# Patient Record
Sex: MALE | Race: WHITE | NOT HISPANIC OR LATINO | URBAN - METROPOLITAN AREA
[De-identification: names, ages, dates, MRNs, and addresses within clinical notes are randomized per-mention and may not be internally consistent; named-entity substitution may affect disease eponyms.]

---

## 2021-04-05 ENCOUNTER — INPATIENT (INPATIENT)
Facility: HOSPITAL | Age: 56
LOS: 24 days | Discharge: NURSING, REHAB OR LONG-TERM CARE | DRG: 4 | End: 2021-04-30
Attending: STUDENT IN AN ORGANIZED HEALTH CARE EDUCATION/TRAINING PROGRAM | Admitting: INTERNAL MEDICINE
Payer: COMMERCIAL

## 2021-04-05 VITALS — HEART RATE: 115 BPM | RESPIRATION RATE: 44 BRPM

## 2021-04-05 LAB
A1C WITH ESTIMATED AVERAGE GLUCOSE RESULT: 6.2 % — HIGH (ref 4–5.6)
ALBUMIN SERPL ELPH-MCNC: 2.8 G/DL — LOW (ref 3.3–5)
ALP SERPL-CCNC: 80 U/L — SIGNIFICANT CHANGE UP (ref 40–120)
ALT FLD-CCNC: 19 U/L — SIGNIFICANT CHANGE UP (ref 10–45)
ANION GAP SERPL CALC-SCNC: 10 MMOL/L — SIGNIFICANT CHANGE UP (ref 5–17)
ANION GAP SERPL CALC-SCNC: 11 MMOL/L — SIGNIFICANT CHANGE UP (ref 5–17)
APTT BLD: 27.1 SEC — LOW (ref 27.5–35.5)
APTT BLD: 27.2 SEC — LOW (ref 27.5–35.5)
AST SERPL-CCNC: 45 U/L — HIGH (ref 10–40)
BASE EXCESS BLDA CALC-SCNC: -0.2 MMOL/L — SIGNIFICANT CHANGE UP (ref -2–3)
BASE EXCESS BLDA CALC-SCNC: -0.8 MMOL/L — SIGNIFICANT CHANGE UP (ref -2–3)
BASE EXCESS BLDA CALC-SCNC: -1.4 MMOL/L — SIGNIFICANT CHANGE UP (ref -2–3)
BASE EXCESS BLDA CALC-SCNC: -1.6 MMOL/L — SIGNIFICANT CHANGE UP (ref -2–3)
BASE EXCESS BLDA CALC-SCNC: 0.1 MMOL/L — SIGNIFICANT CHANGE UP (ref -2–3)
BASE EXCESS BLDV CALC-SCNC: -0.7 MMOL/L — SIGNIFICANT CHANGE UP
BASE EXCESS BLDV CALC-SCNC: -12.2 MMOL/L — SIGNIFICANT CHANGE UP
BASE EXCESS BLDV CALC-SCNC: 0.5 MMOL/L — SIGNIFICANT CHANGE UP
BASOPHILS # BLD AUTO: 0.02 K/UL — SIGNIFICANT CHANGE UP (ref 0–0.2)
BASOPHILS # BLD AUTO: 0.04 K/UL — SIGNIFICANT CHANGE UP (ref 0–0.2)
BASOPHILS NFR BLD AUTO: 0.2 % — SIGNIFICANT CHANGE UP (ref 0–2)
BASOPHILS NFR BLD AUTO: 0.3 % — SIGNIFICANT CHANGE UP (ref 0–2)
BILIRUB SERPL-MCNC: 0.5 MG/DL — SIGNIFICANT CHANGE UP (ref 0.2–1.2)
BUN SERPL-MCNC: 12 MG/DL — SIGNIFICANT CHANGE UP (ref 7–23)
BUN SERPL-MCNC: 14 MG/DL — SIGNIFICANT CHANGE UP (ref 7–23)
BUN SERPL-MCNC: 19 MG/DL — SIGNIFICANT CHANGE UP (ref 7–23)
CA-I SERPL-SCNC: 1.02 MMOL/L — LOW (ref 1.12–1.3)
CA-I SERPL-SCNC: 1.02 MMOL/L — LOW (ref 1.12–1.3)
CALCIUM SERPL-MCNC: 7.6 MG/DL — LOW (ref 8.4–10.5)
CALCIUM SERPL-MCNC: 7.7 MG/DL — LOW (ref 8.4–10.5)
CHLORIDE SERPL-SCNC: 94 MMOL/L — LOW (ref 96–108)
CHLORIDE SERPL-SCNC: 97 MMOL/L — SIGNIFICANT CHANGE UP (ref 96–108)
CK MB CFR SERPL CALC: 8.7 NG/ML — HIGH (ref 0–6.7)
CK MB CFR SERPL CALC: 8.8 NG/ML — HIGH (ref 0–6.7)
CK SERPL-CCNC: 145 U/L — SIGNIFICANT CHANGE UP (ref 30–200)
CK SERPL-CCNC: 203 U/L — HIGH (ref 30–200)
CO2 SERPL-SCNC: 26 MMOL/L — SIGNIFICANT CHANGE UP (ref 22–31)
CO2 SERPL-SCNC: 28 MMOL/L — SIGNIFICANT CHANGE UP (ref 22–31)
CREAT ?TM UR-MCNC: 128 MG/DL — SIGNIFICANT CHANGE UP
CREAT SERPL-MCNC: 1.69 MG/DL — HIGH (ref 0.5–1.3)
CREAT SERPL-MCNC: 2.1 MG/DL — HIGH (ref 0.5–1.3)
D DIMER BLD IA.RAPID-MCNC: 1002 NG/ML DDU — HIGH
EOSINOPHIL # BLD AUTO: 0 K/UL — SIGNIFICANT CHANGE UP (ref 0–0.5)
EOSINOPHIL # BLD AUTO: 0.01 K/UL — SIGNIFICANT CHANGE UP (ref 0–0.5)
EOSINOPHIL NFR BLD AUTO: 0 % — SIGNIFICANT CHANGE UP (ref 0–6)
EOSINOPHIL NFR BLD AUTO: 0.1 % — SIGNIFICANT CHANGE UP (ref 0–6)
ESTIMATED AVERAGE GLUCOSE: 131 MG/DL — HIGH (ref 68–114)
FIBRINOGEN PPP-MCNC: 450 MG/DL — HIGH (ref 258–438)
GAS PNL BLDA: SIGNIFICANT CHANGE UP
GAS PNL BLDV: 130 MMOL/L — LOW (ref 138–146)
GAS PNL BLDV: 130 MMOL/L — LOW (ref 138–146)
GAS PNL BLDV: SIGNIFICANT CHANGE UP
GLUCOSE BLDC GLUCOMTR-MCNC: 239 MG/DL — HIGH (ref 70–99)
GLUCOSE BLDC GLUCOMTR-MCNC: 244 MG/DL — HIGH (ref 70–99)
GLUCOSE BLDC GLUCOMTR-MCNC: 259 MG/DL — HIGH (ref 70–99)
GLUCOSE BLDC GLUCOMTR-MCNC: 266 MG/DL — HIGH (ref 70–99)
GLUCOSE BLDC GLUCOMTR-MCNC: 276 MG/DL — HIGH (ref 70–99)
GLUCOSE BLDC GLUCOMTR-MCNC: 280 MG/DL — HIGH (ref 70–99)
GLUCOSE BLDC GLUCOMTR-MCNC: 289 MG/DL — HIGH (ref 70–99)
GLUCOSE BLDC GLUCOMTR-MCNC: 329 MG/DL — HIGH (ref 70–99)
GLUCOSE SERPL-MCNC: 256 MG/DL — HIGH (ref 70–99)
GLUCOSE SERPL-MCNC: 350 MG/DL — HIGH (ref 70–99)
GRAM STN FLD: SIGNIFICANT CHANGE UP
HCO3 BLDA-SCNC: 25 MMOL/L — SIGNIFICANT CHANGE UP (ref 21–28)
HCO3 BLDA-SCNC: 25 MMOL/L — SIGNIFICANT CHANGE UP (ref 21–28)
HCO3 BLDA-SCNC: 26 MMOL/L — SIGNIFICANT CHANGE UP (ref 21–28)
HCO3 BLDA-SCNC: 26 MMOL/L — SIGNIFICANT CHANGE UP (ref 21–28)
HCO3 BLDA-SCNC: 27 MMOL/L — SIGNIFICANT CHANGE UP (ref 21–28)
HCO3 BLDV-SCNC: 16 MMOL/L — LOW (ref 20–27)
HCO3 BLDV-SCNC: 28 MMOL/L — HIGH (ref 20–27)
HCO3 BLDV-SCNC: 28 MMOL/L — HIGH (ref 20–27)
HCT VFR BLD CALC: 33.3 % — LOW (ref 39–50)
HCT VFR BLD CALC: 38.2 % — LOW (ref 39–50)
HGB BLD-MCNC: 11 G/DL — LOW (ref 13–17)
HGB BLD-MCNC: 9.7 G/DL — LOW (ref 13–17)
IMM GRANULOCYTES NFR BLD AUTO: 1 % — SIGNIFICANT CHANGE UP (ref 0–1.5)
IMM GRANULOCYTES NFR BLD AUTO: 1.1 % — SIGNIFICANT CHANGE UP (ref 0–1.5)
INR BLD: 1.19 — HIGH (ref 0.88–1.16)
INR BLD: 1.25 — HIGH (ref 0.88–1.16)
LACTATE SERPL-SCNC: 1.6 MMOL/L — SIGNIFICANT CHANGE UP (ref 0.5–2)
LACTATE SERPL-SCNC: 12.2 MMOL/L — CRITICAL HIGH (ref 0.5–2)
LACTATE SERPL-SCNC: 2 MMOL/L — SIGNIFICANT CHANGE UP (ref 0.5–2)
LEGIONELLA AG UR QL: NEGATIVE — SIGNIFICANT CHANGE UP
LYMPHOCYTES # BLD AUTO: 0.55 K/UL — LOW (ref 1–3.3)
LYMPHOCYTES # BLD AUTO: 13.1 % — SIGNIFICANT CHANGE UP (ref 13–44)
LYMPHOCYTES # BLD AUTO: 2.07 K/UL — SIGNIFICANT CHANGE UP (ref 1–3.3)
LYMPHOCYTES # BLD AUTO: 4.9 % — LOW (ref 13–44)
MAGNESIUM SERPL-MCNC: 2 MG/DL — SIGNIFICANT CHANGE UP (ref 1.6–2.6)
MCHC RBC-ENTMCNC: 22.4 PG — LOW (ref 27–34)
MCHC RBC-ENTMCNC: 22.8 PG — LOW (ref 27–34)
MCHC RBC-ENTMCNC: 28.8 GM/DL — LOW (ref 32–36)
MCHC RBC-ENTMCNC: 29.1 GM/DL — LOW (ref 32–36)
MCV RBC AUTO: 76.7 FL — LOW (ref 80–100)
MCV RBC AUTO: 79.3 FL — LOW (ref 80–100)
MONOCYTES # BLD AUTO: 0.42 K/UL — SIGNIFICANT CHANGE UP (ref 0–0.9)
MONOCYTES # BLD AUTO: 0.68 K/UL — SIGNIFICANT CHANGE UP (ref 0–0.9)
MONOCYTES NFR BLD AUTO: 3.7 % — SIGNIFICANT CHANGE UP (ref 2–14)
MONOCYTES NFR BLD AUTO: 4.3 % — SIGNIFICANT CHANGE UP (ref 2–14)
MRSA PCR RESULT.: NEGATIVE — SIGNIFICANT CHANGE UP
NEUTROPHILS # BLD AUTO: 10.23 K/UL — HIGH (ref 1.8–7.4)
NEUTROPHILS # BLD AUTO: 12.89 K/UL — HIGH (ref 1.8–7.4)
NEUTROPHILS NFR BLD AUTO: 81.2 % — HIGH (ref 43–77)
NEUTROPHILS NFR BLD AUTO: 90.1 % — HIGH (ref 43–77)
NRBC # BLD: 0 /100 WBCS — SIGNIFICANT CHANGE UP (ref 0–0)
NRBC # BLD: 1 /100 WBCS — HIGH (ref 0–0)
PCO2 BLDA: 51 MMHG — HIGH (ref 35–48)
PCO2 BLDA: 53 MMHG — HIGH (ref 35–48)
PCO2 BLDA: 54 MMHG — HIGH (ref 35–48)
PCO2 BLDA: 57 MMHG — HIGH (ref 35–48)
PCO2 BLDA: 59 MMHG — HIGH (ref 35–48)
PCO2 BLDV: 46 MMHG — SIGNIFICANT CHANGE UP (ref 42–55)
PCO2 BLDV: 58 MMHG — HIGH (ref 42–55)
PCO2 BLDV: 65 MMHG — HIGH (ref 42–55)
PH BLDA: 7.28 — LOW (ref 7.35–7.45)
PH BLDA: 7.28 — LOW (ref 7.35–7.45)
PH BLDA: 7.29 — LOW (ref 7.35–7.45)
PH BLDA: 7.3 — LOW (ref 7.35–7.45)
PH BLDA: 7.33 — LOW (ref 7.35–7.45)
PH BLDV: 7.16 — CRITICAL LOW (ref 7.32–7.43)
PH BLDV: 7.24 — LOW (ref 7.32–7.43)
PH BLDV: 7.3 — LOW (ref 7.32–7.43)
PHOSPHATE SERPL-MCNC: 3.4 MG/DL — SIGNIFICANT CHANGE UP (ref 2.5–4.5)
PLATELET # BLD AUTO: 227 K/UL — SIGNIFICANT CHANGE UP (ref 150–400)
PLATELET # BLD AUTO: 344 K/UL — SIGNIFICANT CHANGE UP (ref 150–400)
PO2 BLDA: 48 MMHG — CRITICAL LOW (ref 83–108)
PO2 BLDA: 54 MMHG — CRITICAL LOW (ref 83–108)
PO2 BLDA: 68 MMHG — LOW (ref 83–108)
PO2 BLDA: 91 MMHG — SIGNIFICANT CHANGE UP (ref 83–108)
PO2 BLDA: 99 MMHG — SIGNIFICANT CHANGE UP (ref 83–108)
PO2 BLDV: 44 MMHG — SIGNIFICANT CHANGE UP
PO2 BLDV: 46 MMHG — SIGNIFICANT CHANGE UP
PO2 BLDV: 54 MMHG — SIGNIFICANT CHANGE UP
POTASSIUM BLDV-SCNC: 3.9 MMOL/L — SIGNIFICANT CHANGE UP (ref 3.5–4.9)
POTASSIUM BLDV-SCNC: 4.1 MMOL/L — SIGNIFICANT CHANGE UP (ref 3.5–4.9)
POTASSIUM SERPL-MCNC: 4.1 MMOL/L — SIGNIFICANT CHANGE UP (ref 3.5–5.3)
POTASSIUM SERPL-MCNC: 4.6 MMOL/L — SIGNIFICANT CHANGE UP (ref 3.5–5.3)
POTASSIUM SERPL-SCNC: 4.1 MMOL/L — SIGNIFICANT CHANGE UP (ref 3.5–5.3)
POTASSIUM SERPL-SCNC: 4.6 MMOL/L — SIGNIFICANT CHANGE UP (ref 3.5–5.3)
PROT SERPL-MCNC: 6.4 G/DL — SIGNIFICANT CHANGE UP (ref 6–8.3)
PROTHROM AB SERPL-ACNC: 14.2 SEC — HIGH (ref 10.6–13.6)
PROTHROM AB SERPL-ACNC: 14.8 SEC — HIGH (ref 10.6–13.6)
RAPID RVP RESULT: DETECTED
RBC # BLD: 4.34 M/UL — SIGNIFICANT CHANGE UP (ref 4.2–5.8)
RBC # BLD: 4.82 M/UL — SIGNIFICANT CHANGE UP (ref 4.2–5.8)
RBC # FLD: 17.1 % — HIGH (ref 10.3–14.5)
RBC # FLD: 17.6 % — HIGH (ref 10.3–14.5)
S AUREUS DNA NOSE QL NAA+PROBE: POSITIVE
S PNEUM AG UR QL: NEGATIVE — SIGNIFICANT CHANGE UP
SAO2 % BLDA: 75 % — LOW (ref 95–100)
SAO2 % BLDA: 81 % — LOW (ref 95–100)
SAO2 % BLDA: 91 % — LOW (ref 95–100)
SAO2 % BLDA: 96 % — SIGNIFICANT CHANGE UP (ref 95–100)
SAO2 % BLDA: 97 % — SIGNIFICANT CHANGE UP (ref 95–100)
SAO2 % BLDV: 70 % — SIGNIFICANT CHANGE UP
SAO2 % BLDV: 73 % — SIGNIFICANT CHANGE UP
SAO2 % BLDV: 74 % — SIGNIFICANT CHANGE UP
SARS-COV-2 RNA SPEC QL NAA+PROBE: DETECTED
SARS-COV-2 RNA SPEC QL NAA+PROBE: DETECTED
SODIUM SERPL-SCNC: 132 MMOL/L — LOW (ref 135–145)
SODIUM SERPL-SCNC: 134 MMOL/L — LOW (ref 135–145)
SODIUM UR-SCNC: 24 MMOL/L — SIGNIFICANT CHANGE UP
SPECIMEN SOURCE: SIGNIFICANT CHANGE UP
TRIGL SERPL-MCNC: 86 MG/DL — SIGNIFICANT CHANGE UP
TROPONIN T SERPL-MCNC: 0.01 NG/ML — SIGNIFICANT CHANGE UP (ref 0–0.01)
TROPONIN T SERPL-MCNC: 0.12 NG/ML — CRITICAL HIGH (ref 0–0.01)
TROPONIN T SERPL-MCNC: 0.19 NG/ML — CRITICAL HIGH (ref 0–0.01)
WBC # BLD: 11.34 K/UL — HIGH (ref 3.8–10.5)
WBC # BLD: 15.85 K/UL — HIGH (ref 3.8–10.5)
WBC # FLD AUTO: 11.34 K/UL — HIGH (ref 3.8–10.5)
WBC # FLD AUTO: 15.85 K/UL — HIGH (ref 3.8–10.5)

## 2021-04-05 PROCEDURE — 99291 CRITICAL CARE FIRST HOUR: CPT

## 2021-04-05 PROCEDURE — 93308 TTE F-UP OR LMTD: CPT | Mod: 26

## 2021-04-05 PROCEDURE — 99291 CRITICAL CARE FIRST HOUR: CPT | Mod: 25

## 2021-04-05 PROCEDURE — 31500 INSERT EMERGENCY AIRWAY: CPT

## 2021-04-05 PROCEDURE — 99254 IP/OBS CNSLTJ NEW/EST MOD 60: CPT

## 2021-04-05 PROCEDURE — 71045 X-RAY EXAM CHEST 1 VIEW: CPT | Mod: 26

## 2021-04-05 RX ORDER — PIPERACILLIN AND TAZOBACTAM 4; .5 G/20ML; G/20ML
3.38 INJECTION, POWDER, LYOPHILIZED, FOR SOLUTION INTRAVENOUS ONCE
Refills: 0 | Status: COMPLETED | OUTPATIENT
Start: 2021-04-05 | End: 2021-04-05

## 2021-04-05 RX ORDER — REMDESIVIR 5 MG/ML
INJECTION INTRAVENOUS
Refills: 0 | Status: DISCONTINUED | OUTPATIENT
Start: 2021-04-05 | End: 2021-04-06

## 2021-04-05 RX ORDER — CISATRACURIUM BESYLATE 2 MG/ML
10 INJECTION INTRAVENOUS ONCE
Refills: 0 | Status: COMPLETED | OUTPATIENT
Start: 2021-04-05 | End: 2021-04-05

## 2021-04-05 RX ORDER — GLUCAGON INJECTION, SOLUTION 0.5 MG/.1ML
1 INJECTION, SOLUTION SUBCUTANEOUS ONCE
Refills: 0 | Status: DISCONTINUED | OUTPATIENT
Start: 2021-04-05 | End: 2021-04-30

## 2021-04-05 RX ORDER — FENTANYL CITRATE 50 UG/ML
50 INJECTION INTRAVENOUS ONCE
Refills: 0 | Status: DISCONTINUED | OUTPATIENT
Start: 2021-04-05 | End: 2021-04-05

## 2021-04-05 RX ORDER — DEXTROSE 50 % IN WATER 50 %
25 SYRINGE (ML) INTRAVENOUS ONCE
Refills: 0 | Status: DISCONTINUED | OUTPATIENT
Start: 2021-04-05 | End: 2021-04-30

## 2021-04-05 RX ORDER — VANCOMYCIN HCL 1 G
1500 VIAL (EA) INTRAVENOUS ONCE
Refills: 0 | Status: COMPLETED | OUTPATIENT
Start: 2021-04-05 | End: 2021-04-05

## 2021-04-05 RX ORDER — CIPROFLOXACIN HCL 0.3 %
1 DROPS OPHTHALMIC (EYE)
Refills: 0 | Status: DISCONTINUED | OUTPATIENT
Start: 2021-04-05 | End: 2021-04-09

## 2021-04-05 RX ORDER — PREDNISOLONE SODIUM PHOSPHATE 1 %
1 DROPS OPHTHALMIC (EYE)
Refills: 0 | Status: DISCONTINUED | OUTPATIENT
Start: 2021-04-05 | End: 2021-04-09

## 2021-04-05 RX ORDER — VANCOMYCIN HCL 1 G
1000 VIAL (EA) INTRAVENOUS ONCE
Refills: 0 | Status: DISCONTINUED | OUTPATIENT
Start: 2021-04-05 | End: 2021-04-05

## 2021-04-05 RX ORDER — PANTOPRAZOLE SODIUM 20 MG/1
40 TABLET, DELAYED RELEASE ORAL DAILY
Refills: 0 | Status: DISCONTINUED | OUTPATIENT
Start: 2021-04-05 | End: 2021-04-20

## 2021-04-05 RX ORDER — PROPOFOL 10 MG/ML
6.94 INJECTION, EMULSION INTRAVENOUS
Qty: 500 | Refills: 0 | Status: DISCONTINUED | OUTPATIENT
Start: 2021-04-05 | End: 2021-04-05

## 2021-04-05 RX ORDER — ETOMIDATE 2 MG/ML
20 INJECTION INTRAVENOUS ONCE
Refills: 0 | Status: COMPLETED | OUTPATIENT
Start: 2021-04-05 | End: 2021-04-05

## 2021-04-05 RX ORDER — CISATRACURIUM BESYLATE 2 MG/ML
3 INJECTION INTRAVENOUS
Qty: 200 | Refills: 0 | Status: DISCONTINUED | OUTPATIENT
Start: 2021-04-05 | End: 2021-04-06

## 2021-04-05 RX ORDER — CHLORHEXIDINE GLUCONATE 213 G/1000ML
1 SOLUTION TOPICAL
Refills: 0 | Status: DISCONTINUED | OUTPATIENT
Start: 2021-04-05 | End: 2021-04-09

## 2021-04-05 RX ORDER — VANCOMYCIN HCL 1 G
1500 VIAL (EA) INTRAVENOUS EVERY 12 HOURS
Refills: 0 | Status: DISCONTINUED | OUTPATIENT
Start: 2021-04-05 | End: 2021-04-05

## 2021-04-05 RX ORDER — KETOROLAC TROMETHAMINE 0.5 %
1 DROPS OPHTHALMIC (EYE)
Refills: 0 | Status: DISCONTINUED | OUTPATIENT
Start: 2021-04-05 | End: 2021-04-09

## 2021-04-05 RX ORDER — AZITHROMYCIN 500 MG/1
500 TABLET, FILM COATED ORAL ONCE
Refills: 0 | Status: DISCONTINUED | OUTPATIENT
Start: 2021-04-05 | End: 2021-04-05

## 2021-04-05 RX ORDER — INSULIN LISPRO 100/ML
VIAL (ML) SUBCUTANEOUS EVERY 6 HOURS
Refills: 0 | Status: DISCONTINUED | OUTPATIENT
Start: 2021-04-05 | End: 2021-04-05

## 2021-04-05 RX ORDER — NOREPINEPHRINE BITARTRATE/D5W 8 MG/250ML
0.02 PLASTIC BAG, INJECTION (ML) INTRAVENOUS
Qty: 8 | Refills: 0 | Status: DISCONTINUED | OUTPATIENT
Start: 2021-04-05 | End: 2021-04-09

## 2021-04-05 RX ORDER — INSULIN HUMAN 100 [IU]/ML
3 INJECTION, SOLUTION SUBCUTANEOUS
Qty: 50 | Refills: 0 | Status: DISCONTINUED | OUTPATIENT
Start: 2021-04-05 | End: 2021-04-06

## 2021-04-05 RX ORDER — SODIUM CHLORIDE 9 MG/ML
1000 INJECTION, SOLUTION INTRAVENOUS
Refills: 0 | Status: DISCONTINUED | OUTPATIENT
Start: 2021-04-05 | End: 2021-04-30

## 2021-04-05 RX ORDER — INSULIN HUMAN 100 [IU]/ML
6 INJECTION, SOLUTION SUBCUTANEOUS ONCE
Refills: 0 | Status: COMPLETED | OUTPATIENT
Start: 2021-04-05 | End: 2021-04-05

## 2021-04-05 RX ORDER — PROPOFOL 10 MG/ML
10 INJECTION, EMULSION INTRAVENOUS
Qty: 1000 | Refills: 0 | Status: DISCONTINUED | OUTPATIENT
Start: 2021-04-05 | End: 2021-04-07

## 2021-04-05 RX ORDER — DEXAMETHASONE 0.5 MG/5ML
6 ELIXIR ORAL ONCE
Refills: 0 | Status: COMPLETED | OUTPATIENT
Start: 2021-04-05 | End: 2021-04-05

## 2021-04-05 RX ORDER — PROPOFOL 10 MG/ML
10 INJECTION, EMULSION INTRAVENOUS
Qty: 1000 | Refills: 0 | Status: DISCONTINUED | OUTPATIENT
Start: 2021-04-05 | End: 2021-04-05

## 2021-04-05 RX ORDER — FENTANYL CITRATE 50 UG/ML
25 INJECTION INTRAVENOUS ONCE
Refills: 0 | Status: DISCONTINUED | OUTPATIENT
Start: 2021-04-05 | End: 2021-04-05

## 2021-04-05 RX ORDER — FENTANYL CITRATE 50 UG/ML
0.5 INJECTION INTRAVENOUS
Qty: 2500 | Refills: 0 | Status: DISCONTINUED | OUTPATIENT
Start: 2021-04-05 | End: 2021-04-07

## 2021-04-05 RX ORDER — DEXTROSE 50 % IN WATER 50 %
15 SYRINGE (ML) INTRAVENOUS ONCE
Refills: 0 | Status: DISCONTINUED | OUTPATIENT
Start: 2021-04-05 | End: 2021-04-30

## 2021-04-05 RX ORDER — AZITHROMYCIN 500 MG/1
500 TABLET, FILM COATED ORAL EVERY 24 HOURS
Refills: 0 | Status: DISCONTINUED | OUTPATIENT
Start: 2021-04-05 | End: 2021-04-05

## 2021-04-05 RX ORDER — PANTOPRAZOLE SODIUM 20 MG/1
40 TABLET, DELAYED RELEASE ORAL DAILY
Refills: 0 | Status: DISCONTINUED | OUTPATIENT
Start: 2021-04-05 | End: 2021-04-05

## 2021-04-05 RX ORDER — CHLORHEXIDINE GLUCONATE 213 G/1000ML
1 SOLUTION TOPICAL
Refills: 0 | Status: DISCONTINUED | OUTPATIENT
Start: 2021-04-05 | End: 2021-04-05

## 2021-04-05 RX ORDER — CEFTRIAXONE 500 MG/1
1000 INJECTION, POWDER, FOR SOLUTION INTRAMUSCULAR; INTRAVENOUS EVERY 24 HOURS
Refills: 0 | Status: DISCONTINUED | OUTPATIENT
Start: 2021-04-05 | End: 2021-04-07

## 2021-04-05 RX ORDER — ENOXAPARIN SODIUM 100 MG/ML
40 INJECTION SUBCUTANEOUS EVERY 12 HOURS
Refills: 0 | Status: DISCONTINUED | OUTPATIENT
Start: 2021-04-05 | End: 2021-04-06

## 2021-04-05 RX ORDER — PIPERACILLIN AND TAZOBACTAM 4; .5 G/20ML; G/20ML
3.38 INJECTION, POWDER, LYOPHILIZED, FOR SOLUTION INTRAVENOUS EVERY 6 HOURS
Refills: 0 | Status: DISCONTINUED | OUTPATIENT
Start: 2021-04-05 | End: 2021-04-05

## 2021-04-05 RX ORDER — SODIUM BICARBONATE 1 MEQ/ML
50 SYRINGE (ML) INTRAVENOUS ONCE
Refills: 0 | Status: COMPLETED | OUTPATIENT
Start: 2021-04-05 | End: 2021-04-05

## 2021-04-05 RX ORDER — DEXAMETHASONE 0.5 MG/5ML
6 ELIXIR ORAL EVERY 24 HOURS
Refills: 0 | Status: DISCONTINUED | OUTPATIENT
Start: 2021-04-06 | End: 2021-04-13

## 2021-04-05 RX ORDER — DEXTROSE 50 % IN WATER 50 %
12.5 SYRINGE (ML) INTRAVENOUS ONCE
Refills: 0 | Status: DISCONTINUED | OUTPATIENT
Start: 2021-04-05 | End: 2021-04-30

## 2021-04-05 RX ORDER — REMDESIVIR 5 MG/ML
200 INJECTION INTRAVENOUS EVERY 24 HOURS
Refills: 0 | Status: COMPLETED | OUTPATIENT
Start: 2021-04-05 | End: 2021-04-05

## 2021-04-05 RX ORDER — REMDESIVIR 5 MG/ML
100 INJECTION INTRAVENOUS EVERY 24 HOURS
Refills: 0 | Status: DISCONTINUED | OUTPATIENT
Start: 2021-04-06 | End: 2021-04-06

## 2021-04-05 RX ORDER — SODIUM CHLORIDE 9 MG/ML
10 INJECTION INTRAMUSCULAR; INTRAVENOUS; SUBCUTANEOUS
Refills: 0 | Status: DISCONTINUED | OUTPATIENT
Start: 2021-04-05 | End: 2021-04-24

## 2021-04-05 RX ORDER — CHLORHEXIDINE GLUCONATE 213 G/1000ML
15 SOLUTION TOPICAL EVERY 12 HOURS
Refills: 0 | Status: DISCONTINUED | OUTPATIENT
Start: 2021-04-05 | End: 2021-04-15

## 2021-04-05 RX ORDER — AZITHROMYCIN 500 MG/1
TABLET, FILM COATED ORAL
Refills: 0 | Status: DISCONTINUED | OUTPATIENT
Start: 2021-04-05 | End: 2021-04-05

## 2021-04-05 RX ORDER — FUROSEMIDE 40 MG
40 TABLET ORAL ONCE
Refills: 0 | Status: COMPLETED | OUTPATIENT
Start: 2021-04-05 | End: 2021-04-05

## 2021-04-05 RX ORDER — INSULIN LISPRO 100/ML
VIAL (ML) SUBCUTANEOUS
Refills: 0 | Status: DISCONTINUED | OUTPATIENT
Start: 2021-04-05 | End: 2021-04-05

## 2021-04-05 RX ORDER — SUCCINYLCHOLINE CHLORIDE 100 MG/5ML
160 SYRINGE (ML) INTRAVENOUS ONCE
Refills: 0 | Status: COMPLETED | OUTPATIENT
Start: 2021-04-05 | End: 2021-04-05

## 2021-04-05 RX ORDER — PANTOPRAZOLE SODIUM 20 MG/1
40 TABLET, DELAYED RELEASE ORAL
Refills: 0 | Status: DISCONTINUED | OUTPATIENT
Start: 2021-04-05 | End: 2021-04-05

## 2021-04-05 RX ORDER — CISATRACURIUM BESYLATE 2 MG/ML
20 INJECTION INTRAVENOUS ONCE
Refills: 0 | Status: COMPLETED | OUTPATIENT
Start: 2021-04-05 | End: 2021-04-05

## 2021-04-05 RX ADMIN — Medication 1 DROP(S): at 23:31

## 2021-04-05 RX ADMIN — CEFTRIAXONE 100 MILLIGRAM(S): 500 INJECTION, POWDER, FOR SOLUTION INTRAMUSCULAR; INTRAVENOUS at 18:12

## 2021-04-05 RX ADMIN — FENTANYL CITRATE 50 MICROGRAM(S): 50 INJECTION INTRAVENOUS at 06:52

## 2021-04-05 RX ADMIN — Medication 6 MILLIGRAM(S): at 06:30

## 2021-04-05 RX ADMIN — FENTANYL CITRATE 25 MICROGRAM(S): 50 INJECTION INTRAVENOUS at 11:32

## 2021-04-05 RX ADMIN — PIPERACILLIN AND TAZOBACTAM 200 GRAM(S): 4; .5 INJECTION, POWDER, LYOPHILIZED, FOR SOLUTION INTRAVENOUS at 07:10

## 2021-04-05 RX ADMIN — PROPOFOL 7.2 MICROGRAM(S)/KG/MIN: 10 INJECTION, EMULSION INTRAVENOUS at 18:45

## 2021-04-05 RX ADMIN — CISATRACURIUM BESYLATE 21.6 MICROGRAM(S)/KG/MIN: 2 INJECTION INTRAVENOUS at 08:45

## 2021-04-05 RX ADMIN — Medication 1 APPLICATION(S): at 18:12

## 2021-04-05 RX ADMIN — Medication 1 DROP(S): at 23:36

## 2021-04-05 RX ADMIN — CISATRACURIUM BESYLATE 21.6 MICROGRAM(S)/KG/MIN: 2 INJECTION INTRAVENOUS at 18:13

## 2021-04-05 RX ADMIN — PROPOFOL 7.2 MICROGRAM(S)/KG/MIN: 10 INJECTION, EMULSION INTRAVENOUS at 11:57

## 2021-04-05 RX ADMIN — Medication 1 DROP(S): at 23:30

## 2021-04-05 RX ADMIN — Medication 300 MILLIGRAM(S): at 07:56

## 2021-04-05 RX ADMIN — Medication 160 MILLIGRAM(S): at 06:38

## 2021-04-05 RX ADMIN — PROPOFOL 7.2 MICROGRAM(S)/KG/MIN: 10 INJECTION, EMULSION INTRAVENOUS at 07:33

## 2021-04-05 RX ADMIN — PIPERACILLIN AND TAZOBACTAM 200 GRAM(S): 4; .5 INJECTION, POWDER, LYOPHILIZED, FOR SOLUTION INTRAVENOUS at 12:43

## 2021-04-05 RX ADMIN — INSULIN HUMAN 6 UNIT(S): 100 INJECTION, SOLUTION SUBCUTANEOUS at 16:47

## 2021-04-05 RX ADMIN — INSULIN HUMAN 3 UNIT(S)/HR: 100 INJECTION, SOLUTION SUBCUTANEOUS at 23:34

## 2021-04-05 RX ADMIN — Medication 40 MILLIGRAM(S): at 06:52

## 2021-04-05 RX ADMIN — PROPOFOL 7.2 MICROGRAM(S)/KG/MIN: 10 INJECTION, EMULSION INTRAVENOUS at 15:08

## 2021-04-05 RX ADMIN — Medication 1 APPLICATION(S): at 12:39

## 2021-04-05 RX ADMIN — CISATRACURIUM BESYLATE 20 MILLIGRAM(S): 2 INJECTION INTRAVENOUS at 07:52

## 2021-04-05 RX ADMIN — PROPOFOL 7.2 MICROGRAM(S)/KG/MIN: 10 INJECTION, EMULSION INTRAVENOUS at 22:20

## 2021-04-05 RX ADMIN — REMDESIVIR 200 MILLIGRAM(S): 5 INJECTION INTRAVENOUS at 11:17

## 2021-04-05 RX ADMIN — FENTANYL CITRATE 50 MICROGRAM(S): 50 INJECTION INTRAVENOUS at 11:32

## 2021-04-05 RX ADMIN — ENOXAPARIN SODIUM 40 MILLIGRAM(S): 100 INJECTION SUBCUTANEOUS at 11:56

## 2021-04-05 RX ADMIN — CISATRACURIUM BESYLATE 10 MILLIGRAM(S): 2 INJECTION INTRAVENOUS at 14:25

## 2021-04-05 RX ADMIN — PANTOPRAZOLE SODIUM 40 MILLIGRAM(S): 20 TABLET, DELAYED RELEASE ORAL at 12:43

## 2021-04-05 RX ADMIN — CHLORHEXIDINE GLUCONATE 15 MILLILITER(S): 213 SOLUTION TOPICAL at 18:12

## 2021-04-05 RX ADMIN — AZITHROMYCIN 255 MILLIGRAM(S): 500 TABLET, FILM COATED ORAL at 16:23

## 2021-04-05 RX ADMIN — FENTANYL CITRATE 6 MICROGRAM(S)/KG/HR: 50 INJECTION INTRAVENOUS at 09:00

## 2021-04-05 RX ADMIN — Medication 1 APPLICATION(S): at 23:36

## 2021-04-05 RX ADMIN — CISATRACURIUM BESYLATE 21.6 MICROGRAM(S)/KG/MIN: 2 INJECTION INTRAVENOUS at 16:06

## 2021-04-05 RX ADMIN — INSULIN HUMAN 3 UNIT(S)/HR: 100 INJECTION, SOLUTION SUBCUTANEOUS at 17:13

## 2021-04-05 RX ADMIN — ETOMIDATE 20 MILLIGRAM(S): 2 INJECTION INTRAVENOUS at 06:37

## 2021-04-05 RX ADMIN — FENTANYL CITRATE 25 MICROGRAM(S): 50 INJECTION INTRAVENOUS at 07:06

## 2021-04-05 RX ADMIN — SODIUM CHLORIDE 10 MILLILITER(S): 9 INJECTION INTRAMUSCULAR; INTRAVENOUS; SUBCUTANEOUS at 12:45

## 2021-04-05 RX ADMIN — ENOXAPARIN SODIUM 40 MILLIGRAM(S): 100 INJECTION SUBCUTANEOUS at 21:22

## 2021-04-05 RX ADMIN — Medication 4.5 MICROGRAM(S)/KG/MIN: at 11:36

## 2021-04-05 RX ADMIN — Medication 50 MILLIEQUIVALENT(S): at 07:09

## 2021-04-05 RX ADMIN — FENTANYL CITRATE 50 MICROGRAM(S): 50 INJECTION INTRAVENOUS at 06:42

## 2021-04-05 RX ADMIN — Medication 8: at 12:01

## 2021-04-05 NOTE — ED ADULT TRIAGE NOTE - CHIEF COMPLAINT QUOTE
Pt ambulated in with wife, co SOB for a couple of days, worsening since yesterday. Pt presents tachypneic and cyanotic. Assisted to roneyus, Dr. Rodriguez aware and bedside.

## 2021-04-05 NOTE — CONSULT NOTE ADULT - ASSESSMENT
Mr. Paul Bosch is a 56M with a PMHx of gout and MU who presented to ED for SOB, intubated in ED for acute hypoxic respiratory failure likely 2/2 covid.    Neuro  Currently intubated and sedated  - c/w sedation with propofol and fentanyl given pt intubated and paralyzed    Cardiovascular  #Shock state  Likely distributive 2/2 sedation  - c/w levophed as needed to maintain MAP >65 Mr. Paul Bosch is a 56M with a PMHx of gout and MU who presented to ED for SOB, intubated in ED for acute hypoxic respiratory failure likely 2/2 covid.    Neuro  Currently intubated and sedated  - c/w sedation with propofol and fentanyl given pt intubated and paralyzed    Cardiovascular  #Shock state  Likely distributive 2/2 sedation  - c/w levophed as needed to maintain MAP >65    #Lactic acidosis  Lactate 12.2 on arrival. Likely multifactorial in setting of increased work of breathing and possible sepsis.  - trend to clearance    Pulmonary  #Acute hypoxic respiratory failure  Pt with progressive dyspnea over the course of two days. Noted to be hypoxic and tachypneic in ED. Initially put on NRB, then subsequently intubated. Hypoxia likely 2/2 ARDS 2/2 covid. Possible superimposed bacterial PNA vs PE contributing to hypoxia.  - c/w mechanical ventilation for oxygen support  - consider CT chest when pt clinically stable  - trend P/F ratio, prone for P/F <150    #MU  W/ home CPAP  - CPAP at night when extubated    GI  - no active issue/intervention    Renal/electrolytes  #Elevated creatinine  Unknown baseline. Possible pre-renal etiology vs ATN  - obtain urine lytes  - trend BMP  - avoid nephrotoxic medications    #AGMA  Likely 2/2 elevated lactate  - trend BMP    ID  #Covid  Pt covid positive. CXR with diffuse bilateral infiltrates. Inflammatory markers elevated.  - c/w dexamethasone and remdesivir  - trend inflammatory markers  - c/w mechanical ventilation for oxygen support    #Severe sepsis  At this time, cannot rule out superimposed bacterial pneumonia given severity of onset. Lactate 12.2 on arrival. Procalcitonin 0.17.  - c/w vanc/zosyn  - f/u blood culture  - f/u urine culture  - tylenol as needed for fever    Heme/onc  #Anemia  Unknown baseline. No evidence of bleed.  - trend CBC  - maintain active T+S  - transfuse for Hb <7    Disposition: COVID ICU    Case discussed with critical care fellow and attending Dr. Ramos

## 2021-04-05 NOTE — CONSULT NOTE ADULT - ATTENDING COMMENTS
Initial attending contact date 4/5/21     . See fellow note written above for details. I reviewed the fellow documentation. I have personally seen and examined this patient. I reviewed vitals, labs, medications, cardiac studies, and additional imaging. I agree with the above fellow's findings and plans as written above with the following additions/statements.    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 which has now been exacerbated in setting of COVID PNA  -Will need repeat ECHO post extubation to re-eval EF, if still depressed with wall motion abn will pursue ischemic work up  -Agree with diuresis for now and continuing with current management with Levo as mixed venous and clinical picture does not correlate with cardiogenic shock at this time

## 2021-04-05 NOTE — H&P ADULT - NSHPREVIEWOFSYSTEMS_GEN_ALL_CORE
As per HPI, unable to obtain at time of this documentation 2/2 ETT and sedation    General: +fatigue, no fever or chills  HEENT:  unable to assess   Respiratory: +SOB, DAVIS  CV: no palpitations, no chest pain  GI: no N/V/D, no abdominal pain  : no urinary frequency, urgency, dysuria, hematuria  Skin: no rashes, itching, dryness  MSK: +myalgia  Endocrine: unable to assess   Neurologic: unable to assess

## 2021-04-05 NOTE — ED PROVIDER NOTE - EKG ADDITIONAL INFORMATION FREE TEXT
45 min crit care time--critically ill pt with hypoxia, resp failure, requiring intubation, airway monitoring sedation

## 2021-04-05 NOTE — CONSULT NOTE ADULT - SUBJECTIVE AND OBJECTIVE BOX
CRITICAL CARE CONSULT NOTE    HPI:  Mr. Paul Bosch is a 56M with a PMHx of gout and MU who presented to ED for SOB. Pt's wife assists with history. Pt had been complaining of fatigue and malaise for two days with progressive shortness of breath. The night before admission, pt attempted to use his CPAP to relieve his symptoms, but his dyspnea progressed. On the morning of admission, he ambulated to the ED. Per the wife, pt did not recently have fever, chills, chest pain, nausea, vomiting, diarrhea, sick contacts, or recent travel. Pt received covid vaccine 3/25/21. Per ED provider, on arrival to ED pt was tachypneic, hypoxic, was placed on nonrebreather and then subsequently intubated.    VS in ED: , /105 -> 211/127, RR 44, SpO2 51% on NRB  Labs significant for: WBC 15.85, Hb 11.0. MCV 38.2, D-dimer 1002, fibrinogen 450, Na 131, Cl 91, CO2 14, anion gap 26, Cr 1.58, calcium 8.3, AST 57, .4, lactate 12.2,   VBG pH 7.16, pCO2 46, pO2 1.02, HCO3 16, covid-positive.   CXR diffuse bilateral infiltrates    PAST MEDICAL & SURGICAL HISTORY:  Gout  MU  Cataract surgery  Hip replacements      FAMILY HISTORY:  Unknown      SOCIAL HISTORY:  Denies tobacco or illicit drug use.  Social alcohol use.  Self-employed      HOME MEDICATIONS:  Colchicine      ALLERGIES:  No Known Allergies      Vital Signs Last 24 Hrs  T(C): 36.8 (04 Mar 2021 22:20), Max: 36.8 (04 Mar 2021 22:20)  T(F): 98.3 (04 Mar 2021 22:20), Max: 98.3 (04 Mar 2021 22:20)  HR: 127 (05 Mar 2021 00:53) (118 - 143)  BP: 133/79 (05 Mar 2021 00:53) (131/79 - 141/85)  BP(mean): --  RR: 16 (05 Mar 2021 00:53) (16 - 20)  SpO2: 98% (05 Mar 2021 00:53) (98% - 100%)      PHYSICAL EXAM:  Constitutional: Adult Male, overweight, intubated/sedated  EENT: PERRL, anicteric sclera; oropharynx clear, ETT in place  Neck: supple, no appreciable JVD  Respiratory: Diffuse crackles in all lung fields  Cardiovascular: +S1/S2, RRR  Gastrointestinal: soft, NT/ND; +BSx4  : alas in place draining clear yellow urine  Extremities: WWP; no edema, clubbing or cyanosis  Vascular: 2+ radial, DP/PT pulses B/L  Neurological: AAOx0      LABS:  CBC Full  -  ( 04 Mar 2021 22:43 )  WBC Count : 12.80 K/uL  RBC Count : 3.91 M/uL  Hemoglobin : 10.6 g/dL  Hematocrit : 33.6 %  Platelet Count - Automated : 467 K/uL  Mean Cell Volume : 85.9 fl  Mean Cell Hemoglobin : 27.1 pg  Mean Cell Hemoglobin Concentration : 31.5 gm/dL  Auto Neutrophil # : 9.57 K/uL  Auto Lymphocyte # : 2.06 K/uL  Auto Monocyte # : 0.92 K/uL  Auto Eosinophil # : 0.14 K/uL  Auto Basophil # : 0.06 K/uL  Auto Neutrophil % : 74.7 %  Auto Lymphocyte % : 16.1 %  Auto Monocyte % : 7.2 %  Auto Eosinophil % : 1.1 %  Auto Basophil % : 0.5 %    03-04    134<L>  |  97  |  10  ----------------------------<  138<H>  3.0<L>   |  25  |  0.51    Ca    9.0      04 Mar 2021 22:43    TPro  7.4  /  Alb  3.6  /  TBili  0.2  /  DBili  x   /  AST  14  /  ALT  10  /  AlkPhos  120  03-04      RADIOLOGY & ADDITIONAL STUDIES:

## 2021-04-05 NOTE — ED PROVIDER NOTE - PROGRESS NOTE DETAILS
pt with initial sats 12%  after he walked into ED- on NRB  sats 50%  pt intubated due to resp failure/ hypoxia FS  190  no hx DM

## 2021-04-05 NOTE — CONSULT NOTE ADULT - ATTENDING COMMENTS
Pt seen upon arrival to ICu with acuña. Profound hypoxemia seems acute and pt did receive 40mg Lasix initially with possible pulm edema contributing to hypoxemia and infiltrats. ECHO requested and EF15%. Cardiac enzymes neg and initial EKG reported sinus tach. repeat EKG and cardiac enzymes now. Cardiology called and will give additional diuretics. SBP is in 90's on low dose levo which started after sedation. HR 70. Warm on exam and lactate cleared.  Continue remdesivir and decadron and pt proned. Continue CAP coverage for now. Pt seen upon arrival to ICu with acuña. Profound hypoxemia seems acute and pt did receive 40mg Lasix initially with possible pulm edema contributing to hypoxemia and infiltrats. ECHO requested and EF15%. Cardiac enzymes neg and initial EKG reported sinus tach. repeat EKG and cardiac enzymes now. Cardiology called and will give additional diuretics. SBP is in 90's on low dose levo which started after sedation. HR 70. Warm on exam and lactate cleared.  Continue remdesivir and decadron and pt proned. Continue CAP coverage for now. Pt recruited on PEEP 14 and  and RR 28 with PH 7.3.

## 2021-04-05 NOTE — CHART NOTE - NSCHARTNOTEFT_GEN_A_CORE
@ approximately 1730, I spoke to Karissa (pt.'s spouse) to try and gain some insight into the patient's ADLs and current level of functioning. She states that prior to the onset of current symptoms, patient was independent and active despite being unemployed. 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 She has not noticed a change in activity level or DAVIS. She states he even recently walked "many" blocks to his appointments with no complaints.    He does use 2-3 pillows at night and this has not been a new finding for him as he does have a hx of MU.    He has not had any unilateral or bilateral pedal edema or swelling/pain in the calves. She does report hearing him c/o feet pain but he does have a hx of gout (and hasn't had any flare ups recently/he takes colchicine and allopurinol).    When asked if he has been keeping up with his PCP appointments, she stated that the last time he went to the doctor was for clearance for his B/L cataract surgery. The lab work was OK and she thinks the patient had an EKG done with no acute findings (apparently he has had issues in the past with EKGs because of his "hairy chest") @ approximately 1730, I spoke to Karissa (pt.'s spouse) to try and gain some insight into the patient's ADLs and current level of functioning. She states that prior to the onset of current symptoms, patient was independent and active despite being unemployed. 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 She has not noticed a change in activity level or DAVIS. She states he even recently walked "many" blocks to his appointments with no complaints.    He does use 2-3 pillows at night and this has not been a new finding for him as he does have a hx of MU.    He has not had any unilateral or bilateral pedal edema or swelling/pain in the calves. She does report hearing him c/o feet pain but he does have a hx of gout (and hasn't had any flare ups recently/he takes colchicine and allopurinol).    When asked if he has been keeping up with his PCP appointments, she stated that the last time he went to the doctor was for clearance for his B/L cataract surgery in March. The lab work was OK and she thinks the patient had an EKG done with no acute findings (apparently he has had issues in the past with EKGs because of his "hairy chest")

## 2021-04-05 NOTE — ED ADULT NURSE REASSESSMENT NOTE - NS ED NURSE REASSESS COMMENT FT1
patient walked to the ED in resp distress IV access achieved, labs drawn, patient sedated, intubated, 5/8  tube, 23lip line.  ICU consulted

## 2021-04-05 NOTE — ED PROCEDURE NOTE - CPROC ED TRACHE INTUB DETAIL1
Patient was pre-oxygenated. An endotracheal tube (ETT) was placed through the vocal cords into the trachea.  ETT position was confirmed by auscultation of bilateral breath sounds to all lung fields. ETCO2 level was appropriate./Difficult/crash intubation (see additional details section)./Patient connected to ventilator with settings as ordered./During intubation, applied gentle pressure to the cricoid cartilage.

## 2021-04-05 NOTE — H&P ADULT - NSHPSOCIALHISTORY_GEN_ALL_CORE
patient is , lives with wife, is self-employed; as per wife, never smoker and drinks wine occasionally

## 2021-04-05 NOTE — ED ADULT NURSE NOTE - OBJECTIVE STATEMENT
Pt ambulated in with wife, co SOB for a couple of days, worsening since yesterday. Pt presents tachypneic and cyanotic. Assisted to resus, Dr. Rodriguez aware and bedside.  Moderna vaccine 1st dose received 3/25/21

## 2021-04-05 NOTE — AIRWAY PLACEMENT NOTE ADULT - POST AIRWAY PLACEMENT ASSESSMENT:
breath sounds bilateral/positive end tidal CO2 noted/CXR pending/chest excursion noted/skin color improved

## 2021-04-05 NOTE — ED PROVIDER NOTE - CLINICAL SUMMARY MEDICAL DECISION MAKING FREE TEXT BOX
resp failure  hypoxia ? Covid   req intubation 57 yo male with hx MU  on arrival RR 50s  sats 12%  RSI intubation 8 ETT  CXR  + interstitial edema white out ARDS picture cw Covid-19 - pt had 1st moderna vaccine 11 days ago--noyed Peak airway pressure in the 40-50 range - dw ICU - will hold IVF for now  in light of CXR  noted- acidosis elev BG  ? DKA  in differential 55 yo male with hx MU  on arrival RR 50s  sats 12%  RSI intubation 8 ETT  CXR  + interstitial edema white out ARDS picture cw Covid-19 - pt had 1st moderna vaccine 11 days ago--noted Peak airway pressure in the 40-50 range - dw ICU - will hold IVF for now  in light of CXR  showing ARDS ? CHF / unclear inf infiltrates - acidosis elev BG  ? DKA  in differential

## 2021-04-05 NOTE — H&P ADULT - ASSESSMENT
Assessment and Plan   Patient is a 57 yo M with PMHx of MU and gout who presented to ED with c/o progressively worsening SOB now admitted to ICU for management of AHRF in the setting of COViD    NEURO  Sedation:   --[ X ] propofol and fentanyl for rass -4     Paralytic:   --[ X ] nimbex, paralyzed for vent synchrony  Neuro checks per ICU protocol, pain/sedation meds assessed and addressed, will titrate down as able to maintain vent synchrony    RESPIRATORY  [ X ] Acute respiratory failure with: [ X ] hypoxemia   /   [ X ] hypercapnia  [ X  ] ARDS with  bilateral ground glass opacities on CXR  -COVID19 confirmed  Will prone patient    Daily vent settings / pressures:  Mechanical Ventilation:  Mode: AC/ CMV (Assist Control/ Continuous Mandatory Ventilation)  RR (machine): 28  TV (machine): 450  FiO2: 100  PEEP: 14    (04-05-21 @ 11:10): 7.33 / 68 (PaO2) / 51 / 26  (04-05-21 @ 09:32): 7.28 / 54 (PaO2) / 59 / 27  (04-05-21 @ 08:23): 7.30 / 48 (PaO2) / 53 / 25      CARDIOVASCULAR  Septic/Vasodilatory shock  --[ X ] norepinephrine infusion  -- Wean vasopressors as tolerated for MAP goal >65  --TTE ordered to evaluate heart function  -POCUS exam with dilated RV and evidence of pulmHTN      RENAL  --[ X ] GRACE current BUN 14 /Cr 1.69 (12/1.58 on ED labs)      ID  --Remdesivir started today 4/5  --s/p Dexamethasone 6 mg in ED, will continue for 10 day course  --Zosyn and vanco for broad spectrum coverage  -Blood cultures x 2 sets drawn in ED  -Urine cx obtained in ED  -Urine leionella and strep ordered  -Sputum cx sent     Procalcitonin:  0.17 (04-05)    GI  --[place] sump NGT placed for decompression  --[ X ] pantoprazole for GI prophylaxis  --AST 57    ENDOCRINE  -q6hr correctional scale ordered to monitor for stress/steroid induced hyperglycemia  -Decadron started 4/5 AM    HEME  --DVT ppx with Lovenox BID    LINES: L IJ TLC (inserted in ED by fellow), L radial [place] (inserted on 3Wo by fellow)    DISPO/GOALS OF CARE:  Patient requires continuous monitoring with bedside rhythm monitoring, arterial line, pulse oximetry, ventilator monitoring and intermittent blood gas analysis. Care plan discussed with ICU care team. Patient remains critical at this time.   Assessment and Plan   Patient is a 55 yo M with PMHx of MU and gout who presented to ED with c/o progressively worsening SOB now admitted to ICU for management of AHRF in the setting of COViD    NEURO  #Sedation:   --[ X ] propofol and fentanyl for rass -4   -Will maintain sedated and paralyzed for vent synchrony    #Paralytic:   --[ X ] nimbex, paralyzed for vent synchrony  Neuro checks per ICU protocol, pain/sedation meds assessed and addressed, will titrate down as able to maintain vent synchrony    RESPIRATORY  [ X ] Acute respiratory failure with: [ X ] hypoxemia  /   [ X ] hypercapnia  [ X  ] ARDS with  bilateral ground glass opacities on CXR  -COVID19 confirmed  Will prone patient    Daily vent settings / pressures:  Mechanical Ventilation:  Mode: AC/ CMV (Assist Control/ Continuous Mandatory Ventilation)  RR (machine): 28  TV (machine): 450  FiO2: 100  PEEP: 14    ABG trend  (04-05-21 @ 11:10): 7.33 / 68 (PaO2) / 51 / 26  (04-05-21 @ 09:32): 7.28 / 54 (PaO2) / 59 / 27  (04-05-21 @ 08:23): 7.30 / 48 (PaO2) / 53 / 25    CARDIOVASCULAR  Septic/Vasodilatory shock  --[ X ] norepinephrine infusion  -- Wean vasopressors as tolerated for MAP goal >65  --TTE ordered to evaluate heart function  -POCUS exam with dilated RV and evidence of pulmHTN    RENAL  --[ X ] GRACE current BUN 14 /Cr 1.69 (12/1.58 on ED labs)    ID  --Remdesivir started today 4/5  --s/p Dexamethasone 6 mg in ED, will continue for 10 day course  --Zosyn and vanco for broad spectrum coverage  -Blood cultures x 2 sets drawn in ED  -Urine cx obtained in ED  -Urine leionella and strep ordered  -Sputum cx sent     Procalcitonin:  0.17 (04-05)    GI  --[place] sump NGT placed for decompression  --[ X ] pantoprazole for GI prophylaxis  --AST 57    ENDOCRINE  -q6hr correctional scale ordered to monitor for stress/steroid induced hyperglycemia  -Decadron started 4/5 AM    HEME  --DVT ppx with Lovenox BID    LINES: L IJ TLC (inserted in ED by fellow), L radial [place] (inserted on 3Wo by fellow)    DISPO/GOALS OF CARE:  Patient requires continuous monitoring with bedside rhythm monitoring, arterial line, pulse oximetry, ventilator monitoring and intermittent blood gas analysis. Care plan discussed with ICU care team. Patient remains critical at this time.   Assessment and Plan   Patient is a 55 yo M with PMHx of MU and gout who presented to ED with c/o progressively worsening SOB now admitted to ICU for management of AHRF in the setting of COViD    NEURO  #Sedated   -propofol and fentanyl for rass -4   -Will maintain sedated and paralyzed for vent synchrony    #Paralytic:   -nimbex, paralyzed for vent synchrony  Neuro checks per ICU protocol, pain/sedation meds assessed and addressed, will titrate down as able to maintain vent synchrony    RESPIRATORY  #Acute hypoxic hypercapneic respiratory failure in the setting of COViD PNA  -Patient initially presented with O2 saturation of 14% and cyanotic requiring immediate intubation  -CXr with diffuse bilateral opacities with + COVID19 PCR  -Will prone patient  -On AC/VC mechanical Ventilation:  Mode: AC/ CMV; RR: 28; TV: 450; FiO2: 100; PEEP: 14  -Initial ABG (04-05-21 @ 08:23): 7.30 / 48 (PaO2) / 53 / 25  -Will Wean FiO2 as tolerated    #MU  -As per patient's wife, patient has MU on CPAP  -Will obtain collateral on current management and compliance with CPAP    CARDIOVASCULAR  #Severe Sepsis  -Patient presents with tachycardia, tachypnea with a pulmonary process, lactate 12; meeting criteria for severe sepsis; currently on exam, patient well perfused with adequate pulses, adequate UO, unable to assess mentation at this time  -Source likely COVID19 PNA with possible superimposed bacterial PNA  -Broad spectrum abx started, vanco and zosyn ordered  -Decadron and remdesivir for COVID19  -F/U blood, sputum and urine cultures  -F/U MRSA/MSSA PCR  -F/U repeat lactate  -Will hold off on fluid resuscitation due to high-risk for pulm edema  -Maintain MAP > 65 mmHg  -Monitor urine output    #RV dilation  -Bedside POCUS demonstrating dilated RV and mildly enlarged pulm artery possibly 2/2 MU vs PE (unlikely)  -Formal TTE ordered to evaluate heart function    #RVdilation  -Bedside POCUS demonstrated enlarged   -Bedside ECHO ordered    RENAL  --[ X ] GRACE current BUN 14 /Cr 1.69 (12/1.58 on ED labs)    ID  --Remdesivir started today 4/5  --s/p Dexamethasone 6 mg in ED, will continue for 10 day course  --Zosyn and vanco for broad spectrum coverage  -Blood cultures x 2 sets drawn in ED  -Urine cx obtained in ED  -Urine leionella and strep ordered  -Sputum cx sent     Procalcitonin:  0.17 (04-05)    GI  --[place] sump NGT placed for decompression  --[ X ] pantoprazole for GI prophylaxis  --AST 57    ENDOCRINE  -q6hr correctional scale ordered to monitor for stress/steroid induced hyperglycemia  -Decadron started 4/5 AM    HEME  --DVT ppx with Lovenox BID    LINES: L IJ TLC (inserted in ED by fellow), L radial [place] (inserted on 3Wo by fellow)    DISPO/GOALS OF CARE:  Patient requires continuous monitoring with bedside rhythm monitoring, arterial line, pulse oximetry, ventilator monitoring and intermittent blood gas analysis. Care plan discussed with ICU care team. Patient remains critical at this time.   Assessment and Plan   Patient is a 57 yo M with PMHx of MU and gout who presented to ED with c/o progressively worsening SOB now admitted to ICU for management of AHRF in the setting of COViD    NEURO  #Sedated   -propofol and fentanyl for rass -4   -Will maintain sedated and paralyzed for vent synchrony    #Paralytic:   -nimbex, paralyzed for vent synchrony  Neuro checks per ICU protocol, pain/sedation meds assessed and addressed, will titrate down as able to maintain vent synchrony    RESPIRATORY  #Acute hypoxic hypercapneic respiratory failure in the setting of COViD PNA  -Patient initially presented with O2 saturation of 14% and cyanotic requiring immediate intubation  -CXr with diffuse bilateral opacities with + COVID19 PCR  -Will prone patient  -On AC/VC mechanical Ventilation:  Mode: AC/ CMV; RR: 28; TV: 450; FiO2: 100; PEEP: 14  -Initial ABG (04-05-21 @ 08:23): 7.30 / 48 (PaO2) / 53 / 25  -Will Wean FiO2 as tolerated    #MU  -As per patient's wife, patient has MU on CPAP  -Will obtain collateral on current management and compliance with CPAP    CARDIOVASCULAR  #Severe Sepsis  -Patient presents with tachycardia, tachypnea with a pulmonary process, lactate 12; meeting criteria for severe sepsis; currently on exam, patient well perfused with adequate pulses, adequate UO, unable to assess mentation at this time  -Source likely COVID19 PNA with possible superimposed bacterial PNA  -Broad spectrum abx started, vanco and zosyn ordered  -Decadron and remdesivir for COVID19  -F/U blood, sputum and urine cultures  -F/U MRSA/MSSA PCR  -F/U repeat lactate  -Will hold off on fluid resuscitation due to high-risk for pulm edema  -Maintain MAP > 65 mmHg  -Monitor urine output    #RV dilation  -Bedside POCUS demonstrating dilated RV and mildly enlarged pulm artery possibly 2/2 MU vs PE (unlikely)  -Formal TTE ordered to evaluate heart function    ---------  RENAL  --[ X ] GRACE current BUN 14 /Cr 1.69 (12/1.58 on ED labs)    ID  --Remdesivir started today 4/5  --s/p Dexamethasone 6 mg in ED, will continue for 10 day course  --Zosyn and vanco for broad spectrum coverage  -Blood cultures x 2 sets drawn in ED  -Urine cx obtained in ED  -Urine leionella and strep ordered  -Sputum cx sent     Procalcitonin:  0.17 (04-05)    GI  --[place] sump NGT placed for decompression  --[ X ] pantoprazole for GI prophylaxis  --AST 57    ENDOCRINE  -q6hr correctional scale ordered to monitor for stress/steroid induced hyperglycemia  -Decadron started 4/5 AM    HEME  --DVT ppx with Lovenox BID    LINES: L IJ TLC (inserted in ED by fellow), L radial [place]anna (inserted on 3Wo by fellow)    DISPO/GOALS OF CARE:  Patient requires continuous monitoring with bedside rhythm monitoring, arterial line, pulse oximetry, ventilator monitoring and intermittent blood gas analysis. Care plan discussed with ICU care team. Patient remains critical at this time.   Assessment and Plan   Patient is a 55 yo M with PMHx of MU and gout who presented to ED with c/o progressively worsening SOB now admitted to ICU for management of AHRF in the setting of COViD    NEURO  #Sedated   -propofol and fentanyl for rass -4   -Will maintain sedated and paralyzed for vent synchrony    #Paralytic:   -nimbex, paralyzed for vent synchrony  Neuro checks per ICU protocol, pain/sedation meds assessed and addressed, will titrate down as able to maintain vent synchrony    RESPIRATORY  #Acute hypoxic hypercapneic respiratory failure in the setting of COViD PNA  -Patient initially presented with O2 saturation of 14% and cyanotic requiring immediate intubation  -CXr with diffuse bilateral opacities with + COVID19 PCR  -Will prone patient  -On AC/VC mechanical Ventilation:  Mode: AC/ CMV; RR: 28; TV: 450; FiO2: 100; PEEP: 14  -Initial ABG (04-05-21 @ 08:23): 7.30 / 48 (PaO2) / 53 / 25  -Will Wean FiO2 as tolerated    #MU  -As per patient's wife, patient has MU on CPAP  -Will obtain collateral on current management and compliance with CPAP    CARDIOVASCULAR  #Severe Sepsis  -Patient presents with tachycardia, tachypnea with a pulmonary process, lactate 12; meeting criteria for severe sepsis; currently on exam, patient well perfused with adequate pulses, adequate UO, unable to assess mentation at this time  -Source likely COVID19 PNA with possible superimposed bacterial PNA  -Broad spectrum abx started, vanco and zosyn ordered  -Decadron and remdesivir started today for COVID19  -F/U blood, sputum and urine cultures  -F/U MRSA/MSSA PCR  -F/U repeat lactate  -Will hold off on fluid resuscitation due to high-risk for pulm edema  -Maintain MAP > 65 mmHg  -Monitor urine output    #RV dilation  -Bedside POCUS demonstrating dilated RV and mildly enlarged pulm artery possibly 2/2 MU vs PE (unlikely)  -Formal TTE ordered to evaluate heart function    GI  -No active issues  -Will maintain NGT for gastric decompression  -Protonix IVP for GI ppx    RENAL//F&E  #Acute Kidney Injury  -Likely 2/2 sepsis  -Current BUN 14 /Cr 1.69 (12/1.58 on ED labs)  -Strict I&O monitoring    ID  #COVID PNA   -Plan as above    #Severe sepsis  -Plan as above    Procalcitonin: 0.17 (04-05)    ENDOCRINE  #Hyperglycemia  -q6hr correctional scale ordered to monitor for stress/steroid induced hyperglycemia  -Decadron started 4/5 AM    HEME  -No active issues  -DVT ppx with Lovenox BID    LINES: L IJ TLC (inserted in ED by fellow), L radial [place] (inserted on 3Wo by fellow)    DISPO/GOALS OF CARE:  Patient requires continuous monitoring with bedside rhythm monitoring, arterial line, pulse oximetry, ventilator monitoring and intermittent blood gas analysis. Care plan discussed with ICU care team. Patient remains critical at this time.   Assessment and Plan   Patient is a 57 yo M with PMHx of MU and gout who presented to ED with c/o progressively worsening SOB now admitted to ICU for management of AHRF in the setting of COViD    NEURO  #Sedated   -propofol and fentanyl for rass -4   -Will maintain sedated and paralyzed for vent synchrony    #Paralytic:   -nimbex, paralyzed for vent synchrony  Neuro checks per ICU protocol, pain/sedation meds assessed and addressed, will titrate down as able to maintain vent synchrony    RESPIRATORY  #Acute hypoxic hypercapneic respiratory failure in the setting of COViD PNA  -Patient initially presented with O2 saturation of 14% and cyanotic requiring immediate intubation  -CXr with diffuse bilateral opacities with + COVID19 PCR  -Elevated inflammatory markers  -Will prone patient  -On AC/VC mechanical Ventilation:  Mode: AC/ CMV; RR: 28; TV: 450; FiO2: 100; PEEP: 14  -Initial ABG (04-05-21 @ 08:23): 7.30 / 48 (PaO2) / 53 / 25  -Will Wean FiO2 as tolerated    #COViD  -CXr with diffuse bilateral opacities with + COVID19 PCR  -Will prone patient, P/F ratio < 150  -Will trend inflammatory markers  -Start Date 4/5 Remdesivir (x 6 day course) and Decadron 6 mg (x 10 day course)     #MU  -As per patient's wife, patient has MU on CPAP  -Will obtain collateral on current management and compliance with CPAP    CARDIOVASCULAR  #Severe Sepsis  -Patient presents with tachycardia, tachypnea with a pulmonary process, lactate 12, WBC 15.85, PCT 0.17; meeting criteria for severe sepsis; currently on exam, patient well perfused with adequate pulses, adequate UO, unable to assess mentation at this time  -Source likely COVID19 PNA with possible superimposed bacterial PNA  -Broad spectrum abx started, vanco and zosyn started 4/5 AM  -Decadron and remdesivir started today for COVID19  -F/U blood, sputum and urine cultures  -F/U urine legionella and strep  -F/U MRSA/MSSA PCR  -F/U repeat lactate  -Will hold off on fluid resuscitation due to high-risk for pulm edema  -Maintain MAP > 65 mmHg  -Monitor urine output    #RV dilation  -Bedside POCUS demonstrating dilated RV and mildly enlarged pulm artery possibly 2/2 MU vs PE (unlikely)  -Formal TTE ordered to evaluate heart function    GI  -No active issues  -Will maintain NGT for gastric decompression  -Protonix IVP for GI ppx    RENAL//F&E  #Acute Kidney Injury  DDx: pre-renal vs ATN  -f/u urine lytes to discern etiology   -Current BUN 14 /Cr 1.69 (12/1.58 on ED labs) s/p Lasix 40 mg IVP in ED   -Baseline creatinine unknown, no reported kidney disease  -Adequate urine output   -Electrolytes stable  -Strict I&O monitoring  -Avoid nephrotoxic medications    #Anion Gap Metabolic Acidosis  -Likely 2/2 elevated lactate  -With appropriate compensation  -Trend lactate    ID  #COVID PNA   -Plan as above    #  -Plan as above    ENDOCRINE  #Hyperglycemia  -q6hr correctional scale ordered to monitor for stress/steroid induced hyperglycemia  -Decadron started 4/5 AM    HEME  #Anemia  -Hgb 11; unknown baseline. No evidence of bleed.  -Trend CBC  -Maintain active T+S  -Transfuse for Hb <7    -DVT ppx with Lovenox BID    DISPO/GOALS OF CARE:  Patient requires continuous monitoring with bedside rhythm monitoring, arterial line, pulse oximetry, ventilator monitoring and intermittent blood gas analysis. Care plan discussed with ICU care team. Patient remains critical at this time.   Assessment and Plan   Patient is a 55 yo M with PMHx of MU and gout who presented to ED with c/o progressively worsening SOB now admitted to ICU for management of AHRF in the setting of COViD    NEURO  #Sedated   -propofol and fentanyl for rass -4   -Will maintain sedated and paralyzed for vent synchrony    #Paralytic:   -nimbex, paralyzed for vent synchrony  Neuro checks per ICU protocol, pain/sedation meds assessed and addressed, will titrate down as able to maintain vent synchrony    RESPIRATORY  #Acute hypoxic hypercapneic respiratory failure in the setting of COViD PNA  -Patient initially presented with O2 saturation of 14% and cyanotic requiring immediate intubation  -CXr with diffuse bilateral opacities with + COVID19 PCR  -Elevated inflammatory markers  -Will prone patient  -On AC/VC mechanical Ventilation:  Mode: AC/ CMV; RR: 28; TV: 450; FiO2: 100; PEEP: 14  -Initial ABG (04-05-21 @ 08:23): 7.30 / 48 (PaO2) / 53 / 25  -Will Wean FiO2 as tolerated    #COViD  -CXr with diffuse bilateral opacities with + COVID19 PCR  -Will prone patient, P/F ratio < 150  -Will trend inflammatory markers  -Start Date 4/5 Remdesivir (x 6 day course) and Decadron 6 mg (x 10 day course)     #MU  -As per patient's wife, patient has MU on CPAP  -Will obtain collateral on current management and compliance with CPAP    CARDIOVASCULAR  #Severe Sepsis  -Patient presents with tachycardia, tachypnea with a pulmonary process, lactate 12, WBC 15.85, PCT 0.17; meeting criteria for severe sepsis; currently on exam, patient well perfused with adequate pulses, adequate UO, unable to assess mentation at this time  -Source likely COVID19 PNA with possible superimposed bacterial PNA  -Broad spectrum abx started, vanco and zosyn started 4/5 AM  -Decadron and remdesivir started today for COVID19  -F/U blood, sputum and urine cultures  -F/U urine legionella and strep  -F/U MRSA/MSSA PCR  -F/U repeat lactate  -Will hold off on fluid resuscitation due to high-risk for pulm edema  -Maintain MAP > 65 mmHg  -Monitor urine output    #RV dilation  -Bedside POCUS demonstrating dilated RV and mildly enlarged pulm artery possibly 2/2 MU vs PE (unlikely)  -Formal TTE ordered to evaluate heart function  ****ADDENDUM:  -Echo: Severe hypokinesis of the entire anteroseptum/inferoseptum. Akinesis of the mid to apical anterolateral region, apical anterior, apical inferior and true apex. Mild hypokinesis of the basal anterolateral, basal anterior and basal inferior regions. The estimated left ventricular systolic function is 15%. The right ventricle is mildly dilated. Right ventricular systolic function is mildly reduced. No pericardial effusion is seen.  -Based on these findings, cardiology was consulted  -Concern for COViD myocarditis vs pulmonary edema vs demand ischemia 2/2 sepsis  -lactate 12 in ED, interstitial changes bilaterally on CXr,   -EKG ordered  -Cardiac enzymes will be trended  -Will obtain collateral from spouse     GI  -No active issues  -Will maintain NGT for gastric decompression  -Protonix IVP for GI ppx    RENAL//F&E  #Acute Kidney Injury  DDx: pre-renal vs ATN  -f/u urine lytes to discern etiology   -Current BUN 14 /Cr 1.69 (12/1.58 on ED labs) s/p Lasix 40 mg IVP in ED   -Baseline creatinine unknown, no reported kidney disease  -Adequate urine output   -Electrolytes stable  -Strict I&O monitoring  -Avoid nephrotoxic medications    #Anion Gap Metabolic Acidosis  -Likely 2/2 elevated lactate  -With appropriate compensation  -Trend lactate    ID  #COVID PNA   -Plan as above    #  -Plan as above    ENDOCRINE  #Hyperglycemia  -q6hr correctional scale ordered to monitor for stress/steroid induced hyperglycemia  -Decadron started 4/5 AM    HEME  #Anemia  -Hgb 11; unknown baseline. No evidence of bleed.  -Trend CBC  -Maintain active T+S  -Transfuse for Hb <7    -DVT ppx with Lovenox BID    DISPO/GOALS OF CARE:  Patient requires continuous monitoring with bedside rhythm monitoring, arterial line, pulse oximetry, ventilator monitoring and intermittent blood gas analysis. Care plan discussed with ICU care team. Patient remains critical at this time.

## 2021-04-05 NOTE — ED PROVIDER NOTE - OBJECTIVE STATEMENT
55 yo male with hx MU on CPAP- got 1st Moderna vaccine -March 25 with sob x 1 day -  no cough or fevers or chills - wife had a cough as well  no lower ext edema  no hx of DVT or PE - no hx of MI, CAD, or CVA   no hx CKD or HTN -  became more sob last night- unable to take a deep breath per wife 55 yo male with hx MU on CPAP- got 1st Moderna vaccine -March 25 with sob x 1 day -  no cough or fevers or chills - wife had a cough as well  no lower ext edema  no hx of DVT or PE - no hx of MI ,DM, CAD, or CVA   no hx CKD or HTN -  became more sob last night- unable to take a deep breath per wife

## 2021-04-05 NOTE — CONSULT NOTE ADULT - ASSESSMENT
57 yo M with a PMHx of gout and MU (on home CPAP) who presented to ED for progressive worsening SOB of 2 days of duration, found to be in profound hypoxic respiratory Failure requiring intubation, paralysis and proning with suspicion for COVID PNA with workup further revealing remarkably reduced LV systolic Function with regional wall motion abnormalities in the setting of troponemia for which cardiology is being consulted.    1) Hypoxic Respiratory Failure likely 2/2 COVID PNA in patient with markedly reduced LV systolic Function  -Patient with EF of 15  on Echo (No known prior) with regional wall motion abnormalities. Echo showing Severe hypokinesis of the entire anteroseptum/inferoseptu as well as Akinesis of the mid to apical anterolateral region, apical anterior, apical inferior and true apex. Pt aslso with Mild hypokinesis of the basal anterolateral, basal anterior and basal inferior regions.  -Echo reviewed. Images are poor due to body habitus but suggest a stress cardiomyopathy pattern rather than an ischemic event  -EKG reviewed showing NSR, with non specific ST changes and poor R wave progression in the precordial leads likely due to body habitus  -Labs show ongoing troponemia in setting of renal dysfunction.  -Clinically patient is warm and well perfused and making urine. He is s/p 40 IV Lasix x1 with 900 output. Mixed venous show O2 sat of 70, making concurrent cardiogenic shock less likely.  -Patient's LV dysfunction appears to be mutifactorial: COVID, Morbid Obesity, Sleep Apnea. Myocarditis is also a possibility  -Unfortunately patient is unable to undergo further ischmic or confirmatory imaging/workup  -Would hold off inotropic support for now as cardiogenic markers within normal range. Can continue low dose levo for now to keep MAP of 65 and above  -Given patient lasix naive would given 20 IV BID of lasix starting tomorrow. Pt s/p 40 IV lasix x1 today in the ER with good response  -Hold off BB or ACE/ARB therapy while on pressors with Renal impairement  -Please continue to trend CE to peak with CK and CKMB  -Please obtain daily EKgs  -Please send TSH, A1c, and Lipid Panel  -Please keep K>4 and Mg>2  -Cardiology will continue to follow, please call with any questions

## 2021-04-05 NOTE — H&P ADULT - NSHPLABSRESULTS_GEN_ALL_CORE
9.7    11.34 )-----------( 227      ( 05 Apr 2021 10:49 )             33.3     04-05    132<L>  |  94<L>  |  14  ----------------------------<  350<H>  4.6   |  28  |  1.69<H>    Ca    7.6<L>      05 Apr 2021 10:49  Phos  3.4     04-05  Mg     2.0     04-05    TPro  6.4  /  Alb  2.8<L>  /  TBili  0.5  /  DBili  x   /  AST  45<H>  /  ALT  19  /  AlkPhos  80  04-05    PT/INR - ( 05 Apr 2021 10:49 )   PT: 14.8 sec;   INR: 1.25          PTT - ( 05 Apr 2021 10:49 )  PTT:27.1 sec  ABG - ( 05 Apr 2021 11:10 )  pH, Arterial: 7.33  pH, Blood: x     /  pCO2: 51    /  pO2: 68    / HCO3: 26    / Base Excess: 0.1   /  SaO2: 91    proBNP 929  [id number]    .4  D-dimer 1002  Ferritin 158  PCT 0.17  Fibrinogen 450      CXr with diffuse bilateral patchy infiltrates consistent with ARDS  EKG in ED with sinus tach, no EKG changes    Urinalysis (04.05.21 @ 07:34)   Glucose Qualitative, Urine: NEGATIVE   Blood, Urine: Small   pH Urine: 6.0   Color: Yellow   Urine Appearance: Clear   Bilirubin: Negative   Ketone - Urine: 40 mg/dL   Specific Gravity: 1.025   Protein, Urine: 100 mg/dL   Urobilinogen: 0.2 E.U./dL   Nitrite: NEGATIVE   Leukocyte Esterase Concentration: NEGATIVE

## 2021-04-05 NOTE — CONSULT NOTE ADULT - SUBJECTIVE AND OBJECTIVE BOX
Patient is a 57 yo M with a PMHx of gout and MU (on home CPAP) who presented to ED for progressive worsening SOB of 2 days of duration, found to be in profound hypoxic respiratory Failure requiring intubation, paralysis and proning with suspicion for COVID PNA with workup further revealing remarkably reduced LV systolic Function with regional wall motion abnormalities in the setting of troponemia for which cardiology is being consulted.    Patient seen and examined at bedside. He is intubated limiting ROS and history. Per wife, patient had been in his usual state of health until 2 days prior to admission when his SOB got worse and prompted use of the CPAP machine without relief at which point they came to the ER. Upon arrival to the ER patient emergently intubated for hypoxia.      PAST MEDICAL & SURGICAL HISTORY:  Sleep apnea    Home Medications:  allopurinol:  (2021 18:19)  colchicine:  (2021 11:55)      MEDICATIONS  (STANDING):  cefTRIAXone   IVPB 1000 milliGRAM(s) IV Intermittent every 24 hours  chlorhexidine 0.12% Liquid 15 milliLiter(s) Oral Mucosa every 12 hours  chlorhexidine 4% Liquid 1 Application(s) Topical <User Schedule>  ciprofloxacin  0.3% Ophthalmic Solution 1 Drop(s) Left EYE four times a day  cisatracurium Infusion 3 MICROgram(s)/kG/Min (21.6 mL/Hr) IV Continuous <Continuous>  dextrose 40% Gel 15 Gram(s) Oral once  dextrose 5%. 1000 milliLiter(s) (50 mL/Hr) IV Continuous <Continuous>  dextrose 5%. 1000 milliLiter(s) (100 mL/Hr) IV Continuous <Continuous>  dextrose 50% Injectable 25 Gram(s) IV Push once  dextrose 50% Injectable 12.5 Gram(s) IV Push once  dextrose 50% Injectable 25 Gram(s) IV Push once  enoxaparin Injectable 40 milliGRAM(s) SubCutaneous every 12 hours  fentaNYL   Infusion. 0.5 MICROgram(s)/kG/Hr (6 mL/Hr) IV Continuous <Continuous>  glucagon  Injectable 1 milliGRAM(s) IntraMuscular once  insulin regular Infusion 3 Unit(s)/Hr (3 mL/Hr) IV Continuous <Continuous>  ketorolac 0.5% Ophthalmic Solution 1 Drop(s) Left EYE four times a day  norepinephrine Infusion 0.02 MICROgram(s)/kG/Min (4.5 mL/Hr) IV Continuous <Continuous>  pantoprazole  Injectable 40 milliGRAM(s) IV Push daily  petrolatum Ophthalmic Ointment 1 Application(s) Both EYES every 6 hours  prednisoLONE acetate 1% Suspension 1 Drop(s) Left EYE four times a day  propofol Infusion 10 MICROgram(s)/kG/Min (7.2 mL/Hr) IV Continuous <Continuous>  remdesivir  IVPB   IV Intermittent     MEDICATIONS  (PRN):  sodium chloride 0.9% lock flush 10 milliLiter(s) IV Push every 1 hour PRN Pre/post blood products, medications, blood draw, and to maintain line patency      .  VITAL SIGNS:  T(C): 37.6 (21 @ 13:00), Max: 37.6 (21 @ 13:00)  T(F): 99.7 (21 @ 13:00), Max: 99.7 (21 @ 13:00)  HR: 64 (21 @ 19:18) (64 - 130)  BP: 93/60 (21 @ 13:00) (93/60 - 211/127)  BP(mean): 72 (21 @ 13:00) (72 - 72)  RR: 28 (21 @ 19:18) (0 - 44)  SpO2: 94% (21 @ 19:18) (51% - 94%)  Wt(kg): --    PHYSICAL EXAM:    Constitutional: Proned, tintubated, sedated  Head: NC/AT  Neck: LIJ in place  Respiratory: Decrease BS troughout with fine cracles and Ronchi  Cardiac: +S1/S2; RRR; no M/R/G;   Gastrointestinal: Currently proned  Extremities: WWP,  no peripheral edema  Vascular: 2+ radial, DP/PT pulses B/L      .  LABS:                         9.7    11.34 )-----------( 227      ( 2021 10:49 )             33.3     04    132<L>  |  94<L>  |  14  ----------------------------<  350<H>  4.6   |  28  |  1.69<H>    Ca    7.6<L>      2021 10:49  Phos  3.4     04-05  Mg     2.0     04-05    TPro  6.4  /  Alb  2.8<L>  /  TBili  0.5  /  DBili  x   /  AST  45<H>  /  ALT  19  /  AlkPhos  80  04-05    PT/INR - ( 2021 10:49 )   PT: 14.8 sec;   INR: 1.25          PTT - ( 2021 10:49 )  PTT:27.1 sec  Urinalysis Basic - ( 2021 07:34 )    Color: Yellow / Appearance: Clear / S.025 / pH: x  Gluc: x / Ketone: 40 mg/dL  / Bili: Negative / Urobili: 0.2 E.U./dL   Blood: x / Protein: 100 mg/dL / Nitrite: NEGATIVE   Leuk Esterase: NEGATIVE / RBC: < 5 /HPF / WBC < 5 /HPF   Sq Epi: x / Non Sq Epi: 5-10 /HPF / Bacteria: Present /HPF      CARDIAC MARKERS ( 2021 15:23 )  x     / 0.19 ng/mL / 203 U/L / x     / 8.7 ng/mL  CARDIAC MARKERS ( 2021 06:50 )  x     / 0.01 ng/mL / 266 U/L / x     / x            Lactate, Blood: 2.0 mmol/L ( @ 12:40)      RADIOLOGY, EKG & ADDITIONAL TESTS: Reviewed.     < from: TTE Echo Complete w/o Contrast w/ Doppler (21 @ 12:46) >  . Limited study obtained for evaluation of left ventricular function.   2. Severe hypokinesis of the entire anteroseptum/inferoseptum. Akinesis of the mid to apical anterolateral region, apical anterior, apical inferior and true apex. Mild hypokinesis of the basal anterolateral, basal anterior and basal inferior regions. The estimated left ventricular systolic function is 15%.   3. The right ventricle is mildly dilated. Right ventricular systolic function is mildly reduced.   4. No pericardial effusion is seen.   5. No prior echo is availablefor comparison.    < end of copied text >  < from: TTE Echo Complete w/o Contrast w/ Doppler (21 @ 12:46) >  ft Ventricle:  Severe hypokinesis of the entire anteroseptum/inferoseptum. Akinesis of the mid to apical anterolateral region, apical anterior, apical inferior and true apex. Mild hypokinesis of the basal anterolateral, basal anterior and basal inferior regions. The estimated left ventricular systolic function is 15%.    < end of copied text >

## 2021-04-05 NOTE — H&P ADULT - NSHPPHYSICALEXAM_GEN_ALL_CORE
GENERAL: ill-appearing, obese, intubated and sedated/paralyzed   SKIN/HAIR/NAILS: Skin warm and clammy. Nails without clubbing or cyanosis. CR<2 secs. No lesions, rash, petechiae, erythema or ecchymoses. Anicteric.   HEAD AND NECK: The skull is NC/AT. B/L Sclera white. PERRL. Nasal mucous membrane with no erythema or drainage. Trachea midline, neck supple.   THORAX/LUNGS: Thorax is symmetric with good expansion, assisted by mechanical ventilation. Rhonchi B/L, diminished throughout.   CARDIOVASCULAR: No JVD. Carotid upstrokes are brisk, without bruits. +S1 and S2, RRR; no extra heart sounds or M/R/G.  ABDOMEN/: Abdomen is distended with +BS x 4. It is soft, no palpable masses; Lechuga catheter in place.  PERIPHERAL VASCULAR: Extremities are warm, radial and dorsalis pedis pulses +1 and symmetric. No clubbing, no cyanosis.  MUSCULOSKELETAL: No active ROM at this time. No evidence of swelling or deformity.  NEUROLOGICAL: unable to assess, sedated and intubated/rx paralysis    LINES (DATES):  L IJ inserted in ED by fellow GENERAL: ill-appearing, obese, intubated and sedated/paralyzed   SKIN/HAIR/NAILS: Skin warm and clammy. Nails without clubbing or cyanosis. CR<2 secs. No lesions, rash, petechiae, erythema or ecchymoses. Anicteric.   HEAD AND NECK: The skull is NC/AT. B/L Sclera white. PERRL. Nasal mucous membrane with no erythema or drainage. Trachea midline, neck supple.   THORAX/LUNGS: Thorax is symmetric with good expansion, assisted by mechanical ventilation. Rhonchi B/L, diminished throughout.   CARDIOVASCULAR: No JVD. Carotid upstrokes are brisk, without bruits. +S1 and S2, RRR; no extra heart sounds or M/R/G.  ABDOMEN/: Abdomen is distended with +BS x 4. It is soft, no palpable masses; Lechuga catheter in place.  PERIPHERAL VASCULAR: Extremities are warm, radial and dorsalis pedis pulses +1 and symmetric. No clubbing, no cyanosis.  MUSCULOSKELETAL: No active ROM at this time. No evidence of swelling or deformity.  NEUROLOGICAL: unable to assess, sedated and intubated/rx paralysis    LINES (DATES):  L IJ inserted in ED by fellow, L radial art line

## 2021-04-06 LAB
ALBUMIN SERPL ELPH-MCNC: 2.6 G/DL — LOW (ref 3.3–5)
ALBUMIN SERPL ELPH-MCNC: 2.6 G/DL — LOW (ref 3.3–5)
ALBUMIN SERPL ELPH-MCNC: 2.7 G/DL — LOW (ref 3.3–5)
ALP SERPL-CCNC: 76 U/L — SIGNIFICANT CHANGE UP (ref 40–120)
ALP SERPL-CCNC: 78 U/L — SIGNIFICANT CHANGE UP (ref 40–120)
ALP SERPL-CCNC: 80 U/L — SIGNIFICANT CHANGE UP (ref 40–120)
ALT FLD-CCNC: 18 U/L — SIGNIFICANT CHANGE UP (ref 10–45)
ALT FLD-CCNC: 21 U/L — SIGNIFICANT CHANGE UP (ref 10–45)
ALT FLD-CCNC: 22 U/L — SIGNIFICANT CHANGE UP (ref 10–45)
ANION GAP SERPL CALC-SCNC: 10 MMOL/L — SIGNIFICANT CHANGE UP (ref 5–17)
ANION GAP SERPL CALC-SCNC: 10 MMOL/L — SIGNIFICANT CHANGE UP (ref 5–17)
ANION GAP SERPL CALC-SCNC: 9 MMOL/L — SIGNIFICANT CHANGE UP (ref 5–17)
APTT BLD: 28.4 SEC — SIGNIFICANT CHANGE UP (ref 27.5–35.5)
AST SERPL-CCNC: 37 U/L — SIGNIFICANT CHANGE UP (ref 10–40)
AST SERPL-CCNC: 39 U/L — SIGNIFICANT CHANGE UP (ref 10–40)
AST SERPL-CCNC: 40 U/L — SIGNIFICANT CHANGE UP (ref 10–40)
BASE EXCESS BLDA CALC-SCNC: 1 MMOL/L — SIGNIFICANT CHANGE UP (ref -2–3)
BASE EXCESS BLDA CALC-SCNC: 1.4 MMOL/L — SIGNIFICANT CHANGE UP (ref -2–3)
BASE EXCESS BLDA CALC-SCNC: 1.5 MMOL/L — SIGNIFICANT CHANGE UP (ref -2–3)
BASE EXCESS BLDV CALC-SCNC: 1.3 MMOL/L — SIGNIFICANT CHANGE UP
BASOPHILS # BLD AUTO: 0.02 K/UL — SIGNIFICANT CHANGE UP (ref 0–0.2)
BASOPHILS NFR BLD AUTO: 0.2 % — SIGNIFICANT CHANGE UP (ref 0–2)
BILIRUB SERPL-MCNC: 0.3 MG/DL — SIGNIFICANT CHANGE UP (ref 0.2–1.2)
BLD GP AB SCN SERPL QL: NEGATIVE — SIGNIFICANT CHANGE UP
BUN SERPL-MCNC: 21 MG/DL — SIGNIFICANT CHANGE UP (ref 7–23)
BUN SERPL-MCNC: 24 MG/DL — HIGH (ref 7–23)
BUN SERPL-MCNC: 28 MG/DL — HIGH (ref 7–23)
CALCIUM SERPL-MCNC: 7.6 MG/DL — LOW (ref 8.4–10.5)
CALCIUM SERPL-MCNC: 7.7 MG/DL — LOW (ref 8.4–10.5)
CALCIUM SERPL-MCNC: 7.9 MG/DL — LOW (ref 8.4–10.5)
CHLORIDE SERPL-SCNC: 101 MMOL/L — SIGNIFICANT CHANGE UP (ref 96–108)
CHLORIDE SERPL-SCNC: 97 MMOL/L — SIGNIFICANT CHANGE UP (ref 96–108)
CHLORIDE SERPL-SCNC: 98 MMOL/L — SIGNIFICANT CHANGE UP (ref 96–108)
CHOLEST SERPL-MCNC: 94 MG/DL — SIGNIFICANT CHANGE UP
CO2 SERPL-SCNC: 27 MMOL/L — SIGNIFICANT CHANGE UP (ref 22–31)
CO2 SERPL-SCNC: 27 MMOL/L — SIGNIFICANT CHANGE UP (ref 22–31)
CO2 SERPL-SCNC: 28 MMOL/L — SIGNIFICANT CHANGE UP (ref 22–31)
COVID-19 SPIKE DOMAIN AB INTERP: POSITIVE
COVID-19 SPIKE DOMAIN ANTIBODY RESULT: 10.5 U/ML — HIGH
CREAT ?TM UR-MCNC: 102 MG/DL — SIGNIFICANT CHANGE UP
CREAT SERPL-MCNC: 2.14 MG/DL — HIGH (ref 0.5–1.3)
CREAT SERPL-MCNC: 2.15 MG/DL — HIGH (ref 0.5–1.3)
CREAT SERPL-MCNC: 2.33 MG/DL — HIGH (ref 0.5–1.3)
CRP SERPL-MCNC: 291.6 MG/L — HIGH (ref 0–4)
CULTURE RESULTS: NO GROWTH — SIGNIFICANT CHANGE UP
D DIMER BLD IA.RAPID-MCNC: 614 NG/ML DDU — HIGH
EOSINOPHIL # BLD AUTO: 0 K/UL — SIGNIFICANT CHANGE UP (ref 0–0.5)
EOSINOPHIL NFR BLD AUTO: 0 % — SIGNIFICANT CHANGE UP (ref 0–6)
FERRITIN SERPL-MCNC: 166 NG/ML — SIGNIFICANT CHANGE UP (ref 30–400)
GAS PNL BLDA: SIGNIFICANT CHANGE UP
GAS PNL BLDV: SIGNIFICANT CHANGE UP
GLUCOSE BLDC GLUCOMTR-MCNC: 132 MG/DL — HIGH (ref 70–99)
GLUCOSE BLDC GLUCOMTR-MCNC: 149 MG/DL — HIGH (ref 70–99)
GLUCOSE BLDC GLUCOMTR-MCNC: 152 MG/DL — HIGH (ref 70–99)
GLUCOSE BLDC GLUCOMTR-MCNC: 155 MG/DL — HIGH (ref 70–99)
GLUCOSE BLDC GLUCOMTR-MCNC: 172 MG/DL — HIGH (ref 70–99)
GLUCOSE BLDC GLUCOMTR-MCNC: 177 MG/DL — HIGH (ref 70–99)
GLUCOSE BLDC GLUCOMTR-MCNC: 178 MG/DL — HIGH (ref 70–99)
GLUCOSE BLDC GLUCOMTR-MCNC: 179 MG/DL — HIGH (ref 70–99)
GLUCOSE BLDC GLUCOMTR-MCNC: 202 MG/DL — HIGH (ref 70–99)
GLUCOSE BLDC GLUCOMTR-MCNC: 208 MG/DL — HIGH (ref 70–99)
GLUCOSE BLDC GLUCOMTR-MCNC: 213 MG/DL — HIGH (ref 70–99)
GLUCOSE BLDC GLUCOMTR-MCNC: 243 MG/DL — HIGH (ref 70–99)
GLUCOSE BLDC GLUCOMTR-MCNC: 259 MG/DL — HIGH (ref 70–99)
GLUCOSE SERPL-MCNC: 181 MG/DL — HIGH (ref 70–99)
GLUCOSE SERPL-MCNC: 193 MG/DL — HIGH (ref 70–99)
GLUCOSE SERPL-MCNC: 261 MG/DL — HIGH (ref 70–99)
HCO3 BLDA-SCNC: 27 MMOL/L — SIGNIFICANT CHANGE UP (ref 21–28)
HCO3 BLDA-SCNC: 28 MMOL/L — SIGNIFICANT CHANGE UP (ref 21–28)
HCO3 BLDA-SCNC: 28 MMOL/L — SIGNIFICANT CHANGE UP (ref 21–28)
HCO3 BLDV-SCNC: 29 MMOL/L — HIGH (ref 20–27)
HCT VFR BLD CALC: 32 % — LOW (ref 39–50)
HDLC SERPL-MCNC: 28 MG/DL — LOW
HGB BLD-MCNC: 9.6 G/DL — LOW (ref 13–17)
IMM GRANULOCYTES NFR BLD AUTO: 1 % — SIGNIFICANT CHANGE UP (ref 0–1.5)
INR BLD: 1.04 — SIGNIFICANT CHANGE UP (ref 0.88–1.16)
LIPID PNL WITH DIRECT LDL SERPL: 43 MG/DL — SIGNIFICANT CHANGE UP
LYMPHOCYTES # BLD AUTO: 0.74 K/UL — LOW (ref 1–3.3)
LYMPHOCYTES # BLD AUTO: 5.8 % — LOW (ref 13–44)
MAGNESIUM SERPL-MCNC: 2.5 MG/DL — SIGNIFICANT CHANGE UP (ref 1.6–2.6)
MCHC RBC-ENTMCNC: 22.5 PG — LOW (ref 27–34)
MCHC RBC-ENTMCNC: 30 GM/DL — LOW (ref 32–36)
MCV RBC AUTO: 75.1 FL — LOW (ref 80–100)
MONOCYTES # BLD AUTO: 0.49 K/UL — SIGNIFICANT CHANGE UP (ref 0–0.9)
MONOCYTES NFR BLD AUTO: 3.9 % — SIGNIFICANT CHANGE UP (ref 2–14)
NEUTROPHILS # BLD AUTO: 11.32 K/UL — HIGH (ref 1.8–7.4)
NEUTROPHILS NFR BLD AUTO: 89.1 % — HIGH (ref 43–77)
NON HDL CHOLESTEROL: 66 MG/DL — SIGNIFICANT CHANGE UP
NRBC # BLD: 0 /100 WBCS — SIGNIFICANT CHANGE UP (ref 0–0)
PCO2 BLDA: 49 MMHG — HIGH (ref 35–48)
PCO2 BLDA: 51 MMHG — HIGH (ref 35–48)
PCO2 BLDA: 55 MMHG — HIGH (ref 35–48)
PCO2 BLDV: 60 MMHG — HIGH (ref 42–55)
PH BLDA: 7.32 — LOW (ref 7.35–7.45)
PH BLDA: 7.36 — SIGNIFICANT CHANGE UP (ref 7.35–7.45)
PH BLDA: 7.36 — SIGNIFICANT CHANGE UP (ref 7.35–7.45)
PH BLDV: 7.3 — LOW (ref 7.32–7.43)
PHOSPHATE SERPL-MCNC: 2.8 MG/DL — SIGNIFICANT CHANGE UP (ref 2.5–4.5)
PLATELET # BLD AUTO: 261 K/UL — SIGNIFICANT CHANGE UP (ref 150–400)
PO2 BLDA: 102 MMHG — SIGNIFICANT CHANGE UP (ref 83–108)
PO2 BLDA: 104 MMHG — SIGNIFICANT CHANGE UP (ref 83–108)
PO2 BLDA: 86 MMHG — SIGNIFICANT CHANGE UP (ref 83–108)
PO2 BLDV: 48 MMHG — SIGNIFICANT CHANGE UP
POTASSIUM SERPL-MCNC: 3.9 MMOL/L — SIGNIFICANT CHANGE UP (ref 3.5–5.3)
POTASSIUM SERPL-MCNC: 4.3 MMOL/L — SIGNIFICANT CHANGE UP (ref 3.5–5.3)
POTASSIUM SERPL-MCNC: 4.5 MMOL/L — SIGNIFICANT CHANGE UP (ref 3.5–5.3)
POTASSIUM SERPL-SCNC: 3.9 MMOL/L — SIGNIFICANT CHANGE UP (ref 3.5–5.3)
POTASSIUM SERPL-SCNC: 4.3 MMOL/L — SIGNIFICANT CHANGE UP (ref 3.5–5.3)
POTASSIUM SERPL-SCNC: 4.5 MMOL/L — SIGNIFICANT CHANGE UP (ref 3.5–5.3)
PROCALCITONIN SERPL-MCNC: 1.73 NG/ML — HIGH (ref 0.02–0.1)
PROT SERPL-MCNC: 5.9 G/DL — LOW (ref 6–8.3)
PROT SERPL-MCNC: 6.1 G/DL — SIGNIFICANT CHANGE UP (ref 6–8.3)
PROT SERPL-MCNC: 6.2 G/DL — SIGNIFICANT CHANGE UP (ref 6–8.3)
PROTHROM AB SERPL-ACNC: 12.4 SEC — SIGNIFICANT CHANGE UP (ref 10.6–13.6)
RBC # BLD: 4.26 M/UL — SIGNIFICANT CHANGE UP (ref 4.2–5.8)
RBC # FLD: 17.1 % — HIGH (ref 10.3–14.5)
RH IG SCN BLD-IMP: POSITIVE — SIGNIFICANT CHANGE UP
SAO2 % BLDA: 96 % — SIGNIFICANT CHANGE UP (ref 95–100)
SAO2 % BLDA: 97 % — SIGNIFICANT CHANGE UP (ref 95–100)
SAO2 % BLDA: 98 % — SIGNIFICANT CHANGE UP (ref 95–100)
SAO2 % BLDV: 78 % — SIGNIFICANT CHANGE UP
SARS-COV-2 IGG+IGM SERPL QL IA: 10.5 U/ML — HIGH
SARS-COV-2 IGG+IGM SERPL QL IA: POSITIVE
SODIUM SERPL-SCNC: 134 MMOL/L — LOW (ref 135–145)
SODIUM SERPL-SCNC: 136 MMOL/L — SIGNIFICANT CHANGE UP (ref 135–145)
SODIUM SERPL-SCNC: 137 MMOL/L — SIGNIFICANT CHANGE UP (ref 135–145)
SODIUM UR-SCNC: <20 MMOL/L — SIGNIFICANT CHANGE UP
SPECIMEN SOURCE: SIGNIFICANT CHANGE UP
TRIGL SERPL-MCNC: 117 MG/DL — SIGNIFICANT CHANGE UP
TSH SERPL-MCNC: 0.54 UIU/ML — SIGNIFICANT CHANGE UP (ref 0.35–4.94)
UUN UR-MCNC: 584 MG/DL — SIGNIFICANT CHANGE UP
VANCOMYCIN TROUGH SERPL-MCNC: 6.6 UG/ML — LOW (ref 10–20)
WBC # BLD: 12.7 K/UL — HIGH (ref 3.8–10.5)
WBC # FLD AUTO: 12.7 K/UL — HIGH (ref 3.8–10.5)

## 2021-04-06 PROCEDURE — 99233 SBSQ HOSP IP/OBS HIGH 50: CPT

## 2021-04-06 PROCEDURE — 71045 X-RAY EXAM CHEST 1 VIEW: CPT | Mod: 26,77

## 2021-04-06 PROCEDURE — 71045 X-RAY EXAM CHEST 1 VIEW: CPT | Mod: 26,76

## 2021-04-06 PROCEDURE — 99233 SBSQ HOSP IP/OBS HIGH 50: CPT | Mod: GC

## 2021-04-06 RX ORDER — SENNA PLUS 8.6 MG/1
2 TABLET ORAL AT BEDTIME
Refills: 0 | Status: DISCONTINUED | OUTPATIENT
Start: 2021-04-06 | End: 2021-04-27

## 2021-04-06 RX ORDER — POTASSIUM CHLORIDE 20 MEQ
10 PACKET (EA) ORAL
Refills: 0 | Status: COMPLETED | OUTPATIENT
Start: 2021-04-06 | End: 2021-04-06

## 2021-04-06 RX ORDER — AZITHROMYCIN 500 MG/1
500 TABLET, FILM COATED ORAL EVERY 24 HOURS
Refills: 0 | Status: DISCONTINUED | OUTPATIENT
Start: 2021-04-06 | End: 2021-04-07

## 2021-04-06 RX ORDER — INSULIN GLARGINE 100 [IU]/ML
28 INJECTION, SOLUTION SUBCUTANEOUS ONCE
Refills: 0 | Status: COMPLETED | OUTPATIENT
Start: 2021-04-06 | End: 2021-04-06

## 2021-04-06 RX ORDER — HEPARIN SODIUM 5000 [USP'U]/ML
7500 INJECTION INTRAVENOUS; SUBCUTANEOUS EVERY 8 HOURS
Refills: 0 | Status: DISCONTINUED | OUTPATIENT
Start: 2021-04-06 | End: 2021-04-08

## 2021-04-06 RX ORDER — CISATRACURIUM BESYLATE 2 MG/ML
1 INJECTION INTRAVENOUS
Qty: 200 | Refills: 0 | Status: DISCONTINUED | OUTPATIENT
Start: 2021-04-06 | End: 2021-04-07

## 2021-04-06 RX ORDER — INSULIN HUMAN 100 [IU]/ML
INJECTION, SOLUTION SUBCUTANEOUS EVERY 6 HOURS
Refills: 0 | Status: DISCONTINUED | OUTPATIENT
Start: 2021-04-06 | End: 2021-04-30

## 2021-04-06 RX ORDER — INSULIN HUMAN 100 [IU]/ML
INJECTION, SOLUTION SUBCUTANEOUS EVERY 6 HOURS
Refills: 0 | Status: DISCONTINUED | OUTPATIENT
Start: 2021-04-06 | End: 2021-04-06

## 2021-04-06 RX ORDER — POLYETHYLENE GLYCOL 3350 17 G/17G
17 POWDER, FOR SOLUTION ORAL EVERY 12 HOURS
Refills: 0 | Status: DISCONTINUED | OUTPATIENT
Start: 2021-04-06 | End: 2021-04-20

## 2021-04-06 RX ADMIN — CHLORHEXIDINE GLUCONATE 15 MILLILITER(S): 213 SOLUTION TOPICAL at 18:18

## 2021-04-06 RX ADMIN — PROPOFOL 7.2 MICROGRAM(S)/KG/MIN: 10 INJECTION, EMULSION INTRAVENOUS at 01:12

## 2021-04-06 RX ADMIN — INSULIN GLARGINE 28 UNIT(S): 100 INJECTION, SOLUTION SUBCUTANEOUS at 05:29

## 2021-04-06 RX ADMIN — INSULIN HUMAN 4: 100 INJECTION, SOLUTION SUBCUTANEOUS at 23:54

## 2021-04-06 RX ADMIN — Medication 1 DROP(S): at 11:16

## 2021-04-06 RX ADMIN — Medication 1 DROP(S): at 11:17

## 2021-04-06 RX ADMIN — CEFTRIAXONE 100 MILLIGRAM(S): 500 INJECTION, POWDER, FOR SOLUTION INTRAMUSCULAR; INTRAVENOUS at 18:19

## 2021-04-06 RX ADMIN — INSULIN HUMAN 3 UNIT(S)/HR: 100 INJECTION, SOLUTION SUBCUTANEOUS at 05:33

## 2021-04-06 RX ADMIN — Medication 1 DROP(S): at 18:07

## 2021-04-06 RX ADMIN — Medication 1 DROP(S): at 23:57

## 2021-04-06 RX ADMIN — POLYETHYLENE GLYCOL 3350 17 GRAM(S): 17 POWDER, FOR SOLUTION ORAL at 18:19

## 2021-04-06 RX ADMIN — Medication 1 DROP(S): at 05:27

## 2021-04-06 RX ADMIN — PROPOFOL 7.2 MICROGRAM(S)/KG/MIN: 10 INJECTION, EMULSION INTRAVENOUS at 19:18

## 2021-04-06 RX ADMIN — REMDESIVIR 500 MILLIGRAM(S): 5 INJECTION INTRAVENOUS at 10:10

## 2021-04-06 RX ADMIN — PANTOPRAZOLE SODIUM 40 MILLIGRAM(S): 20 TABLET, DELAYED RELEASE ORAL at 11:21

## 2021-04-06 RX ADMIN — PROPOFOL 7.2 MICROGRAM(S)/KG/MIN: 10 INJECTION, EMULSION INTRAVENOUS at 07:16

## 2021-04-06 RX ADMIN — Medication 1 APPLICATION(S): at 21:27

## 2021-04-06 RX ADMIN — Medication 100 MILLIEQUIVALENT(S): at 04:26

## 2021-04-06 RX ADMIN — HEPARIN SODIUM 7500 UNIT(S): 5000 INJECTION INTRAVENOUS; SUBCUTANEOUS at 10:50

## 2021-04-06 RX ADMIN — Medication 100 MILLIEQUIVALENT(S): at 05:26

## 2021-04-06 RX ADMIN — CISATRACURIUM BESYLATE 7.2 MICROGRAM(S)/KG/MIN: 2 INJECTION INTRAVENOUS at 18:05

## 2021-04-06 RX ADMIN — HEPARIN SODIUM 7500 UNIT(S): 5000 INJECTION INTRAVENOUS; SUBCUTANEOUS at 18:19

## 2021-04-06 RX ADMIN — SODIUM CHLORIDE 10 MILLILITER(S): 9 INJECTION INTRAMUSCULAR; INTRAVENOUS; SUBCUTANEOUS at 09:16

## 2021-04-06 RX ADMIN — CISATRACURIUM BESYLATE 21.6 MICROGRAM(S)/KG/MIN: 2 INJECTION INTRAVENOUS at 11:14

## 2021-04-06 RX ADMIN — Medication 1 APPLICATION(S): at 13:43

## 2021-04-06 RX ADMIN — AZITHROMYCIN 255 MILLIGRAM(S): 500 TABLET, FILM COATED ORAL at 16:51

## 2021-04-06 RX ADMIN — Medication 1 DROP(S): at 05:31

## 2021-04-06 RX ADMIN — PROPOFOL 7.2 MICROGRAM(S)/KG/MIN: 10 INJECTION, EMULSION INTRAVENOUS at 10:49

## 2021-04-06 RX ADMIN — CHLORHEXIDINE GLUCONATE 1 APPLICATION(S): 213 SOLUTION TOPICAL at 05:28

## 2021-04-06 RX ADMIN — FENTANYL CITRATE 6 MICROGRAM(S)/KG/HR: 50 INJECTION INTRAVENOUS at 00:20

## 2021-04-06 RX ADMIN — Medication 4.5 MICROGRAM(S)/KG/MIN: at 02:36

## 2021-04-06 RX ADMIN — Medication 1 APPLICATION(S): at 18:07

## 2021-04-06 RX ADMIN — Medication 1 APPLICATION(S): at 23:57

## 2021-04-06 RX ADMIN — SENNA PLUS 2 TABLET(S): 8.6 TABLET ORAL at 22:04

## 2021-04-06 RX ADMIN — CISATRACURIUM BESYLATE 21.6 MICROGRAM(S)/KG/MIN: 2 INJECTION INTRAVENOUS at 00:08

## 2021-04-06 RX ADMIN — INSULIN HUMAN 4: 100 INJECTION, SOLUTION SUBCUTANEOUS at 13:40

## 2021-04-06 RX ADMIN — PROPOFOL 7.2 MICROGRAM(S)/KG/MIN: 10 INJECTION, EMULSION INTRAVENOUS at 15:44

## 2021-04-06 RX ADMIN — INSULIN HUMAN 6: 100 INJECTION, SOLUTION SUBCUTANEOUS at 18:35

## 2021-04-06 RX ADMIN — Medication 6 MILLIGRAM(S): at 05:34

## 2021-04-06 RX ADMIN — CHLORHEXIDINE GLUCONATE 15 MILLILITER(S): 213 SOLUTION TOPICAL at 06:46

## 2021-04-06 NOTE — PROGRESS NOTE ADULT - ATTENDING COMMENTS
Initial attending contact date 4/5/21     . See fellow note written above for details. I reviewed the fellow documentation. I have personally seen and examined this patient. I reviewed vitals, labs, medications, cardiac studies, and additional imaging. I agree with the above fellow's findings and plans as written above with the following additions/statements.    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 which has now been exacerbated in setting of COVID PNA  -Will need repeat ECHO post extubation to re-eval EF, if still depressed with wall motion abn will pursue ischemic work up  -Agree with diuresis for now and continuing with current management with Levo as mixed venous and clinical picture does not correlate with cardiogenic shock at this time Initial attending contact date 4/5/21     . See fellow note written above for details. I reviewed the fellow documentation. I have personally seen and examined this patient. I reviewed vitals, labs, medications, cardiac studies, and additional imaging. I agree with the above fellow's findings and plans as written above with the following additions/statements.    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 which has now been exacerbated in setting of COVID PNA  -Will need repeat ECHO post extubation to re-eval EF, if still depressed with wall motion abn will pursue ischemic work up  -Continue with current management with Levo as mixed venous and clinical picture does not correlate with cardiogenic shock at this time  -CVP 15? today however CXR with pulm edema compared to CXR yesterday post diuresis  -Recommend Lasix 40 mg IV x 1 today

## 2021-04-06 NOTE — PROGRESS NOTE ADULT - ATTENDING COMMENTS
Patient seen and examined with house-staff during bedside rounds.  Resident note read, including vitals, physical findings, laboratory data, and radiological reports.   Revisions included below.  Direct personal management at bed side and extensive interpretation of the data.  Plan was outlined and discussed in details with the housestaff.  Decision making of high complexity  Action taken for acute disease activity to reflect the level of care provided:  - medication reconciliation  - review laboratory data  Patient with acute hypoxic respiratory failure secondary to Covid pneumonia with ARDS and worsening acute renal injury.  Patient was also uncontrolled diabetes off insulin drip.  The patient is protective lung ventilation protocol.  The patient is on proning for CVA severe ARDS.  Continue on remdesivir but with adjust with renal function as the creatinine improved.  Continue Decadron.  Continue acetaminophen blood sugar between the range 1 4180.  Monitor renal function.  Continue antibiotic.

## 2021-04-06 NOTE — PROGRESS NOTE ADULT - TIME BILLING
discussed with primary team Initial attending contact date 4/5/21     . See fellow note written above for details. I reviewed the fellow documentation. I have personally seen and examined this patient. I reviewed vitals, labs, medications, cardiac studies, and additional imaging. I agree with the above fellow's findings and plans as written above with the following additions/statements.    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 which has now been exacerbated in setting of COVID PNA  -Will need repeat ECHO post extubation to re-eval EF, if still depressed with wall motion abn will pursue ischemic work up  -Continue with current management with Levo as mixed venous and clinical picture does not correlate with cardiogenic shock at this time  -CVP 15? today however CXR with pulm edema compared to CXR yesterday post diuresis  -Recommend Lasix 40 mg IV x 1 today

## 2021-04-06 NOTE — PROGRESS NOTE ADULT - ASSESSMENT
57 yo M with a PMHx of gout and MU (on home CPAP) who presented to ED for progressive worsening SOB of 2 days of duration, found to be in profound hypoxic respiratory Failure requiring intubation, paralysis and proning with suspicion for COVID PNA with workup further revealing remarkably reduced LV systolic Function with regional wall motion abnormalities in the setting of troponemia     1) Hypoxic Respiratory Failure likely 2/2 COVID PNA in patient with markedly reduced LV systolic Function  -Patient with EF of 15 on Echo (No known prior) with regional wall motion abnormalities. Echo showing Severe hypokinesis of the entire anteroseptum/inferoseptu as well as Akinesis of the mid to apical anterolateral region, apical anterior, apical inferior and true apex. Pt aslso with Mild hypokinesis of the basal anterolateral, basal anterior and basal inferior regions.  -Echo reviewed. Images are poor due to body habitus but suggest a stress cardiomyopathy pattern rather than an ischemic event  -EKG 4/5 reviewed showing NSR, with non specific ST changes and poor R wave progression in the precordial leads likely due to body habitus  -Clinically patient is warm and well perfused and making urine. Given His severely reduced EF and numerous gtts he remains Net positive. Would favor gentle diuresis ( 40 IV lasix x1)to keep him net negative. Pt's GRACE likely from ATN currently improving   -Pt noted to be bradycardic on tele (Sinus joel to 39, averaging 53). No evidence of pauses or conduction disease. Bradycardia could be in setting of high dose propofol. Continue to monitor  -Hold off BB or ACE/ARB therapy while on pressors with Renal impairement  -Please obtain daily EKgs  -Please keep K>4 and Mg>2  -Cardiology will continue to follow, please call with any questions

## 2021-04-06 NOTE — PROGRESS NOTE ADULT - SUBJECTIVE AND OBJECTIVE BOX
Interval HPI: Patient oxygen requirements have significantly decreased. He is now on 50% FIo2 and maintaining his sats. Mixed venous remain elevated.     PAST MEDICAL & SURGICAL HISTORY:  Sleep apnea    Home Medications:  allopurinol:  (2021 18:19)  colchicine:  (2021 11:55)    MEDICATIONS  (STANDING):  azithromycin  IVPB 500 milliGRAM(s) IV Intermittent every 24 hours  cefTRIAXone   IVPB 1000 milliGRAM(s) IV Intermittent every 24 hours  chlorhexidine 0.12% Liquid 15 milliLiter(s) Oral Mucosa every 12 hours  chlorhexidine 4% Liquid 1 Application(s) Topical <User Schedule>  ciprofloxacin  0.3% Ophthalmic Solution 1 Drop(s) Left EYE four times a day  cisatracurium Infusion 1 MICROgram(s)/kG/Min (7.2 mL/Hr) IV Continuous <Continuous>  dexAMETHasone  Injectable 6 milliGRAM(s) IV Push every 24 hours  dextrose 40% Gel 15 Gram(s) Oral once  dextrose 5%. 1000 milliLiter(s) (50 mL/Hr) IV Continuous <Continuous>  dextrose 5%. 1000 milliLiter(s) (100 mL/Hr) IV Continuous <Continuous>  dextrose 50% Injectable 25 Gram(s) IV Push once  dextrose 50% Injectable 12.5 Gram(s) IV Push once  dextrose 50% Injectable 25 Gram(s) IV Push once  fentaNYL   Infusion. 0.5 MICROgram(s)/kG/Hr (6 mL/Hr) IV Continuous <Continuous>  glucagon  Injectable 1 milliGRAM(s) IntraMuscular once  heparin   Injectable 7500 Unit(s) SubCutaneous every 8 hours  insulin regular  human corrective regimen sliding scale   SubCutaneous every 6 hours  ketorolac 0.5% Ophthalmic Solution 1 Drop(s) Left EYE four times a day  norepinephrine Infusion 0.02 MICROgram(s)/kG/Min (4.5 mL/Hr) IV Continuous <Continuous>  pantoprazole  Injectable 40 milliGRAM(s) IV Push daily  petrolatum Ophthalmic Ointment 1 Application(s) Both EYES every 6 hours  polyethylene glycol 3350 17 Gram(s) Oral every 12 hours  prednisoLONE acetate 1% Suspension 1 Drop(s) Left EYE four times a day  propofol Infusion 10 MICROgram(s)/kG/Min (7.2 mL/Hr) IV Continuous <Continuous>  senna 2 Tablet(s) Oral at bedtime    MEDICATIONS  (PRN):  sodium chloride 0.9% lock flush 10 milliLiter(s) IV Push every 1 hour PRN Pre/post blood products, medications, blood draw, and to maintain line patency    .  ICU Vital Signs Last 24 Hrs  T(C): 36.1 (2021 13:34), Max: 36.1 (2021 13:34)  T(F): 96.9 (2021 13:34), Max: 96.9 (2021 13:34)  HR: 51 (2021 18:00) (44 - 62)  BP: --  BP(mean): --  ABP: 93/53 (2021 18:00) (91/58 - 115/63)  ABP(mean): 68 (2021 18:00) (68 - 84)  RR: 24 (2021 18:00) (0 - 28)  SpO2: 96% (2021 18:00) (92% - 100%)    PHYSICAL EXAM:    Constitutional: Iintubated, sedated  Head: NC/AT  Neck: LIJ in place  Respiratory: Decrease BS troughout with fine cracles and Ronchi  Cardiac: +S1/S2; RRR; no M/R/G;   Gastrointestinal: Currently proned  Extremities: WWP,  no peripheral edema  Vascular: 2+ radial, DP/PT pulses B/L      ..  LABS:                         9.6    12.70 )-----------( 261      ( 2021 05:21 )             32.0     04-06    137  |  101  |  28<H>  ----------------------------<  261<H>  4.3   |  27  |  2.15<H>    Ca    7.6<L>      2021 18:20  Phos  2.8     04-06  Mg     2.5     04-06    TPro  5.9<L>  /  Alb  2.6<L>  /  TBili  0.3  /  DBili  x   /  AST  39  /  ALT  22  /  AlkPhos  78  04-06    PT/INR - ( 2021 05:20 )   PT: 12.4 sec;   INR: 1.04          PTT - ( 2021 05:20 )  PTT:28.4 sec  Urinalysis Basic - ( 2021 07:34 )    Color: Yellow / Appearance: Clear / S.025 / pH: x  Gluc: x / Ketone: 40 mg/dL  / Bili: Negative / Urobili: 0.2 E.U./dL   Blood: x / Protein: 100 mg/dL / Nitrite: NEGATIVE   Leuk Esterase: NEGATIVE / RBC: < 5 /HPF / WBC < 5 /HPF   Sq Epi: x / Non Sq Epi: 5-10 /HPF / Bacteria: Present /HPF      CARDIAC MARKERS ( 2021 22:10 )  x     / 0.12 ng/mL / 145 U/L / x     / 8.8 ng/mL  CARDIAC MARKERS ( 2021 15:23 )  x     / 0.19 ng/mL / 203 U/L / x     / 8.7 ng/mL  CARDIAC MARKERS ( 2021 06:50 )  x     / 0.01 ng/mL / 266 U/L / x     / x                RADIOLOGY, EKG & ADDITIONAL TESTS: Reviewed. < from: TTE Echo Complete w/o Contrast w/ Doppler (21 @ 12:46) >  . Limited study obtained for evaluation of left ventricular function.   2. Severe hypokinesis of the entire anteroseptum/inferoseptum. Akinesis of the mid to apical anterolateral region, apical anterior, apical inferior and true apex. Mild hypokinesis of the basal anterolateral, basal anterior and basal inferior regions. The estimated left ventricular systolic function is 15%.   3. The right ventricle is mildly dilated. Right ventricular systolic function is mildly reduced.   4. No pericardial effusion is seen.   5. No prior echo is availablefor comparison.    < end of copied text >  < from: TTE Echo Complete w/o Contrast w/ Doppler (21 @ 12:46) >  ft Ventricle:  Severe hypokinesis of the entire anteroseptum/inferoseptum. Akinesis of the mid to apical anterolateral region, apical anterior, apical inferior and true apex. Mild hypokinesis of the basal anterolateral, basal anterior and basal inferior regions. The estimated left ventricular systolic function is 15%.    < end of copied text >

## 2021-04-06 NOTE — PROGRESS NOTE ADULT - SUBJECTIVE AND OBJECTIVE BOX
SUBJECTIVE  HPI:  55 yo M with a PMHx of gout and MU (on home CPAP) who presented to ED for SOB. He is accompanied by wife at bedside who is assisting with HPI. Pt. started complaining of fatigue, malaise, myalgias x 4 days and progressively worsening SOB x 2 days. Last night, patient became dyspneic and he attempted to use CPAP machine to relieve his symptoms with no improvement. Today, patient took cab with wife to the ED and ambulated to triage. As per his wife, Karissa, patient had no fevers, chills, chest pain, N/V/D, sick contacts or recent travel. Of note, patient received his first dose of the Moderna vaccine on 3/25/2021.    As per ED provider, patient was tachypneic, cyanotic on arrival to ED. He was placed on NRB and subsequently emergently intubated.    Hospital Course:  : started on remdesivir, decadron, vancomycin/zosyn. Pt critically ill with low saturations despite max vent settings. Pan cultured, urine legionella and strep sent, RVP panel sent; urine legionella neg. Sedated on prop and fent, paralyzed with nimbex. TTE global hypokinesis of septum, akinesis of apicolateral region, EF 15% (cardiology consulted- concern for covid myocarditis); mrsa swab negative, dc'd vanc; de-escalated abx to CAP coverage (azithromycin d/c'd, RVP neg), insulin gtt (hyperglycemia protocol) started with Q1hr FSG. Post Prone ABG with improvement in paO2, FiO2 down to 50%; troponin trending up  O/N: Insulin gtt wasnt running/connected per change of shift nurse, trop T downtrending 0.12, CK 8.8 from 8.7, lactate 1.6, lactate cleared, trops peaked, remainder of chem grossly normal, stopped insulin gtt and given 28ulantus @0430  FiO2 decreased to 50%    REVIEW OF SYSTEMS:   See HPI (as per chart review), unable to obtain 2/2 ETT and sedation                                    OBJECTIVE  Vital Signs Last 24 Hrs  T(C): 35.9 (2021 06:00), Max: 37.6 (2021 13:00)  T(F): 96.6 (2021 06:00), Max: 99.7 (2021 13:00)  HR: 52 (2021 08:00) (52 - 100)  Arterial Line SBP 90-100s, MAP 70's  BP: 93/60 (2021 13:00) (93/60 - 124/77)  BP(mean): 72 (2021 13:00) (72 - 72)  RR: 28 (2021 08:00) (0 - 28)  SpO2: 97% (2021 08:00) (81% - 97%)    Vent settings   Mode: AC/ CMV (Assist Control/ Continuous Mandatory Ventilation), RR (machine): 28, TV (machine): 450, FiO2: 50, PEEP: 14, ITime: 1, MAP: 20, PIP: 29    I&O's Detail    2021 07:01  -  2021 07:00  --------------------------------------------------------  IN:    Cisatracurium: 513.6 mL    FentaNYL: 276 mL    Insulin: 75 mL    IV PiggyBack: 250 mL    IV PiggyBack: 100 mL    IV PiggyBack: 250 mL    IV PiggyBack: 50 mL    Norepinephrine: 106 mL    Propofol: 532.8 mL  Total IN: 2153.4 mL    OUT:    Indwelling Catheter - Urethral (mL): 680 mL    Nasogastric/Oral tube (mL): 120 mL    Voided (mL): 1110 mL  Total OUT: 1910 mL    Total NET: 243.4 mL      LABS:                        9.6    12.70 )-----------( 261      ( 2021 05:21 )             32.0     04-06    134<L>  |  97  |  21  ----------------------------<  193<H>  3.9   |  27  |  2.14<H>    Ca    7.9<L>      2021 02:15  Phos  2.8     04-06  Mg     2.5     04-06    TPro  6.1  /  Alb  2.6<L>  /  TBili  0.3  /  DBili  x   /  AST  37  /  ALT  18  /  AlkPhos  80  04-06    PT/INR - ( 2021 05:20 )   PT: 12.4 sec;   INR: 1.04         PTT - ( 2021 05:20 )  PTT:28.4 sec  ABG - ( 2021 05:16 )  pH, Arterial: 7.32  pH, Blood: x     /  pCO2: 55    /  pO2: 104   / HCO3: 28    / Base Excess: 1.4   /  SaO2: 97        Blood Gas Profile - Venous (21 @ 05:16)   pH, Venous: 7.30   pCO2, Venous: 60 mmHg   pO2, Venous: 48 mmHg   HCO3, Venous: 29 mmol/L   Base Excess, Venous: 1.3 mmol/L   Oxygen Saturation, Venous: 78 %   proBNP 929 4/5  lactate1.6  .6  D-dimer 614  Ferritin [ pending ]  Culture - Sputum (collected 2021 13:03)  Source: .Sputum Sputum  Gram Stain (2021 17:46):    No epithelial cells seen    Rare WBC's    No organisms seen    Culture - Blood (collected 2021 08:53)  Source: .Blood Blood-Peripheral  Preliminary Report (2021 21:40):    No growth at 12 hours    Culture - Blood (collected 2021 08:53)  Source: .Blood Blood-Peripheral  Preliminary Report (2021 21:40):    No growth at 12 hours    Urinalysis Basic - ( 2021 07:34 )    Color: Yellow / Appearance: Clear / S.025 / pH: x  Gluc: x / Ketone: 40 mg/dL  / Bili: Negative / Urobili: 0.2 E.U./dL   Blood: x / Protein: 100 mg/dL / Nitrite: NEGATIVE   Leuk Esterase: NEGATIVE / RBC: < 5 /HPF / WBC < 5 /HPF   Sq Epi: x / Non Sq Epi: 5-10 /HPF / Bacteria: Present /HPF    CXr from 21 AM reviewed with fellow and attending during bedside rounds *prone film*    EKG from  with RBBB    MEDICATIONS  (STANDING):  cefTRIAXone   IVPB 1000 milliGRAM(s) IV Intermittent every 24 hours  chlorhexidine 0.12% Liquid 15 milliLiter(s) Oral Mucosa every 12 hours  chlorhexidine 4% Liquid 1 Application(s) Topical <User Schedule>  ciprofloxacin  0.3% Ophthalmic Solution 1 Drop(s) Left EYE four times a day  cisatracurium Infusion 3 MICROgram(s)/kG/Min (21.6 mL/Hr) IV Continuous <Continuous>  dexAMETHasone  Injectable 6 milliGRAM(s) IV Push every 24 hours  dextrose 40% Gel 15 Gram(s) Oral once  dextrose 5%. 1000 milliLiter(s) (50 mL/Hr) IV Continuous <Continuous>  dextrose 5%. 1000 milliLiter(s) (100 mL/Hr) IV Continuous <Continuous>  dextrose 50% Injectable 25 Gram(s) IV Push once  dextrose 50% Injectable 12.5 Gram(s) IV Push once  dextrose 50% Injectable 25 Gram(s) IV Push once  enoxaparin Injectable 40 milliGRAM(s) SubCutaneous every 12 hours  fentaNYL   Infusion. 0.5 MICROgram(s)/kG/Hr (6 mL/Hr) IV Continuous <Continuous> @ 1 mcg  glucagon  Injectable 1 milliGRAM(s) IntraMuscular once  insulin regular Infusion 3 Unit(s)/Hr (3 mL/Hr) IV Continuous <Continuous> OFF at this time  ketorolac 0.5% Ophthalmic Solution 1 Drop(s) Left EYE four times a day  norepinephrine Infusion 0.02 MICROgram(s)/kG/Min (4.5 mL/Hr) IV Continuous <Continuous> @ 0.025  pantoprazole  Injectable 40 milliGRAM(s) IV Push daily  petrolatum Ophthalmic Ointment 1 Application(s) Both EYES every 6 hours  prednisoLONE acetate 1% Suspension 1 Drop(s) Left EYE four times a day  propofol Infusion 10 MICROgram(s)/kG/Min (7.2 mL/Hr) IV Continuous <Continuous> @ 40 mcg  remdesivir  IVPB   IV Intermittent   remdesivir  IVPB 100 milliGRAM(s) IV Intermittent every 24 hours    MEDICATIONS  (PRN):  sodium chloride 0.9% lock flush 10 milliLiter(s) IV Push every 1 hour PRN Pre/post blood products, medications, blood draw, and to maintain line patency    PHYSICAL EXAM:  GENERAL: ill-appearing, obese, intubated and sedated/paralyzed   SKIN/HAIR/NAILS: Skin warm and clammy. Nails without clubbing or cyanosis. CR<2 secs. No lesions, rash, petechiae, erythema or ecchymoses. Anicteric.   HEAD AND NECK: The skull is NC/AT. B/L Sclera white. PERRL. Nasal mucous membrane with no erythema or drainage. Trachea midline, neck supple.   THORAX/LUNGS: Thorax is symmetric with good expansion, assisted by mechanical ventilation. Rhonchi B/L, diminished throughout.   CARDIOVASCULAR: No JVD. Carotid upstrokes are brisk, without bruits. +S1 and S2, RRR; no extra heart sounds or M/R/G.  ABDOMEN/: Abdomen is distended with +BS x 4. It is soft, no palpable masses; Lechuga catheter in place.  PERIPHERAL VASCULAR: Extremities are warm, radial and dorsalis pedis pulses +1 and symmetric. No clubbing, no cyanosis.  MUSCULOSKELETAL: No active ROM at this time. No evidence of swelling or deformity.  NEUROLOGICAL: unable to assess, sedated and intubated/rx paralysis  LINES (DATES): L IJ inserted in ED by fellow , L radial art line , PIV x 2    Assessment and Plan   Patient is a 55 yo M with PMHx of MU and gout who presented to ED with c/o progressively worsening SOB now admitted to ICU for management of AHRF in the setting of COViD    NEURO  #Sedated   -propofol and fentanyl for rass -4   -Will maintain sedated and paralyzed for vent synchrony    #Paralytic:   -nimbex, paralyzed for vent synchrony  -Neuro checks per ICU protocol, pain/sedation meds assessed and addressed, will titrate down as able to maintain vent synchrony  -Will give paralytic vacation when supined    RESPIRATORY  #Acute hypoxic hypercapneic respiratory failure in the setting of COViD PNA  -Patient initially presented with O2 saturation of 14% and cyanotic requiring immediate intubation  -CXr with diffuse bilateral opacities with + COVID19 PCR  -Elevated inflammatory markers  -Patient currently proned  -On AC/VC mechanical Ventilation:  Mode: AC/ CMV; RR: 28; TV: 450; FiO2: 50; PEEP: 14  -PF ratio 208 this AM  -Will Wean FiO2 as tolerated    #COViD  -CXr with diffuse bilateral opacities with + COVID19 PCR  -Start Date  Remdesivir (x 6 day course) and Decadron 6 mg (x 10 day course)     #MU  -As per patient's wife, patient has MU on CPAP  -Patient sleeps with 2-3 pillows at night, attributed to MU    CARDIOVASCULAR  #Severe Sepsis  -Patient presents with tachycardia, tachypnea with a pulmonary process, lactate 12, WBC 15.85, PCT 0.17; meeting criteria for severe sepsis; currently on exam, patient well perfused with adequate pulses, adequate UO, unable to assess mentation at this time  -Source likely COVID19 PNA with possible superimposed bacterial PNA  -Broad spectrum abx started, vanco and zosyn started 4/5 AM, de-escalated to ceftriaxone  -Decadron and remdesivir started 4/5 for COVID19  -Sputum Cx negative, BCx no growth at 12 hours  -Urine cx pending  -Urine legionella and strep negative  -MRSA PCR negative  -Lactate cleared  -Will hold off on fluid resuscitation due to high-risk for pulm edema  -Maintain MAP > 65 mmHg  -Monitor urine output    #RV dilation  -Bedside POCUS demonstrating dilated RV and mildly enlarged pulm artery possibly 2/2 MU vs PE (unlikely)  -Echo: Severe hypokinesis of the entire anteroseptum/inferoseptum. Akinesis of the mid to apical anterolateral region, apical anterior, apical inferior and true apex. Mild hypokinesis of the basal anterolateral, basal anterior and basal inferior regions. The estimated left ventricular systolic function is 15%. The right ventricle is mildly dilated. Right ventricular systolic function is mildly reduced. No pericardial effusion is seen.  -Based on these findings, cardiology was consulted  -Appreciate cardiac recs  -As per spouse, no hx of cardiac disease and no change in ADLs or activity tolerance prior to current presentation    #LV Dysfunction  As per Cardiology:  -Patient with EF of 15  on Echo (No known prior) with regional wall motion abnormalities. Echo showing Severe hypokinesis of the entire anteroseptum/inferoseptu as well as Akinesis of the mid to apical anterolateral region, apical anterior, apical inferior and true apex. Pt aslso with Mild hypokinesis of the basal anterolateral, basal anterior and basal inferior regions.  -Echo reviewed. Images are poor due to body habitus but suggest a stress cardiomyopathy pattern (takotsubo cardiomyopathy) rather than an ischemic event  -EKG reviewed showing NSR, with non specific ST changes and poor R wave progression in the precordial leads likely due to body habitus  -Labs show ongoing troponemia in setting of renal dysfunction.  -Clinically patient is warm and well perfused and making urine. He is s/p 40 IV Lasix x1 with 900 output. Mixed venous show O2 sat of 70, making concurrent cardiogenic shock less likely.  -Patient's LV dysfunction appears to be mutifactorial: COVID, Morbid Obesity, Sleep Apnea. Myocarditis is also a possibility; now exacerbateed in setting of COVIDPNA  -Unfortunately patient is unable to undergo further ischemic or confirmatory imaging/workup  -Would hold off inotropic support for now as cardiogenic markers within normal range. Can continue low dose levo for now to keep MAP of 65 and above  -Given patient lasix naive would given 20 IV BID of lasix starting tomorrow. Pt s/p 40 IV lasix x1 today in the ER with good response  -Hold off BB or ACE/ARB therapy while on pressors with Renal impairement  -Please continue to trend CE to peak with CK and CKMB  -Please obtain daily EKgs  -EKG from  NSR with RBBB, without ischemic changes  -Please send TSH, A1c, and Lipid Panel  -Please keep K>4 and Mg>2  -Will need repeat ECHO post extubation to re-eval EF, if still depressed with wall motion abn will pursue ischemic work up  -Agree with diuresis for now and continuing with current management with Levo as mixed venous and clinical picture does not correlate with cardiogenic shock at this time.    GI  -No active issues  -needs nasoenteral nurition, will obtain recs from nutrition  -Protonix IVP for GI ppx    RENAL//F&E  #Acute Kidney Injury  DDx: pre-renal vs ATN  -Lasix 40 mg IVP in ED   -SCr 2.14  -Baseline creatinine unknown, no reported kidney disease  -Adequate urine output   -Electrolytes stable  -Strict I&O monitoring  -Avoid nephrotoxic medications    #Anion Gap Metabolic Acidosis  -Likely 2/2 elevated lactate  -With appropriate compensation  -Trend lactate    ID  #COVID PNA   -Plan as above    #  -Plan as above    ENDOCRINE  #Hyperglycemia  -q6hr correctional scale ordered to monitor for stress/steroid induced hyperglycemia  -Decadron started 4/5 AM  -A1c 6.2    HEME  #Anemia  -Hgb 11; unknown baseline. No evidence of bleed.  -Trend CBC  -Maintain active T+S  -Transfuse for Hb <7    -DVT ppx with Lovenox BID    DISPO/GOALS OF CARE:  Patient requires continuous monitoring with bedside rhythm monitoring, arterial line, pulse oximetry, ventilator monitoring and intermittent blood gas analysis. Care plan discussed with ICU care team. Patient remains critical at this time.     SUBJECTIVE  HPI:  55 yo M with a PMHx of gout and MU (on home CPAP) who presented to ED for SOB. He is accompanied by wife at bedside who is assisting with HPI. Pt. started complaining of fatigue, malaise, myalgias x 4 days and progressively worsening SOB x 2 days. Last night, patient became dyspneic and he attempted to use CPAP machine to relieve his symptoms with no improvement. Today, patient took cab with wife to the ED and ambulated to triage. As per his wife, Karissa, patient had no fevers, chills, chest pain, N/V/D, sick contacts or recent travel. Of note, patient received his first dose of the Moderna vaccine on 3/25/2021.    As per ED provider, patient was tachypneic, cyanotic on arrival to ED. He was placed on NRB and subsequently emergently intubated.    Hospital Course:  : started on remdesivir, decadron, vancomycin/zosyn. Pt critically ill with low saturations despite max vent settings. Pan cultured, urine legionella and strep sent, RVP panel sent; urine legionella neg. Sedated on prop and fent, paralyzed with nimbex. TTE global hypokinesis of septum, akinesis of apicolateral region, EF 15% (cardiology consulted- concern for covid myocarditis); mrsa swab negative, dc'd vanc; de-escalated abx to CAP coverage (azithromycin d/c'd, RVP neg), insulin gtt (hyperglycemia protocol) started with Q1hr FSG. Post Prone ABG with improvement in paO2, FiO2 down to 50%; troponin trending up  O/N: Insulin gtt wasnt running/connected per change of shift nurse, trop T downtrending 0.12, CK 8.8 from 8.7, lactate 1.6, lactate cleared, trops peaked, remainder of chem grossly normal, stopped insulin gtt and given 28ulantus @0430  FiO2 decreased to 50%    REVIEW OF SYSTEMS:   See HPI (as per chart review), unable to obtain 2/2 ETT and sedation                                    OBJECTIVE  Vital Signs Last 24 Hrs  T(C): 35.9 (2021 06:00), Max: 37.6 (2021 13:00)  T(F): 96.6 (2021 06:00), Max: 99.7 (2021 13:00)  HR: 52 (2021 08:00) (52 - 100)  Arterial Line SBP 90-100s, MAP 70's  BP: 93/60 (2021 13:00) (93/60 - 124/77)  BP(mean): 72 (2021 13:00) (72 - 72)  RR: 28 (2021 08:00) (0 - 28)  SpO2: 97% (2021 08:00) (81% - 97%)    Vent settings   Mode: AC/ CMV (Assist Control/ Continuous Mandatory Ventilation), RR (machine): 28, TV (machine): 450, FiO2: 50, PEEP: 14, ITime: 1, MAP: 20, PIP: 29    I&O's Detail    2021 07:01  -  2021 07:00  --------------------------------------------------------  IN:    Cisatracurium: 513.6 mL    FentaNYL: 276 mL    Insulin: 75 mL    IV PiggyBack: 250 mL    IV PiggyBack: 100 mL    IV PiggyBack: 250 mL    IV PiggyBack: 50 mL    Norepinephrine: 106 mL    Propofol: 532.8 mL  Total IN: 2153.4 mL    OUT:    Indwelling Catheter - Urethral (mL): 680 mL    Nasogastric/Oral tube (mL): 120 mL    Voided (mL): 1110 mL  Total OUT: 1910 mL    Total NET: 243.4 mL      LABS:                        9.6    12.70 )-----------( 261      ( 2021 05:21 )             32.0     04-06    134<L>  |  97  |  21  ----------------------------<  193<H>  3.9   |  27  |  2.14<H>    Ca    7.9<L>      2021 02:15  Phos  2.8     04-06  Mg     2.5     04-06    TPro  6.1  /  Alb  2.6<L>  /  TBili  0.3  /  DBili  x   /  AST  37  /  ALT  18  /  AlkPhos  80  04-06    PT/INR - ( 2021 05:20 )   PT: 12.4 sec;   INR: 1.04         PTT - ( 2021 05:20 )  PTT:28.4 sec  ABG - ( 2021 05:16 )  pH, Arterial: 7.32  pH, Blood: x     /  pCO2: 55    /  pO2: 104   / HCO3: 28    / Base Excess: 1.4   /  SaO2: 97        Blood Gas Profile - Venous (21 @ 05:16)   pH, Venous: 7.30   pCO2, Venous: 60 mmHg   pO2, Venous: 48 mmHg   HCO3, Venous: 29 mmol/L   Base Excess, Venous: 1.3 mmol/L   Oxygen Saturation, Venous: 78 %   proBNP 929 4/5  lactate1.6  .6  D-dimer 614  Ferritin [ pending ]  Culture - Sputum (collected 2021 13:03)  Source: .Sputum Sputum  Gram Stain (2021 17:46):    No epithelial cells seen    Rare WBC's    No organisms seen    Culture - Blood (collected 2021 08:53)  Source: .Blood Blood-Peripheral  Preliminary Report (2021 21:40):    No growth at 12 hours    Culture - Blood (collected 2021 08:53)  Source: .Blood Blood-Peripheral  Preliminary Report (2021 21:40):    No growth at 12 hours    Urinalysis Basic - ( 2021 07:34 )    Color: Yellow / Appearance: Clear / S.025 / pH: x  Gluc: x / Ketone: 40 mg/dL  / Bili: Negative / Urobili: 0.2 E.U./dL   Blood: x / Protein: 100 mg/dL / Nitrite: NEGATIVE   Leuk Esterase: NEGATIVE / RBC: < 5 /HPF / WBC < 5 /HPF   Sq Epi: x / Non Sq Epi: 5-10 /HPF / Bacteria: Present /HPF    CXr from 21 AM reviewed with fellow and attending during bedside rounds *prone film*    EKG from  with RBBB    MEDICATIONS  (STANDING):  cefTRIAXone   IVPB 1000 milliGRAM(s) IV Intermittent every 24 hours  chlorhexidine 0.12% Liquid 15 milliLiter(s) Oral Mucosa every 12 hours  chlorhexidine 4% Liquid 1 Application(s) Topical <User Schedule>  ciprofloxacin  0.3% Ophthalmic Solution 1 Drop(s) Left EYE four times a day  cisatracurium Infusion 3 MICROgram(s)/kG/Min (21.6 mL/Hr) IV Continuous <Continuous>  dexAMETHasone  Injectable 6 milliGRAM(s) IV Push every 24 hours  dextrose 40% Gel 15 Gram(s) Oral once  dextrose 5%. 1000 milliLiter(s) (50 mL/Hr) IV Continuous <Continuous>  dextrose 5%. 1000 milliLiter(s) (100 mL/Hr) IV Continuous <Continuous>  dextrose 50% Injectable 25 Gram(s) IV Push once  dextrose 50% Injectable 12.5 Gram(s) IV Push once  dextrose 50% Injectable 25 Gram(s) IV Push once  enoxaparin Injectable 40 milliGRAM(s) SubCutaneous every 12 hours  fentaNYL   Infusion. 0.5 MICROgram(s)/kG/Hr (6 mL/Hr) IV Continuous <Continuous> @ 1 mcg  glucagon  Injectable 1 milliGRAM(s) IntraMuscular once  insulin regular Infusion 3 Unit(s)/Hr (3 mL/Hr) IV Continuous <Continuous> OFF at this time  ketorolac 0.5% Ophthalmic Solution 1 Drop(s) Left EYE four times a day  norepinephrine Infusion 0.02 MICROgram(s)/kG/Min (4.5 mL/Hr) IV Continuous <Continuous> @ 0.025  pantoprazole  Injectable 40 milliGRAM(s) IV Push daily  petrolatum Ophthalmic Ointment 1 Application(s) Both EYES every 6 hours  prednisoLONE acetate 1% Suspension 1 Drop(s) Left EYE four times a day  propofol Infusion 10 MICROgram(s)/kG/Min (7.2 mL/Hr) IV Continuous <Continuous> @ 40 mcg  remdesivir  IVPB   IV Intermittent   remdesivir  IVPB 100 milliGRAM(s) IV Intermittent every 24 hours    MEDICATIONS  (PRN):  sodium chloride 0.9% lock flush 10 milliLiter(s) IV Push every 1 hour PRN Pre/post blood products, medications, blood draw, and to maintain line patency    PHYSICAL EXAM:  GENERAL: ill-appearing, obese, intubated and sedated/paralyzed   SKIN/HAIR/NAILS: Skin warm and clammy. Nails without clubbing or cyanosis. CR<2 secs. No lesions, rash, petechiae, erythema or ecchymoses. Anicteric.   HEAD AND NECK: The skull is NC/AT. B/L Sclera white. PERRL. Nasal mucous membrane with no erythema or drainage. Trachea midline, neck supple.   THORAX/LUNGS: Thorax is symmetric with good expansion, assisted by mechanical ventilation. Rhonchi B/L, diminished throughout.   CARDIOVASCULAR: No JVD. Carotid upstrokes are brisk, without bruits. +S1 and S2, RRR; no extra heart sounds or M/R/G.  ABDOMEN/: Abdomen is distended with +BS x 4. It is soft, no palpable masses; Lechuga catheter in place.  PERIPHERAL VASCULAR: Extremities are warm, radial and dorsalis pedis pulses +1 and symmetric. No clubbing, no cyanosis.  MUSCULOSKELETAL: No active ROM at this time. No evidence of swelling or deformity.  NEUROLOGICAL: unable to assess, sedated and intubated/rx paralysis  LINES (DATES): L IJ inserted in ED by fellow , L radial art line , PIV x 2    Assessment and Plan   Patient is a 55 yo M with PMHx of MU and gout who presented to ED with c/o progressively worsening SOB now admitted to ICU for management of AHRF in the setting of COViD    NEURO  #Sedated   -propofol and fentanyl for rass -4   -Will maintain sedated and paralyzed for vent synchrony    #Paralytic:   -nimbex, paralyzed for vent synchrony  -Neuro checks per ICU protocol, pain/sedation meds assessed and addressed, will titrate down as able to maintain vent synchrony  -Will give paralytic vacation when supined    RESPIRATORY  #Acute hypoxic hypercapneic respiratory failure in the setting of COViD PNA  -Patient initially presented with O2 saturation of 14% and cyanotic requiring immediate intubation  -CXr with diffuse bilateral opacities with + COVID19 PCR  -Elevated inflammatory markers  -Patient currently proned  -On AC/VC mechanical Ventilation:  Mode: AC/ CMV; RR: 28; TV: 450; FiO2: 50; PEEP: 14  -PF ratio 208 this AM  -Will Wean FiO2 as tolerated    #COViD  -CXr with diffuse bilateral opacities with + COVID19 PCR  -Start Date  Remdesivir (x 6 day course) and Decadron 6 mg (x 10 day course)     #MU  -As per patient's wife, patient has MU on CPAP  -Patient sleeps with 2-3 pillows at night, attributed to MU    CARDIOVASCULAR  #Severe Sepsis  -Patient presents with tachycardia, tachypnea with a pulmonary process, lactate 12, WBC 15.85, PCT 0.17; meeting criteria for severe sepsis; currently on exam, patient well perfused with adequate pulses, adequate UO, unable to assess mentation at this time  -Source likely COVID19 PNA with possible superimposed bacterial PNA  -Broad spectrum abx started, vanco and zosyn started 4/5 AM, de-escalated to ceftriaxone  -Decadron and remdesivir started 4/5 for COVID19  -Sputum Cx negative, BCx no growth at 12 hours  -Urine cx pending  -Urine legionella and strep negative  -MRSA PCR negative  -Lactate cleared  -Will hold off on fluid resuscitation due to high-risk for pulm edema  -Maintain MAP > 65 mmHg  -Monitor urine output    #RV dilation  -Bedside POCUS demonstrating dilated RV and mildly enlarged pulm artery possibly 2/2 MU vs PE (unlikely)  -Echo: Severe hypokinesis of the entire anteroseptum/inferoseptum. Akinesis of the mid to apical anterolateral region, apical anterior, apical inferior and true apex. Mild hypokinesis of the basal anterolateral, basal anterior and basal inferior regions. The estimated left ventricular systolic function is 15%. The right ventricle is mildly dilated. Right ventricular systolic function is mildly reduced. No pericardial effusion is seen.  -Based on these findings, cardiology was consulted  -Appreciate cardiac recs  -As per spouse, no hx of cardiac disease and no change in ADLs or activity tolerance prior to current presentation    #LV Dysfunction  As per Cardiology:  -Patient with EF of 15  on Echo (No known prior) with regional wall motion abnormalities. Echo showing Severe hypokinesis of the entire anteroseptum/inferoseptu as well as Akinesis of the mid to apical anterolateral region, apical anterior, apical inferior and true apex. Pt aslso with Mild hypokinesis of the basal anterolateral, basal anterior and basal inferior regions.  -Echo reviewed. Images are poor due to body habitus but suggest a stress cardiomyopathy pattern (takotsubo cardiomyopathy) rather than an ischemic event  -EKG reviewed showing NSR, with non specific ST changes and poor R wave progression in the precordial leads likely due to body habitus  -Labs show ongoing troponemia in setting of renal dysfunction.  -Clinically patient is warm and well perfused and making urine. He is s/p 40 IV Lasix x1 with 900 output. Mixed venous show O2 sat of 70, making concurrent cardiogenic shock less likely.  -Patient's LV dysfunction appears to be mutifactorial: COVID, Morbid Obesity, Sleep Apnea. Myocarditis is also a possibility; now exacerbateed in setting of COVIDPNA  -Unfortunately patient is unable to undergo further ischemic or confirmatory imaging/workup  -Would hold off inotropic support for now as cardiogenic markers within normal range. Can continue low dose levo for now to keep MAP of 65 and above  -Given patient lasix naive would given 20 IV BID of lasix starting tomorrow. Pt s/p 40 IV lasix x1 today in the ER with good response  -Hold off BB or ACE/ARB therapy while on pressors with Renal impairement  -Please continue to trend CE to peak with CK and CKMB  -Please obtain daily EKgs  -EKG from / NSR with RBBB, without ischemic changes  -Please send TSH, A1c, and Lipid Panel  -Please keep K>4 and Mg>2  -Will need repeat ECHO post extubation to re-eval EF, if still depressed with wall motion abn will pursue ischemic work up  -Agree with diuresis for now and continuing with current management with Levo as mixed venous and clinical picture does not correlate with cardiogenic shock at this time.    GI  -No active issues  -Needs nasoenteral nurition, will obtain recs from nutrition  -Protonix IVP for GI ppx    RENAL//F&E  #Acute Kidney Injury  DDx: pre-renal vs ATN  -Lasix 40 mg IVP in ED 4/  -FENa 0.2% with yesterday's urine lytes  -SCr 2.14 today  -Baseline creatinine unknown, no reported kidney disease  -Adequate urine output   -Electrolytes stable  -Strict I&O monitoring  -Avoid nephrotoxic medications    #Anion Gap Metabolic Acidosis  -Likely 2/2 elevated lactate  -With appropriate compensation  -Lactate cleared    ID  #COVID PNA   -Plan as above    #  -Plan as above    ENDOCRINE  #Hyperglycemia  -q6hr correctional scale ordered to monitor for stress/steroid induced hyperglycemia  -Decadron started 4/5 AM  -A1c 6.2    HEME  #Anemia  -Hgb 11; unknown baseline. No evidence of bleed.  -Trend CBC  -Maintain active T+S  -Transfuse for Hb <7    -DVT ppx with Lovenox BID    DISPO/GOALS OF CARE:  Patient requires continuous monitoring with bedside rhythm monitoring, arterial line, pulse oximetry, ventilator monitoring and intermittent blood gas analysis. Care plan discussed with ICU care team. Patient remains critical at this time.     SUBJECTIVE  HPI:  55 yo M with a PMHx of gout and MU (on home CPAP) who presented to ED for SOB. He is accompanied by wife at bedside who is assisting with HPI. Pt. started complaining of fatigue, malaise, myalgias x 4 days and progressively worsening SOB x 2 days. Last night, patient became dyspneic and he attempted to use CPAP machine to relieve his symptoms with no improvement. Today, patient took cab with wife to the ED and ambulated to triage. As per his wife, Karissa, patient had no fevers, chills, chest pain, N/V/D, sick contacts or recent travel. Of note, patient received his first dose of the Moderna vaccine on 3/25/2021.    As per ED provider, patient was tachypneic, cyanotic on arrival to ED. He was placed on NRB and subsequently emergently intubated.    Hospital Course:  : started on remdesivir, decadron, vancomycin/zosyn. Pt critically ill with low saturations despite max vent settings. Pan cultured, urine legionella and strep sent, RVP panel sent; urine legionella neg. Sedated on prop and fent, paralyzed with nimbex. TTE global hypokinesis of septum, akinesis of apicolateral region, EF 15% (cardiology consulted- concern for covid myocarditis); mrsa swab negative, dc'd vanc; de-escalated abx to CAP coverage (azithromycin d/c'd, RVP neg), insulin gtt (hyperglycemia protocol) started with Q1hr FSG. Post Prone ABG with improvement in paO2, FiO2 down to 50%; troponin trending up  O/N: Insulin gtt wasnt running/connected per change of shift nurse, trop T downtrending 0.12, CK 8.8 from 8.7, lactate 1.6, lactate cleared, trops peaked, remainder of chem grossly normal, stopped insulin gtt and given 28ulantus @0430  FiO2 decreased to 50%    REVIEW OF SYSTEMS:   See HPI (as per chart review), unable to obtain 2/2 ETT and sedation                                    OBJECTIVE  Vital Signs Last 24 Hrs  T(C): 35.9 (2021 06:00), Max: 37.6 (2021 13:00)  T(F): 96.6 (2021 06:00), Max: 99.7 (2021 13:00)  HR: 52 (2021 08:00) (52 - 100)  Arterial Line SBP 90-100s, MAP 70's  BP: 93/60 (2021 13:00) (93/60 - 124/77)  BP(mean): 72 (2021 13:00) (72 - 72)  RR: 28 (2021 08:00) (0 - 28)  SpO2: 97% (2021 08:00) (81% - 97%)    Vent settings   Mode: AC/ CMV (Assist Control/ Continuous Mandatory Ventilation), RR (machine): 28, TV (machine): 450, FiO2: 50, PEEP: 14, ITime: 1, MAP: 20, PIP: 29    I&O's Detail    2021 07:01  -  2021 07:00  --------------------------------------------------------  IN:    Cisatracurium: 513.6 mL    FentaNYL: 276 mL    Insulin: 75 mL    IV PiggyBack: 250 mL    IV PiggyBack: 100 mL    IV PiggyBack: 250 mL    IV PiggyBack: 50 mL    Norepinephrine: 106 mL    Propofol: 532.8 mL  Total IN: 2153.4 mL    OUT:    Indwelling Catheter - Urethral (mL): 680 mL    Nasogastric/Oral tube (mL): 120 mL    Voided (mL): 1110 mL  Total OUT: 1910 mL    Total NET: 243.4 mL      LABS:                        9.6    12.70 )-----------( 261      ( 2021 05:21 )             32.0     04-06    134<L>  |  97  |  21  ----------------------------<  193<H>  3.9   |  27  |  2.14<H>    Ca    7.9<L>      2021 02:15  Phos  2.8     04-06  Mg     2.5     04-06    TPro  6.1  /  Alb  2.6<L>  /  TBili  0.3  /  DBili  x   /  AST  37  /  ALT  18  /  AlkPhos  80  04-06    PT/INR - ( 2021 05:20 )   PT: 12.4 sec;   INR: 1.04         PTT - ( 2021 05:20 )  PTT:28.4 sec  ABG - ( 2021 05:16 )  pH, Arterial: 7.32  pH, Blood: x     /  pCO2: 55    /  pO2: 104   / HCO3: 28    / Base Excess: 1.4   /  SaO2: 97        Blood Gas Profile - Venous (21 @ 05:16)   pH, Venous: 7.30   pCO2, Venous: 60 mmHg   pO2, Venous: 48 mmHg   HCO3, Venous: 29 mmol/L   Base Excess, Venous: 1.3 mmol/L   Oxygen Saturation, Venous: 78 %   proBNP 929 4/5  lactate1.6  .6  D-dimer 614  Ferritin [ pending ]  Culture - Sputum (collected 2021 13:03)  Source: .Sputum Sputum  Gram Stain (2021 17:46):    No epithelial cells seen    Rare WBC's    No organisms seen    Culture - Blood (collected 2021 08:53)  Source: .Blood Blood-Peripheral  Preliminary Report (2021 21:40):    No growth at 12 hours    Culture - Blood (collected 2021 08:53)  Source: .Blood Blood-Peripheral  Preliminary Report (2021 21:40):    No growth at 12 hours    Urinalysis Basic - ( 2021 07:34 )    Color: Yellow / Appearance: Clear / S.025 / pH: x  Gluc: x / Ketone: 40 mg/dL  / Bili: Negative / Urobili: 0.2 E.U./dL   Blood: x / Protein: 100 mg/dL / Nitrite: NEGATIVE   Leuk Esterase: NEGATIVE / RBC: < 5 /HPF / WBC < 5 /HPF   Sq Epi: x / Non Sq Epi: 5-10 /HPF / Bacteria: Present /HPF    CXr from 21 AM reviewed with fellow and attending during bedside rounds *prone film*    EKG from  with RBBB    MEDICATIONS  (STANDING):  cefTRIAXone   IVPB 1000 milliGRAM(s) IV Intermittent every 24 hours  chlorhexidine 0.12% Liquid 15 milliLiter(s) Oral Mucosa every 12 hours  chlorhexidine 4% Liquid 1 Application(s) Topical <User Schedule>  ciprofloxacin  0.3% Ophthalmic Solution 1 Drop(s) Left EYE four times a day  cisatracurium Infusion 3 MICROgram(s)/kG/Min (21.6 mL/Hr) IV Continuous <Continuous>  dexAMETHasone  Injectable 6 milliGRAM(s) IV Push every 24 hours  dextrose 40% Gel 15 Gram(s) Oral once  dextrose 5%. 1000 milliLiter(s) (50 mL/Hr) IV Continuous <Continuous>  dextrose 5%. 1000 milliLiter(s) (100 mL/Hr) IV Continuous <Continuous>  dextrose 50% Injectable 25 Gram(s) IV Push once  dextrose 50% Injectable 12.5 Gram(s) IV Push once  dextrose 50% Injectable 25 Gram(s) IV Push once  enoxaparin Injectable 40 milliGRAM(s) SubCutaneous every 12 hours  fentaNYL   Infusion. 0.5 MICROgram(s)/kG/Hr (6 mL/Hr) IV Continuous <Continuous> @ 1 mcg  glucagon  Injectable 1 milliGRAM(s) IntraMuscular once  insulin regular Infusion 3 Unit(s)/Hr (3 mL/Hr) IV Continuous <Continuous> OFF at this time  ketorolac 0.5% Ophthalmic Solution 1 Drop(s) Left EYE four times a day  norepinephrine Infusion 0.02 MICROgram(s)/kG/Min (4.5 mL/Hr) IV Continuous <Continuous> @ 0.025  pantoprazole  Injectable 40 milliGRAM(s) IV Push daily  petrolatum Ophthalmic Ointment 1 Application(s) Both EYES every 6 hours  prednisoLONE acetate 1% Suspension 1 Drop(s) Left EYE four times a day  propofol Infusion 10 MICROgram(s)/kG/Min (7.2 mL/Hr) IV Continuous <Continuous> @ 40 mcg  remdesivir  IVPB   IV Intermittent   remdesivir  IVPB 100 milliGRAM(s) IV Intermittent every 24 hours    MEDICATIONS  (PRN):  sodium chloride 0.9% lock flush 10 milliLiter(s) IV Push every 1 hour PRN Pre/post blood products, medications, blood draw, and to maintain line patency    PHYSICAL EXAM:  GENERAL: ill-appearing, obese, intubated and sedated/paralyzed   SKIN/HAIR/NAILS: Skin warm and clammy. Nails without clubbing or cyanosis. CR<2 secs. No lesions, rash, petechiae, erythema or ecchymoses. Anicteric.   HEAD AND NECK: The skull is NC/AT. B/L Sclera white. PERRL. Nasal mucous membrane with no erythema or drainage. Trachea midline, neck supple.   THORAX/LUNGS: Thorax is symmetric with good expansion, assisted by mechanical ventilation. Rhonchi B/L, diminished throughout.   CARDIOVASCULAR: No JVD. Carotid upstrokes are brisk, without bruits. +S1 and S2, RRR; no extra heart sounds or M/R/G.  ABDOMEN/: Abdomen is distended with +BS x 4. It is soft, no palpable masses; Lechuga catheter in place.  PERIPHERAL VASCULAR: Extremities are warm, radial and dorsalis pedis pulses +1 and symmetric. No clubbing, no cyanosis.  MUSCULOSKELETAL: No active ROM at this time. No evidence of swelling or deformity.  NEUROLOGICAL: unable to assess, sedated and intubated/rx paralysis  LINES (DATES): L IJ inserted in ED by fellow , L radial art line , PIV x 2    Assessment and Plan   Patient is a 55 yo M with PMHx of MU and gout who presented to ED with c/o progressively worsening SOB now admitted to ICU for management of AHRF in the setting of COViD    NEURO  #Sedated   -propofol and fentanyl for rass -4   -Will maintain sedated and paralyzed for vent synchrony    #Paralytic:   -nimbex, paralyzed for vent synchrony  -Neuro checks per ICU protocol, pain/sedation meds assessed and addressed, will titrate down as able to maintain vent synchrony  -Will give paralytic vacation when supined    RESPIRATORY  #Acute hypoxic hypercapneic respiratory failure in the setting of COViD PNA  -Patient initially presented with O2 saturation of 14% and cyanotic requiring immediate intubation  -CXr with diffuse bilateral opacities with + COVID19 PCR  -Elevated inflammatory markers  -Patient currently proned  -On AC/VC mechanical Ventilation:  Mode: AC/ CMV; RR: 28; TV: 450; FiO2: 50; PEEP: 14  -PF ratio 208 this AM  -Will Wean FiO2 as tolerated    #COViD  -CXr with diffuse bilateral opacities with + COVID19 PCR  -Start Date  Remdesivir (x 6 day course) and Decadron 6 mg (x 10 day course)     #MU  -As per patient's wife, patient has MU on CPAP  -Patient sleeps with 2-3 pillows at night, attributed to MU    CARDIOVASCULAR  #Severe Sepsis  -Patient presents with tachycardia, tachypnea with a pulmonary process, lactate 12, WBC 15.85, PCT 0.17; meeting criteria for severe sepsis; currently on exam, patient well perfused with adequate pulses, adequate UO, unable to assess mentation at this time  -Source likely COVID19 PNA with possible superimposed bacterial PNA  -Broad spectrum abx started, vanco and zosyn started 4/5 AM, de-escalated to ceftriaxone  -Decadron and remdesivir started 4/5 for COVID19  -Sputum Cx negative, BCx no growth at 12 hours  -Urine cx pending  -Urine legionella and strep negative  -MRSA PCR negative  -Lactate cleared  -Will hold off on fluid resuscitation due to high-risk for pulm edema  -Maintain MAP > 65 mmHg  -Monitor urine output    #RV dilation  -Bedside POCUS demonstrating dilated RV and mildly enlarged pulm artery possibly 2/2 UM vs PE (unlikely)  -Echo: Severe hypokinesis of the entire anteroseptum/inferoseptum. Akinesis of the mid to apical anterolateral region, apical anterior, apical inferior and true apex. Mild hypokinesis of the basal anterolateral, basal anterior and basal inferior regions. The estimated left ventricular systolic function is 15%. The right ventricle is mildly dilated. Right ventricular systolic function is mildly reduced. No pericardial effusion is seen.  -Based on these findings, cardiology was consulted  -Appreciate cardiac recs  -As per spouse, no hx of cardiac disease and no change in ADLs or activity tolerance prior to current presentation    #LV Dysfunction  As per Cardiology:  -Patient with EF of 15  on Echo (No known prior) with regional wall motion abnormalities. Echo showing Severe hypokinesis of the entire anteroseptum/inferoseptu as well as Akinesis of the mid to apical anterolateral region, apical anterior, apical inferior and true apex. Pt aslso with Mild hypokinesis of the basal anterolateral, basal anterior and basal inferior regions.  -Echo reviewed. Images are poor due to body habitus but suggest a stress cardiomyopathy pattern (takotsubo cardiomyopathy) rather than an ischemic event  -EKG reviewed showing NSR, with non specific ST changes and poor R wave progression in the precordial leads likely due to body habitus  -Labs show ongoing troponemia in setting of renal dysfunction.  -Clinically patient is warm and well perfused and making urine. He is s/p 40 IV Lasix x1 with 900 output. Mixed venous show O2 sat of 70, making concurrent cardiogenic shock less likely.  -Patient's LV dysfunction appears to be mutifactorial: COVID, Morbid Obesity, Sleep Apnea. Myocarditis is also a possibility; now exacerbateed in setting of COVIDPNA  -Unfortunately patient is unable to undergo further ischemic or confirmatory imaging/workup  -Would hold off inotropic support for now as cardiogenic markers within normal range. Can continue low dose levo for now to keep MAP of 65 and above  -Given patient lasix naive would given 20 IV BID of lasix starting tomorrow. Pt s/p 40 IV lasix x1 today in the ER with good response  -Hold off BB or ACE/ARB therapy while on pressors with Renal impairement  -Please continue to trend CE to peak with CK and CKMB  -Please obtain daily EKgs  -EKG from  NSR with RBBB, without ischemic changes  -Please send TSH, A1c, and Lipid Panel  -Please keep K>4 and Mg>2  -Will need repeat ECHO post extubation to re-eval EF, if still depressed with wall motion abn will pursue ischemic work up  -Agree with diuresis for now and continuing with current management with Levo as mixed venous and clinical picture does not correlate with cardiogenic shock at this time.    GI  -No active issues  -Needs nasoenteral nurition, will obtain recs from nutrition  -Protonix IVP for GI ppx    RENAL//F&E  #Acute Kidney Injury  DDx: pre-renal vs ATN  -Lasix 40 mg IVP in ED 4/  -FENa 0.3% with yesterday's urine lytes  -SCr 2.14 today  -Baseline creatinine unknown, no reported kidney disease  -Adequate urine output   -Electrolytes stable  -Strict I&O monitoring  -Avoid nephrotoxic medications    #Anion Gap Metabolic Acidosis  -Likely 2/2 elevated lactate  -With appropriate compensation  -Lactate cleared    ID  #COVID PNA   -Plan as above    ENDOCRINE  #Hyperglycemia  -Was on insulin gtt overnight due to stress/steroid induced hyperglycemia  -Will monitor blood glucose q2hr   -q6hr correctional scale ordered   -Decadron started 4/5 AM  -A1c 6.2    HEME  #Anemia  -Hgb 11 on admission; unknown baseline. No evidence of bleed.  -Hgb today 9.6  -Trend CBC  -Maintain active T+S  -Transfuse for Hb <7    -DVT ppx with Lovenox BID, will change to heparin as SCr. increasing    DISPO/GOALS OF CARE:  Patient requires continuous monitoring with bedside rhythm monitoring, arterial line, pulse oximetry, ventilator monitoring and intermittent blood gas analysis. Care plan discussed with ICU care team. Patient remains critical at this time.     SUBJECTIVE  HPI:  57 yo M with a PMHx of gout and MU (on home CPAP) who presented to ED for SOB. He is accompanied by wife at bedside who is assisting with HPI. Pt. started complaining of fatigue, malaise, myalgias x 4 days and progressively worsening SOB x 2 days. Last night, patient became dyspneic and he attempted to use CPAP machine to relieve his symptoms with no improvement. Today, patient took cab with wife to the ED and ambulated to triage. As per his wife, Karissa, patient had no fevers, chills, chest pain, N/V/D, sick contacts or recent travel. Of note, patient received his first dose of the Moderna vaccine on 3/25/2021.    As per ED provider, patient was tachypneic, cyanotic on arrival to ED. He was placed on NRB and subsequently emergently intubated.    Hospital Course:  : started on remdesivir, decadron, vancomycin/zosyn. Pt critically ill with low saturations despite max vent settings. Pan cultured, urine legionella and strep sent, RVP panel sent; urine legionella neg. Sedated on prop and fent, paralyzed with nimbex. TTE global hypokinesis of septum, akinesis of apicolateral region, EF 15% (cardiology consulted- concern for covid myocarditis); mrsa swab negative, dc'd vanc; de-escalated abx to CAP coverage (azithromycin d/c'd, RVP neg), insulin gtt (hyperglycemia protocol) started with Q1hr FSG. Post Prone ABG with improvement in paO2, FiO2 down to 50%; troponin trending up  O/N: Insulin gtt wasnt running/connected per change of shift nurse, trop T downtrending 0.12, CK 8.8 from 8.7, lactate 1.6, lactate cleared, trops peaked, remainder of chem grossly normal, stopped insulin gtt and given 28ulantus @0430  FiO2 decreased to 50%    REVIEW OF SYSTEMS:   See HPI (as per chart review), unable to obtain 2/2 ETT and sedation                                    OBJECTIVE  Vital Signs Last 24 Hrs  T(C): 35.9 (2021 06:00), Max: 37.6 (2021 13:00)  T(F): 96.6 (2021 06:00), Max: 99.7 (2021 13:00)  HR: 52 (2021 08:00) (52 - 100)  Arterial Line SBP 90-100s, MAP 70's  BP: 93/60 (2021 13:00) (93/60 - 124/77)  BP(mean): 72 (2021 13:00) (72 - 72)  RR: 28 (2021 08:00) (0 - 28)  SpO2: 97% (2021 08:00) (81% - 97%)    Vent settings   Mode: AC/ CMV (Assist Control/ Continuous Mandatory Ventilation), RR (machine): 28, TV (machine): 450, FiO2: 50, PEEP: 14, ITime: 1, MAP: 20, PIP: 29    I&O's Detail    2021 07:01  -  2021 07:00  --------------------------------------------------------  IN:    Cisatracurium: 513.6 mL    FentaNYL: 276 mL    Insulin: 75 mL    IV PiggyBack: 250 mL    IV PiggyBack: 100 mL    IV PiggyBack: 250 mL    IV PiggyBack: 50 mL    Norepinephrine: 106 mL    Propofol: 532.8 mL  Total IN: 2153.4 mL    OUT:    Indwelling Catheter - Urethral (mL): 680 mL    Nasogastric/Oral tube (mL): 120 mL    Voided (mL): 1110 mL  Total OUT: 1910 mL    Total NET: 243.4 mL      LABS:                        9.6    12.70 )-----------( 261      ( 2021 05:21 )             32.0     04-06    134<L>  |  97  |  21  ----------------------------<  193<H>  3.9   |  27  |  2.14<H>    Ca    7.9<L>      2021 02:15  Phos  2.8     04-06  Mg     2.5     04-06    TPro  6.1  /  Alb  2.6<L>  /  TBili  0.3  /  DBili  x   /  AST  37  /  ALT  18  /  AlkPhos  80  04-06    PT/INR - ( 2021 05:20 )   PT: 12.4 sec;   INR: 1.04         PTT - ( 2021 05:20 )  PTT:28.4 sec  ABG - ( 2021 05:16 )  pH, Arterial: 7.32  pH, Blood: x     /  pCO2: 55    /  pO2: 104   / HCO3: 28    / Base Excess: 1.4   /  SaO2: 97        Blood Gas Profile - Venous (21 @ 05:16)   pH, Venous: 7.30   pCO2, Venous: 60 mmHg   pO2, Venous: 48 mmHg   HCO3, Venous: 29 mmol/L   Base Excess, Venous: 1.3 mmol/L   Oxygen Saturation, Venous: 78 %   proBNP 929 4/5  lactate1.6  .6  D-dimer 614  Ferritin [ pending ]  Culture - Sputum (collected 2021 13:03)  Source: .Sputum Sputum  Gram Stain (2021 17:46):    No epithelial cells seen    Rare WBC's    No organisms seen    Culture - Blood (collected 2021 08:53)  Source: .Blood Blood-Peripheral  Preliminary Report (2021 21:40):    No growth at 12 hours    Culture - Blood (collected 2021 08:53)  Source: .Blood Blood-Peripheral  Preliminary Report (2021 21:40):    No growth at 12 hours    Urinalysis Basic - ( 2021 07:34 )    Color: Yellow / Appearance: Clear / S.025 / pH: x  Gluc: x / Ketone: 40 mg/dL  / Bili: Negative / Urobili: 0.2 E.U./dL   Blood: x / Protein: 100 mg/dL / Nitrite: NEGATIVE   Leuk Esterase: NEGATIVE / RBC: < 5 /HPF / WBC < 5 /HPF   Sq Epi: x / Non Sq Epi: 5-10 /HPF / Bacteria: Present /HPF    CXr from 21 AM reviewed with fellow and attending during bedside rounds *prone film*    EKG from  with RBBB    MEDICATIONS  (STANDING):  cefTRIAXone   IVPB 1000 milliGRAM(s) IV Intermittent every 24 hours  chlorhexidine 0.12% Liquid 15 milliLiter(s) Oral Mucosa every 12 hours  chlorhexidine 4% Liquid 1 Application(s) Topical <User Schedule>  ciprofloxacin  0.3% Ophthalmic Solution 1 Drop(s) Left EYE four times a day  cisatracurium Infusion 3 MICROgram(s)/kG/Min (21.6 mL/Hr) IV Continuous <Continuous>  dexAMETHasone  Injectable 6 milliGRAM(s) IV Push every 24 hours  dextrose 40% Gel 15 Gram(s) Oral once  dextrose 5%. 1000 milliLiter(s) (50 mL/Hr) IV Continuous <Continuous>  dextrose 5%. 1000 milliLiter(s) (100 mL/Hr) IV Continuous <Continuous>  dextrose 50% Injectable 25 Gram(s) IV Push once  dextrose 50% Injectable 12.5 Gram(s) IV Push once  dextrose 50% Injectable 25 Gram(s) IV Push once  enoxaparin Injectable 40 milliGRAM(s) SubCutaneous every 12 hours  fentaNYL   Infusion. 0.5 MICROgram(s)/kG/Hr (6 mL/Hr) IV Continuous <Continuous> @ 1 mcg  glucagon  Injectable 1 milliGRAM(s) IntraMuscular once  insulin regular Infusion 3 Unit(s)/Hr (3 mL/Hr) IV Continuous <Continuous> OFF at this time  ketorolac 0.5% Ophthalmic Solution 1 Drop(s) Left EYE four times a day  norepinephrine Infusion 0.02 MICROgram(s)/kG/Min (4.5 mL/Hr) IV Continuous <Continuous> @ 0.025  pantoprazole  Injectable 40 milliGRAM(s) IV Push daily  petrolatum Ophthalmic Ointment 1 Application(s) Both EYES every 6 hours  prednisoLONE acetate 1% Suspension 1 Drop(s) Left EYE four times a day  propofol Infusion 10 MICROgram(s)/kG/Min (7.2 mL/Hr) IV Continuous <Continuous> @ 40 mcg  remdesivir  IVPB   IV Intermittent   remdesivir  IVPB 100 milliGRAM(s) IV Intermittent every 24 hours    MEDICATIONS  (PRN):  sodium chloride 0.9% lock flush 10 milliLiter(s) IV Push every 1 hour PRN Pre/post blood products, medications, blood draw, and to maintain line patency    PHYSICAL EXAM:  GENERAL: ill-appearing, obese, intubated and sedated/paralyzed   SKIN/HAIR/NAILS: Skin warm and clammy. Nails without clubbing or cyanosis. CR<2 secs. No lesions, rash, petechiae, erythema or ecchymoses. Anicteric.   HEAD AND NECK: The skull is NC/AT. B/L Sclera white. PERRL. Nasal mucous membrane with no erythema or drainage. Trachea midline, neck supple.   THORAX/LUNGS: Thorax is symmetric with good expansion, assisted by mechanical ventilation. Rhonchi B/L, diminished throughout.   CARDIOVASCULAR: No JVD. Carotid upstrokes are brisk, without bruits. +S1 and S2, RRR; no extra heart sounds or M/R/G.  ABDOMEN/: Abdomen is distended with +BS x 4. It is soft, no palpable masses; Lechuga catheter in place.  PERIPHERAL VASCULAR: Extremities are warm, radial and dorsalis pedis pulses +1 and symmetric. No clubbing, no cyanosis.  MUSCULOSKELETAL: No active ROM at this time. No evidence of swelling or deformity.  NEUROLOGICAL: unable to assess, sedated and intubated/rx paralysis  LINES (DATES): L IJ inserted in ED by fellow , L radial art line , PIV x 2    Assessment and Plan   Patient is a 57 yo M with PMHx of MU and gout who presented to ED with c/o progressively worsening SOB now admitted to ICU for management of AHRF in the setting of COViD    NEURO  #Sedated   -propofol and fentanyl for rass -4   -Will maintain sedated and paralyzed for vent synchrony    #Paralytic:   -nimbex, paralyzed for vent synchrony  -Neuro checks per ICU protocol, pain/sedation meds assessed and addressed, will titrate down as able to maintain vent synchrony  -Will give paralytic vacation when supined    RESPIRATORY  #Acute hypoxic hypercapneic respiratory failure in the setting of COViD PNA  -Patient initially presented with O2 saturation of 14% and cyanotic requiring immediate intubation  -CXr with diffuse bilateral opacities with + COVID19 PCR  -Elevated inflammatory markers  -Patient currently proned  -On AC/VC mechanical Ventilation:  Mode: AC/ CMV; RR: 28; TV: 450; FiO2: 50; PEEP: 14  -PF ratio 208 this AM  -Will Wean FiO2 as tolerated    #COViD  -CXr with diffuse bilateral opacities with + COVID19 PCR  -Start Date  Remdesivir (x 6 day course) and Decadron 6 mg (x 10 day course)  -Remdesevir d/c'd due to worsening renal fxn     #MU  -As per patient's wife, patient has MU on CPAP  -Patient sleeps with 2-3 pillows at night, attributed to MU    CARDIOVASCULAR  #Severe Sepsis  -Patient presents with tachycardia, tachypnea with a pulmonary process, lactate 12, WBC 15.85, PCT 0.17; meeting criteria for severe sepsis; currently on exam, patient well perfused with adequate pulses, adequate UO, unable to assess mentation at this time  -Source likely COVID19 PNA with possible superimposed bacterial PNA  -Broad spectrum abx started, vanco and zosyn started 4/5 AM, de-escalated to ceftriaxone  -Decadron and remdesivir started 4/5 for COVID19  -Sputum Cx negative, BCx no growth at 12 hours  -Urine cx negative  -Urine legionella and strep negative  -Will start 3 day course of azithromycin  -MRSA PCR negative  -Lactate cleared  -Will hold off on fluid resuscitation due to high-risk for pulm edema  -Maintain MAP > 65 mmHg  -Monitor urine output    #RV dilation  -Bedside POCUS demonstrating dilated RV and mildly enlarged pulm artery possibly 2/2 MU vs PE (unlikely)  -Echo: Severe hypokinesis of the entire anteroseptum/inferoseptum. Akinesis of the mid to apical anterolateral region, apical anterior, apical inferior and true apex. Mild hypokinesis of the basal anterolateral, basal anterior and basal inferior regions. The estimated left ventricular systolic function is 15%. The right ventricle is mildly dilated. Right ventricular systolic function is mildly reduced. No pericardial effusion is seen.  -Based on these findings, cardiology was consulted  -Appreciate cardiac recs  -As per spouse, no hx of cardiac disease and no change in ADLs or activity tolerance prior to current presentation    #LV Dysfunction  As per Cardiology:  -Echo reviewed. Images are poor due to body habitus but suggest a stress cardiomyopathy pattern (takotsubo cardiomyopathy) rather than an ischemic event  -EKG reviewed showing NSR, with non specific ST changes and poor R wave progression in the precordial leads likely due to body habitus  -Labs show ongoing troponemia in setting of renal dysfunction.  -Clinically patient is warm and well perfused and making urine. He is s/p 40 IV Lasix x1 with 900 output. Mixed venous show O2 sat of 70, making concurrent cardiogenic shock less likely.  -Patient's LV dysfunction appears to be mutifactorial: COVID, Morbid Obesity, Sleep Apnea. Myocarditis is also a possibility; now exacerbateed in setting of COVIDPNA  -Unfortunately patient is unable to undergo further ischemic or confirmatory imaging/workup  -Would hold off inotropic support for now as cardiogenic markers within normal range. Can continue low dose levo for now to keep MAP of 65 and above  -Given patient lasix naive would given 20 IV BID of lasix starting tomorrow. Pt s/p 40 IV lasix x1 today in the ER with good response  -Hold off BB or ACE/ARB therapy while on pressors with Renal impairement  -Please continue to trend CE to peak with CK and CKMB  -Please obtain daily EKgs  -EKG from  NSR with RBBB, without ischemic changes  -Please send TSH, A1c, and Lipid Panel  -Please keep K>4 and Mg>2  -Will need repeat ECHO post extubation to re-eval EF, if still depressed with wall motion abn will pursue ischemic work up  -Agree with diuresis for now and continuing with current management with Levo as mixed venous and clinical picture does not correlate with cardiogenic shock at this time.    GI  -No active issues  -Needs nasoenteral nurition, will obtain recs from nutrition  -Protonix IVP for GI ppx    RENAL//F&E  #Acute Kidney Injury  DDx: pre-renal vs ATN  -Lasix 40 mg IVP in ED   -FENa 0.3% with yesterday's urine lytes  -SCr 2.14 today  -Baseline creatinine unknown, no reported kidney disease  -Adequate urine output   -Electrolytes stable  -Strict I&O monitoring  -Avoid nephrotoxic medications    #Anion Gap Metabolic Acidosis  -Likely 2/2 elevated lactate  -With appropriate compensation  -Lactate cleared    ID  #COVID PNA   -Plan as above  -D/C remdesevir due to worsening renal fxn    ENDOCRINE  #Hyperglycemia  -Was on insulin gtt overnight due to stress/steroid induced hyperglycemia  -Will monitor blood glucose q2hr   -q6hr correctional scale ordered   -Decadron started 4/5 AM  -A1c 6.2    HEME  #Anemia  -Hgb 11 on admission; unknown baseline. No evidence of bleed.  -Hgb today 9.6  -Trend CBC  -Maintain active T+S  -Transfuse for Hb <7    -DVT ppx with Lovenox BID, will change to heparin as SCr. increasing    DISPO/GOALS OF CARE:  Patient requires continuous monitoring with bedside rhythm monitoring, arterial line, pulse oximetry, ventilator monitoring and intermittent blood gas analysis. Care plan discussed with ICU care team. Patient remains critical at this time.

## 2021-04-06 NOTE — CHART NOTE - NSCHARTNOTEFT_GEN_A_CORE
Admitting Diagnosis:   Patient is a 56y old  Male who presents with a chief complaint of SOB (06 Apr 2021 08:04)      Consult: Yes [   ]  No [   ]    Reason for Initial Nutrition Assessment:      PAST MEDICAL & SURGICAL HISTORY:  Sleep apnea            Current Nutrition Order:   Nepro @30cc started at 10am today    PO Intake: Good (%) [   ]  Fair (50-75%) [   ] Poor (<25%) [   ] - N/A    GI Issues:   Started on bowel regimen today 2/2 on paralytics  No BM this admission    Pain:  KYLEE 2/2 intubated/sedated    Skin Integrity:  Jagdeep 9  Intact        Labs:   04-06    136  |  98  |  24<H>  ----------------------------<  181<H>  4.5   |  28  |  2.33<H>    Ca    7.7<L>      06 Apr 2021 09:03  Phos  2.8     04-06  Mg     2.5     04-06    TPro  6.2  /  Alb  2.7<L>  /  TBili  0.3  /  DBili  x   /  AST  40  /  ALT  21  /  AlkPhos  76  04-06    CAPILLARY BLOOD GLUCOSE      POCT Blood Glucose.: 202 mg/dL (06 Apr 2021 11:25)  POCT Blood Glucose.: 177 mg/dL (06 Apr 2021 10:02)  POCT Blood Glucose.: 172 mg/dL (06 Apr 2021 07:52)  POCT Blood Glucose.: 152 mg/dL (06 Apr 2021 06:50)  POCT Blood Glucose.: 132 mg/dL (06 Apr 2021 05:54)  POCT Blood Glucose.: 149 mg/dL (06 Apr 2021 04:56)  POCT Blood Glucose.: 155 mg/dL (06 Apr 2021 03:52)  POCT Blood Glucose.: 178 mg/dL (06 Apr 2021 02:56)  POCT Blood Glucose.: 179 mg/dL (06 Apr 2021 01:58)  POCT Blood Glucose.: 213 mg/dL (06 Apr 2021 01:06)  POCT Blood Glucose.: 208 mg/dL (05 Apr 2021 23:58)  POCT Blood Glucose.: 239 mg/dL (05 Apr 2021 22:52)  POCT Blood Glucose.: 244 mg/dL (05 Apr 2021 22:05)  POCT Blood Glucose.: 276 mg/dL (05 Apr 2021 20:59)  POCT Blood Glucose.: 266 mg/dL (05 Apr 2021 20:12)  POCT Blood Glucose.: 259 mg/dL (05 Apr 2021 19:15)  POCT Blood Glucose.: 280 mg/dL (05 Apr 2021 18:22)  POCT Blood Glucose.: 289 mg/dL (05 Apr 2021 16:30)    Nutritionally Pertinent Lab Values:    Medications:  MEDICATIONS  (STANDING):  azithromycin  IVPB 500 milliGRAM(s) IV Intermittent every 24 hours  cefTRIAXone   IVPB 1000 milliGRAM(s) IV Intermittent every 24 hours  chlorhexidine 0.12% Liquid 15 milliLiter(s) Oral Mucosa every 12 hours  chlorhexidine 4% Liquid 1 Application(s) Topical <User Schedule>  ciprofloxacin  0.3% Ophthalmic Solution 1 Drop(s) Left EYE four times a day  dexAMETHasone  Injectable 6 milliGRAM(s) IV Push every 24 hours  dextrose 40% Gel 15 Gram(s) Oral once  dextrose 5%. 1000 milliLiter(s) (50 mL/Hr) IV Continuous <Continuous>  dextrose 5%. 1000 milliLiter(s) (100 mL/Hr) IV Continuous <Continuous>  dextrose 50% Injectable 25 Gram(s) IV Push once  dextrose 50% Injectable 12.5 Gram(s) IV Push once  dextrose 50% Injectable 25 Gram(s) IV Push once  fentaNYL   Infusion. 0.5 MICROgram(s)/kG/Hr (6 mL/Hr) IV Continuous <Continuous>  glucagon  Injectable 1 milliGRAM(s) IntraMuscular once  heparin   Injectable 7500 Unit(s) SubCutaneous every 8 hours  insulin regular  human corrective regimen sliding scale   SubCutaneous every 6 hours  ketorolac 0.5% Ophthalmic Solution 1 Drop(s) Left EYE four times a day  norepinephrine Infusion 0.02 MICROgram(s)/kG/Min (4.5 mL/Hr) IV Continuous <Continuous>  pantoprazole  Injectable 40 milliGRAM(s) IV Push daily  petrolatum Ophthalmic Ointment 1 Application(s) Both EYES every 6 hours  polyethylene glycol 3350 17 Gram(s) Oral every 12 hours  prednisoLONE acetate 1% Suspension 1 Drop(s) Left EYE four times a day  propofol Infusion 10 MICROgram(s)/kG/Min (7.2 mL/Hr) IV Continuous <Continuous>  senna 2 Tablet(s) Oral at bedtime    MEDICATIONS  (PRN):  sodium chloride 0.9% lock flush 10 milliLiter(s) IV Push every 1 hour PRN Pre/post blood products, medications, blood draw, and to maintain line patency      Admitted Anthropometrics:  Weight: 5'10"; ABW 120kg; IBW: 75.4kg; %IBW: 166%; BMI: 37    Weight Change:   KYLEE 2/2 intubated/sedated    Estimated energy needs:   IBW (75.4kg) used for calculations as pt is >100% of IBW and critically ill  Nutrient needs based on [facility] standards of care for maintenance in older adults.   Needs adjusted for age and hypermetabolic/inflammatory state 2/2 COVID+ and oxygen demands  Energy: 1885-2262kcal (25-30cal/kg)  Protein: 106-121g pro (1.4-1.6g/kg pro)  Fluids per team    Subjective:   Patient is a 55 yo M with PMHx of MU and gout who presented to ED with c/o progressively worsening SOB now admitted to ICU for management of AHRF in the setting of COVID    Unable to conduct a face to face interview or nutrition-focused physical exam due to limited contact restrictions related to the pt's medical condition and isolation precautions. Pt discussed with MICU team on rounds. Currently intubated on AC/CMV mode. Parlayzed on nimbex for vent synchrony with plan for paralytic vacation once supine. Sedated on fentanyl with propofol infusing @28.8ml/hr x24hrs (providing 761kcal from lipids/day). MAP 71- requiring levo for BP support. Unable to obtain diet/weight history at this time 2/2 intubated/sedated. Notable labs: POCT , 152, 132mg/dL, Na 134, Cr 2.14. See EN recs below for current propofol rate. RD to follow.     Nutrition Diagnosis:  Increased nutrient needs RT increased demand for kcal/pro 2/2 hypermetabolic state AEB oxygen demands and viral infection COVID+  [  ] No active nutrition diagnosis at this time  [  ] Current medical condition precludes nutrition intervention    Goal: Pt to meet >/=75% EER consistently with good tolerance via most medically appropriate route.     Recommendations:  1. With continued intubation, worsening renal function, and at current propofol rate, recommend Nepro @26ml/hr x24hrs +3 LPS/day. Provides 624ml TV, 1423kcal (w/o propofol), 2183kcal (w/ propofol), 23.9 NPC/kg, 95g pro, 1.25g pro/kg, 453ml FW.   Will adjust rate pending propofol titration and any changes in renal function.   2. Additional free H2O per team. Monitor for s/s intolerance; maintain aspiration precautions at all times.     3.  Monitor lytes and replete prn.   4.  Pain and bowel regimens per team  5. Please obtain updated TG daily.   *d/w team.     Education: n/a    Risk Level: High [   x] Moderate [   ] Low [   ].

## 2021-04-07 LAB
ALBUMIN SERPL ELPH-MCNC: 2.6 G/DL — LOW (ref 3.3–5)
ALP SERPL-CCNC: 80 U/L — SIGNIFICANT CHANGE UP (ref 40–120)
ALT FLD-CCNC: 22 U/L — SIGNIFICANT CHANGE UP (ref 10–45)
ANION GAP SERPL CALC-SCNC: 10 MMOL/L — SIGNIFICANT CHANGE UP (ref 5–17)
APTT BLD: 25.5 SEC — LOW (ref 27.5–35.5)
AST SERPL-CCNC: 33 U/L — SIGNIFICANT CHANGE UP (ref 10–40)
BASE EXCESS BLDA CALC-SCNC: 3.4 MMOL/L — HIGH (ref -2–3)
BASE EXCESS BLDA CALC-SCNC: 4.4 MMOL/L — HIGH (ref -2–3)
BASE EXCESS BLDA CALC-SCNC: 5 MMOL/L — HIGH (ref -2–3)
BASOPHILS # BLD AUTO: 0.01 K/UL — SIGNIFICANT CHANGE UP (ref 0–0.2)
BASOPHILS NFR BLD AUTO: 0.1 % — SIGNIFICANT CHANGE UP (ref 0–2)
BILIRUB SERPL-MCNC: 0.2 MG/DL — SIGNIFICANT CHANGE UP (ref 0.2–1.2)
BUN SERPL-MCNC: 32 MG/DL — HIGH (ref 7–23)
CALCIUM SERPL-MCNC: 7.9 MG/DL — LOW (ref 8.4–10.5)
CHLORIDE SERPL-SCNC: 99 MMOL/L — SIGNIFICANT CHANGE UP (ref 96–108)
CO2 SERPL-SCNC: 29 MMOL/L — SIGNIFICANT CHANGE UP (ref 22–31)
CREAT SERPL-MCNC: 2.03 MG/DL — HIGH (ref 0.5–1.3)
CRP SERPL-MCNC: 117.3 MG/L — HIGH (ref 0–4)
CULTURE RESULTS: SIGNIFICANT CHANGE UP
D DIMER BLD IA.RAPID-MCNC: 908 NG/ML DDU — HIGH
EOSINOPHIL # BLD AUTO: 0 K/UL — SIGNIFICANT CHANGE UP (ref 0–0.5)
EOSINOPHIL NFR BLD AUTO: 0 % — SIGNIFICANT CHANGE UP (ref 0–6)
FERRITIN SERPL-MCNC: 154 NG/ML — SIGNIFICANT CHANGE UP (ref 30–400)
GAS PNL BLDA: SIGNIFICANT CHANGE UP
GLUCOSE BLDC GLUCOMTR-MCNC: 217 MG/DL — HIGH (ref 70–99)
GLUCOSE BLDC GLUCOMTR-MCNC: 240 MG/DL — HIGH (ref 70–99)
GLUCOSE BLDC GLUCOMTR-MCNC: 242 MG/DL — HIGH (ref 70–99)
GLUCOSE BLDC GLUCOMTR-MCNC: 243 MG/DL — HIGH (ref 70–99)
GLUCOSE SERPL-MCNC: 246 MG/DL — HIGH (ref 70–99)
HCO3 BLDA-SCNC: 30 MMOL/L — HIGH (ref 21–28)
HCO3 BLDA-SCNC: 30 MMOL/L — HIGH (ref 21–28)
HCO3 BLDA-SCNC: 32 MMOL/L — HIGH (ref 21–28)
HCT VFR BLD CALC: 32.5 % — LOW (ref 39–50)
HGB BLD-MCNC: 9.7 G/DL — LOW (ref 13–17)
IMM GRANULOCYTES NFR BLD AUTO: 1.2 % — SIGNIFICANT CHANGE UP (ref 0–1.5)
INR BLD: 1.04 — SIGNIFICANT CHANGE UP (ref 0.88–1.16)
LYMPHOCYTES # BLD AUTO: 0.55 K/UL — LOW (ref 1–3.3)
LYMPHOCYTES # BLD AUTO: 4.6 % — LOW (ref 13–44)
MAGNESIUM SERPL-MCNC: 2.7 MG/DL — HIGH (ref 1.6–2.6)
MCHC RBC-ENTMCNC: 23 PG — LOW (ref 27–34)
MCHC RBC-ENTMCNC: 29.8 GM/DL — LOW (ref 32–36)
MCV RBC AUTO: 77.2 FL — LOW (ref 80–100)
MONOCYTES # BLD AUTO: 0.55 K/UL — SIGNIFICANT CHANGE UP (ref 0–0.9)
MONOCYTES NFR BLD AUTO: 4.6 % — SIGNIFICANT CHANGE UP (ref 2–14)
NEUTROPHILS # BLD AUTO: 10.8 K/UL — HIGH (ref 1.8–7.4)
NEUTROPHILS NFR BLD AUTO: 89.5 % — HIGH (ref 43–77)
NRBC # BLD: 0 /100 WBCS — SIGNIFICANT CHANGE UP (ref 0–0)
PCO2 BLDA: 49 MMHG — HIGH (ref 35–48)
PCO2 BLDA: 58 MMHG — HIGH (ref 35–48)
PCO2 BLDA: 63 MMHG — CRITICAL HIGH (ref 35–48)
PH BLDA: 7.32 — LOW (ref 7.35–7.45)
PH BLDA: 7.34 — LOW (ref 7.35–7.45)
PH BLDA: 7.4 — SIGNIFICANT CHANGE UP (ref 7.35–7.45)
PHOSPHATE SERPL-MCNC: 3.9 MG/DL — SIGNIFICANT CHANGE UP (ref 2.5–4.5)
PLATELET # BLD AUTO: 299 K/UL — SIGNIFICANT CHANGE UP (ref 150–400)
PO2 BLDA: 110 MMHG — HIGH (ref 83–108)
PO2 BLDA: 144 MMHG — HIGH (ref 83–108)
PO2 BLDA: 97 MMHG — SIGNIFICANT CHANGE UP (ref 83–108)
POTASSIUM SERPL-MCNC: 4.7 MMOL/L — SIGNIFICANT CHANGE UP (ref 3.5–5.3)
POTASSIUM SERPL-SCNC: 4.7 MMOL/L — SIGNIFICANT CHANGE UP (ref 3.5–5.3)
PROT SERPL-MCNC: 5.9 G/DL — LOW (ref 6–8.3)
PROTHROM AB SERPL-ACNC: 12.4 SEC — SIGNIFICANT CHANGE UP (ref 10.6–13.6)
RBC # BLD: 4.21 M/UL — SIGNIFICANT CHANGE UP (ref 4.2–5.8)
RBC # FLD: 17.2 % — HIGH (ref 10.3–14.5)
SAO2 % BLDA: 97 % — SIGNIFICANT CHANGE UP (ref 95–100)
SAO2 % BLDA: 98 % — SIGNIFICANT CHANGE UP (ref 95–100)
SAO2 % BLDA: 99 % — SIGNIFICANT CHANGE UP (ref 95–100)
SODIUM SERPL-SCNC: 138 MMOL/L — SIGNIFICANT CHANGE UP (ref 135–145)
SPECIMEN SOURCE: SIGNIFICANT CHANGE UP
WBC # BLD: 12.05 K/UL — HIGH (ref 3.8–10.5)
WBC # FLD AUTO: 12.05 K/UL — HIGH (ref 3.8–10.5)

## 2021-04-07 PROCEDURE — 71045 X-RAY EXAM CHEST 1 VIEW: CPT | Mod: 26,77

## 2021-04-07 PROCEDURE — 99233 SBSQ HOSP IP/OBS HIGH 50: CPT

## 2021-04-07 PROCEDURE — 99233 SBSQ HOSP IP/OBS HIGH 50: CPT | Mod: GC

## 2021-04-07 PROCEDURE — 71045 X-RAY EXAM CHEST 1 VIEW: CPT | Mod: 26

## 2021-04-07 RX ORDER — REMDESIVIR 5 MG/ML
100 INJECTION INTRAVENOUS EVERY 24 HOURS
Refills: 0 | Status: COMPLETED | OUTPATIENT
Start: 2021-04-07 | End: 2021-04-09

## 2021-04-07 RX ORDER — PROPOFOL 10 MG/ML
10 INJECTION, EMULSION INTRAVENOUS
Qty: 1000 | Refills: 0 | Status: DISCONTINUED | OUTPATIENT
Start: 2021-04-07 | End: 2021-04-09

## 2021-04-07 RX ORDER — FENTANYL CITRATE 50 UG/ML
0.5 INJECTION INTRAVENOUS
Qty: 2500 | Refills: 0 | Status: DISCONTINUED | OUTPATIENT
Start: 2021-04-07 | End: 2021-04-09

## 2021-04-07 RX ADMIN — INSULIN HUMAN 4: 100 INJECTION, SOLUTION SUBCUTANEOUS at 23:57

## 2021-04-07 RX ADMIN — Medication 1 DROP(S): at 12:46

## 2021-04-07 RX ADMIN — CHLORHEXIDINE GLUCONATE 1 APPLICATION(S): 213 SOLUTION TOPICAL at 05:45

## 2021-04-07 RX ADMIN — HEPARIN SODIUM 7500 UNIT(S): 5000 INJECTION INTRAVENOUS; SUBCUTANEOUS at 12:59

## 2021-04-07 RX ADMIN — Medication 1 DROP(S): at 05:27

## 2021-04-07 RX ADMIN — Medication 1 DROP(S): at 23:17

## 2021-04-07 RX ADMIN — Medication 1 DROP(S): at 18:17

## 2021-04-07 RX ADMIN — HEPARIN SODIUM 7500 UNIT(S): 5000 INJECTION INTRAVENOUS; SUBCUTANEOUS at 19:54

## 2021-04-07 RX ADMIN — Medication 6 MILLIGRAM(S): at 05:50

## 2021-04-07 RX ADMIN — PROPOFOL 7.2 MICROGRAM(S)/KG/MIN: 10 INJECTION, EMULSION INTRAVENOUS at 10:09

## 2021-04-07 RX ADMIN — POLYETHYLENE GLYCOL 3350 17 GRAM(S): 17 POWDER, FOR SOLUTION ORAL at 05:49

## 2021-04-07 RX ADMIN — PROPOFOL 7.2 MICROGRAM(S)/KG/MIN: 10 INJECTION, EMULSION INTRAVENOUS at 14:52

## 2021-04-07 RX ADMIN — Medication 1 APPLICATION(S): at 18:17

## 2021-04-07 RX ADMIN — PROPOFOL 7.2 MICROGRAM(S)/KG/MIN: 10 INJECTION, EMULSION INTRAVENOUS at 21:16

## 2021-04-07 RX ADMIN — PROPOFOL 7.2 MICROGRAM(S)/KG/MIN: 10 INJECTION, EMULSION INTRAVENOUS at 17:46

## 2021-04-07 RX ADMIN — Medication 1 APPLICATION(S): at 05:26

## 2021-04-07 RX ADMIN — INSULIN HUMAN 4: 100 INJECTION, SOLUTION SUBCUTANEOUS at 13:19

## 2021-04-07 RX ADMIN — Medication 1 DROP(S): at 23:08

## 2021-04-07 RX ADMIN — Medication 1 DROP(S): at 23:16

## 2021-04-07 RX ADMIN — Medication 1 APPLICATION(S): at 23:08

## 2021-04-07 RX ADMIN — INSULIN HUMAN 4: 100 INJECTION, SOLUTION SUBCUTANEOUS at 18:41

## 2021-04-07 RX ADMIN — INSULIN HUMAN 4: 100 INJECTION, SOLUTION SUBCUTANEOUS at 06:47

## 2021-04-07 RX ADMIN — CHLORHEXIDINE GLUCONATE 15 MILLILITER(S): 213 SOLUTION TOPICAL at 05:49

## 2021-04-07 RX ADMIN — POLYETHYLENE GLYCOL 3350 17 GRAM(S): 17 POWDER, FOR SOLUTION ORAL at 18:17

## 2021-04-07 RX ADMIN — SENNA PLUS 2 TABLET(S): 8.6 TABLET ORAL at 21:20

## 2021-04-07 RX ADMIN — Medication 1 APPLICATION(S): at 12:46

## 2021-04-07 RX ADMIN — FENTANYL CITRATE 6 MICROGRAM(S)/KG/HR: 50 INJECTION INTRAVENOUS at 01:56

## 2021-04-07 RX ADMIN — PROPOFOL 7.2 MICROGRAM(S)/KG/MIN: 10 INJECTION, EMULSION INTRAVENOUS at 00:55

## 2021-04-07 RX ADMIN — HEPARIN SODIUM 7500 UNIT(S): 5000 INJECTION INTRAVENOUS; SUBCUTANEOUS at 00:56

## 2021-04-07 RX ADMIN — CHLORHEXIDINE GLUCONATE 15 MILLILITER(S): 213 SOLUTION TOPICAL at 18:17

## 2021-04-07 RX ADMIN — PANTOPRAZOLE SODIUM 40 MILLIGRAM(S): 20 TABLET, DELAYED RELEASE ORAL at 12:58

## 2021-04-07 RX ADMIN — PROPOFOL 7.2 MICROGRAM(S)/KG/MIN: 10 INJECTION, EMULSION INTRAVENOUS at 07:26

## 2021-04-07 RX ADMIN — REMDESIVIR 500 MILLIGRAM(S): 5 INJECTION INTRAVENOUS at 13:53

## 2021-04-07 NOTE — PROGRESS NOTE ADULT - ASSESSMENT
55 yo M with a PMHx of gout and MU (on home CPAP) who presented to ED for progressive worsening SOB of 2 days of duration, found to be in profound hypoxic respiratory Failure requiring intubation, paralysis and proning with suspicion for COVID PNA with workup further revealing remarkably reduced LV systolic Function with regional wall motion abnormalities in the setting of troponemia     1) Hypoxic Respiratory Failure likely 2/2 COVID PNA in patient with markedly reduced LV systolic Function  -Patient with EF of 15 on Echo (No known prior) with regional wall motion abnormalities. Echo showing Severe hypokinesis of the entire anteroseptum/inferoseptu as well as Akinesis of the mid to apical anterolateral region, apical anterior, apical inferior and true apex. Pt aslso with Mild hypokinesis of the basal anterolateral, basal anterior and basal inferior regions.  -Echo reviewed. Images are poor due to body habitus but suggest a stress cardiomyopathy pattern rather than an ischemic event  -Clinically patient is warm and well perfused and making urine. Given His severely reduced EF and numerous gtts he remains Net positive. Would favor gentle diuresis ( 40 IV lasix x1)to keep him net negative to help offload the LV  -Pt's GRACE likely from ATN currently improving   -Pt noted to be bradycardic on tele ( Averaging 53). No evidence of pauses or conduction disease. Bradycardia could be in setting of high dose propofol. Continue to monitor  -Hold off BB or ACE/ARB therapy while on pressors with Renal impairement  -Please obtain daily EKgs  -Please keep K>4 and Mg>2  -Cardiology will continue to follow, please call with any questions

## 2021-04-07 NOTE — PROGRESS NOTE ADULT - ASSESSMENT
Patient is a 57 yo M with PMHx of MU and gout who presented to ED with c/o progressively worsening SOB now admitted to ICU for management of AHRF in the setting of COViD    NEURO  #Sedated   -propofol and fentanyl for rass -4   -Will maintain sedated and paralyzed for vent synchrony    #Paralytic:   -nimbex, paralyzed for vent synchrony  -Neuro checks per ICU protocol, pain/sedation meds assessed and addressed, will titrate down as able to maintain vent synchrony  -Will give paralytic vacation when supined, reparalyze while prone    RESPIRATORY  #Acute hypoxic hypercapneic respiratory failure in the setting of COViD PNA  -Patient initially presented with O2 saturation of 14% and cyanotic requiring immediate intubation  -CXr with diffuse bilateral opacities with + COVID19 PCR  -Elevated inflammatory markers  -Patient currently proned  -On AC/VC mechanical Ventilation:  Mode: AC/ CMV; RR: 28; TV: 450; FiO2: 50; PEEP: 14  -PF ratio 208 this AM  -Will Wean FiO2 as tolerated    #COVID  -CXr with diffuse bilateral opacities with + COVID19 PCR  -Start Date 4/5 Remdesivir (x 6 day course) and Decadron 6 mg (x 10 day course)  -Remdesevir d/c'd due to worsening renal fxn     #MU  -As per patient's wife, patient has MU on CPAP  -Patient sleeps with 2-3 pillows at night, attributed to MU    CARDIOVASCULAR  #Severe Sepsis  -Patient presents with tachycardia, tachypnea with a pulmonary process, lactate 12, WBC 15.85, PCT 0.17; meeting criteria for severe sepsis; currently on exam, patient well perfused with adequate pulses, adequate UO, unable to assess mentation at this time  -Source likely COVID19 PNA with possible superimposed bacterial PNA  -Broad spectrum abx started, vanco and zosyn started 4/5 AM, de-escalated to ceftriaxone  -Decadron and remdesivir started 4/5 for COVID19  -Sputum Cx negative, BCx no growth at 12 hours  -Urine cx negative  -Urine legionella and strep negative  -Will start 3 day course of azithromycin  -MRSA PCR negative  -Lactate cleared  -Will hold off on fluid resuscitation due to high-risk for pulm edema  -Maintain MAP > 65 mmHg  -Monitor urine output    #RV dilation  -Bedside POCUS demonstrating dilated RV and mildly enlarged pulm artery possibly 2/2 MU vs PE (unlikely)  -Echo: Severe hypokinesis of the entire anteroseptum/inferoseptum. Akinesis of the mid to apical anterolateral region, apical anterior, apical inferior and true apex. Mild hypokinesis of the basal anterolateral, basal anterior and basal inferior regions. The estimated left ventricular systolic function is 15%. The right ventricle is mildly dilated. Right ventricular systolic function is mildly reduced. No pericardial effusion is seen.  -Based on these findings, cardiology was consulted  -Appreciate cardiac recs  -As per spouse, no hx of cardiac disease and no change in ADLs or activity tolerance prior to current presentation    #LV Dysfunction  As per Cardiology:  -Echo reviewed. Images are poor due to body habitus but suggest a stress cardiomyopathy pattern (takotsubo cardiomyopathy) rather than an ischemic event  -EKG reviewed showing NSR, with non specific ST changes and poor R wave progression in the precordial leads likely due to body habitus  -Labs show ongoing troponemia in setting of renal dysfunction.  -Clinically patient is warm and well perfused and making urine. He is s/p 40 IV Lasix x1 with 900 output. Mixed venous show O2 sat of 70, making concurrent cardiogenic shock less likely.  -Patient's LV dysfunction appears to be mutifactorial: COVID, Morbid Obesity, Sleep Apnea. Myocarditis is also a possibility; now exacerbateed in setting of COVIDPNA  -Unfortunately patient is unable to undergo further ischemic or confirmatory imaging/workup  -Would hold off inotropic support for now as cardiogenic markers within normal range. Can continue low dose levo for now to keep MAP of 65 and above  -Given patient lasix naive would given 20 IV BID of lasix starting tomorrow. Pt s/p 40 IV lasix x1 today in the ER with good response  -Hold off BB or ACE/ARB therapy while on pressors with Renal impairement  -Please continue to trend CE to peak with CK and CKMB  -Please obtain daily EKgs  -EKG from 4/5 NSR with RBBB, without ischemic changes  -Please send TSH, A1c, and Lipid Panel  -Please keep K>4 and Mg>2  -Will need repeat ECHO post extubation to re-eval EF, if still depressed with wall motion abn will pursue ischemic work up  -Agree with diuresis for now and continuing with current management with Levo as mixed venous and clinical picture does not correlate with cardiogenic shock at this time.    GI  -No active issues  -Needs nasoenteral nurition, will obtain recs from nutrition  -Protonix IVP for GI ppx    RENAL//F&E  #Acute Kidney Injury  DDx: pre-renal vs ATN  -Lasix 40 mg IVP in ED 4/5  -FENa 0.3% with yesterday's urine lytes  -SCr 2.14 today  -Baseline creatinine unknown, no reported kidney disease  -Adequate urine output   -Electrolytes stable  -Strict I&O monitoring  -Avoid nephrotoxic medications    #Anion Gap Metabolic Acidosis  -Likely 2/2 elevated lactate  -With appropriate compensation  -Lactate cleared    ID  #COVID PNA   -Plan as above  -D/C remdesevir due to worsening renal fxn    ENDOCRINE  #Hyperglycemia  -Was on insulin gtt overnight due to stress/steroid induced hyperglycemia  -Will monitor blood glucose q2hr   -q6hr correctional scale ordered   -Decadron started 4/5 AM (ends on 4/14)  -A1c 6.2    HEME  #Anemia  -Hgb 11 on admission; unknown baseline. No evidence of bleed.  -Hgb today 9.6  -Trend CBC  -Maintain active T+S  -Transfuse for Hb <7    -DVT ppx with Lovenox BID, will change to heparin as SCr. increasing    DISPO/GOALS OF CARE:  Patient requires continuous monitoring with bedside rhythm monitoring, arterial line, pulse oximetry, ventilator monitoring and intermittent blood gas analysis. Care plan discussed with ICU care team. Patient remains critical at this time.

## 2021-04-07 NOTE — PROGRESS NOTE ADULT - SUBJECTIVE AND OBJECTIVE BOX
Interval HPI: Patient oxygen requirements have significantly decreased. He is now on 40% FIo2 and maintaining his sats.     PAST MEDICAL & SURGICAL HISTORY:  Sleep apnea    Home Medications:  allopurinol:  (2021 18:19)  colchicine:  (2021 11:55)    MEDICATIONS  (STANDING):  chlorhexidine 0.12% Liquid 15 milliLiter(s) Oral Mucosa every 12 hours  chlorhexidine 4% Liquid 1 Application(s) Topical <User Schedule>  ciprofloxacin  0.3% Ophthalmic Solution 1 Drop(s) Left EYE four times a day  dexAMETHasone  Injectable 6 milliGRAM(s) IV Push every 24 hours  dextrose 40% Gel 15 Gram(s) Oral once  dextrose 5%. 1000 milliLiter(s) (50 mL/Hr) IV Continuous <Continuous>  dextrose 5%. 1000 milliLiter(s) (100 mL/Hr) IV Continuous <Continuous>  dextrose 50% Injectable 25 Gram(s) IV Push once  dextrose 50% Injectable 12.5 Gram(s) IV Push once  dextrose 50% Injectable 25 Gram(s) IV Push once  fentaNYL   Infusion. 0.5 MICROgram(s)/kG/Hr (6 mL/Hr) IV Continuous <Continuous>  glucagon  Injectable 1 milliGRAM(s) IntraMuscular once  heparin   Injectable 7500 Unit(s) SubCutaneous every 8 hours  insulin regular  human corrective regimen sliding scale   SubCutaneous every 6 hours  ketorolac 0.5% Ophthalmic Solution 1 Drop(s) Left EYE four times a day  norepinephrine Infusion 0.02 MICROgram(s)/kG/Min (4.5 mL/Hr) IV Continuous <Continuous>  pantoprazole  Injectable 40 milliGRAM(s) IV Push daily  petrolatum Ophthalmic Ointment 1 Application(s) Both EYES every 6 hours  polyethylene glycol 3350 17 Gram(s) Oral every 12 hours  prednisoLONE acetate 1% Suspension 1 Drop(s) Left EYE four times a day  propofol Infusion 10 MICROgram(s)/kG/Min (7.2 mL/Hr) IV Continuous <Continuous>  remdesivir  IVPB 100 milliGRAM(s) IV Intermittent every 24 hours  senna 2 Tablet(s) Oral at bedtime    MEDICATIONS  (PRN):  sodium chloride 0.9% lock flush 10 milliLiter(s) IV Push every 1 hour PRN Pre/post blood products, medications, blood draw, and to maintain line patency      ICU Vital Signs Last 24 Hrs  T(C): 37.1 (2021 18:00), Max: 37.1 (2021 09:04)  T(F): 98.8 (2021 18:00), Max: 98.8 (2021 18:00)  HR: 46 (2021 17:00) (45 - 84)  BP: 118/55 (2021 17:00) (112/55 - 118/56)  BP(mean): 79 (2021 17:00) (77 - 82)  ABP: 105/56 (2021 17:00) (57/57 - 148/85)  ABP(mean): 73 (2021 17:00) (57 - 112)  RR: 24 (2021 17:00) (0 - 26)  SpO2: 95% (2021 17:00) (89% - 98%)      PHYSICAL EXAM:    Constitutional: Intubated, sedated, no longer paralyzed  Head: NC/AT  Neck: LIJ in place  Respiratory: Decrease BS throughout with fine crackles and Ronchi  Cardiac: +S1/S2; RRR; Bradycardia; no M/R/G;   Gastrointestinal: Soft, NT, ND  Extremities: WWP,  no peripheral edema  Vascular: 2+ radial, DP/PT pulses B/L      ..  LABS:                         9.6    12.70 )-----------( 261      ( 2021 05:21 )             32.0     04-06    137  |  101  |  28<H>  ----------------------------<  261<H>  4.3   |  27  |  2.15<H>    Ca    7.6<L>      2021 18:20  Phos  2.8     04-06  Mg     2.5     04-06    TPro  5.9<L>  /  Alb  2.6<L>  /  TBili  0.3  /  DBili  x   /  AST  39  /  ALT  22  /  AlkPhos  78  04-06    PT/INR - ( 2021 05:20 )   PT: 12.4 sec;   INR: 1.04          PTT - ( 2021 05:20 )  PTT:28.4 sec  Urinalysis Basic - ( 2021 07:34 )    Color: Yellow / Appearance: Clear / S.025 / pH: x  Gluc: x / Ketone: 40 mg/dL  / Bili: Negative / Urobili: 0.2 E.U./dL   Blood: x / Protein: 100 mg/dL / Nitrite: NEGATIVE   Leuk Esterase: NEGATIVE / RBC: < 5 /HPF / WBC < 5 /HPF   Sq Epi: x / Non Sq Epi: 5-10 /HPF / Bacteria: Present /HPF      CARDIAC MARKERS ( 2021 22:10 )  x     / 0.12 ng/mL / 145 U/L / x     / 8.8 ng/mL  CARDIAC MARKERS ( 2021 15:23 )  x     / 0.19 ng/mL / 203 U/L / x     / 8.7 ng/mL  CARDIAC MARKERS ( 2021 06:50 )  x     / 0.01 ng/mL / 266 U/L / x     / x                RADIOLOGY, EKG & ADDITIONAL TESTS: Reviewed. < from: TTE Echo Complete w/o Contrast w/ Doppler (21 @ 12:46) >  . Limited study obtained for evaluation of left ventricular function.   2. Severe hypokinesis of the entire anteroseptum/inferoseptum. Akinesis of the mid to apical anterolateral region, apical anterior, apical inferior and true apex. Mild hypokinesis of the basal anterolateral, basal anterior and basal inferior regions. The estimated left ventricular systolic function is 15%.   3. The right ventricle is mildly dilated. Right ventricular systolic function is mildly reduced.   4. No pericardial effusion is seen.   5. No prior echo is availablefor comparison.    < end of copied text >  < from: TTE Echo Complete w/o Contrast w/ Doppler (21 @ 12:46) >  ft Ventricle:  Severe hypokinesis of the entire anteroseptum/inferoseptum. Akinesis of the mid to apical anterolateral region, apical anterior, apical inferior and true apex. Mild hypokinesis of the basal anterolateral, basal anterior and basal inferior regions. The estimated left ventricular systolic function is 15%.    < end of copied text >

## 2021-04-07 NOTE — PROGRESS NOTE ADULT - ATTENDING COMMENTS
Initial attending contact date 4/5/21     . See fellow note written above for details. I reviewed the fellow documentation. I have personally seen and examined this patient. I reviewed vitals, labs, medications, cardiac studies, and additional imaging. I agree with the above fellow's findings and plans as written above with the following additions/statements.    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 which has now been exacerbated in setting of COVID PNA  -Will need repeat ECHO post extubation to re-eval EF, if still depressed with wall motion abn will pursue ischemic work up  -Continue to wean off levo,   - CXR with pulm edema compared to prior CXR post diuresis  -Recommend Lasix 40 mg IV x 1 today

## 2021-04-07 NOTE — PROGRESS NOTE ADULT - SUBJECTIVE AND OBJECTIVE BOX
INTERVAL HPI/OVERNIGHT EVENTS: Overnight there were no acute events. Unable to elicit subjective complaints this morning    VITAL SIGNS:  T(F): 98.7 (04-07-21 @ 09:04)  HR: 51 (04-07-21 @ 09:00)  BP: --  RR: 0 (04-07-21 @ 09:00)  SpO2: 95% (04-07-21 @ 09:00)  Wt(kg): --    PHYSICAL EXAM:     GENERAL: ill-appearing, obese, intubated and sedated/paralyzed   SKIN/HAIR/NAILS: Skin warm and clammy. Nails without clubbing or cyanosis. CR<2 secs. No lesions, rash, petechiae, erythema or ecchymoses. Anicteric.   HEAD AND NECK: The skull is NC/AT. B/L Sclera white. PERRL. Nasal mucous membrane with no erythema or drainage. Trachea midline, neck supple.   THORAX/LUNGS: Thorax is symmetric with good expansion, assisted by mechanical ventilation. Rhonchi B/L, diminished throughout.   CARDIOVASCULAR: No JVD. Carotid upstrokes are brisk, without bruits. +S1 and S2, RRR; no extra heart sounds or M/R/G.  ABDOMEN/: it is soft, no palpable masses; Lechuga catheter in place.  PERIPHERAL VASCULAR: Extremities are warm, radial and dorsalis pedis pulses +1 and symmetric. No clubbing, no cyanosis.  MUSCULOSKELETAL: No active ROM at this time. No evidence of swelling or deformity.  NEUROLOGICAL: unable to assess, sedated and intubated, paralysis    MEDICATIONS  (STANDING):  azithromycin  IVPB 500 milliGRAM(s) IV Intermittent every 24 hours  cefTRIAXone   IVPB 1000 milliGRAM(s) IV Intermittent every 24 hours  chlorhexidine 0.12% Liquid 15 milliLiter(s) Oral Mucosa every 12 hours  chlorhexidine 4% Liquid 1 Application(s) Topical <User Schedule>  ciprofloxacin  0.3% Ophthalmic Solution 1 Drop(s) Left EYE four times a day  cisatracurium Infusion 1 MICROgram(s)/kG/Min (7.2 mL/Hr) IV Continuous <Continuous>  dexAMETHasone  Injectable 6 milliGRAM(s) IV Push every 24 hours  dextrose 40% Gel 15 Gram(s) Oral once  dextrose 5%. 1000 milliLiter(s) (50 mL/Hr) IV Continuous <Continuous>  dextrose 5%. 1000 milliLiter(s) (100 mL/Hr) IV Continuous <Continuous>  dextrose 50% Injectable 25 Gram(s) IV Push once  dextrose 50% Injectable 12.5 Gram(s) IV Push once  dextrose 50% Injectable 25 Gram(s) IV Push once  fentaNYL   Infusion. 0.5 MICROgram(s)/kG/Hr (6 mL/Hr) IV Continuous <Continuous>  glucagon  Injectable 1 milliGRAM(s) IntraMuscular once  heparin   Injectable 7500 Unit(s) SubCutaneous every 8 hours  insulin regular  human corrective regimen sliding scale   SubCutaneous every 6 hours  ketorolac 0.5% Ophthalmic Solution 1 Drop(s) Left EYE four times a day  norepinephrine Infusion 0.02 MICROgram(s)/kG/Min (4.5 mL/Hr) IV Continuous <Continuous>  pantoprazole  Injectable 40 milliGRAM(s) IV Push daily  petrolatum Ophthalmic Ointment 1 Application(s) Both EYES every 6 hours  polyethylene glycol 3350 17 Gram(s) Oral every 12 hours  prednisoLONE acetate 1% Suspension 1 Drop(s) Left EYE four times a day  propofol Infusion 10 MICROgram(s)/kG/Min (7.2 mL/Hr) IV Continuous <Continuous>  senna 2 Tablet(s) Oral at bedtime    MEDICATIONS  (PRN):  sodium chloride 0.9% lock flush 10 milliLiter(s) IV Push every 1 hour PRN Pre/post blood products, medications, blood draw, and to maintain line patency      Allergies    No Known Allergies    Intolerances        LABS:                        9.7    12.05 )-----------( 299      ( 07 Apr 2021 05:13 )             32.5     04-07    138  |  99  |  32<H>  ----------------------------<  246<H>  4.7   |  29  |  2.03<H>    Ca    7.9<L>      07 Apr 2021 05:13  Phos  3.9     04-07  Mg     2.7     04-07    TPro  5.9<L>  /  Alb  2.6<L>  /  TBili  0.2  /  DBili  x   /  AST  33  /  ALT  22  /  AlkPhos  80  04-07    PT/INR - ( 06 Apr 2021 05:20 )   PT: 12.4 sec;   INR: 1.04          PTT - ( 06 Apr 2021 05:20 )  PTT:28.4 sec      RADIOLOGY & ADDITIONAL TESTS: Reviewed

## 2021-04-07 NOTE — PROGRESS NOTE ADULT - ATTENDING COMMENTS
Patient seen and examined with house-staff during bedside rounds.  Resident note read, including vitals, physical findings, laboratory data, and radiological reports.   Revisions included below.  Direct personal management at bed side and extensive interpretation of the data.  Plan was outlined and discussed in details with the housestaff.  Decision making of high complexity  Action taken for acute disease activity to reflect the level of care provided:  - medication reconciliation  - review laboratory data  Patient is clinically stable compared to yesterday.  The gas rotation improved and his still requiring paralytic and sedative.  Will evaluate the need for further proning.  Will discontinue paralytic and will evaluate the patient.  Chronically.  The patient is on antibiotic for required community-acquired pneumonia.  The patient is on Decadron.  Will restart remdesivir as the GFR improved.  Hold on diuretics for now and evaluate whether he needed as she will be decreasing the PEEP and decrease the positive pressure ventilation.  Hemoglobin is stable.  Start tube feeding.  Blood sugars controlled.

## 2021-04-08 LAB
ALBUMIN SERPL ELPH-MCNC: 2.5 G/DL — LOW (ref 3.3–5)
ALP SERPL-CCNC: 77 U/L — SIGNIFICANT CHANGE UP (ref 40–120)
ALT FLD-CCNC: 19 U/L — SIGNIFICANT CHANGE UP (ref 10–45)
ANION GAP SERPL CALC-SCNC: 9 MMOL/L — SIGNIFICANT CHANGE UP (ref 5–17)
APPEARANCE UR: CLEAR — SIGNIFICANT CHANGE UP
AST SERPL-CCNC: 27 U/L — SIGNIFICANT CHANGE UP (ref 10–40)
BACTERIA # UR AUTO: PRESENT /HPF
BASE EXCESS BLDA CALC-SCNC: 2.4 MMOL/L — SIGNIFICANT CHANGE UP (ref -2–3)
BASE EXCESS BLDA CALC-SCNC: 3.2 MMOL/L — HIGH (ref -2–3)
BASE EXCESS BLDA CALC-SCNC: 5.4 MMOL/L — HIGH (ref -2–3)
BASE EXCESS BLDA CALC-SCNC: 5.5 MMOL/L — HIGH (ref -2–3)
BASOPHILS # BLD AUTO: 0 K/UL — SIGNIFICANT CHANGE UP (ref 0–0.2)
BASOPHILS NFR BLD AUTO: 0 % — SIGNIFICANT CHANGE UP (ref 0–2)
BILIRUB SERPL-MCNC: 0.3 MG/DL — SIGNIFICANT CHANGE UP (ref 0.2–1.2)
BILIRUB UR-MCNC: NEGATIVE — SIGNIFICANT CHANGE UP
BUN SERPL-MCNC: 36 MG/DL — HIGH (ref 7–23)
CALCIUM SERPL-MCNC: 8.1 MG/DL — LOW (ref 8.4–10.5)
CHLORIDE SERPL-SCNC: 105 MMOL/L — SIGNIFICANT CHANGE UP (ref 96–108)
CO2 SERPL-SCNC: 31 MMOL/L — SIGNIFICANT CHANGE UP (ref 22–31)
COLOR SPEC: YELLOW — SIGNIFICANT CHANGE UP
CREAT SERPL-MCNC: 1.67 MG/DL — HIGH (ref 0.5–1.3)
CRP SERPL-MCNC: 49.6 MG/L — HIGH (ref 0–4)
D DIMER BLD IA.RAPID-MCNC: 1876 NG/ML DDU — HIGH
DIFF PNL FLD: ABNORMAL
EOSINOPHIL # BLD AUTO: 0.01 K/UL — SIGNIFICANT CHANGE UP (ref 0–0.5)
EOSINOPHIL NFR BLD AUTO: 0.1 % — SIGNIFICANT CHANGE UP (ref 0–6)
FERRITIN SERPL-MCNC: 124 NG/ML — SIGNIFICANT CHANGE UP (ref 30–400)
GLUCOSE BLDC GLUCOMTR-MCNC: 163 MG/DL — HIGH (ref 70–99)
GLUCOSE BLDC GLUCOMTR-MCNC: 186 MG/DL — HIGH (ref 70–99)
GLUCOSE BLDC GLUCOMTR-MCNC: 230 MG/DL — HIGH (ref 70–99)
GLUCOSE BLDC GLUCOMTR-MCNC: 235 MG/DL — HIGH (ref 70–99)
GLUCOSE SERPL-MCNC: 199 MG/DL — HIGH (ref 70–99)
GLUCOSE UR QL: NEGATIVE — SIGNIFICANT CHANGE UP
GRAM STN FLD: SIGNIFICANT CHANGE UP
HCO3 BLDA-SCNC: 29 MMOL/L — HIGH (ref 21–28)
HCO3 BLDA-SCNC: 30 MMOL/L — HIGH (ref 21–28)
HCO3 BLDA-SCNC: 30 MMOL/L — HIGH (ref 21–28)
HCO3 BLDA-SCNC: 33 MMOL/L — HIGH (ref 21–28)
HCT VFR BLD CALC: 30.9 % — LOW (ref 39–50)
HGB BLD-MCNC: 8.9 G/DL — LOW (ref 13–17)
IMM GRANULOCYTES NFR BLD AUTO: 1.9 % — HIGH (ref 0–1.5)
KETONES UR-MCNC: NEGATIVE — SIGNIFICANT CHANGE UP
LEUKOCYTE ESTERASE UR-ACNC: NEGATIVE — SIGNIFICANT CHANGE UP
LYMPHOCYTES # BLD AUTO: 0.79 K/UL — LOW (ref 1–3.3)
LYMPHOCYTES # BLD AUTO: 8 % — LOW (ref 13–44)
MAGNESIUM SERPL-MCNC: 3 MG/DL — HIGH (ref 1.6–2.6)
MCHC RBC-ENTMCNC: 22.7 PG — LOW (ref 27–34)
MCHC RBC-ENTMCNC: 28.8 GM/DL — LOW (ref 32–36)
MCV RBC AUTO: 78.8 FL — LOW (ref 80–100)
MONOCYTES # BLD AUTO: 0.6 K/UL — SIGNIFICANT CHANGE UP (ref 0–0.9)
MONOCYTES NFR BLD AUTO: 6.1 % — SIGNIFICANT CHANGE UP (ref 2–14)
NEUTROPHILS # BLD AUTO: 8.3 K/UL — HIGH (ref 1.8–7.4)
NEUTROPHILS NFR BLD AUTO: 83.9 % — HIGH (ref 43–77)
NITRITE UR-MCNC: NEGATIVE — SIGNIFICANT CHANGE UP
NRBC # BLD: 2 /100 WBCS — HIGH (ref 0–0)
PCO2 BLDA: 46 MMHG — SIGNIFICANT CHANGE UP (ref 35–48)
PCO2 BLDA: 50 MMHG — HIGH (ref 35–48)
PCO2 BLDA: 62 MMHG — CRITICAL HIGH (ref 35–48)
PCO2 BLDA: 70 MMHG — CRITICAL HIGH (ref 35–48)
PH BLDA: 7.3 — LOW (ref 7.35–7.45)
PH BLDA: 7.31 — LOW (ref 7.35–7.45)
PH BLDA: 7.39 — SIGNIFICANT CHANGE UP (ref 7.35–7.45)
PH BLDA: 7.44 — SIGNIFICANT CHANGE UP (ref 7.35–7.45)
PH UR: 6 — SIGNIFICANT CHANGE UP (ref 5–8)
PHOSPHATE SERPL-MCNC: 2.8 MG/DL — SIGNIFICANT CHANGE UP (ref 2.5–4.5)
PLATELET # BLD AUTO: 293 K/UL — SIGNIFICANT CHANGE UP (ref 150–400)
PO2 BLDA: 65 MMHG — LOW (ref 83–108)
PO2 BLDA: 82 MMHG — LOW (ref 83–108)
PO2 BLDA: 94 MMHG — SIGNIFICANT CHANGE UP (ref 83–108)
PO2 BLDA: 95 MMHG — SIGNIFICANT CHANGE UP (ref 83–108)
POTASSIUM SERPL-MCNC: 4.5 MMOL/L — SIGNIFICANT CHANGE UP (ref 3.5–5.3)
POTASSIUM SERPL-SCNC: 4.5 MMOL/L — SIGNIFICANT CHANGE UP (ref 3.5–5.3)
PROT SERPL-MCNC: 5.6 G/DL — LOW (ref 6–8.3)
PROT UR-MCNC: ABNORMAL MG/DL
RBC # BLD: 3.92 M/UL — LOW (ref 4.2–5.8)
RBC # FLD: 17.4 % — HIGH (ref 10.3–14.5)
RBC CASTS # UR COMP ASSIST: ABNORMAL /HPF
SAO2 % BLDA: 91 % — LOW (ref 95–100)
SAO2 % BLDA: 96 % — SIGNIFICANT CHANGE UP (ref 95–100)
SODIUM SERPL-SCNC: 145 MMOL/L — SIGNIFICANT CHANGE UP (ref 135–145)
SP GR SPEC: 1.02 — SIGNIFICANT CHANGE UP (ref 1–1.03)
SPECIMEN SOURCE: SIGNIFICANT CHANGE UP
TRIGL SERPL-MCNC: 272 MG/DL — HIGH
UROBILINOGEN FLD QL: 0.2 E.U./DL — SIGNIFICANT CHANGE UP
WBC # BLD: 9.89 K/UL — SIGNIFICANT CHANGE UP (ref 3.8–10.5)
WBC # FLD AUTO: 9.89 K/UL — SIGNIFICANT CHANGE UP (ref 3.8–10.5)
WBC UR QL: < 5 /HPF — SIGNIFICANT CHANGE UP

## 2021-04-08 PROCEDURE — 71045 X-RAY EXAM CHEST 1 VIEW: CPT | Mod: 26

## 2021-04-08 PROCEDURE — 99233 SBSQ HOSP IP/OBS HIGH 50: CPT | Mod: GC

## 2021-04-08 PROCEDURE — 99233 SBSQ HOSP IP/OBS HIGH 50: CPT

## 2021-04-08 PROCEDURE — 93970 EXTREMITY STUDY: CPT | Mod: 26

## 2021-04-08 RX ORDER — QUETIAPINE FUMARATE 200 MG/1
25 TABLET, FILM COATED ORAL DAILY
Refills: 0 | Status: DISCONTINUED | OUTPATIENT
Start: 2021-04-08 | End: 2021-04-13

## 2021-04-08 RX ORDER — MIDAZOLAM HYDROCHLORIDE 1 MG/ML
0.02 INJECTION, SOLUTION INTRAMUSCULAR; INTRAVENOUS
Qty: 100 | Refills: 0 | Status: DISCONTINUED | OUTPATIENT
Start: 2021-04-08 | End: 2021-04-08

## 2021-04-08 RX ORDER — CISATRACURIUM BESYLATE 2 MG/ML
3 INJECTION INTRAVENOUS
Qty: 200 | Refills: 0 | Status: DISCONTINUED | OUTPATIENT
Start: 2021-04-08 | End: 2021-04-12

## 2021-04-08 RX ORDER — QUETIAPINE FUMARATE 200 MG/1
50 TABLET, FILM COATED ORAL AT BEDTIME
Refills: 0 | Status: DISCONTINUED | OUTPATIENT
Start: 2021-04-08 | End: 2021-04-14

## 2021-04-08 RX ORDER — ENOXAPARIN SODIUM 100 MG/ML
40 INJECTION SUBCUTANEOUS EVERY 12 HOURS
Refills: 0 | Status: DISCONTINUED | OUTPATIENT
Start: 2021-04-08 | End: 2021-04-16

## 2021-04-08 RX ORDER — MIDAZOLAM HYDROCHLORIDE 1 MG/ML
0.02 INJECTION, SOLUTION INTRAMUSCULAR; INTRAVENOUS
Qty: 100 | Refills: 0 | Status: DISCONTINUED | OUTPATIENT
Start: 2021-04-08 | End: 2021-04-09

## 2021-04-08 RX ORDER — LACTULOSE 10 G/15ML
20 SOLUTION ORAL THREE TIMES A DAY
Refills: 0 | Status: DISCONTINUED | OUTPATIENT
Start: 2021-04-08 | End: 2021-04-09

## 2021-04-08 RX ORDER — MIDAZOLAM HYDROCHLORIDE 1 MG/ML
8 INJECTION, SOLUTION INTRAMUSCULAR; INTRAVENOUS ONCE
Refills: 0 | Status: DISCONTINUED | OUTPATIENT
Start: 2021-04-08 | End: 2021-04-08

## 2021-04-08 RX ORDER — INSULIN GLARGINE 100 [IU]/ML
12 INJECTION, SOLUTION SUBCUTANEOUS AT BEDTIME
Refills: 0 | Status: DISCONTINUED | OUTPATIENT
Start: 2021-04-08 | End: 2021-04-17

## 2021-04-08 RX ORDER — ACETAMINOPHEN 500 MG
1000 TABLET ORAL ONCE
Refills: 0 | Status: COMPLETED | OUTPATIENT
Start: 2021-04-08 | End: 2021-04-08

## 2021-04-08 RX ORDER — CISATRACURIUM BESYLATE 2 MG/ML
20 INJECTION INTRAVENOUS ONCE
Refills: 0 | Status: COMPLETED | OUTPATIENT
Start: 2021-04-08 | End: 2021-04-08

## 2021-04-08 RX ORDER — DEXMEDETOMIDINE HYDROCHLORIDE IN 0.9% SODIUM CHLORIDE 4 UG/ML
0 INJECTION INTRAVENOUS
Qty: 200 | Refills: 0 | Status: DISCONTINUED | OUTPATIENT
Start: 2021-04-08 | End: 2021-04-08

## 2021-04-08 RX ORDER — ACETAMINOPHEN 500 MG
650 TABLET ORAL EVERY 6 HOURS
Refills: 0 | Status: DISCONTINUED | OUTPATIENT
Start: 2021-04-08 | End: 2021-04-30

## 2021-04-08 RX ADMIN — PANTOPRAZOLE SODIUM 40 MILLIGRAM(S): 20 TABLET, DELAYED RELEASE ORAL at 12:02

## 2021-04-08 RX ADMIN — Medication 1 DROP(S): at 12:08

## 2021-04-08 RX ADMIN — Medication 1000 MILLIGRAM(S): at 18:48

## 2021-04-08 RX ADMIN — POLYETHYLENE GLYCOL 3350 17 GRAM(S): 17 POWDER, FOR SOLUTION ORAL at 05:27

## 2021-04-08 RX ADMIN — FENTANYL CITRATE 6 MICROGRAM(S)/KG/HR: 50 INJECTION INTRAVENOUS at 01:44

## 2021-04-08 RX ADMIN — CHLORHEXIDINE GLUCONATE 15 MILLILITER(S): 213 SOLUTION TOPICAL at 05:40

## 2021-04-08 RX ADMIN — MIDAZOLAM HYDROCHLORIDE 8 MILLIGRAM(S): 1 INJECTION, SOLUTION INTRAMUSCULAR; INTRAVENOUS at 15:00

## 2021-04-08 RX ADMIN — PROPOFOL 7.2 MICROGRAM(S)/KG/MIN: 10 INJECTION, EMULSION INTRAVENOUS at 20:33

## 2021-04-08 RX ADMIN — Medication 1 DROP(S): at 17:38

## 2021-04-08 RX ADMIN — LACTULOSE 20 GRAM(S): 10 SOLUTION ORAL at 12:08

## 2021-04-08 RX ADMIN — QUETIAPINE FUMARATE 50 MILLIGRAM(S): 200 TABLET, FILM COATED ORAL at 22:14

## 2021-04-08 RX ADMIN — Medication 1 DROP(S): at 12:02

## 2021-04-08 RX ADMIN — CISATRACURIUM BESYLATE 20 MILLIGRAM(S): 2 INJECTION INTRAVENOUS at 18:13

## 2021-04-08 RX ADMIN — PROPOFOL 7.2 MICROGRAM(S)/KG/MIN: 10 INJECTION, EMULSION INTRAVENOUS at 01:39

## 2021-04-08 RX ADMIN — Medication 650 MILLIGRAM(S): at 23:20

## 2021-04-08 RX ADMIN — ENOXAPARIN SODIUM 40 MILLIGRAM(S): 100 INJECTION SUBCUTANEOUS at 17:44

## 2021-04-08 RX ADMIN — INSULIN HUMAN 4: 100 INJECTION, SOLUTION SUBCUTANEOUS at 17:35

## 2021-04-08 RX ADMIN — PROPOFOL 7.2 MICROGRAM(S)/KG/MIN: 10 INJECTION, EMULSION INTRAVENOUS at 13:05

## 2021-04-08 RX ADMIN — Medication 1 APPLICATION(S): at 05:32

## 2021-04-08 RX ADMIN — FENTANYL CITRATE 6 MICROGRAM(S)/KG/HR: 50 INJECTION INTRAVENOUS at 20:33

## 2021-04-08 RX ADMIN — Medication 1 APPLICATION(S): at 17:38

## 2021-04-08 RX ADMIN — QUETIAPINE FUMARATE 25 MILLIGRAM(S): 200 TABLET, FILM COATED ORAL at 12:03

## 2021-04-08 RX ADMIN — CISATRACURIUM BESYLATE 21.6 MICROGRAM(S)/KG/MIN: 2 INJECTION INTRAVENOUS at 18:14

## 2021-04-08 RX ADMIN — LACTULOSE 20 GRAM(S): 10 SOLUTION ORAL at 22:14

## 2021-04-08 RX ADMIN — Medication 1 DROP(S): at 05:32

## 2021-04-08 RX ADMIN — Medication 6 MILLIGRAM(S): at 05:40

## 2021-04-08 RX ADMIN — INSULIN HUMAN 2: 100 INJECTION, SOLUTION SUBCUTANEOUS at 06:47

## 2021-04-08 RX ADMIN — POLYETHYLENE GLYCOL 3350 17 GRAM(S): 17 POWDER, FOR SOLUTION ORAL at 17:34

## 2021-04-08 RX ADMIN — Medication 1 APPLICATION(S): at 12:02

## 2021-04-08 RX ADMIN — CHLORHEXIDINE GLUCONATE 1 APPLICATION(S): 213 SOLUTION TOPICAL at 05:33

## 2021-04-08 RX ADMIN — PROPOFOL 7.2 MICROGRAM(S)/KG/MIN: 10 INJECTION, EMULSION INTRAVENOUS at 11:18

## 2021-04-08 RX ADMIN — MIDAZOLAM HYDROCHLORIDE 2.4 MG/KG/HR: 1 INJECTION, SOLUTION INTRAMUSCULAR; INTRAVENOUS at 15:05

## 2021-04-08 RX ADMIN — HEPARIN SODIUM 7500 UNIT(S): 5000 INJECTION INTRAVENOUS; SUBCUTANEOUS at 03:53

## 2021-04-08 RX ADMIN — SENNA PLUS 2 TABLET(S): 8.6 TABLET ORAL at 22:14

## 2021-04-08 RX ADMIN — CHLORHEXIDINE GLUCONATE 15 MILLILITER(S): 213 SOLUTION TOPICAL at 05:28

## 2021-04-08 RX ADMIN — Medication 650 MILLIGRAM(S): at 13:24

## 2021-04-08 RX ADMIN — REMDESIVIR 500 MILLIGRAM(S): 5 INJECTION INTRAVENOUS at 12:04

## 2021-04-08 RX ADMIN — INSULIN HUMAN 4: 100 INJECTION, SOLUTION SUBCUTANEOUS at 12:11

## 2021-04-08 RX ADMIN — INSULIN GLARGINE 12 UNIT(S): 100 INJECTION, SOLUTION SUBCUTANEOUS at 22:41

## 2021-04-08 RX ADMIN — Medication 650 MILLIGRAM(S): at 22:28

## 2021-04-08 RX ADMIN — ENOXAPARIN SODIUM 40 MILLIGRAM(S): 100 INJECTION SUBCUTANEOUS at 13:21

## 2021-04-08 RX ADMIN — PROPOFOL 7.2 MICROGRAM(S)/KG/MIN: 10 INJECTION, EMULSION INTRAVENOUS at 08:35

## 2021-04-08 RX ADMIN — Medication 650 MILLIGRAM(S): at 15:00

## 2021-04-08 RX ADMIN — Medication 400 MILLIGRAM(S): at 18:16

## 2021-04-08 NOTE — PROGRESS NOTE ADULT - ASSESSMENT
IN PROGRESS, including assessment and plan    Patient is a 57 yo M with PMHx of MU and gout who presented to ED with c/o progressively worsening SOB now admitted to ICU for management of AHRF in the setting of COViD    NEURO  #Sedated   -propofol and fentanyl for rass -4   -Will maintain sedated and paralyzed for vent synchrony    #Paralytic:   -nimbex, paralyzed for vent synchrony  -Neuro checks per ICU protocol, pain/sedation meds assessed and addressed, will titrate down as able to maintain vent synchrony  -Will give paralytic vacation when supined, reparalyze while prone    RESPIRATORY  #Acute hypoxic hypercapneic respiratory failure in the setting of COViD PNA  -Patient initially presented with O2 saturation of 14% and cyanotic requiring immediate intubation  -CXr with diffuse bilateral opacities with + COVID19 PCR  -Elevated inflammatory markers  -Patient currently proned  -On AC/VC mechanical Ventilation:  Mode: AC/ CMV; RR: 28; TV: 450; FiO2: 50; PEEP: 14  -PF ratio 208 this AM  -Will Wean FiO2 as tolerated    #COVID  -CXr with diffuse bilateral opacities with + COVID19 PCR  -Start Date 4/5 Remdesivir (x 6 day course) and Decadron 6 mg (x 10 day course)  -Remdesevir d/c'd due to worsening renal fxn     #MU  -As per patient's wife, patient has MU on CPAP  -Patient sleeps with 2-3 pillows at night, attributed to MU    CARDIOVASCULAR  #Severe Sepsis  -Patient presents with tachycardia, tachypnea with a pulmonary process, lactate 12, WBC 15.85, PCT 0.17; meeting criteria for severe sepsis; currently on exam, patient well perfused with adequate pulses, adequate UO, unable to assess mentation at this time  -Source likely COVID19 PNA with possible superimposed bacterial PNA  -Broad spectrum abx started, vanco and zosyn started 4/5 AM, de-escalated to ceftriaxone  -Decadron and remdesivir started 4/5 for COVID19  -Sputum Cx negative, BCx no growth at 12 hours  -Urine cx negative  -Urine legionella and strep negative  -Will start 3 day course of azithromycin  -MRSA PCR negative  -Lactate cleared  -Will hold off on fluid resuscitation due to high-risk for pulm edema  -Maintain MAP > 65 mmHg  -Monitor urine output    #RV dilation  -Bedside POCUS demonstrating dilated RV and mildly enlarged pulm artery possibly 2/2 MU vs PE (unlikely)  -Echo: Severe hypokinesis of the entire anteroseptum/inferoseptum. Akinesis of the mid to apical anterolateral region, apical anterior, apical inferior and true apex. Mild hypokinesis of the basal anterolateral, basal anterior and basal inferior regions. The estimated left ventricular systolic function is 15%. The right ventricle is mildly dilated. Right ventricular systolic function is mildly reduced. No pericardial effusion is seen.  -Based on these findings, cardiology was consulted  -Appreciate cardiac recs  -As per spouse, no hx of cardiac disease and no change in ADLs or activity tolerance prior to current presentation    #LV Dysfunction  As per Cardiology:  -Echo reviewed. Images are poor due to body habitus but suggest a stress cardiomyopathy pattern (takotsubo cardiomyopathy) rather than an ischemic event  -EKG reviewed showing NSR, with non specific ST changes and poor R wave progression in the precordial leads likely due to body habitus  -Labs show ongoing troponemia in setting of renal dysfunction.  -Clinically patient is warm and well perfused and making urine. He is s/p 40 IV Lasix x1 with 900 output. Mixed venous show O2 sat of 70, making concurrent cardiogenic shock less likely.  -Patient's LV dysfunction appears to be mutifactorial: COVID, Morbid Obesity, Sleep Apnea. Myocarditis is also a possibility; now exacerbateed in setting of COVIDPNA  -Unfortunately patient is unable to undergo further ischemic or confirmatory imaging/workup  -Would hold off inotropic support for now as cardiogenic markers within normal range. Can continue low dose levo for now to keep MAP of 65 and above  -Given patient lasix naive would given 20 IV BID of lasix starting tomorrow. Pt s/p 40 IV lasix x1 today in the ER with good response  -Hold off BB or ACE/ARB therapy while on pressors with Renal impairement  -Please continue to trend CE to peak with CK and CKMB  -Please obtain daily EKgs  -EKG from 4/5 NSR with RBBB, without ischemic changes  -Please send TSH, A1c, and Lipid Panel  -Please keep K>4 and Mg>2  -Will need repeat ECHO post extubation to re-eval EF, if still depressed with wall motion abn will pursue ischemic work up  -Agree with diuresis for now and continuing with current management with Levo as mixed venous and clinical picture does not correlate with cardiogenic shock at this time.    GI  -No active issues  -Needs nasoenteral nurition, will obtain recs from nutrition  -Protonix IVP for GI ppx    RENAL//F&E  #Acute Kidney Injury  DDx: pre-renal vs ATN  -Lasix 40 mg IVP in ED 4/5  -FENa 0.3% with yesterday's urine lytes  -SCr 2.14 today  -Baseline creatinine unknown, no reported kidney disease  -Adequate urine output   -Electrolytes stable  -Strict I&O monitoring  -Avoid nephrotoxic medications    #Anion Gap Metabolic Acidosis  -Likely 2/2 elevated lactate  -With appropriate compensation  -Lactate cleared    ID  #COVID PNA   -Plan as above  -D/C remdesevir due to worsening renal fxn    ENDOCRINE  #Hyperglycemia  -Was on insulin gtt overnight due to stress/steroid induced hyperglycemia  -Will monitor blood glucose q2hr   -q6hr correctional scale ordered   -Decadron started 4/5 AM (ends on 4/14)  -A1c 6.2    HEME  #Anemia  -Hgb 11 on admission; unknown baseline. No evidence of bleed.  -Hgb today 9.6  -Trend CBC  -Maintain active T+S  -Transfuse for Hb <7    -DVT ppx with Lovenox BID, will change to heparin as SCr. increasing    DISPO/GOALS OF CARE:  Patient requires continuous monitoring with bedside rhythm monitoring, arterial line, pulse oximetry, ventilator monitoring and intermittent blood gas analysis. Care plan discussed with ICU care team. Patient remains critical at this time. Patient is a 55 yo M with PMHx of MU and gout who presented to ED with c/o progressively worsening SOB now admitted to ICU for management of AHRF in the setting of COViD    NEURO  #Sedated   -propofol and fentanyl for rass -4   -Will maintain sedated and paralyzed for vent synchrony    #Paralytic:   -nimbex, paralyzed for vent synchrony  -Neuro checks per ICU protocol, pain/sedation meds assessed and addressed, will titrate down as able to maintain vent synchrony  -Will give paralytic vacation when supined, reparalyze while prone    RESPIRATORY  #Acute hypoxic hypercapneic respiratory failure in the setting of COViD PNA  -Patient initially presented with O2 saturation of 14% and cyanotic requiring immediate intubation  -CXr with diffuse bilateral opacities with + COVID19 PCR  -Elevated inflammatory markers  -Patient currently proned  -On AC/VC mechanical Ventilation:  Mode: AC/ CMV; RR: 28; TV: 450; FiO2: 50; PEEP: 14  -PF ratio 208 this AM  -Will Wean FiO2 as tolerated    #COVID  -CXr with diffuse bilateral opacities with + COVID19 PCR  -Start Date 4/5 Remdesivir (x 6 day course) and Decadron 6 mg (x 10 day course)  -Remdesevir d/c'd due to worsening renal fxn     #MU  -As per patient's wife, patient has MU on CPAP  -Patient sleeps with 2-3 pillows at night, attributed to MU    CARDIOVASCULAR  #Severe Sepsis  -Patient presents with tachycardia, tachypnea with a pulmonary process, lactate 12, WBC 15.85, PCT 0.17; meeting criteria for severe sepsis; currently on exam, patient well perfused with adequate pulses, adequate UO, unable to assess mentation at this time  -Source likely COVID19 PNA with possible superimposed bacterial PNA  -Broad spectrum abx started, vanco and zosyn started 4/5 AM, de-escalated to ceftriaxone  -Decadron and remdesivir started 4/5 for COVID19  -Sputum Cx negative, BCx no growth at 12 hours  -Urine cx negative  -Urine legionella and strep negative  -Will start 3 day course of azithromycin  -MRSA PCR negative  -Lactate cleared  -Will hold off on fluid resuscitation due to high-risk for pulm edema  -Maintain MAP > 65 mmHg  -Monitor urine output    #RV dilation  -Bedside POCUS demonstrating dilated RV and mildly enlarged pulm artery possibly 2/2 MU vs PE (unlikely)  -Echo: Severe hypokinesis of the entire anteroseptum/inferoseptum. Akinesis of the mid to apical anterolateral region, apical anterior, apical inferior and true apex. Mild hypokinesis of the basal anterolateral, basal anterior and basal inferior regions. The estimated left ventricular systolic function is 15%. The right ventricle is mildly dilated. Right ventricular systolic function is mildly reduced. No pericardial effusion is seen.  -Based on these findings, cardiology was consulted  -Appreciate cardiac recs  -As per spouse, no hx of cardiac disease and no change in ADLs or activity tolerance prior to current presentation    #LV Dysfunction  As per Cardiology:  -Echo reviewed. Images are poor due to body habitus but suggest a stress cardiomyopathy pattern (takotsubo cardiomyopathy) rather than an ischemic event  -EKG reviewed showing NSR, with non specific ST changes and poor R wave progression in the precordial leads likely due to body habitus  -Labs show ongoing troponemia in setting of renal dysfunction.  -Clinically patient is warm and well perfused and making urine. He is s/p 40 IV Lasix x1 with 900 output. Mixed venous show O2 sat of 70, making concurrent cardiogenic shock less likely.  -Patient's LV dysfunction appears to be mutifactorial: COVID, Morbid Obesity, Sleep Apnea. Myocarditis is also a possibility; now exacerbateed in setting of COVIDPNA  -Unfortunately patient is unable to undergo further ischemic or confirmatory imaging/workup  -Would hold off inotropic support for now as cardiogenic markers within normal range. Can continue low dose levo for now to keep MAP of 65 and above  -Given patient lasix naive would given 20 IV BID of lasix starting tomorrow. Pt s/p 40 IV lasix x1 today in the ER with good response  -Hold off BB or ACE/ARB therapy while on pressors with Renal impairement  -Please continue to trend CE to peak with CK and CKMB  -Please obtain daily EKgs  -EKG from 4/5 NSR with RBBB, without ischemic changes  -Please send TSH, A1c, and Lipid Panel  -Please keep K>4 and Mg>2  -Will need repeat ECHO post extubation to re-eval EF, if still depressed with wall motion abn will pursue ischemic work up  -Agree with diuresis for now and continuing with current management with Levo as mixed venous and clinical picture does not correlate with cardiogenic shock at this time.    GI  -No active issues  -Needs nasoenteral nurition, will obtain recs from nutrition  -Protonix IVP for GI ppx    RENAL//F&E  #Acute Kidney Injury  DDx: pre-renal vs ATN  -Lasix 40 mg IVP in ED 4/5  -FENa 0.3% with yesterday's urine lytes  -SCr 2.14 today  -Baseline creatinine unknown, no reported kidney disease  -Adequate urine output   -Electrolytes stable  -Strict I&O monitoring  -Avoid nephrotoxic medications    #Anion Gap Metabolic Acidosis  -Likely 2/2 elevated lactate  -With appropriate compensation  -Lactate cleared    ID  #COVID PNA   -Plan as above  -D/C remdesevir due to worsening renal fxn    ENDOCRINE  #Hyperglycemia  -Was on insulin gtt overnight due to stress/steroid induced hyperglycemia  -Will monitor blood glucose q2hr   -q6hr correctional scale ordered   -Decadron started 4/5 AM (ends on 4/14)  -A1c 6.2    HEME  #Anemia  -Hgb 11 on admission; unknown baseline. No evidence of bleed.  -Hgb today 9.6  -Trend CBC  -Maintain active T+S  -Transfuse for Hb <7    -DVT ppx with Lovenox BID, will change to heparin as SCr. increasing    DISPO/GOALS OF CARE:  Patient requires continuous monitoring with bedside rhythm monitoring, arterial line, pulse oximetry, ventilator monitoring and intermittent blood gas analysis. Care plan discussed with ICU care team. Patient remains critical at this time. Patient is a 57 yo M with PMHx of MU and gout who presented to ED with c/o progressively worsening SOB now admitted to ICU for management of AHRF in the setting of COViD    NEURO  #Sedated   -propofol and fentanyl for rass -4   -Will maintain sedated and paralyzed for vent synchrony  -For avoiding delirium, seroquel 25 QD and 50mg QHS    #Paralytic:   -May need nimbex again (taken off day before, with pt worsening), for vent synchrony   -Neuro checks per ICU protocol, pain/sedation meds assessed and addressed, will titrate down as able to maintain vent synchrony  -Will give paralytic vacation when supined, reparalyze while prone    RESPIRATORY  #Acute hypoxic hypercapneic respiratory failure in the setting of COViD PNA  -Patient initially presented with O2 saturation of 14% and cyanotic requiring immediate intubation  -CXr with diffuse bilateral opacities with + COVID19 PCR  -Elevated inflammatory markers  -Pt PF ratio today around 144 from 275 yesterday, will need reproning today  -On AC/VC mechanical Ventilation:  Mode: AC/ CMV; RR: 24; TV: 450; FiO2: 40; PEEP: 10  -Will Wean FiO2 as tolerated    #COVID  -CXr with diffuse bilateral opacities with + COVID19 PCR  -Start Date 4/5 Remdesivir (x 6 day course) and Decadron 6 mg (x 10 day course)  -Remdesevir d/c'd due to worsening renal fxn     #MU  -As per patient's wife, patient has MU on CPAP  -Patient sleeps with 2-3 pillows at night, attributed to MU    CARDIOVASCULAR  #RV dilation  -Bedside POCUS demonstrating dilated RV and mildly enlarged pulm artery possibly 2/2 MU vs PE (unlikely)  -Echo: Severe hypokinesis of the entire anteroseptum/inferoseptum. Akinesis of the mid to apical anterolateral region, apical anterior, apical inferior and true apex. Mild hypokinesis of the basal anterolateral, basal anterior and basal inferior regions. The estimated left ventricular systolic function is 15%. The right ventricle is mildly dilated. Right ventricular systolic function is mildly reduced. No pericardial effusion is seen.  -Based on these findings, cardiology was consulted  -Appreciate cardiac recs  -As per spouse, no hx of cardiac disease and no change in ADLs or activity tolerance prior to current presentation    #LV Dysfunction  As per Cardiology:  -Echo reviewed. Images are poor due to body habitus but suggest a stress cardiomyopathy pattern (takotsubo cardiomyopathy) rather than an ischemic event  -EKG reviewed showing NSR, with non specific ST changes and poor R wave progression in the precordial leads likely due to body habitus  -Labs show ongoing troponemia in setting of renal dysfunction.  -Clinically patient is warm and well perfused and making urine. He is s/p 40 IV Lasix x1 with 900 output. Mixed venous show O2 sat of 70, making concurrent cardiogenic shock less likely.  -Patient's LV dysfunction appears to be mutifactorial: COVID, Morbid Obesity, Sleep Apnea. Myocarditis is also a possibility; now exacerbateed in setting of COVIDPNA  -Unfortunately patient is unable to undergo further ischemic or confirmatory imaging/workup  -Would hold off inotropic support for now as cardiogenic markers within normal range. Can continue low dose levo for now to keep MAP of 65 and above  -Given patient lasix naive would given 20 IV BID of lasix starting tomorrow. Pt s/p 40 IV lasix x1 today in the ER with good response  -Hold off BB or ACE/ARB therapy while on pressors with Renal impairement  -Please continue to trend CE to peak with CK and CKMB  -continue obtaining daily EKgs  -EKG from 4/5 NSR with RBBB, without ischemic changes  -Please send TSH, A1c, and Lipid Panel  -Please keep K>4 and Mg>2  -Will need repeat ECHO post extubation to re-eval EF, if still depressed with wall motion abn will pursue ischemic work up    GI  -No active issues  -Needs nasoenteral nurition, will obtain recs from nutrition  -Protonix IVP for GI ppx    RENAL//F&E  #Acute Kidney Injury  DDx: pre-renal vs ATN  -Lasix 40 mg IVP in ED 4/5  -FENa 0.3% with yesterday's urine lytes  -SCr 2.14 => 1.67 today, improving  -Baseline creatinine unknown, no reported kidney disease  -Adequate urine output   -Electrolytes stable  -Strict I&O monitoring  -Avoid nephrotoxic medications    #Anion Gap Metabolic Acidosis  -Likely 2/2 elevated lactate  -With appropriate compensation  -Lactate cleared    ID  #Severe Sepsis-   -Patient presents with tachycardia, tachypnea with a pulmonary process, lactate 12, WBC 15.85, PCT 0.17; meeting criteria for severe sepsis; currently on exam, patient well perfused with adequate pulses, adequate UO, unable to assess mentation at this time  -Source likely COVID19 PNA with possible superimposed bacterial PNA  -Broad spectrum abx started, vanco and zosyn started 4/5 AM, de-escalated to ceftriaxone- ABX stopped 4/7 given low suspicion for zafar infxn  -Decadron and remdesivir started 4/5 for COVID19  -Sputum Cx negative, BCx no growth at 12 hours  -Urine cx negative  -Urine legionella and strep negative  -MRSA PCR negative  -Lactate cleared  -Will hold off on fluid resuscitation due to high-risk for pulm edema  -Maintain MAP > 65 mmHg    #COVID PNA   -Plan as above  -D/C remdesevir due to worsening renal fxn    ENDOCRINE  #Hyperglycemia  -Was on insulin gtt overnight due to stress/steroid induced hyperglycemia  -Will monitor blood glucose q2hr   -q6hr correctional scale ordered   -Decadron started 4/5 AM (ends on 4/14)  -A1c 6.2  - 4/8 started 12 U lantus    HEME  #Anemia  -Hgb 11 on admission; unknown baseline. No evidence of bleed.  -Hgb today 9.6  -Trend CBC  -Maintain active T+S  -Transfuse for Hb <7    -DVT ppx with Lovenox BID, will change to heparin as SCr. increasing    DISPO/GOALS OF CARE:  Patient requires continuous monitoring with bedside rhythm monitoring, arterial line, pulse oximetry, ventilator monitoring and intermittent blood gas analysis. Care plan discussed with ICU care team. Patient remains critical at this time.

## 2021-04-08 NOTE — PROGRESS NOTE ADULT - SUBJECTIVE AND OBJECTIVE BOX
IN PROGRESS IN PROGRESS, including assessment and plan    Overnight Events: Per report, MAICOL. AM labs noted w/ Hb down from 9.7 to 8.9.     Subjective: Patient seen and examined at bedside. Subjective portion of exam not possible given pt intubated and on sedation.     [OBJECTIVE]:  Vital Signs:  T(F): , Max: 99.3 (04-08-21 @ 09:00)  HR:  (45 - 84)  BP:  (112/55 - 135/64)  BP(mean):  (77 - 91)  RR:  (0 - 37)  SpO2:  (87% - 97%)  Wt(kg): --  CVP(cm H2O): --  Mode: AC/ CMV (Assist Control/ Continuous Mandatory Ventilation), RR (machine): 26, RR (patient): 24, TV (machine): 450, FiO2: 40, PEEP: 10, ITime: 1, MAP: 15, PIP: 28    04-07 @ 07:01  -  04-08 @ 07:00  --------------------------------------------------------  IN: 2272 mL / OUT: 1720 mL / NET: 552 mL    04-08 @ 07:01  -  04-08 @ 09:40  --------------------------------------------------------  IN: 211.2 mL / OUT: 120 mL / NET: 91.2 mL    CAPILLARY BLOOD GLUCOSE    POCT Blood Glucose.: 186 mg/dL (08 Apr 2021 06:45)    Physcial Exam:  T(F): 99.3 (04-08-21 @ 09:00)  HR: 54 (04-08-21 @ 09:00)  BP: 127/67 (04-08-21 @ 09:00)  RR: 25 (04-08-21 @ 09:00)  SpO2: 91% (04-08-21 @ 09:00)  Wt(kg): -    GENERAL: ill-appearing, obese, intubated and sedated/paralyzed   SKIN/HAIR/NAILS: Skin warm and clammy. Nails without clubbing or cyanosis. CR<2 secs. No lesions, rash, petechiae, erythema or ecchymoses. Anicteric.   HEAD AND NECK: The skull is NC/AT. B/L Sclera white. PERRL. Nasal mucous membrane with no erythema or drainage. Trachea midline, neck supple.   THORAX/LUNGS: Thorax is symmetric with good expansion, assisted by mechanical ventilation. Rhonchi B/L, diminished throughout.   CARDIOVASCULAR: No JVD. Carotid upstrokes are brisk, without bruits. +S1 and S2, RRR; no extra heart sounds or M/R/G.  ABDOMEN/: it is soft, no palpable masses; Lechuga catheter in place.  PERIPHERAL VASCULAR: Extremities are warm, radial and dorsalis pedis pulses +1 and symmetric. No clubbing, no cyanosis.  MUSCULOSKELETAL: No active ROM at this time. No evidence of swelling or deformity.  NEUROLOGICAL: unable to assess, sedated and intubated, paralysis    Medications:  MEDICATIONS  (STANDING):  chlorhexidine 0.12% Liquid 15 milliLiter(s) Oral Mucosa every 12 hours  chlorhexidine 4% Liquid 1 Application(s) Topical <User Schedule>  ciprofloxacin  0.3% Ophthalmic Solution 1 Drop(s) Left EYE four times a day  dexAMETHasone  Injectable 6 milliGRAM(s) IV Push every 24 hours  dextrose 40% Gel 15 Gram(s) Oral once  dextrose 5%. 1000 milliLiter(s) (50 mL/Hr) IV Continuous <Continuous>  dextrose 5%. 1000 milliLiter(s) (100 mL/Hr) IV Continuous <Continuous>  dextrose 50% Injectable 25 Gram(s) IV Push once  dextrose 50% Injectable 12.5 Gram(s) IV Push once  dextrose 50% Injectable 25 Gram(s) IV Push once  fentaNYL   Infusion. 0.5 MICROgram(s)/kG/Hr (6 mL/Hr) IV Continuous <Continuous>  glucagon  Injectable 1 milliGRAM(s) IntraMuscular once  heparin   Injectable 7500 Unit(s) SubCutaneous every 8 hours  insulin glargine Injectable (LANTUS) 12 Unit(s) SubCutaneous at bedtime  insulin regular  human corrective regimen sliding scale   SubCutaneous every 6 hours  ketorolac 0.5% Ophthalmic Solution 1 Drop(s) Left EYE four times a day  norepinephrine Infusion 0.02 MICROgram(s)/kG/Min (4.5 mL/Hr) IV Continuous <Continuous>  pantoprazole  Injectable 40 milliGRAM(s) IV Push daily  petrolatum Ophthalmic Ointment 1 Application(s) Both EYES every 6 hours  polyethylene glycol 3350 17 Gram(s) Oral every 12 hours  prednisoLONE acetate 1% Suspension 1 Drop(s) Left EYE four times a day  propofol Infusion 10 MICROgram(s)/kG/Min (7.2 mL/Hr) IV Continuous <Continuous>  QUEtiapine 25 milliGRAM(s) Oral daily  QUEtiapine 50 milliGRAM(s) Oral at bedtime  remdesivir  IVPB 100 milliGRAM(s) IV Intermittent every 24 hours  senna 2 Tablet(s) Oral at bedtime    MEDICATIONS  (PRN):  sodium chloride 0.9% lock flush 10 milliLiter(s) IV Push every 1 hour PRN Pre/post blood products, medications, blood draw, and to maintain line patency    Allergies:  Allergies    No Known Allergies    Intolerances    Labs:                        8.9    9.89  )-----------( 293      ( 08 Apr 2021 04:51 )             30.9     04-08    145  |  105  |  36<H>  ----------------------------<  199<H>  4.5   |  31  |  1.67<H>    Ca    8.1<L>      08 Apr 2021 04:51  Phos  2.8     04-08  Mg     3.0     04-08    TPro  5.6<L>  /  Alb  2.5<L>  /  TBili  0.3  /  DBili  x   /  AST  27  /  ALT  19  /  AlkPhos  77  04-08    PT/INR - ( 07 Apr 2021 05:13 )   PT: 12.4 sec;   INR: 1.04       PTT - ( 07 Apr 2021 05:13 )  PTT:25.5 sec    Radiology and other tests:   Overnight Events: Per report, NAEO. AM labs noted w/ Hb down from 9.7 to 8.9.     Subjective: Patient seen and examined at bedside. Subjective portion of exam not possible given pt intubated and on sedation.     [OBJECTIVE]:  Vital Signs:  T(F): , Max: 99.3 (04-08-21 @ 09:00)  HR:  (45 - 84)  BP:  (112/55 - 135/64)  BP(mean):  (77 - 91)  RR:  (0 - 37)  SpO2:  (87% - 97%)  Wt(kg): --  CVP(cm H2O): --  Mode: AC/ CMV (Assist Control/ Continuous Mandatory Ventilation), RR (machine): 26, RR (patient): 24, TV (machine): 450, FiO2: 40, PEEP: 10, ITime: 1, MAP: 15, PIP: 28    04-07 @ 07:01  -  04-08 @ 07:00  --------------------------------------------------------  IN: 2272 mL / OUT: 1720 mL / NET: 552 mL    04-08 @ 07:01  -  04-08 @ 09:40  --------------------------------------------------------  IN: 211.2 mL / OUT: 120 mL / NET: 91.2 mL    CAPILLARY BLOOD GLUCOSE    POCT Blood Glucose.: 186 mg/dL (08 Apr 2021 06:45)    Physcial Exam:  T(F): 99.3 (04-08-21 @ 09:00)  HR: 54 (04-08-21 @ 09:00)  BP: 127/67 (04-08-21 @ 09:00)  RR: 25 (04-08-21 @ 09:00)  SpO2: 91% (04-08-21 @ 09:00)  Wt(kg): -    GENERAL: ill-appearing, obese, intubated and sedated/paralyzed   SKIN/HAIR/NAILS: Skin warm and clammy. Nails without clubbing or cyanosis. CR<2 secs. No lesions, rash, petechiae, erythema or ecchymoses. Anicteric.   HEAD AND NECK: The skull is NC/AT. B/L Sclera white. PERRL. Nasal mucous membrane with no erythema or drainage. Trachea midline, neck supple.   THORAX/LUNGS: Thorax is symmetric with good expansion, assisted by mechanical ventilation. Rhonchi B/L, diminished throughout.   CARDIOVASCULAR: No JVD. Carotid upstrokes are brisk, without bruits. +S1 and S2, RRR; no extra heart sounds or M/R/G.  ABDOMEN/: it is soft, no palpable masses; Lechuga catheter in place.  PERIPHERAL VASCULAR: Extremities are warm, radial and dorsalis pedis pulses +1 and symmetric. No clubbing, no cyanosis.  MUSCULOSKELETAL: No active ROM at this time. No evidence of swelling or deformity.  NEUROLOGICAL: unable to assess, sedated and intubated, paralysis    Medications:  MEDICATIONS  (STANDING):  chlorhexidine 0.12% Liquid 15 milliLiter(s) Oral Mucosa every 12 hours  chlorhexidine 4% Liquid 1 Application(s) Topical <User Schedule>  ciprofloxacin  0.3% Ophthalmic Solution 1 Drop(s) Left EYE four times a day  dexAMETHasone  Injectable 6 milliGRAM(s) IV Push every 24 hours  dextrose 40% Gel 15 Gram(s) Oral once  dextrose 5%. 1000 milliLiter(s) (50 mL/Hr) IV Continuous <Continuous>  dextrose 5%. 1000 milliLiter(s) (100 mL/Hr) IV Continuous <Continuous>  dextrose 50% Injectable 25 Gram(s) IV Push once  dextrose 50% Injectable 12.5 Gram(s) IV Push once  dextrose 50% Injectable 25 Gram(s) IV Push once  fentaNYL   Infusion. 0.5 MICROgram(s)/kG/Hr (6 mL/Hr) IV Continuous <Continuous>  glucagon  Injectable 1 milliGRAM(s) IntraMuscular once  heparin   Injectable 7500 Unit(s) SubCutaneous every 8 hours  insulin glargine Injectable (LANTUS) 12 Unit(s) SubCutaneous at bedtime  insulin regular  human corrective regimen sliding scale   SubCutaneous every 6 hours  ketorolac 0.5% Ophthalmic Solution 1 Drop(s) Left EYE four times a day  norepinephrine Infusion 0.02 MICROgram(s)/kG/Min (4.5 mL/Hr) IV Continuous <Continuous>  pantoprazole  Injectable 40 milliGRAM(s) IV Push daily  petrolatum Ophthalmic Ointment 1 Application(s) Both EYES every 6 hours  polyethylene glycol 3350 17 Gram(s) Oral every 12 hours  prednisoLONE acetate 1% Suspension 1 Drop(s) Left EYE four times a day  propofol Infusion 10 MICROgram(s)/kG/Min (7.2 mL/Hr) IV Continuous <Continuous>  QUEtiapine 25 milliGRAM(s) Oral daily  QUEtiapine 50 milliGRAM(s) Oral at bedtime  remdesivir  IVPB 100 milliGRAM(s) IV Intermittent every 24 hours  senna 2 Tablet(s) Oral at bedtime    MEDICATIONS  (PRN):  sodium chloride 0.9% lock flush 10 milliLiter(s) IV Push every 1 hour PRN Pre/post blood products, medications, blood draw, and to maintain line patency    Allergies:  Allergies    No Known Allergies    Intolerances    Labs:                        8.9    9.89  )-----------( 293      ( 08 Apr 2021 04:51 )             30.9     04-08    145  |  105  |  36<H>  ----------------------------<  199<H>  4.5   |  31  |  1.67<H>    Ca    8.1<L>      08 Apr 2021 04:51  Phos  2.8     04-08  Mg     3.0     04-08    TPro  5.6<L>  /  Alb  2.5<L>  /  TBili  0.3  /  DBili  x   /  AST  27  /  ALT  19  /  AlkPhos  77  04-08    PT/INR - ( 07 Apr 2021 05:13 )   PT: 12.4 sec;   INR: 1.04       PTT - ( 07 Apr 2021 05:13 )  PTT:25.5 sec    Radiology and other tests:

## 2021-04-08 NOTE — PROGRESS NOTE ADULT - ATTENDING COMMENTS
Initial attending contact date 4/5/21     . See fellow note written above for details. I reviewed the fellow documentation. I have personally seen and examined this patient. I reviewed vitals, labs, medications, cardiac studies, and additional imaging. I agree with the above fellow's findings and plans as written above with the following additions/statements.    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 which has now been exacerbated in setting of COVID PNA  -Will need repeat ECHO post extubation to re-eval EF, if still depressed with wall motion abn will pursue ischemic work up  -Still on levo  - CXR with pulm edema compared to prior CXR post diuresis  -Recommend Lasix 40 mg IV x 1

## 2021-04-08 NOTE — CHART NOTE - NSCHARTNOTEFT_GEN_A_CORE
Admitting Diagnosis:   Patient is a 56y old  Male who presents with a chief complaint of SOB (08 Apr 2021 06:38)      PAST MEDICAL & SURGICAL HISTORY:  Sleep apnea        Current Nutrition Order:   Nepro @26ml/hr x24hrs +3 LPS/day via NGT. Provides 624ml TV, 1423kcal (w/o propofol), 2373kcal (w/ propofol), 95g pro, 1.26g pro/kg IBW, 453ml FW    PO Intake: Good (%) [   ]  Fair (50-75%) [   ] Poor (<25%) [   ] - N/A    GI Issues:   Started on bowel regimen 4/6, has yet to have a BM  Ordered for miralax and senna- d/w team considering more aggressive bowel regimen    Pain:  KYLEE 2/2 intubated/sedated    Skin Integrity:  Jagdeep 10  Intact    Labs:   04-08    145  |  105  |  36<H>  ----------------------------<  199<H>  4.5   |  31  |  1.67<H>    Ca    8.1<L>      08 Apr 2021 04:51  Phos  2.8     04-08  Mg     3.0     04-08    TPro  5.6<L>  /  Alb  2.5<L>  /  TBili  0.3  /  DBili  x   /  AST  27  /  ALT  19  /  AlkPhos  77  04-08    CAPILLARY BLOOD GLUCOSE      POCT Blood Glucose.: 230 mg/dL (08 Apr 2021 12:10)  POCT Blood Glucose.: 186 mg/dL (08 Apr 2021 06:45)  POCT Blood Glucose.: 217 mg/dL (07 Apr 2021 23:32)  POCT Blood Glucose.: 242 mg/dL (07 Apr 2021 18:32)      Medications:  MEDICATIONS  (STANDING):  chlorhexidine 0.12% Liquid 15 milliLiter(s) Oral Mucosa every 12 hours  chlorhexidine 4% Liquid 1 Application(s) Topical <User Schedule>  ciprofloxacin  0.3% Ophthalmic Solution 1 Drop(s) Left EYE four times a day  dexAMETHasone  Injectable 6 milliGRAM(s) IV Push every 24 hours  dextrose 40% Gel 15 Gram(s) Oral once  dextrose 5%. 1000 milliLiter(s) (50 mL/Hr) IV Continuous <Continuous>  dextrose 5%. 1000 milliLiter(s) (100 mL/Hr) IV Continuous <Continuous>  dextrose 50% Injectable 25 Gram(s) IV Push once  dextrose 50% Injectable 12.5 Gram(s) IV Push once  dextrose 50% Injectable 25 Gram(s) IV Push once  enoxaparin Injectable 40 milliGRAM(s) SubCutaneous every 12 hours  fentaNYL   Infusion. 0.5 MICROgram(s)/kG/Hr (6 mL/Hr) IV Continuous <Continuous>  glucagon  Injectable 1 milliGRAM(s) IntraMuscular once  insulin glargine Injectable (LANTUS) 12 Unit(s) SubCutaneous at bedtime  insulin regular  human corrective regimen sliding scale   SubCutaneous every 6 hours  ketorolac 0.5% Ophthalmic Solution 1 Drop(s) Left EYE four times a day  lactulose Syrup 20 Gram(s) Oral three times a day  midazolam Infusion 0.02 mG/kG/Hr (2.4 mL/Hr) IV Continuous <Continuous>  midazolam Injectable 8 milliGRAM(s) IV Push once  norepinephrine Infusion 0.02 MICROgram(s)/kG/Min (4.5 mL/Hr) IV Continuous <Continuous>  pantoprazole  Injectable 40 milliGRAM(s) IV Push daily  petrolatum Ophthalmic Ointment 1 Application(s) Both EYES every 6 hours  polyethylene glycol 3350 17 Gram(s) Oral every 12 hours  prednisoLONE acetate 1% Suspension 1 Drop(s) Left EYE four times a day  propofol Infusion 10 MICROgram(s)/kG/Min (7.2 mL/Hr) IV Continuous <Continuous>  QUEtiapine 25 milliGRAM(s) Oral daily  QUEtiapine 50 milliGRAM(s) Oral at bedtime  remdesivir  IVPB 100 milliGRAM(s) IV Intermittent every 24 hours  senna 2 Tablet(s) Oral at bedtime    MEDICATIONS  (PRN):  acetaminophen    Suspension .. 650 milliGRAM(s) Oral every 6 hours PRN Temp greater or equal to 38C (100.4F)  sodium chloride 0.9% lock flush 10 milliLiter(s) IV Push every 1 hour PRN Pre/post blood products, medications, blood draw, and to maintain line patency      Admitted Anthropometrics:  Weight: 5'10"; ABW 120kg; IBW: 75.4kg; %IBW: 166%; BMI: 37    Weight Change:   KYLEE 2/2 intubated/sedated    Estimated energy needs:   IBW (75.4kg) used for calculations as pt is >100% of IBW and critically ill  Nutrient needs based on [facility] standards of care for maintenance in older adults.   Needs adjusted for age and hypermetabolic/inflammatory state 2/2 COVID+ and oxygen demands  Energy: 1885-2262kcal (25-30cal/kg)  Protein: 106-121g pro (1.4-1.6g/kg pro)  Fluids per team    Subjective:   Patient is a 57 yo M with PMHx of MU and gout who presented to ED with c/o progressively worsening SOB now admitted to ICU for management of AHRF in the setting of COVID    Unable to conduct a face to face interview or nutrition-focused physical exam due to limited contact restrictions related to the pt's medical condition and isolation precautions. Pt discussed with MICU team on rounds. Currently intubated on AC/CMV mode. Off paralytics. Sedated on fentanyl with propofol infusing @36ml/hr x24hrs (providing 950kcal from lipids/day). MAP 85- requiring levo for BP support. Unable to obtain diet/weight history at this time 2/2 intubated/sedated. Notable labs: POCT , 217, 242, 243, BUN 36, Cr 1.67, CRP 49.6, Mg 3.0. See EN recs below for current propofol rate. RD to follow.     Nutrition Diagnosis:  Increased nutrient needs RT increased demand for kcal/pro 2/2 hypermetabolic state AEB oxygen demands and viral infection COVID+  [  ] No active nutrition diagnosis at this time  [  ] Current medical condition precludes nutrition intervention    Goal: Pt to meet >/=75% EER consistently with good tolerance via most medically appropriate route.     Recommendations:  1. With continued intubation, improving renal function, and at current propofol rate, recommend Vital High Protein @52ml/hr x24hrs via NGT. Provides 1248ml TV, 1248kcal (w/o propofol), 2198kcal (w/ propofol), 109g pro, 1.44g pro/kg IBW, 1043ml FW.   2. If to require Nepro- recommend Nepro @20ml/hr x24hrs +4 LPS/day via NGT. Provides 480ml TV, 1264kcal (w/o propofol), 2214kcal (w/ propofol), 98g pro, 1.3g pro/kg IBW, 349ml FW.    Will adjust rate pending propofol titration and any changes in renal function.   3. Additional free H2O per team. Monitor for s/s intolerance; maintain aspiration precautions at all times.     4.  Monitor lytes and replete prn.   5.  Pain and bowel regimens per team  6. Please obtain updated TG daily.   *d/w team.     Education: n/a    Risk Level: High [   x] Moderate [   ] Low [   ].

## 2021-04-08 NOTE — PROGRESS NOTE ADULT - SUBJECTIVE AND OBJECTIVE BOX
Interval HPI:     PAST MEDICAL & SURGICAL HISTORY:  Sleep apnea    Home Medications:  allopurinol:  (05 Apr 2021 18:19)  colchicine:  (05 Apr 2021 11:55)  MEDICATIONS  (STANDING):  chlorhexidine 0.12% Liquid 15 milliLiter(s) Oral Mucosa every 12 hours  chlorhexidine 4% Liquid 1 Application(s) Topical <User Schedule>  ciprofloxacin  0.3% Ophthalmic Solution 1 Drop(s) Left EYE four times a day  dexAMETHasone  Injectable 6 milliGRAM(s) IV Push every 24 hours  dextrose 40% Gel 15 Gram(s) Oral once  dextrose 5%. 1000 milliLiter(s) (50 mL/Hr) IV Continuous <Continuous>  dextrose 5%. 1000 milliLiter(s) (100 mL/Hr) IV Continuous <Continuous>  dextrose 50% Injectable 25 Gram(s) IV Push once  dextrose 50% Injectable 12.5 Gram(s) IV Push once  dextrose 50% Injectable 25 Gram(s) IV Push once  enoxaparin Injectable 40 milliGRAM(s) SubCutaneous every 12 hours  fentaNYL   Infusion. 0.5 MICROgram(s)/kG/Hr (6 mL/Hr) IV Continuous <Continuous>  glucagon  Injectable 1 milliGRAM(s) IntraMuscular once  insulin glargine Injectable (LANTUS) 12 Unit(s) SubCutaneous at bedtime  insulin regular  human corrective regimen sliding scale   SubCutaneous every 6 hours  ketorolac 0.5% Ophthalmic Solution 1 Drop(s) Left EYE four times a day  lactulose Syrup 20 Gram(s) Oral three times a day  midazolam Infusion 0.02 mG/kG/Hr (2.4 mL/Hr) IV Continuous <Continuous>  norepinephrine Infusion 0.02 MICROgram(s)/kG/Min (4.5 mL/Hr) IV Continuous <Continuous>  pantoprazole  Injectable 40 milliGRAM(s) IV Push daily  petrolatum Ophthalmic Ointment 1 Application(s) Both EYES every 6 hours  polyethylene glycol 3350 17 Gram(s) Oral every 12 hours  prednisoLONE acetate 1% Suspension 1 Drop(s) Left EYE four times a day  propofol Infusion 10 MICROgram(s)/kG/Min (7.2 mL/Hr) IV Continuous <Continuous>  QUEtiapine 25 milliGRAM(s) Oral daily  QUEtiapine 50 milliGRAM(s) Oral at bedtime  remdesivir  IVPB 100 milliGRAM(s) IV Intermittent every 24 hours  senna 2 Tablet(s) Oral at bedtime    MEDICATIONS  (PRN):  acetaminophen    Suspension .. 650 milliGRAM(s) Oral every 6 hours PRN Temp greater or equal to 38C (100.4F)  sodium chloride 0.9% lock flush 10 milliLiter(s) IV Push every 1 hour PRN Pre/post blood products, medications, blood draw, and to maintain line patency      ICU Vital Signs Last 24 Hrs  T(C): 38.1 (08 Apr 2021 15:00), Max: 38.1 (08 Apr 2021 13:00)  T(F): 100.6 (08 Apr 2021 15:00), Max: 100.6 (08 Apr 2021 13:00)  HR: 57 (08 Apr 2021 16:00) (46 - 84)  BP: 118/58 (08 Apr 2021 16:00) (117/58 - 139/70)  BP(mean): 81 (08 Apr 2021 16:00) (79 - 98)  ABP: 91/70 (08 Apr 2021 16:00) (91/70 - 142/85)  ABP(mean): 79 (08 Apr 2021 16:00) (71 - 111)  RR: 19 (08 Apr 2021 16:00) (17 - 45)  SpO2: 88% (08 Apr 2021 16:00) (87% - 95%)        PHYSICAL EXAM:    Constitutional: Intubated, sedated, no longer paralyzed  Head: NC/AT  Neck: LIJ in place  Respiratory: Decrease BS throughout with fine crackles and Ronchi  Cardiac: +S1/S2; RRR; Bradycardia; no M/R/G;   Gastrointestinal: Soft, NT, ND  Extremities: WWP,  no peripheral edema  Vascular: 2+ radial, DP/PT pulses B/L      ..ICU Vital Signs Last 24 Hrs  T(C): 38.1 (08 Apr 2021 15:00), Max: 38.1 (08 Apr 2021 13:00)  T(F): 100.6 (08 Apr 2021 15:00), Max: 100.6 (08 Apr 2021 13:00)  HR: 57 (08 Apr 2021 16:00) (46 - 84)  BP: 118/58 (08 Apr 2021 16:00) (117/58 - 139/70)  BP(mean): 81 (08 Apr 2021 16:00) (79 - 98)  ABP: 91/70 (08 Apr 2021 16:00) (91/70 - 142/85)  ABP(mean): 79 (08 Apr 2021 16:00) (71 - 111)  RR: 19 (08 Apr 2021 16:00) (17 - 45)  SpO2: 88% (08 Apr 2021 16:00) (87% - 95%)        RADIOLOGY, EKG & ADDITIONAL TESTS: Reviewed. < from: TTE Echo Complete w/o Contrast w/ Doppler (04.05.21 @ 12:46) >  . Limited study obtained for evaluation of left ventricular function.   2. Severe hypokinesis of the entire anteroseptum/inferoseptum. Akinesis of the mid to apical anterolateral region, apical anterior, apical inferior and true apex. Mild hypokinesis of the basal anterolateral, basal anterior and basal inferior regions. The estimated left ventricular systolic function is 15%.   3. The right ventricle is mildly dilated. Right ventricular systolic function is mildly reduced.   4. No pericardial effusion is seen.   5. No prior echo is availablefor comparison.    < end of copied text >  < from: TTE Echo Complete w/o Contrast w/ Doppler (04.05.21 @ 12:46) >  ft Ventricle:  Severe hypokinesis of the entire anteroseptum/inferoseptum. Akinesis of the mid to apical anterolateral region, apical anterior, apical inferior and true apex. Mild hypokinesis of the basal anterolateral, basal anterior and basal inferior regions. The estimated left ventricular systolic function is 15%.    < end of copied text >   Interval HPI: Clinically patient continues to improve. Attempt at weaning sedation today unsuccessful Started on versed in addition to propofol. Patient remains Net positive    PAST MEDICAL & SURGICAL HISTORY:  Sleep apnea    Home Medications:  allopurinol:  (05 Apr 2021 18:19)  colchicine:  (05 Apr 2021 11:55)  MEDICATIONS  (STANDING):  chlorhexidine 0.12% Liquid 15 milliLiter(s) Oral Mucosa every 12 hours  chlorhexidine 4% Liquid 1 Application(s) Topical <User Schedule>  ciprofloxacin  0.3% Ophthalmic Solution 1 Drop(s) Left EYE four times a day  dexAMETHasone  Injectable 6 milliGRAM(s) IV Push every 24 hours  dextrose 40% Gel 15 Gram(s) Oral once  dextrose 5%. 1000 milliLiter(s) (50 mL/Hr) IV Continuous <Continuous>  dextrose 5%. 1000 milliLiter(s) (100 mL/Hr) IV Continuous <Continuous>  dextrose 50% Injectable 25 Gram(s) IV Push once  dextrose 50% Injectable 12.5 Gram(s) IV Push once  dextrose 50% Injectable 25 Gram(s) IV Push once  enoxaparin Injectable 40 milliGRAM(s) SubCutaneous every 12 hours  fentaNYL   Infusion. 0.5 MICROgram(s)/kG/Hr (6 mL/Hr) IV Continuous <Continuous>  glucagon  Injectable 1 milliGRAM(s) IntraMuscular once  insulin glargine Injectable (LANTUS) 12 Unit(s) SubCutaneous at bedtime  insulin regular  human corrective regimen sliding scale   SubCutaneous every 6 hours  ketorolac 0.5% Ophthalmic Solution 1 Drop(s) Left EYE four times a day  lactulose Syrup 20 Gram(s) Oral three times a day  midazolam Infusion 0.02 mG/kG/Hr (2.4 mL/Hr) IV Continuous <Continuous>  norepinephrine Infusion 0.02 MICROgram(s)/kG/Min (4.5 mL/Hr) IV Continuous <Continuous>  pantoprazole  Injectable 40 milliGRAM(s) IV Push daily  petrolatum Ophthalmic Ointment 1 Application(s) Both EYES every 6 hours  polyethylene glycol 3350 17 Gram(s) Oral every 12 hours  prednisoLONE acetate 1% Suspension 1 Drop(s) Left EYE four times a day  propofol Infusion 10 MICROgram(s)/kG/Min (7.2 mL/Hr) IV Continuous <Continuous>  QUEtiapine 25 milliGRAM(s) Oral daily  QUEtiapine 50 milliGRAM(s) Oral at bedtime  remdesivir  IVPB 100 milliGRAM(s) IV Intermittent every 24 hours  senna 2 Tablet(s) Oral at bedtime    MEDICATIONS  (PRN):  acetaminophen    Suspension .. 650 milliGRAM(s) Oral every 6 hours PRN Temp greater or equal to 38C (100.4F)  sodium chloride 0.9% lock flush 10 milliLiter(s) IV Push every 1 hour PRN Pre/post blood products, medications, blood draw, and to maintain line patency      ICU Vital Signs Last 24 Hrs  T(C): 38.1 (08 Apr 2021 15:00), Max: 38.1 (08 Apr 2021 13:00)  T(F): 100.6 (08 Apr 2021 15:00), Max: 100.6 (08 Apr 2021 13:00)  HR: 57 (08 Apr 2021 16:00) (46 - 84)  BP: 118/58 (08 Apr 2021 16:00) (117/58 - 139/70)  BP(mean): 81 (08 Apr 2021 16:00) (79 - 98)  ABP: 91/70 (08 Apr 2021 16:00) (91/70 - 142/85)  ABP(mean): 79 (08 Apr 2021 16:00) (71 - 111)  RR: 19 (08 Apr 2021 16:00) (17 - 45)  SpO2: 88% (08 Apr 2021 16:00) (87% - 95%)        PHYSICAL EXAM:    Constitutional: Intubated, sedated, no longer paralyzed  Head: NC/AT  Neck: LIJ in place  Respiratory: Decreased BS at the base B/L  Cardiac: +S1/S2; RRR; Bradycardia; no M/R/G;   Gastrointestinal: Soft, NT, ND  Extremities: WWP,  no peripheral edema  Vascular: 2+ radial, DP/PT pulses B/L      ..ICU Vital Signs Last 24 Hrs  T(C): 38.1 (08 Apr 2021 15:00), Max: 38.1 (08 Apr 2021 13:00)  T(F): 100.6 (08 Apr 2021 15:00), Max: 100.6 (08 Apr 2021 13:00)  HR: 57 (08 Apr 2021 16:00) (46 - 84)  BP: 118/58 (08 Apr 2021 16:00) (117/58 - 139/70)  BP(mean): 81 (08 Apr 2021 16:00) (79 - 98)  ABP: 91/70 (08 Apr 2021 16:00) (91/70 - 142/85)  ABP(mean): 79 (08 Apr 2021 16:00) (71 - 111)  RR: 19 (08 Apr 2021 16:00) (17 - 45)  SpO2: 88% (08 Apr 2021 16:00) (87% - 95%)        RADIOLOGY, EKG & ADDITIONAL TESTS: Reviewed. < from: TTE Echo Complete w/o Contrast w/ Doppler (04.05.21 @ 12:46) >  . Limited study obtained for evaluation of left ventricular function.   2. Severe hypokinesis of the entire anteroseptum/inferoseptum. Akinesis of the mid to apical anterolateral region, apical anterior, apical inferior and true apex. Mild hypokinesis of the basal anterolateral, basal anterior and basal inferior regions. The estimated left ventricular systolic function is 15%.   3. The right ventricle is mildly dilated. Right ventricular systolic function is mildly reduced.   4. No pericardial effusion is seen.   5. No prior echo is availablefor comparison.    < end of copied text >  < from: TTE Echo Complete w/o Contrast w/ Doppler (04.05.21 @ 12:46) >  ft Ventricle:  Severe hypokinesis of the entire anteroseptum/inferoseptum. Akinesis of the mid to apical anterolateral region, apical anterior, apical inferior and true apex. Mild hypokinesis of the basal anterolateral, basal anterior and basal inferior regions. The estimated left ventricular systolic function is 15%.    < end of copied text >

## 2021-04-08 NOTE — PROGRESS NOTE ADULT - ASSESSMENT
55 yo M with a PMHx of gout and MU (on home CPAP) who presented to ED for progressive worsening SOB of 2 days of duration, found to be in profound hypoxic respiratory Failure requiring intubation, paralysis and proning with suspicion for COVID PNA with workup further revealing remarkably reduced LV systolic Function with regional wall motion abnormalities in the setting of troponemia       INCOMPLETE  1) Hypoxic Respiratory Failure likely 2/2 COVID PNA in patient with markedly reduced LV systolic Function  -Patient with EF of 15 on Echo (No known prior) with regional wall motion abnormalities. Echo showing Severe hypokinesis of the entire anteroseptum/inferoseptu as well as Akinesis of the mid to apical anterolateral region, apical anterior, apical inferior and true apex. Pt aslso with Mild hypokinesis of the basal anterolateral, basal anterior and basal inferior regions.  -Echo reviewed. Images are poor due to body habitus but suggest a stress cardiomyopathy pattern rather than an ischemic event  -Clinically patient is warm and well perfused and making urine. Given His severely reduced EF and numerous gtts he remains Net positive. Would favor gentle diuresis ( 40 IV lasix x1)to keep him net negative to help offload the LV  -Pt's GRACE likely from ATN currently improving   -Pt noted to be bradycardic on tele ( Averaging 53). No evidence of pauses or conduction disease. Bradycardia could be in setting of high dose propofol. Continue to monitor  -Hold off BB or ACE/ARB therapy while on pressors with Renal impairement  -Please obtain daily EKgs  -Please keep K>4 and Mg>2  -Cardiology will continue to follow, please call with any questions   57 yo M with a PMHx of gout and MU (on home CPAP) who presented to ED for progressive worsening SOB of 2 days of duration, found to be in profound hypoxic respiratory Failure requiring intubation, paralysis and proning with suspicion for COVID PNA with workup further revealing remarkably reduced LV systolic Function with regional wall motion abnormalities in the setting of troponemia       1) Hypoxic Respiratory Failure likely 2/2 COVID PNA in patient with markedly reduced LV systolic Function  -Patient with EF of 15 on Echo (No known prior) with regional wall motion abnormalities. Echo showing Severe hypokinesis of the entire anteroseptum/inferoseptu as well as Akinesis of the mid to apical anterolateral region, apical anterior, apical inferior and true apex. Pt also with Mild hypokinesis of the basal anterolateral, basal anterior and basal inferior regions.  -Echo reviewed. Images are poor due to body habitus but suggest a stress cardiomyopathy pattern rather than an ischemic event  -Clinically patient is warm and well perfused and making urine. Given His severely reduced EF and numerous gtts he remains Net positive. Would favor gentle diuresis ( 20 IV lasix x1)to keep him net negative to help offload the LV  -Pt's GRACE continues to improve  -Hold off BB or ACE/ARB therapy. Will initiate BB therapy once of pressors >24 hours  -Please obtain daily EKgs  -Please keep K>4 and Mg>2  -Cardiology will continue to follow, please call with any questions

## 2021-04-08 NOTE — PROGRESS NOTE ADULT - ATTENDING COMMENTS
Patient seen and examined with house-staff during bedside rounds.  Resident note read, including vitals, physical findings, laboratory data, and radiological reports.   Revisions included below.  Direct personal management at bed side and extensive interpretation of the data.  Plan was outlined and discussed in details with the housestaff.  Decision making of high complexity  Action taken for acute disease activity to reflect the level of care provided:  - medication reconciliation  - review laboratory data  Clinically stable.  Patient is here for norepinephrine.  Patient on fentanyl and propofol..  Patient resulted in one-point.  The gas exchange are stable.  The peak pressure is 33 and a plateau 28.  Adequate urine output.  Renal functions are improving.  The patient is on maximum blood sugar is better controlled.  The patient on very severe and Decadron.  Liver function are stable.  No proning at this point

## 2021-04-09 LAB
ALBUMIN SERPL ELPH-MCNC: 2.5 G/DL — LOW (ref 3.3–5)
ALBUMIN SERPL ELPH-MCNC: 2.5 G/DL — LOW (ref 3.3–5)
ALP SERPL-CCNC: 73 U/L — SIGNIFICANT CHANGE UP (ref 40–120)
ALP SERPL-CCNC: 74 U/L — SIGNIFICANT CHANGE UP (ref 40–120)
ALT FLD-CCNC: 31 U/L — SIGNIFICANT CHANGE UP (ref 10–45)
ALT FLD-CCNC: 32 U/L — SIGNIFICANT CHANGE UP (ref 10–45)
ANION GAP SERPL CALC-SCNC: 7 MMOL/L — SIGNIFICANT CHANGE UP (ref 5–17)
ANION GAP SERPL CALC-SCNC: 8 MMOL/L — SIGNIFICANT CHANGE UP (ref 5–17)
ANION GAP SERPL CALC-SCNC: 9 MMOL/L — SIGNIFICANT CHANGE UP (ref 5–17)
ANISOCYTOSIS BLD QL: SLIGHT — SIGNIFICANT CHANGE UP
AST SERPL-CCNC: 40 U/L — SIGNIFICANT CHANGE UP (ref 10–40)
AST SERPL-CCNC: 44 U/L — HIGH (ref 10–40)
BASE EXCESS BLDA CALC-SCNC: 3.2 MMOL/L — HIGH (ref -2–3)
BASE EXCESS BLDA CALC-SCNC: 4.3 MMOL/L — HIGH (ref -2–3)
BASE EXCESS BLDA CALC-SCNC: 5.9 MMOL/L — HIGH (ref -2–3)
BASE EXCESS BLDA CALC-SCNC: 6 MMOL/L — HIGH (ref -2–3)
BASOPHILS # BLD AUTO: 0 K/UL — SIGNIFICANT CHANGE UP (ref 0–0.2)
BASOPHILS # BLD AUTO: 0.01 K/UL — SIGNIFICANT CHANGE UP (ref 0–0.2)
BASOPHILS NFR BLD AUTO: 0 % — SIGNIFICANT CHANGE UP (ref 0–2)
BASOPHILS NFR BLD AUTO: 0.1 % — SIGNIFICANT CHANGE UP (ref 0–2)
BILIRUB SERPL-MCNC: 0.2 MG/DL — SIGNIFICANT CHANGE UP (ref 0.2–1.2)
BILIRUB SERPL-MCNC: 0.2 MG/DL — SIGNIFICANT CHANGE UP (ref 0.2–1.2)
BUN SERPL-MCNC: 38 MG/DL — HIGH (ref 7–23)
BUN SERPL-MCNC: 42 MG/DL — HIGH (ref 7–23)
BUN SERPL-MCNC: 43 MG/DL — HIGH (ref 7–23)
CALCIUM SERPL-MCNC: 7.8 MG/DL — LOW (ref 8.4–10.5)
CALCIUM SERPL-MCNC: 8 MG/DL — LOW (ref 8.4–10.5)
CALCIUM SERPL-MCNC: 8.1 MG/DL — LOW (ref 8.4–10.5)
CHLORIDE SERPL-SCNC: 108 MMOL/L — SIGNIFICANT CHANGE UP (ref 96–108)
CHLORIDE SERPL-SCNC: 110 MMOL/L — HIGH (ref 96–108)
CHLORIDE SERPL-SCNC: 112 MMOL/L — HIGH (ref 96–108)
CK SERPL-CCNC: 369 U/L — HIGH (ref 30–200)
CO2 SERPL-SCNC: 29 MMOL/L — SIGNIFICANT CHANGE UP (ref 22–31)
CO2 SERPL-SCNC: 30 MMOL/L — SIGNIFICANT CHANGE UP (ref 22–31)
CO2 SERPL-SCNC: 31 MMOL/L — SIGNIFICANT CHANGE UP (ref 22–31)
CREAT SERPL-MCNC: 1.89 MG/DL — HIGH (ref 0.5–1.3)
CREAT SERPL-MCNC: 2.46 MG/DL — HIGH (ref 0.5–1.3)
CREAT SERPL-MCNC: 2.48 MG/DL — HIGH (ref 0.5–1.3)
CRP SERPL-MCNC: 32.6 MG/L — HIGH (ref 0–4)
CRP SERPL-MCNC: 35.7 MG/L — HIGH (ref 0–4)
D DIMER BLD IA.RAPID-MCNC: 903 NG/ML DDU — HIGH
D DIMER BLD IA.RAPID-MCNC: 950 NG/ML DDU — HIGH
DACRYOCYTES BLD QL SMEAR: SLIGHT — SIGNIFICANT CHANGE UP
EOSINOPHIL # BLD AUTO: 0 K/UL — SIGNIFICANT CHANGE UP (ref 0–0.5)
EOSINOPHIL # BLD AUTO: 0.02 K/UL — SIGNIFICANT CHANGE UP (ref 0–0.5)
EOSINOPHIL NFR BLD AUTO: 0 % — SIGNIFICANT CHANGE UP (ref 0–6)
EOSINOPHIL NFR BLD AUTO: 0.2 % — SIGNIFICANT CHANGE UP (ref 0–6)
FERRITIN SERPL-MCNC: 108 NG/ML — SIGNIFICANT CHANGE UP (ref 30–400)
FERRITIN SERPL-MCNC: 108 NG/ML — SIGNIFICANT CHANGE UP (ref 30–400)
GLUCOSE BLDC GLUCOMTR-MCNC: 127 MG/DL — HIGH (ref 70–99)
GLUCOSE BLDC GLUCOMTR-MCNC: 149 MG/DL — HIGH (ref 70–99)
GLUCOSE BLDC GLUCOMTR-MCNC: 242 MG/DL — HIGH (ref 70–99)
GLUCOSE BLDC GLUCOMTR-MCNC: 250 MG/DL — HIGH (ref 70–99)
GLUCOSE BLDC GLUCOMTR-MCNC: 256 MG/DL — HIGH (ref 70–99)
GLUCOSE SERPL-MCNC: 131 MG/DL — HIGH (ref 70–99)
GLUCOSE SERPL-MCNC: 148 MG/DL — HIGH (ref 70–99)
GLUCOSE SERPL-MCNC: 259 MG/DL — HIGH (ref 70–99)
HCO3 BLDA-SCNC: 30 MMOL/L — HIGH (ref 21–28)
HCO3 BLDA-SCNC: 33 MMOL/L — HIGH (ref 21–28)
HCO3 BLDA-SCNC: 33 MMOL/L — HIGH (ref 21–28)
HCO3 BLDA-SCNC: 34 MMOL/L — HIGH (ref 21–28)
HCT VFR BLD CALC: 29.9 % — LOW (ref 39–50)
HCT VFR BLD CALC: 30.9 % — LOW (ref 39–50)
HGB BLD-MCNC: 8.6 G/DL — LOW (ref 13–17)
HGB BLD-MCNC: 8.8 G/DL — LOW (ref 13–17)
HYPOCHROMIA BLD QL: SLIGHT — SIGNIFICANT CHANGE UP
IMM GRANULOCYTES NFR BLD AUTO: 3.7 % — HIGH (ref 0–1.5)
LACTATE SERPL-SCNC: 1 MMOL/L — SIGNIFICANT CHANGE UP (ref 0.5–2)
LG PLATELETS BLD QL AUTO: SLIGHT — SIGNIFICANT CHANGE UP
LYMPHOCYTES # BLD AUTO: 1.1 K/UL — SIGNIFICANT CHANGE UP (ref 1–3.3)
LYMPHOCYTES # BLD AUTO: 1.33 K/UL — SIGNIFICANT CHANGE UP (ref 1–3.3)
LYMPHOCYTES # BLD AUTO: 11.7 % — LOW (ref 13–44)
LYMPHOCYTES # BLD AUTO: 14 % — SIGNIFICANT CHANGE UP (ref 13–44)
MAGNESIUM SERPL-MCNC: 2.9 MG/DL — HIGH (ref 1.6–2.6)
MAGNESIUM SERPL-MCNC: 3 MG/DL — HIGH (ref 1.6–2.6)
MANUAL SMEAR VERIFICATION: SIGNIFICANT CHANGE UP
MCHC RBC-ENTMCNC: 22.8 PG — LOW (ref 27–34)
MCHC RBC-ENTMCNC: 23 PG — LOW (ref 27–34)
MCHC RBC-ENTMCNC: 28.5 GM/DL — LOW (ref 32–36)
MCHC RBC-ENTMCNC: 28.8 GM/DL — LOW (ref 32–36)
MCV RBC AUTO: 79.3 FL — LOW (ref 80–100)
MCV RBC AUTO: 80.7 FL — SIGNIFICANT CHANGE UP (ref 80–100)
METAMYELOCYTES # FLD: 2 % — HIGH (ref 0–0)
MONOCYTES # BLD AUTO: 0.56 K/UL — SIGNIFICANT CHANGE UP (ref 0–0.9)
MONOCYTES # BLD AUTO: 0.76 K/UL — SIGNIFICANT CHANGE UP (ref 0–0.9)
MONOCYTES NFR BLD AUTO: 6 % — SIGNIFICANT CHANGE UP (ref 2–14)
MONOCYTES NFR BLD AUTO: 8 % — SIGNIFICANT CHANGE UP (ref 2–14)
MYELOCYTES NFR BLD: 2 % — HIGH (ref 0–0)
NEUTROPHILS # BLD AUTO: 7.01 K/UL — SIGNIFICANT CHANGE UP (ref 1.8–7.4)
NEUTROPHILS # BLD AUTO: 7.33 K/UL — SIGNIFICANT CHANGE UP (ref 1.8–7.4)
NEUTROPHILS NFR BLD AUTO: 72 % — SIGNIFICANT CHANGE UP (ref 43–77)
NEUTROPHILS NFR BLD AUTO: 78.3 % — HIGH (ref 43–77)
NEUTS BAND # BLD: 2 % — SIGNIFICANT CHANGE UP (ref 0–8)
NRBC # BLD: 1 /100 WBCS — HIGH (ref 0–0)
NRBC # BLD: 3 /100 — HIGH (ref 0–0)
NRBC # BLD: SIGNIFICANT CHANGE UP /100 WBCS (ref 0–0)
OVALOCYTES BLD QL SMEAR: SLIGHT — SIGNIFICANT CHANGE UP
PCO2 BLDA: 54 MMHG — HIGH (ref 35–48)
PCO2 BLDA: 62 MMHG — CRITICAL HIGH (ref 35–48)
PCO2 BLDA: 65 MMHG — CRITICAL HIGH (ref 35–48)
PCO2 BLDA: 69 MMHG — CRITICAL HIGH (ref 35–48)
PH BLDA: 7.31 — LOW (ref 7.35–7.45)
PH BLDA: 7.32 — LOW (ref 7.35–7.45)
PH BLDA: 7.34 — LOW (ref 7.35–7.45)
PH BLDA: 7.36 — SIGNIFICANT CHANGE UP (ref 7.35–7.45)
PHOSPHATE SERPL-MCNC: 5 MG/DL — HIGH (ref 2.5–4.5)
PHOSPHATE SERPL-MCNC: 5.6 MG/DL — HIGH (ref 2.5–4.5)
PLAT MORPH BLD: ABNORMAL
PLATELET # BLD AUTO: 252 K/UL — SIGNIFICANT CHANGE UP (ref 150–400)
PLATELET # BLD AUTO: 260 K/UL — SIGNIFICANT CHANGE UP (ref 150–400)
PLATELET CLUMP BLD QL SMEAR: ABNORMAL
PLATELET COUNT - ESTIMATE: NORMAL — SIGNIFICANT CHANGE UP
PO2 BLDA: 100 MMHG — SIGNIFICANT CHANGE UP (ref 83–108)
PO2 BLDA: 108 MMHG — SIGNIFICANT CHANGE UP (ref 83–108)
PO2 BLDA: 81 MMHG — LOW (ref 83–108)
PO2 BLDA: 93 MMHG — SIGNIFICANT CHANGE UP (ref 83–108)
POIKILOCYTOSIS BLD QL AUTO: SLIGHT — SIGNIFICANT CHANGE UP
POTASSIUM SERPL-MCNC: 4.9 MMOL/L — SIGNIFICANT CHANGE UP (ref 3.5–5.3)
POTASSIUM SERPL-MCNC: 5 MMOL/L — SIGNIFICANT CHANGE UP (ref 3.5–5.3)
POTASSIUM SERPL-MCNC: 5 MMOL/L — SIGNIFICANT CHANGE UP (ref 3.5–5.3)
POTASSIUM SERPL-SCNC: 4.9 MMOL/L — SIGNIFICANT CHANGE UP (ref 3.5–5.3)
POTASSIUM SERPL-SCNC: 5 MMOL/L — SIGNIFICANT CHANGE UP (ref 3.5–5.3)
POTASSIUM SERPL-SCNC: 5 MMOL/L — SIGNIFICANT CHANGE UP (ref 3.5–5.3)
PROT SERPL-MCNC: 5.4 G/DL — LOW (ref 6–8.3)
PROT SERPL-MCNC: 5.6 G/DL — LOW (ref 6–8.3)
RBC # BLD: 3.77 M/UL — LOW (ref 4.2–5.8)
RBC # BLD: 3.83 M/UL — LOW (ref 4.2–5.8)
RBC # FLD: 17.7 % — HIGH (ref 10.3–14.5)
RBC # FLD: 18 % — HIGH (ref 10.3–14.5)
RBC BLD AUTO: ABNORMAL
SAO2 % BLDA: 95 % — SIGNIFICANT CHANGE UP (ref 95–100)
SAO2 % BLDA: 96 % — SIGNIFICANT CHANGE UP (ref 95–100)
SAO2 % BLDA: 97 % — SIGNIFICANT CHANGE UP (ref 95–100)
SAO2 % BLDA: 98 % — SIGNIFICANT CHANGE UP (ref 95–100)
SMUDGE CELLS # BLD: PRESENT — SIGNIFICANT CHANGE UP
SODIUM SERPL-SCNC: 146 MMOL/L — HIGH (ref 135–145)
SODIUM SERPL-SCNC: 148 MMOL/L — HIGH (ref 135–145)
SODIUM SERPL-SCNC: 150 MMOL/L — HIGH (ref 135–145)
STOMATOCYTES BLD QL SMEAR: SLIGHT — SIGNIFICANT CHANGE UP
WBC # BLD: 9.37 K/UL — SIGNIFICANT CHANGE UP (ref 3.8–10.5)
WBC # BLD: 9.47 K/UL — SIGNIFICANT CHANGE UP (ref 3.8–10.5)
WBC # FLD AUTO: 9.37 K/UL — SIGNIFICANT CHANGE UP (ref 3.8–10.5)
WBC # FLD AUTO: 9.47 K/UL — SIGNIFICANT CHANGE UP (ref 3.8–10.5)

## 2021-04-09 PROCEDURE — 71045 X-RAY EXAM CHEST 1 VIEW: CPT | Mod: 26

## 2021-04-09 PROCEDURE — 99233 SBSQ HOSP IP/OBS HIGH 50: CPT | Mod: GC

## 2021-04-09 PROCEDURE — 36600 WITHDRAWAL OF ARTERIAL BLOOD: CPT | Mod: 59

## 2021-04-09 PROCEDURE — 36620 INSERTION CATHETER ARTERY: CPT

## 2021-04-09 RX ORDER — MIDAZOLAM HYDROCHLORIDE 1 MG/ML
0.02 INJECTION, SOLUTION INTRAMUSCULAR; INTRAVENOUS
Qty: 100 | Refills: 0 | Status: DISCONTINUED | OUTPATIENT
Start: 2021-04-09 | End: 2021-04-12

## 2021-04-09 RX ORDER — FENTANYL CITRATE 50 UG/ML
0.5 INJECTION INTRAVENOUS
Qty: 2500 | Refills: 0 | Status: DISCONTINUED | OUTPATIENT
Start: 2021-04-09 | End: 2021-04-13

## 2021-04-09 RX ORDER — SODIUM CHLORIDE 9 MG/ML
1000 INJECTION, SOLUTION INTRAVENOUS ONCE
Refills: 0 | Status: COMPLETED | OUTPATIENT
Start: 2021-04-09 | End: 2021-04-09

## 2021-04-09 RX ORDER — ACETAMINOPHEN 500 MG
1000 TABLET ORAL ONCE
Refills: 0 | Status: COMPLETED | OUTPATIENT
Start: 2021-04-09 | End: 2021-04-09

## 2021-04-09 RX ORDER — CHLORHEXIDINE GLUCONATE 213 G/1000ML
1 SOLUTION TOPICAL DAILY
Refills: 0 | Status: DISCONTINUED | OUTPATIENT
Start: 2021-04-09 | End: 2021-04-28

## 2021-04-09 RX ORDER — SODIUM CHLORIDE 9 MG/ML
1000 INJECTION, SOLUTION INTRAVENOUS
Refills: 0 | Status: DISCONTINUED | OUTPATIENT
Start: 2021-04-09 | End: 2021-04-09

## 2021-04-09 RX ORDER — PROPOFOL 10 MG/ML
10 INJECTION, EMULSION INTRAVENOUS
Qty: 1000 | Refills: 0 | Status: DISCONTINUED | OUTPATIENT
Start: 2021-04-09 | End: 2021-04-13

## 2021-04-09 RX ORDER — PIPERACILLIN AND TAZOBACTAM 4; .5 G/20ML; G/20ML
4.5 INJECTION, POWDER, LYOPHILIZED, FOR SOLUTION INTRAVENOUS EVERY 6 HOURS
Refills: 0 | Status: DISCONTINUED | OUTPATIENT
Start: 2021-04-09 | End: 2021-04-16

## 2021-04-09 RX ORDER — NOREPINEPHRINE BITARTRATE/D5W 8 MG/250ML
0.05 PLASTIC BAG, INJECTION (ML) INTRAVENOUS
Qty: 8 | Refills: 0 | Status: DISCONTINUED | OUTPATIENT
Start: 2021-04-09 | End: 2021-04-12

## 2021-04-09 RX ORDER — VANCOMYCIN HCL 1 G
1750 VIAL (EA) INTRAVENOUS ONCE
Refills: 0 | Status: DISCONTINUED | OUTPATIENT
Start: 2021-04-09 | End: 2021-04-09

## 2021-04-09 RX ORDER — IPRATROPIUM/ALBUTEROL SULFATE 18-103MCG
3 AEROSOL WITH ADAPTER (GRAM) INHALATION EVERY 4 HOURS
Refills: 0 | Status: DISCONTINUED | OUTPATIENT
Start: 2021-04-09 | End: 2021-04-28

## 2021-04-09 RX ORDER — SODIUM CHLORIDE 9 MG/ML
1000 INJECTION, SOLUTION INTRAVENOUS
Refills: 0 | Status: DISCONTINUED | OUTPATIENT
Start: 2021-04-09 | End: 2021-04-10

## 2021-04-09 RX ORDER — ACETYLCYSTEINE 200 MG/ML
4 VIAL (ML) MISCELLANEOUS THREE TIMES A DAY
Refills: 0 | Status: DISCONTINUED | OUTPATIENT
Start: 2021-04-09 | End: 2021-04-12

## 2021-04-09 RX ORDER — VANCOMYCIN HCL 1 G
1500 VIAL (EA) INTRAVENOUS EVERY 12 HOURS
Refills: 0 | Status: DISCONTINUED | OUTPATIENT
Start: 2021-04-09 | End: 2021-04-09

## 2021-04-09 RX ADMIN — SODIUM CHLORIDE 1000 MILLILITER(S): 9 INJECTION, SOLUTION INTRAVENOUS at 08:56

## 2021-04-09 RX ADMIN — QUETIAPINE FUMARATE 25 MILLIGRAM(S): 200 TABLET, FILM COATED ORAL at 11:12

## 2021-04-09 RX ADMIN — PIPERACILLIN AND TAZOBACTAM 200 GRAM(S): 4; .5 INJECTION, POWDER, LYOPHILIZED, FOR SOLUTION INTRAVENOUS at 17:47

## 2021-04-09 RX ADMIN — POLYETHYLENE GLYCOL 3350 17 GRAM(S): 17 POWDER, FOR SOLUTION ORAL at 17:45

## 2021-04-09 RX ADMIN — Medication 1 APPLICATION(S): at 17:48

## 2021-04-09 RX ADMIN — Medication 6 MILLIGRAM(S): at 07:21

## 2021-04-09 RX ADMIN — Medication 1 DROP(S): at 00:03

## 2021-04-09 RX ADMIN — INSULIN GLARGINE 12 UNIT(S): 100 INJECTION, SOLUTION SUBCUTANEOUS at 22:09

## 2021-04-09 RX ADMIN — Medication 1 DROP(S): at 05:35

## 2021-04-09 RX ADMIN — Medication 1 APPLICATION(S): at 00:03

## 2021-04-09 RX ADMIN — Medication 3 MILLILITER(S): at 21:11

## 2021-04-09 RX ADMIN — INSULIN HUMAN 6: 100 INJECTION, SOLUTION SUBCUTANEOUS at 17:46

## 2021-04-09 RX ADMIN — INSULIN HUMAN 4: 100 INJECTION, SOLUTION SUBCUTANEOUS at 23:00

## 2021-04-09 RX ADMIN — PIPERACILLIN AND TAZOBACTAM 200 GRAM(S): 4; .5 INJECTION, POWDER, LYOPHILIZED, FOR SOLUTION INTRAVENOUS at 12:39

## 2021-04-09 RX ADMIN — ENOXAPARIN SODIUM 40 MILLIGRAM(S): 100 INJECTION SUBCUTANEOUS at 17:45

## 2021-04-09 RX ADMIN — Medication 1 DROP(S): at 05:51

## 2021-04-09 RX ADMIN — QUETIAPINE FUMARATE 50 MILLIGRAM(S): 200 TABLET, FILM COATED ORAL at 22:08

## 2021-04-09 RX ADMIN — PROPOFOL 7.2 MICROGRAM(S)/KG/MIN: 10 INJECTION, EMULSION INTRAVENOUS at 17:47

## 2021-04-09 RX ADMIN — Medication 1 APPLICATION(S): at 11:13

## 2021-04-09 RX ADMIN — Medication 400 MILLIGRAM(S): at 02:07

## 2021-04-09 RX ADMIN — Medication 1 DROP(S): at 05:37

## 2021-04-09 RX ADMIN — Medication 1000 MILLIGRAM(S): at 03:15

## 2021-04-09 RX ADMIN — SODIUM CHLORIDE 80 MILLILITER(S): 9 INJECTION, SOLUTION INTRAVENOUS at 08:22

## 2021-04-09 RX ADMIN — ENOXAPARIN SODIUM 40 MILLIGRAM(S): 100 INJECTION SUBCUTANEOUS at 06:19

## 2021-04-09 RX ADMIN — INSULIN HUMAN 4: 100 INJECTION, SOLUTION SUBCUTANEOUS at 12:40

## 2021-04-09 RX ADMIN — PIPERACILLIN AND TAZOBACTAM 200 GRAM(S): 4; .5 INJECTION, POWDER, LYOPHILIZED, FOR SOLUTION INTRAVENOUS at 08:25

## 2021-04-09 RX ADMIN — POLYETHYLENE GLYCOL 3350 17 GRAM(S): 17 POWDER, FOR SOLUTION ORAL at 05:37

## 2021-04-09 RX ADMIN — Medication 1 APPLICATION(S): at 05:36

## 2021-04-09 RX ADMIN — CHLORHEXIDINE GLUCONATE 1 APPLICATION(S): 213 SOLUTION TOPICAL at 05:35

## 2021-04-09 RX ADMIN — CHLORHEXIDINE GLUCONATE 15 MILLILITER(S): 213 SOLUTION TOPICAL at 05:35

## 2021-04-09 RX ADMIN — PROPOFOL 7.2 MICROGRAM(S)/KG/MIN: 10 INJECTION, EMULSION INTRAVENOUS at 11:09

## 2021-04-09 RX ADMIN — CHLORHEXIDINE GLUCONATE 15 MILLILITER(S): 213 SOLUTION TOPICAL at 17:45

## 2021-04-09 RX ADMIN — REMDESIVIR 500 MILLIGRAM(S): 5 INJECTION INTRAVENOUS at 11:11

## 2021-04-09 RX ADMIN — LACTULOSE 20 GRAM(S): 10 SOLUTION ORAL at 05:51

## 2021-04-09 RX ADMIN — CISATRACURIUM BESYLATE 21.6 MICROGRAM(S)/KG/MIN: 2 INJECTION INTRAVENOUS at 13:20

## 2021-04-09 RX ADMIN — FENTANYL CITRATE 6 MICROGRAM(S)/KG/HR: 50 INJECTION INTRAVENOUS at 14:38

## 2021-04-09 RX ADMIN — PANTOPRAZOLE SODIUM 40 MILLIGRAM(S): 20 TABLET, DELAYED RELEASE ORAL at 11:12

## 2021-04-09 NOTE — CHART NOTE - NSCHARTNOTEFT_GEN_A_CORE
Patient proned with attention to offload pressure points at 1700. Patient tolerated well. Chest PT done. Suctioned for secretions.

## 2021-04-09 NOTE — CHART NOTE - NSCHARTNOTEFT_GEN_A_CORE
Patient deproned this AM at 09:55. Uneventful. Noted some right periorbital swelling decreased with massage. Eye open. HOB elevated. Turn and position q 2. Cooling blanket placed.

## 2021-04-09 NOTE — PROGRESS NOTE ADULT - SUBJECTIVE AND OBJECTIVE BOX
INTERVAL HPI/OVERNIGHT EVENTS: Overnight there were no acute events. Unable to elicit subjective complaints this morning.     VITAL SIGNS:  T(F): 102.1 (21 @ 06:42)  HR: 73 (21 @ 08:00)  BP: 142/68 (21 @ 08:00)  RR: 24 (21 @ 08:00)  SpO2: 91% (21 @ 08:00)  Wt(kg): --    PHYSICAL EXAM:     GENERAL: ill-appearing, obese, intubated and sedated/paralyzed, proned  HEAD AND NECK: The skull is NC/AT. B/L Sclera white. PERRL. Nasal mucous membrane with no erythema or drainage. Trachea midline, neck supple.   THORAX/LUNGS: CTAB, intubated, sedated   CARDIOVASCULAR: No JVD. Carotid upstrokes are brisk, without bruits. +S1 and S2, RRR; no extra heart sounds or M/R/G.  ABDOMEN/: it is soft, no palpable masses; Lechuga catheter in place.  PERIPHERAL VASCULAR: Extremities are warm, radial and dorsalis pedis pulses +1 and symmetric. No clubbing, no cyanosis.  MUSCULOSKELETAL: No active ROM at this time. No evidence of swelling or deformity.  NEUROLOGICAL: unable to assess, sedated and intubated, paralysis    MEDICATIONS  (STANDING):  chlorhexidine 0.12% Liquid 15 milliLiter(s) Oral Mucosa every 12 hours  chlorhexidine 4% Liquid 1 Application(s) Topical <User Schedule>  ciprofloxacin  0.3% Ophthalmic Solution 1 Drop(s) Left EYE four times a day  cisatracurium Infusion 3 MICROgram(s)/kG/Min (21.6 mL/Hr) IV Continuous <Continuous>  dexAMETHasone  Injectable 6 milliGRAM(s) IV Push every 24 hours  dextrose 40% Gel 15 Gram(s) Oral once  dextrose 5%. 1000 milliLiter(s) (50 mL/Hr) IV Continuous <Continuous>  dextrose 5%. 1000 milliLiter(s) (100 mL/Hr) IV Continuous <Continuous>  dextrose 5%. 1000 milliLiter(s) (80 mL/Hr) IV Continuous <Continuous>  dextrose 50% Injectable 25 Gram(s) IV Push once  dextrose 50% Injectable 12.5 Gram(s) IV Push once  dextrose 50% Injectable 25 Gram(s) IV Push once  enoxaparin Injectable 40 milliGRAM(s) SubCutaneous every 12 hours  fentaNYL   Infusion. 0.5 MICROgram(s)/kG/Hr (6 mL/Hr) IV Continuous <Continuous>  glucagon  Injectable 1 milliGRAM(s) IntraMuscular once  insulin glargine Injectable (LANTUS) 12 Unit(s) SubCutaneous at bedtime  insulin regular  human corrective regimen sliding scale   SubCutaneous every 6 hours  ketorolac 0.5% Ophthalmic Solution 1 Drop(s) Left EYE four times a day  lactated ringers Bolus 1000 milliLiter(s) IV Bolus once  lactulose Syrup 20 Gram(s) Oral three times a day  midazolam Infusion 0.02 mG/kG/Hr (2.4 mL/Hr) IV Continuous <Continuous>  norepinephrine Infusion 0.02 MICROgram(s)/kG/Min (4.5 mL/Hr) IV Continuous <Continuous>  pantoprazole  Injectable 40 milliGRAM(s) IV Push daily  petrolatum Ophthalmic Ointment 1 Application(s) Both EYES every 6 hours  piperacillin/tazobactam IVPB.. 4.5 Gram(s) IV Intermittent every 6 hours  polyethylene glycol 3350 17 Gram(s) Oral every 12 hours  prednisoLONE acetate 1% Suspension 1 Drop(s) Left EYE four times a day  propofol Infusion 10 MICROgram(s)/kG/Min (7.2 mL/Hr) IV Continuous <Continuous>  QUEtiapine 25 milliGRAM(s) Oral daily  QUEtiapine 50 milliGRAM(s) Oral at bedtime  remdesivir  IVPB 100 milliGRAM(s) IV Intermittent every 24 hours  senna 2 Tablet(s) Oral at bedtime  vancomycin  IVPB 1750 milliGRAM(s) IV Intermittent once    MEDICATIONS  (PRN):  acetaminophen    Suspension .. 650 milliGRAM(s) Oral every 6 hours PRN Temp greater or equal to 38C (100.4F)  sodium chloride 0.9% lock flush 10 milliLiter(s) IV Push every 1 hour PRN Pre/post blood products, medications, blood draw, and to maintain line patency      Allergies    No Known Allergies    Intolerances        LABS:                        8.6    9.37  )-----------( 252      ( 2021 06:42 )             29.9     04-09    150<H>  |  112<H>  |  43<H>  ----------------------------<  131<H>  4.9   |  30  |  2.46<H>    Ca    8.1<L>      2021 06:42  Phos  5.0     04-  Mg     2.9     -    TPro  5.4<L>  /  Alb  2.5<L>  /  TBili  0.2  /  DBili  x   /  AST  40  /  ALT  31  /  AlkPhos  73  04-      Urinalysis Basic - ( 2021 18:47 )    Color: Yellow / Appearance: Clear / S.025 / pH: x  Gluc: x / Ketone: NEGATIVE  / Bili: Negative / Urobili: 0.2 E.U./dL   Blood: x / Protein: Trace mg/dL / Nitrite: NEGATIVE   Leuk Esterase: NEGATIVE / RBC: 5-10 /HPF / WBC < 5 /HPF   Sq Epi: x / Non Sq Epi: x / Bacteria: Present /HPF        RADIOLOGY & ADDITIONAL TESTS: Reviewed

## 2021-04-09 NOTE — PROGRESS NOTE ADULT - ASSESSMENT
Patient is a 57 yo M with PMHx of MU and gout who presented to ED with c/o progressively worsening SOB now admitted to ICU for management of AHRF in the setting of COViD    NEURO  #Sedated   -propofol, versed, and fentanyl for rass -4   -Will maintain sedated and paralyzed for vent synchrony  -For avoiding delirium, seroquel 25 QD and 50mg QHS    #Paralytic:   -nimbex for vent synchrony   -Neuro checks per ICU protocol, pain/sedation meds assessed and addressed, will titrate down as able to maintain vent synchrony  -Will give paralytic vacation when supined, reparalyze while prone    RESPIRATORY  #Acute hypoxic hypercapneic respiratory failure in the setting of COViD PNA  -Patient initially presented with O2 saturation of 14% and cyanotic requiring immediate intubation  -CXr with diffuse bilateral opacities with + COVID19 PCR  -Elevated inflammatory markers  -Pt PF ratio today around 270 proned in AM  -On AC/VC mechanical Ventilation:  Mode: AC/ CMV; RR: 24; TV: 450; FiO2: 40; PEEP: 10  -Will Wean FiO2 as tolerated    #COVID  -CXr with diffuse bilateral opacities with + COVID19 PCR  -Start Date 4/5 Remdesivir (x 6 day course) and Decadron 6 mg (x 10 day course)  -Remdesevir d/c'd due to worsening renal fxn     #MU  -As per patient's wife, patient has MU on CPAP  -Patient sleeps with 2-3 pillows at night, attributed to MU    CARDIOVASCULAR  #RV dilation  -Bedside POCUS demonstrating dilated RV and mildly enlarged pulm artery possibly 2/2 MU vs PE (unlikely)  -Echo: Severe hypokinesis of the entire anteroseptum/inferoseptum. Akinesis of the mid to apical anterolateral region, apical anterior, apical inferior and true apex. Mild hypokinesis of the basal anterolateral, basal anterior and basal inferior regions. The estimated left ventricular systolic function is 15%. The right ventricle is mildly dilated. Right ventricular systolic function is mildly reduced. No pericardial effusion is seen.  -Based on these findings, cardiology was consulted  -Appreciate cardiac recs  -As per spouse, no hx of cardiac disease and no change in ADLs or activity tolerance prior to current presentation    #LV Dysfunction  As per Cardiology:  -Echo reviewed. Images are poor due to body habitus but suggest a stress cardiomyopathy pattern (takotsubo cardiomyopathy) rather than an ischemic event  -EKG reviewed showing NSR, with non specific ST changes and poor R wave progression in the precordial leads likely due to body habitus  -Labs show ongoing troponemia in setting of renal dysfunction.  -Clinically patient is warm and well perfused and making urine. He is s/p 40 IV Lasix x1 with 900 output. Mixed venous show O2 sat of 70, making concurrent cardiogenic shock less likely.  -Patient's LV dysfunction appears to be mutifactorial: COVID, Morbid Obesity, Sleep Apnea. Myocarditis is also a possibility; now exacerbateed in setting of COVIDPNA  -Unfortunately patient is unable to undergo further ischemic or confirmatory imaging/workup  -Would hold off inotropic support for now as cardiogenic markers within normal range. Can continue low dose levo for now to keep MAP of 65 and above  -Given patient lasix naive would given 20 IV BID of lasix starting tomorrow. Pt s/p 40 IV lasix x1 today in the ER with good response  -Hold off BB or ACE/ARB therapy while on pressors with Renal impairement  -Please continue to trend CE to peak with CK and CKMB  -continue obtaining daily EKgs  -EKG from 4/5 NSR with RBBB, without ischemic changes  -Please send TSH, A1c, and Lipid Panel  -Please keep K>4 and Mg>2  -Will need repeat ECHO post extubation to re-eval EF, if still depressed with wall motion abn will pursue ischemic work up    GI  -No active issues  -Needs nasoenteral nurition, will obtain recs from nutrition  -Protonix IVP for GI ppx    RENAL//F&E  #Acute Kidney Injury  DDx: pre-renal vs ATN  - acute worsening on 4/9, increase in creatinine from 1.67 to 2.48, likely prerenal given insensable losses from fever, will given 1 L LR bolus  -Lasix 40 mg IVP in ED 4/5  -FENa 0.3% with yesterday's urine lytes  -Baseline creatinine unknown, no reported kidney disease  -Adequate urine output   -Electrolytes stable  -Strict I&O monitoring  -Avoid nephrotoxic medications    #Anion Gap Metabolic Acidosis  -Likely 2/2 elevated lactate  -With appropriate compensation  -Lactate cleared    ID  #Severe Sepsis-   -Patient presents with tachycardia, tachypnea with a pulmonary process, lactate 12, WBC 15.85, PCT 0.17; meeting criteria for severe sepsis; currently on exam, patient well perfused with adequate pulses, adequate UO, unable to assess mentation at this time  -Source likely COVID19 PNA with possible superimposed bacterial PNA  -Broad spectrum abx started, vanco and zosyn started 4/5 AM, de-escalated to ceftriaxone- ABX stopped 4/7 given low suspicion for zafar infxn, however given on 4/9 given persistent fevers overnight restarted zosyn  -Decadron and remdesivir started 4/5 for COVID19  -Sputum Cx negative, BCx no growth at 12 hours  -Urine cx negative  -Urine legionella and strep negative  -MRSA PCR negative  -Lactate cleared  -Will hold off on fluid resuscitation due to high-risk for pulm edema  -Maintain MAP > 65 mmHg    #COVID PNA   -Plan as above  -D/C remdesevir due to worsening renal fxn    ENDOCRINE  #Hyperglycemia  -Was on insulin gtt overnight due to stress/steroid induced hyperglycemia  -Will monitor blood glucose q2hr   -q6hr correctional scale ordered   -Decadron started 4/5 AM (ends on 4/14)  -A1c 6.2  - 4/8 started 12 U lantus    HEME  #Anemia  -Hgb 11 on admission; unknown baseline. No evidence of bleed.  -Hgb today 9.6  -Trend CBC  -Maintain active T+S  -Transfuse for Hb <7    -DVT ppx with Lovenox BID, will change to heparin as SCr. increasing    DISPO/GOALS OF CARE:  Patient requires continuous monitoring with bedside rhythm monitoring, arterial line, pulse oximetry, ventilator monitoring and intermittent blood gas analysis. Care plan discussed with ICU care team. Patient remains critical at this time.

## 2021-04-10 LAB
ALBUMIN SERPL ELPH-MCNC: 2.4 G/DL — LOW (ref 3.3–5)
ALP SERPL-CCNC: 70 U/L — SIGNIFICANT CHANGE UP (ref 40–120)
ALT FLD-CCNC: 31 U/L — SIGNIFICANT CHANGE UP (ref 10–45)
ANION GAP SERPL CALC-SCNC: 10 MMOL/L — SIGNIFICANT CHANGE UP (ref 5–17)
ANION GAP SERPL CALC-SCNC: 9 MMOL/L — SIGNIFICANT CHANGE UP (ref 5–17)
AST SERPL-CCNC: 33 U/L — SIGNIFICANT CHANGE UP (ref 10–40)
BASE EXCESS BLDA CALC-SCNC: 3.5 MMOL/L — HIGH (ref -2–3)
BASE EXCESS BLDA CALC-SCNC: 4.6 MMOL/L — HIGH (ref -2–3)
BASE EXCESS BLDA CALC-SCNC: 5.5 MMOL/L — HIGH (ref -2–3)
BASOPHILS # BLD AUTO: 0.01 K/UL — SIGNIFICANT CHANGE UP (ref 0–0.2)
BASOPHILS NFR BLD AUTO: 0.1 % — SIGNIFICANT CHANGE UP (ref 0–2)
BILIRUB SERPL-MCNC: 0.2 MG/DL — SIGNIFICANT CHANGE UP (ref 0.2–1.2)
BUN SERPL-MCNC: 37 MG/DL — HIGH (ref 7–23)
BUN SERPL-MCNC: 38 MG/DL — HIGH (ref 7–23)
CALCIUM SERPL-MCNC: 7.6 MG/DL — LOW (ref 8.4–10.5)
CALCIUM SERPL-MCNC: 7.9 MG/DL — LOW (ref 8.4–10.5)
CHLORIDE SERPL-SCNC: 106 MMOL/L — SIGNIFICANT CHANGE UP (ref 96–108)
CHLORIDE SERPL-SCNC: 107 MMOL/L — SIGNIFICANT CHANGE UP (ref 96–108)
CK SERPL-CCNC: 429 U/L — HIGH (ref 30–200)
CO2 SERPL-SCNC: 29 MMOL/L — SIGNIFICANT CHANGE UP (ref 22–31)
CO2 SERPL-SCNC: 30 MMOL/L — SIGNIFICANT CHANGE UP (ref 22–31)
CREAT SERPL-MCNC: 1.69 MG/DL — HIGH (ref 0.5–1.3)
CREAT SERPL-MCNC: 1.72 MG/DL — HIGH (ref 0.5–1.3)
CRP SERPL-MCNC: 32.7 MG/L — HIGH (ref 0–4)
CULTURE RESULTS: SIGNIFICANT CHANGE UP
D DIMER BLD IA.RAPID-MCNC: 528 NG/ML DDU — HIGH
EOSINOPHIL # BLD AUTO: 0.06 K/UL — SIGNIFICANT CHANGE UP (ref 0–0.5)
EOSINOPHIL NFR BLD AUTO: 0.7 % — SIGNIFICANT CHANGE UP (ref 0–6)
FERRITIN SERPL-MCNC: 104 NG/ML — SIGNIFICANT CHANGE UP (ref 30–400)
GAS PNL BLDA: SIGNIFICANT CHANGE UP
GLUCOSE BLDC GLUCOMTR-MCNC: 186 MG/DL — HIGH (ref 70–99)
GLUCOSE BLDC GLUCOMTR-MCNC: 189 MG/DL — HIGH (ref 70–99)
GLUCOSE BLDC GLUCOMTR-MCNC: 232 MG/DL — HIGH (ref 70–99)
GLUCOSE BLDC GLUCOMTR-MCNC: 245 MG/DL — HIGH (ref 70–99)
GLUCOSE SERPL-MCNC: 203 MG/DL — HIGH (ref 70–99)
GLUCOSE SERPL-MCNC: 256 MG/DL — HIGH (ref 70–99)
HCO3 BLDA-SCNC: 29 MMOL/L — HIGH (ref 21–28)
HCO3 BLDA-SCNC: 31 MMOL/L — HIGH (ref 21–28)
HCO3 BLDA-SCNC: 32 MMOL/L — HIGH (ref 21–28)
HCT VFR BLD CALC: 28.8 % — LOW (ref 39–50)
HGB BLD-MCNC: 8.2 G/DL — LOW (ref 13–17)
IMM GRANULOCYTES NFR BLD AUTO: 4.1 % — HIGH (ref 0–1.5)
LYMPHOCYTES # BLD AUTO: 0.83 K/UL — LOW (ref 1–3.3)
LYMPHOCYTES # BLD AUTO: 9.8 % — LOW (ref 13–44)
MAGNESIUM SERPL-MCNC: 2.8 MG/DL — HIGH (ref 1.6–2.6)
MCHC RBC-ENTMCNC: 22.7 PG — LOW (ref 27–34)
MCHC RBC-ENTMCNC: 28.5 GM/DL — LOW (ref 32–36)
MCV RBC AUTO: 79.6 FL — LOW (ref 80–100)
MONOCYTES # BLD AUTO: 0.56 K/UL — SIGNIFICANT CHANGE UP (ref 0–0.9)
MONOCYTES NFR BLD AUTO: 6.6 % — SIGNIFICANT CHANGE UP (ref 2–14)
NEUTROPHILS # BLD AUTO: 6.64 K/UL — SIGNIFICANT CHANGE UP (ref 1.8–7.4)
NEUTROPHILS NFR BLD AUTO: 78.7 % — HIGH (ref 43–77)
NRBC # BLD: 0 /100 WBCS — SIGNIFICANT CHANGE UP (ref 0–0)
PCO2 BLDA: 50 MMHG — HIGH (ref 35–48)
PCO2 BLDA: 54 MMHG — HIGH (ref 35–48)
PCO2 BLDA: 57 MMHG — HIGH (ref 35–48)
PH BLDA: 7.36 — SIGNIFICANT CHANGE UP (ref 7.35–7.45)
PH BLDA: 7.37 — SIGNIFICANT CHANGE UP (ref 7.35–7.45)
PH BLDA: 7.38 — SIGNIFICANT CHANGE UP (ref 7.35–7.45)
PHOSPHATE SERPL-MCNC: 4.1 MG/DL — SIGNIFICANT CHANGE UP (ref 2.5–4.5)
PLATELET # BLD AUTO: 235 K/UL — SIGNIFICANT CHANGE UP (ref 150–400)
PO2 BLDA: 101 MMHG — SIGNIFICANT CHANGE UP (ref 83–108)
PO2 BLDA: 103 MMHG — SIGNIFICANT CHANGE UP (ref 83–108)
PO2 BLDA: 96 MMHG — SIGNIFICANT CHANGE UP (ref 83–108)
POTASSIUM SERPL-MCNC: 4.8 MMOL/L — SIGNIFICANT CHANGE UP (ref 3.5–5.3)
POTASSIUM SERPL-MCNC: 5 MMOL/L — SIGNIFICANT CHANGE UP (ref 3.5–5.3)
POTASSIUM SERPL-SCNC: 4.8 MMOL/L — SIGNIFICANT CHANGE UP (ref 3.5–5.3)
POTASSIUM SERPL-SCNC: 5 MMOL/L — SIGNIFICANT CHANGE UP (ref 3.5–5.3)
PROT SERPL-MCNC: 5.4 G/DL — LOW (ref 6–8.3)
RBC # BLD: 3.62 M/UL — LOW (ref 4.2–5.8)
RBC # FLD: 17.9 % — HIGH (ref 10.3–14.5)
SAO2 % BLDA: 97 % — SIGNIFICANT CHANGE UP (ref 95–100)
SAO2 % BLDA: 97 % — SIGNIFICANT CHANGE UP (ref 95–100)
SAO2 % BLDA: 98 % — SIGNIFICANT CHANGE UP (ref 95–100)
SODIUM SERPL-SCNC: 145 MMOL/L — SIGNIFICANT CHANGE UP (ref 135–145)
SODIUM SERPL-SCNC: 146 MMOL/L — HIGH (ref 135–145)
SPECIMEN SOURCE: SIGNIFICANT CHANGE UP
TRIGL SERPL-MCNC: 138 MG/DL — SIGNIFICANT CHANGE UP
VANCOMYCIN TROUGH SERPL-MCNC: 4 UG/ML — LOW (ref 10–20)
WBC # BLD: 8.45 K/UL — SIGNIFICANT CHANGE UP (ref 3.8–10.5)
WBC # FLD AUTO: 8.45 K/UL — SIGNIFICANT CHANGE UP (ref 3.8–10.5)

## 2021-04-10 PROCEDURE — 71045 X-RAY EXAM CHEST 1 VIEW: CPT | Mod: 26

## 2021-04-10 PROCEDURE — 99233 SBSQ HOSP IP/OBS HIGH 50: CPT | Mod: GC

## 2021-04-10 RX ORDER — INSULIN HUMAN 100 [IU]/ML
2 INJECTION, SOLUTION SUBCUTANEOUS EVERY 6 HOURS
Refills: 0 | Status: DISCONTINUED | OUTPATIENT
Start: 2021-04-10 | End: 2021-04-15

## 2021-04-10 RX ADMIN — INSULIN GLARGINE 12 UNIT(S): 100 INJECTION, SOLUTION SUBCUTANEOUS at 22:32

## 2021-04-10 RX ADMIN — QUETIAPINE FUMARATE 50 MILLIGRAM(S): 200 TABLET, FILM COATED ORAL at 21:23

## 2021-04-10 RX ADMIN — Medication 1 APPLICATION(S): at 06:04

## 2021-04-10 RX ADMIN — PANTOPRAZOLE SODIUM 40 MILLIGRAM(S): 20 TABLET, DELAYED RELEASE ORAL at 11:25

## 2021-04-10 RX ADMIN — Medication 3 MILLILITER(S): at 01:10

## 2021-04-10 RX ADMIN — Medication 1 APPLICATION(S): at 23:09

## 2021-04-10 RX ADMIN — PIPERACILLIN AND TAZOBACTAM 200 GRAM(S): 4; .5 INJECTION, POWDER, LYOPHILIZED, FOR SOLUTION INTRAVENOUS at 11:30

## 2021-04-10 RX ADMIN — INSULIN HUMAN 4: 100 INJECTION, SOLUTION SUBCUTANEOUS at 17:53

## 2021-04-10 RX ADMIN — Medication 6 MILLIGRAM(S): at 06:03

## 2021-04-10 RX ADMIN — PIPERACILLIN AND TAZOBACTAM 200 GRAM(S): 4; .5 INJECTION, POWDER, LYOPHILIZED, FOR SOLUTION INTRAVENOUS at 23:09

## 2021-04-10 RX ADMIN — Medication 3 MILLILITER(S): at 12:30

## 2021-04-10 RX ADMIN — INSULIN HUMAN 2: 100 INJECTION, SOLUTION SUBCUTANEOUS at 06:40

## 2021-04-10 RX ADMIN — CHLORHEXIDINE GLUCONATE 15 MILLILITER(S): 213 SOLUTION TOPICAL at 06:04

## 2021-04-10 RX ADMIN — PROPOFOL 7.2 MICROGRAM(S)/KG/MIN: 10 INJECTION, EMULSION INTRAVENOUS at 13:02

## 2021-04-10 RX ADMIN — Medication 1 APPLICATION(S): at 00:58

## 2021-04-10 RX ADMIN — CHLORHEXIDINE GLUCONATE 1 APPLICATION(S): 213 SOLUTION TOPICAL at 11:25

## 2021-04-10 RX ADMIN — Medication 650 MILLIGRAM(S): at 23:13

## 2021-04-10 RX ADMIN — PIPERACILLIN AND TAZOBACTAM 200 GRAM(S): 4; .5 INJECTION, POWDER, LYOPHILIZED, FOR SOLUTION INTRAVENOUS at 06:03

## 2021-04-10 RX ADMIN — QUETIAPINE FUMARATE 25 MILLIGRAM(S): 200 TABLET, FILM COATED ORAL at 11:25

## 2021-04-10 RX ADMIN — POLYETHYLENE GLYCOL 3350 17 GRAM(S): 17 POWDER, FOR SOLUTION ORAL at 17:53

## 2021-04-10 RX ADMIN — PIPERACILLIN AND TAZOBACTAM 200 GRAM(S): 4; .5 INJECTION, POWDER, LYOPHILIZED, FOR SOLUTION INTRAVENOUS at 17:53

## 2021-04-10 RX ADMIN — INSULIN HUMAN 2 UNIT(S): 100 INJECTION, SOLUTION SUBCUTANEOUS at 22:43

## 2021-04-10 RX ADMIN — INSULIN HUMAN 2 UNIT(S): 100 INJECTION, SOLUTION SUBCUTANEOUS at 22:35

## 2021-04-10 RX ADMIN — PROPOFOL 7.2 MICROGRAM(S)/KG/MIN: 10 INJECTION, EMULSION INTRAVENOUS at 16:32

## 2021-04-10 RX ADMIN — Medication 3 MILLILITER(S): at 00:45

## 2021-04-10 RX ADMIN — Medication 1 APPLICATION(S): at 11:32

## 2021-04-10 RX ADMIN — PROPOFOL 7.2 MICROGRAM(S)/KG/MIN: 10 INJECTION, EMULSION INTRAVENOUS at 23:11

## 2021-04-10 RX ADMIN — Medication 3 MILLILITER(S): at 04:41

## 2021-04-10 RX ADMIN — PROPOFOL 7.2 MICROGRAM(S)/KG/MIN: 10 INJECTION, EMULSION INTRAVENOUS at 20:08

## 2021-04-10 RX ADMIN — ENOXAPARIN SODIUM 40 MILLIGRAM(S): 100 INJECTION SUBCUTANEOUS at 06:03

## 2021-04-10 RX ADMIN — FENTANYL CITRATE 6 MICROGRAM(S)/KG/HR: 50 INJECTION INTRAVENOUS at 16:34

## 2021-04-10 RX ADMIN — PROPOFOL 7.2 MICROGRAM(S)/KG/MIN: 10 INJECTION, EMULSION INTRAVENOUS at 09:57

## 2021-04-10 RX ADMIN — INSULIN HUMAN 4: 100 INJECTION, SOLUTION SUBCUTANEOUS at 12:08

## 2021-04-10 RX ADMIN — Medication 1 APPLICATION(S): at 17:54

## 2021-04-10 RX ADMIN — SENNA PLUS 2 TABLET(S): 8.6 TABLET ORAL at 21:23

## 2021-04-10 RX ADMIN — Medication 4 MILLILITER(S): at 00:44

## 2021-04-10 RX ADMIN — INSULIN HUMAN 2: 100 INJECTION, SOLUTION SUBCUTANEOUS at 22:43

## 2021-04-10 RX ADMIN — Medication 650 MILLIGRAM(S): at 21:23

## 2021-04-10 RX ADMIN — ENOXAPARIN SODIUM 40 MILLIGRAM(S): 100 INJECTION SUBCUTANEOUS at 17:53

## 2021-04-10 RX ADMIN — CHLORHEXIDINE GLUCONATE 15 MILLILITER(S): 213 SOLUTION TOPICAL at 17:53

## 2021-04-10 RX ADMIN — Medication 3 MILLILITER(S): at 21:04

## 2021-04-10 RX ADMIN — PIPERACILLIN AND TAZOBACTAM 200 GRAM(S): 4; .5 INJECTION, POWDER, LYOPHILIZED, FOR SOLUTION INTRAVENOUS at 00:02

## 2021-04-10 NOTE — PROGRESS NOTE ADULT - SUBJECTIVE AND OBJECTIVE BOX
SUBJECTIVE  INTERVAL HPI/OVERNIGHT EVENTS: Overnight there were no acute events. Hypernatremia improving from yesterday.    REVIEW OF SYSTEMS:   See HPI (as per chart review), unable to obtain 2/2 ETT and sedation    OBJECTIVE    Vital Signs Last 24 Hrs  T(C): 37.7 (10 Apr 2021 12:14), Max: 37.7 (2021 18:00)  T(F): 99.8 (10 Apr 2021 12:14), Max: 99.9 (2021 18:00)  HR: 51 (10 Apr 2021 14:00) (43 - 66)  BP: -140s  BP(mean): MAP 70-90s  RR: 19 (10 Apr 2021 14:00) (13 - 23)  SpO2: 99% (10 Apr 2021 14:00) (91% - 99%)    Vent settings   Mode: AC/ CMV (Assist Control/ Continuous Mandatory Ventilation), RR (machine): 20, TV (machine): 450, FiO2: 40, PEEP: 12, ITime: 1, MAP: 15, PIP: 20    I&O's Detail    2021 07:01  -  10 Apr 2021 07:00  --------------------------------------------------------  IN:    Cisatracurium: 86.4 mL    dextrose 5%: 880 mL    dextrose 5%: 400 mL    FentaNYL: 204 mL    Free Water: 250 mL    IV PiggyBack: 500 mL    IV PiggyBack: 250 mL    Nepro: 312 mL    Propofol: 14.4 mL    Propofol: 403.2 mL    Vital High Protein: 240 mL  Total IN: 3540 mL    OUT:    Indwelling Catheter - Urethral (mL): 2945 mL    Stool (mL): 2 mL  Total OUT: 2947 mL    Total NET: 593 mL      LABS:                        8.2    8.45  )-----------( 235      ( 10 Apr 2021 04:57 )             28.8     04-10    146<H>  |  107  |  37<H>  ----------------------------<  203<H>  4.8   |  29  |  1.72<H>    Ca    7.9<L>      10 Apr 2021 04:57  Phos  4.1     04-10  Mg     2.8     04-10    TPro  5.4<L>  /  Alb  2.4<L>  /  TBili  0.2  /  DBili  x   /  AST  33  /  ALT  31  /  AlkPhos  70  04-10    ABG - ( 10 Apr 2021 12:14 )  pH, Arterial: 7.38  pH, Blood: x     /  pCO2: 50    /  pO2: 96    / HCO3: 29    / Base Excess: 3.5   /  SaO2: 97        D-dimer 528  Ferritin 104  CRP 32.7        POCT Blood Glucose.: 232 mg/dL (10 Apr 2021 12:04)  POCT Blood Glucose.: 186 mg/dL (10 Apr 2021 06:23)  POCT Blood Glucose.: 242 mg/dL (2021 22:02)  POCT Blood Glucose.: 256 mg/dL (2021 17:38)    Culture - Blood (collected 2021 17:53)  Source: .Blood Blood  Preliminary Report (2021 18:00):    No growth at 1 day.    Culture - Blood (collected 2021 17:53)  Source: .Blood Blood  Preliminary Report (2021 18:00):    No growth at 1 day.    Culture - Sputum (collected 2021 17:36)  Source: .Sputum Sputum  Gram Stain (2021 18:11):    No epithelial cells seen    Few WBC's    Few-moderate Yeast  Final Report (10 Apr 2021 08:33):    Numerous Keila dubliniensis    No other  Normal Respiratory Marleni present    Urinalysis Basic - ( 2021 18:47 )    Color: Yellow / Appearance: Clear / S.025 / pH: x  Gluc: x / Ketone: NEGATIVE  / Bili: Negative / Urobili: 0.2 E.U./dL   Blood: x / Protein: Trace mg/dL / Nitrite: NEGATIVE   Leuk Esterase: NEGATIVE / RBC: 5-10 /HPF / WBC < 5 /HPF   Sq Epi: x / Non Sq Epi: x / Bacteria: Present /HPF    RADIOLOGY  *CXr will be ordered and reviewed when patient de-proned    MEDICATIONS  (STANDING):  acetylcysteine 10%  Inhalation 4 milliLiter(s) Inhalation three times a day  albuterol/ipratropium for Nebulization 3 milliLiter(s) Nebulizer every 4 hours  chlorhexidine 0.12% Liquid 15 milliLiter(s) Oral Mucosa every 12 hours  chlorhexidine 2% Cloths 1 Application(s) Topical daily  cisatracurium Infusion 3 MICROgram(s)/kG/Min (21.6 mL/Hr) IV Continuous <Continuous> currently off  dexAMETHasone  Injectable 6 milliGRAM(s) IV Push every 24 hours  dextrose 40% Gel 15 Gram(s) Oral once  dextrose 5%. 1000 milliLiter(s) (50 mL/Hr) IV Continuous <Continuous>  dextrose 5%. 1000 milliLiter(s) (100 mL/Hr) IV Continuous <Continuous>  dextrose 50% Injectable 25 Gram(s) IV Push once  dextrose 50% Injectable 12.5 Gram(s) IV Push once  dextrose 50% Injectable 25 Gram(s) IV Push once  enoxaparin Injectable 40 milliGRAM(s) SubCutaneous every 12 hours  fentaNYL   Infusion. 0.5 MICROgram(s)/kG/Hr (6 mL/Hr) IV Continuous <Continuous> @ 1 mcg  glucagon  Injectable 1 milliGRAM(s) IntraMuscular once  insulin glargine Injectable (LANTUS) 12 Unit(s) SubCutaneous at bedtime  insulin regular  human corrective regimen sliding scale   SubCutaneous every 6 hours  midazolam Infusion 0.02 mG/kG/Hr (2.4 mL/Hr) IV Continuous <Continuous> currently off  norepinephrine Infusion 0.05 MICROgram(s)/kG/Min (11.3 mL/Hr) IV Continuous <Continuous> currently off  pantoprazole  Injectable 40 milliGRAM(s) IV Push daily  petrolatum Ophthalmic Ointment 1 Application(s) Both EYES every 6 hours  piperacillin/tazobactam IVPB.. 4.5 Gram(s) IV Intermittent every 6 hours  polyethylene glycol 3350 17 Gram(s) Oral every 12 hours  propofol Infusion 10 MICROgram(s)/kG/Min (7.2 mL/Hr) IV Continuous <Continuous> currently @ 40 mcg  QUEtiapine 25 milliGRAM(s) Oral daily  QUEtiapine 50 milliGRAM(s) Oral at bedtime  senna 2 Tablet(s) Oral at bedtime    MEDICATIONS  (PRN):  acetaminophen    Suspension .. 650 milliGRAM(s) Oral every 6 hours PRN Temp greater or equal to 38C (100.4F)  sodium chloride 0.9% lock flush 10 milliLiter(s) IV Push every 1 hour PRN Pre/post blood products, medications, blood draw, and to maintain line patency    PHYSICAL EXAM: limited as patient is proned      ** need to ***  GENERAL: ill-appearing, obese, intubated and sedated/paralyzed   SKIN/HAIR/NAILS: Skin warm and clammy. Nails without clubbing or cyanosis. CR<2 secs. No lesions, rash, petechiae, erythema or ecchymoses. Anicteric.   HEAD AND NECK: The skull is NC/AT. B/L Sclera white. PERRL. Nasal mucous membrane with no erythema or drainage. Trachea midline, neck supple.   THORAX/LUNGS: Thorax is symmetric with good expansion, assisted by mechanical ventilation. Rhonchi B/L, diminished throughout.   CARDIOVASCULAR: No JVD. Carotid upstrokes are brisk, without bruits. +S1 and S2, RRR; no extra heart sounds or M/R/G.  ABDOMEN/: Abdomen is distended with +BS x 4. It is soft, no palpable masses; Lechuga catheter in place.  PERIPHERAL VASCULAR: Extremities are warm, radial and dorsalis pedis pulses +1 and symmetric. No clubbing, no cyanosis.  MUSCULOSKELETAL: No active ROM at this time. No evidence of swelling or deformity.  NEUROLOGICAL: unable to assess, sedated and intubated/rx paralysis  LINES (DATES): L IJ inserted in ED by fellow 4/5, L radial art line 4/5, PIV x 2    Assessment and Plan   Patient is a 57 yo M with PMHx of MU and gout who presented to ED with c/o progressively worsening SOB now admitted to ICU for management of AHRF in the setting of COViD    NEURO  #Sedated   -propofol and fentanyl for rass -4   -Will maintain sedated and paralyzed for vent synchrony    #Paralytic:   -nimbex, paralyzed for vent synchrony  -Neuro checks per ICU protocol, pain/sedation meds assessed and addressed, will titrate down as able to maintain vent synchrony  -Will give paralytic vacation when supined    RESPIRATORY  #Acute hypoxic hypercapneic respiratory failure in the setting of COViD PNA  -Patient initially presented with O2 saturation of 14% and cyanotic requiring immediate intubation  -CXr with diffuse bilateral opacities with + COVID19 PCR  -Elevated inflammatory markers  -Patient currently proned  -On AC/VC mechanical Ventilation:  Mode: AC/ CMV; RR: 28; TV: 450; FiO2: 50; PEEP: 14  -PF ratio 208 this AM  -Will Wean FiO2 as tolerated    #COViD  -CXr with diffuse bilateral opacities with + COVID19 PCR  -Start Date  Remdesivir (x 6 day course) and Decadron 6 mg (x 10 day course)  -Remdesevir d/c'd due to worsening renal fxn     #MU  -As per patient's wife, patient has MU on CPAP  -Patient sleeps with 2-3 pillows at night, attributed to MU    CARDIOVASCULAR  #Severe Sepsis  -Patient presents with tachycardia, tachypnea with a pulmonary process, lactate 12, WBC 15.85, PCT 0.17; meeting criteria for severe sepsis; currently on exam, patient well perfused with adequate pulses, adequate UO, unable to assess mentation at this time  -Source likely COVID19 PNA with possible superimposed bacterial PNA  -Broad spectrum abx started, vanco and zosyn started 4/5 AM, de-escalated to ceftriaxone  -Decadron and remdesivir started  for COVID19  -Sputum Cx negative, BCx no growth at 12 hours  -Urine cx negative  -Urine legionella and strep negative  -Will start 3 day course of azithromycin  -MRSA PCR negative  -Lactate cleared  -Will hold off on fluid resuscitation due to high-risk for pulm edema  -Maintain MAP > 65 mmHg  -Monitor urine output    #RV dilation  -Bedside POCUS demonstrating dilated RV and mildly enlarged pulm artery possibly 2/2 MU vs PE (unlikely)  -Echo: Severe hypokinesis of the entire anteroseptum/inferoseptum. Akinesis of the mid to apical anterolateral region, apical anterior, apical inferior and true apex. Mild hypokinesis of the basal anterolateral, basal anterior and basal inferior regions. The estimated left ventricular systolic function is 15%. The right ventricle is mildly dilated. Right ventricular systolic function is mildly reduced. No pericardial effusion is seen.  -Based on these findings, cardiology was consulted  -Appreciate cardiac recs  -As per spouse, no hx of cardiac disease and no change in ADLs or activity tolerance prior to current presentation    #LV Dysfunction  As per Cardiology:  -Echo reviewed. Images are poor due to body habitus but suggest a stress cardiomyopathy pattern (takotsubo cardiomyopathy) rather than an ischemic event  -EKG reviewed showing NSR, with non specific ST changes and poor R wave progression in the precordial leads likely due to body habitus  -Labs show ongoing troponemia in setting of renal dysfunction.  -Clinically patient is warm and well perfused and making urine. He is s/p 40 IV Lasix x1 with 900 output. Mixed venous show O2 sat of 70, making concurrent cardiogenic shock less likely.  -Patient's LV dysfunction appears to be mutifactorial: COVID, Morbid Obesity, Sleep Apnea. Myocarditis is also a possibility; now exacerbateed in setting of COVIDPNA  -Unfortunately patient is unable to undergo further ischemic or confirmatory imaging/workup  -Would hold off inotropic support for now as cardiogenic markers within normal range. Can continue low dose levo for now to keep MAP of 65 and above  -Given patient lasix naive would given 20 IV BID of lasix starting tomorrow. Pt s/p 40 IV lasix x1 today in the ER with good response  -Hold off BB or ACE/ARB therapy while on pressors with Renal impairement  -Please continue to trend CE to peak with CK and CKMB  -Please obtain daily EKgs  -EKG from  NSR with RBBB, without ischemic changes  -Please send TSH, A1c, and Lipid Panel  -Please keep K>4 and Mg>2  -Will need repeat ECHO post extubation to re-eval EF, if still depressed with wall motion abn will pursue ischemic work up  -Agree with diuresis for now and continuing with current management with Levo as mixed venous and clinical picture does not correlate with cardiogenic shock at this time.    GI  -No active issues  -Needs nasoenteral nurition, will obtain recs from nutrition  -Protonix IVP for GI ppx    RENAL//F&E  #Acute Kidney Injury  DDx: pre-renal vs ATN  -Lasix 40 mg IVP in ED   -FENa 0.3% with yesterday's urine lytes  -SCr 2.14 today  -Baseline creatinine unknown, no reported kidney disease  -Adequate urine output   -Electrolytes stable  -Strict I&O monitoring  -Avoid nephrotoxic medications    #Anion Gap Metabolic Acidosis  -Likely 2/2 elevated lactate  -With appropriate compensation  -Lactate cleared    ID  #COVID PNA   -Plan as above  -D/C remdesevir due to worsening renal fxn    ENDOCRINE  #Hyperglycemia  -Was on insulin gtt overnight due to stress/steroid induced hyperglycemia  -Will monitor blood glucose q2hr   -q6hr correctional scale ordered   -Decadron started 4/5 AM  -A1c 6.2    HEME  #Anemia  -Hgb 11 on admission; unknown baseline. No evidence of bleed.  -Hgb today 9.6  -Trend CBC  -Maintain active T+S  -Transfuse for Hb <7    -DVT ppx with Lovenox BID, will change to heparin as SCr. increasing    DISPO/GOALS OF CARE:  Patient requires continuous monitoring with bedside rhythm monitoring, arterial line, pulse oximetry, ventilator monitoring and intermittent blood gas analysis. Care plan discussed with ICU care team. Patient remains critical at this time.   SUBJECTIVE  INTERVAL HPI/OVERNIGHT EVENTS: Overnight there were no acute events. Hypernatremia improving from yesterday.    REVIEW OF SYSTEMS:   See HPI (as per chart review), unable to obtain 2/2 ETT and sedation    OBJECTIVE  Vital Signs Last 24 Hrs  T(C): 37.7 (10 Apr 2021 12:14), Max: 37.7 (2021 18:00)  T(F): 99.8 (10 Apr 2021 12:14), Max: 99.9 (2021 18:00)  HR: 51 (10 Apr 2021 14:00) (43 - 66)  BP: -140s  BP(mean): MAP 70-90s  RR: 19 (10 Apr 2021 14:00) (13 - 23)  SpO2: 99% (10 Apr 2021 14:00) (91% - 99%)    Vent settings   Mode: AC/ CMV (Assist Control/ Continuous Mandatory Ventilation), RR (machine): 20, TV (machine): 450, FiO2: 40, PEEP: 12, ITime: 1, MAP: 15, PIP: 20    I&O's Detail  2021 07:01  -  10 Apr 2021 07:00  --------------------------------------------------------  IN:    Cisatracurium: 86.4 mL    dextrose 5%: 880 mL    dextrose 5%: 400 mL    FentaNYL: 204 mL    Free Water: 250 mL    IV PiggyBack: 500 mL    IV PiggyBack: 250 mL    Nepro: 312 mL    Propofol: 14.4 mL    Propofol: 403.2 mL    Vital High Protein: 240 mL  Total IN: 3540 mL    OUT:    Indwelling Catheter - Urethral (mL): 2945 mL    Stool (mL): 2 mL  Total OUT: 2947 mL    Total NET: 593 mL    LABS:                        8.2    8.45  )-----------( 235      ( 10 Apr 2021 04:57 )             28.8     04-10    146<H>  |  107  |  37<H>  ----------------------------<  203<H>  4.8   |  29  |  1.72<H>    Ca    7.9<L>      10 Apr 2021 04:57  Phos  4.1     04-10  Mg     2.8     04-10    TPro  5.4<L>  /  Alb  2.4<L>  /  TBili  0.2  /  DBili  x   /  AST  33  /  ALT  31  /  AlkPhos  70  04-10    ABG - ( 10 Apr 2021 12:14 )  pH, Arterial: 7.38  pH, Blood: x     /  pCO2: 50    /  pO2: 96    / HCO3: 29    / Base Excess: 3.5   /  SaO2: 97        D-dimer 528  Ferritin 104  CRP 32.7        POCT Blood Glucose.: 232 mg/dL (10 Apr 2021 12:04)  POCT Blood Glucose.: 186 mg/dL (10 Apr 2021 06:23)  POCT Blood Glucose.: 242 mg/dL (2021 22:02)  POCT Blood Glucose.: 256 mg/dL (2021 17:38)    Culture - Blood (collected 2021 17:53)  Source: .Blood Blood  Preliminary Report (2021 18:00):    No growth at 1 day.    Culture - Blood (collected 2021 17:53)  Source: .Blood Blood  Preliminary Report (2021 18:00):    No growth at 1 day.    Culture - Sputum (collected 2021 17:36)  Source: .Sputum Sputum  Gram Stain (2021 18:11):    No epithelial cells seen    Few WBC's    Few-moderate Yeast  Final Report (10 Apr 2021 08:33):    Numerous Keila dubliniensis    No other  Normal Respiratory Marleni present    Urinalysis Basic - ( 2021 18:47 )    Color: Yellow / Appearance: Clear / S.025 / pH: x  Gluc: x / Ketone: NEGATIVE  / Bili: Negative / Urobili: 0.2 E.U./dL   Blood: x / Protein: Trace mg/dL / Nitrite: NEGATIVE   Leuk Esterase: NEGATIVE / RBC: 5-10 /HPF / WBC < 5 /HPF   Sq Epi: x / Non Sq Epi: x / Bacteria: Present /HPF    RADIOLOGY  *CXr will be ordered and reviewed when patient de-proned    MEDICATIONS  (STANDING):  acetylcysteine 10%  Inhalation 4 milliLiter(s) Inhalation three times a day  albuterol/ipratropium for Nebulization 3 milliLiter(s) Nebulizer every 4 hours  chlorhexidine 0.12% Liquid 15 milliLiter(s) Oral Mucosa every 12 hours  chlorhexidine 2% Cloths 1 Application(s) Topical daily  cisatracurium Infusion 3 MICROgram(s)/kG/Min (21.6 mL/Hr) IV Continuous <Continuous> currently off  dexAMETHasone  Injectable 6 milliGRAM(s) IV Push every 24 hours  dextrose 40% Gel 15 Gram(s) Oral once  dextrose 5%. 1000 milliLiter(s) (50 mL/Hr) IV Continuous <Continuous>  dextrose 5%. 1000 milliLiter(s) (100 mL/Hr) IV Continuous <Continuous>  dextrose 50% Injectable 25 Gram(s) IV Push once  dextrose 50% Injectable 12.5 Gram(s) IV Push once  dextrose 50% Injectable 25 Gram(s) IV Push once  enoxaparin Injectable 40 milliGRAM(s) SubCutaneous every 12 hours  fentaNYL   Infusion. 0.5 MICROgram(s)/kG/Hr (6 mL/Hr) IV Continuous <Continuous> @ 1 mcg  glucagon  Injectable 1 milliGRAM(s) IntraMuscular once  insulin glargine Injectable (LANTUS) 12 Unit(s) SubCutaneous at bedtime  insulin regular  human corrective regimen sliding scale   SubCutaneous every 6 hours  midazolam Infusion 0.02 mG/kG/Hr (2.4 mL/Hr) IV Continuous <Continuous> currently off  norepinephrine Infusion 0.05 MICROgram(s)/kG/Min (11.3 mL/Hr) IV Continuous <Continuous> currently off  pantoprazole  Injectable 40 milliGRAM(s) IV Push daily  petrolatum Ophthalmic Ointment 1 Application(s) Both EYES every 6 hours  piperacillin/tazobactam IVPB.. 4.5 Gram(s) IV Intermittent every 6 hours  polyethylene glycol 3350 17 Gram(s) Oral every 12 hours  propofol Infusion 10 MICROgram(s)/kG/Min (7.2 mL/Hr) IV Continuous <Continuous> currently @ 40 mcg  QUEtiapine 25 milliGRAM(s) Oral daily  QUEtiapine 50 milliGRAM(s) Oral at bedtime  senna 2 Tablet(s) Oral at bedtime    MEDICATIONS  (PRN):  acetaminophen    Suspension .. 650 milliGRAM(s) Oral every 6 hours PRN Temp greater or equal to 38C (100.4F)  sodium chloride 0.9% lock flush 10 milliLiter(s) IV Push every 1 hour PRN Pre/post blood products, medications, blood draw, and to maintain line patency    PHYSICAL EXAM: limited as patient is proned  GENERAL: obese, intubated and sedated  SKIN/HAIR/NAILS: Skin warm and clammy. Nails without clubbing or cyanosis. CR<2 secs. No lesions, rash, petechiae, erythema or ecchymoses. Anicteric.   HEAD AND NECK: The skull is NC/AT. unable to assess thoroughly as patient proned.  THORAX/LUNGS: Thorax is symmetric with good expansion, assisted by mechanical ventilation. Lung sounds diminished throughout with scattered rhonchi.  CARDIOVASCULAR: No JVD. Carotid upstrokes are brisk, without bruits. +S1 and S2, RRR; no extra heart sounds or M/R/G.  ABDOMEN/: unable to assess, proned, Lechuga catheter in place.  PERIPHERAL VASCULAR: Extremities are warm, radial and dorsalis pedis pulses +1 and symmetric. No clubbing, no cyanosis.  MUSCULOSKELETAL: No active ROM at this time. No evidence of swelling or deformity.  NEUROLOGICAL: unable to assess, patient stimulated by stimuli such as suctioning. not following commands, sedated and intubated.  LINES (DATES): L IJ inserted in ED by fellow , radial art line changed on , IVL    Assessment and Plan   Patient is a 57 yo M with PMHx of MU and gout who presented to ED with c/o progressively worsening SOB now admitted to ICU for management of AHRF in the setting of COViD    NEURO  #Sedated   -propofol and fentanyl for rass -4   -Will maintain sedated for ventilator setting tolerance  -Neuro checks per ICU protocol, pain/sedation meds assessed and addressed, will titrate down as able to maintain vent synchrony    RESPIRATORY  #Acute hypoxic hypercapneic respiratory failure in the setting of COViD PNA  -Patient initially presented with O2 saturation of 14% and cyanotic requiring immediate intubation  -CXr with diffuse bilateral opacities with + COVID19 PCR  -Elevated inflammatory markers  -Patient currently proned  -On AC/VC mechanical Ventilation:  Mode: AC/ CMV; RR: 20; TV: 450; FiO2: 40; PEEP: 12  -PF ratio 257 with this morning's ABG  -Will Wean FiO2/PEEP as tolerated    #COViD  -CXr with diffuse bilateral opacities with + COVID19 PCR  -Start Date  Remdesivir (x 6 day course) and Decadron 6 mg (x 10 day course)  -Remdesevir course completed - (was briefly d/c'd due to renal fxn worsening but was REORDERED)    #MU  -As per patient's wife, patient has MU on CPAP  -Patient sleeps with 2-3 pillows at night, attributed to MU    CARDIOVASCULAR  #RV dilation  -Bedside POCUS demonstrating dilated RV and mildly enlarged pulm artery possibly 2/2 MU vs PE (unlikely)  -Echo: Severe hypokinesis of the entire anteroseptum/inferoseptum. Akinesis of the mid to apical anterolateral region, apical anterior, apical inferior and true apex. Mild hypokinesis of the basal anterolateral, basal anterior and basal inferior regions. The estimated left ventricular systolic function is 15%. The right ventricle is mildly dilated. Right ventricular systolic function is mildly reduced. No pericardial effusion is seen.  -Based on these findings, cardiology was consulted  -Appreciate cardiac recs  -As per spouse, no hx of cardiac disease and no change in ADLs or activity tolerance prior to current presentation  -Holding off on diuretics as per primary team    #LV Dysfunction  As per Cardiology:  -Echo reviewed. Images are poor due to body habitus but suggest a stress cardiomyopathy pattern (takotsubo cardiomyopathy) rather than an ischemic event  -EKG reviewed showing NSR, with non specific ST changes and poor R wave progression in the precordial leads likely due to body habitus  -Clinically patient is warm and well perfused and making urine.  -Patient's LV dysfunction appears to be mutifactorial: COVID, Morbid Obesity, Sleep Apnea. Myocarditis is also a possibility; now exacerbateed in setting of COVIDPNA  -Unfortunately patient is unable to undergo further ischemic or confirmatory imaging/workup  -Hold off BB or ACE/ARB therapy while on pressors with Renal impairement  -EKG from  NSR with RBBB, without ischemic changes  -Please keep K>4 and Mg>2  -Will need repeat ECHO post extubation to re-eval EF, if still depressed with wall motion abn will pursue ischemic work up  -Agree with diuresis for now and continuing with current management with Levo as mixed venous and clinical picture does not correlate with cardiogenic shock at this time.    GI  -No active issues  -Nasoenteral feeding currently being held as NGT is not patent, will reassess and place new one if indicated  -Protonix IVP for GI ppx    RENAL//F&E  #Acute Kidney Injury  -SCr stable, trending down  -Baseline creatinine unknown, no reported kidney disease  -Adequate urine output   -Electrolytes stable  -Strict I&O monitoring  -Avoid nephrotoxic medications    ID  #COVID PNA   -Plan as above  -Remains on steroid rx, remdesivir course completed    #Severe Sepsis  -Patient presents with tachycardia, tachypnea with a pulmonary process, lactate 12, WBC 15.85, PCT 0.17; meeting criteria for severe sepsis; currently on exam, patient well perfused with adequate pulses, adequate UO, unable to assess mentation at this time  -Source likely COVID19 PNA with possible superimposed bacterial PNA  -Broad spectrum abx started, vanco and zosyn started 4/5 AM, de-escalated to ceftriaxone, now d/c'd  -Decadron and remdesivir started  for COVID19  -Patient febrile  and  with copious creamy secretions, Zosyn started 4/9 x 7-10 day course  -Maintain MAP > 65 mmHg  -Monitor urine output    ENDOCRINE  #Hyperglycemia  -q6hr correctional scale ordered, will adjust as needed  -Blood glucose trend as above  -Decadron started 4/5 AM  -A1c 6.2    HEME  #Anemia  -Hgb 11 on admission; unknown baseline. No evidence of bleed.  -Hgb today 82  -Trend CBC  -Maintain active T+S  -Transfuse for Hb <7    -DVT ppx with Lovenox BID    DISPO/GOALS OF CARE:  Patient requires continuous monitoring with bedside rhythm monitoring, arterial line, pulse oximetry, ventilator monitoring and intermittent blood gas analysis. Care plan discussed with ICU care team. Patient remains critical at this time.   SUBJECTIVE  INTERVAL HPI/OVERNIGHT EVENTS: Overnight there were no acute events. Hypernatremia improving from yesterday.    REVIEW OF SYSTEMS:   See HPI (as per chart review), unable to obtain 2/2 ETT and sedation    OBJECTIVE  Vital Signs Last 24 Hrs  T(C): 37.7 (10 Apr 2021 12:14), Max: 37.7 (2021 18:00)  T(F): 99.8 (10 Apr 2021 12:14), Max: 99.9 (2021 18:00)  HR: 51 (10 Apr 2021 14:00) (43 - 66)  BP: -140s  BP(mean): MAP 70-90s  RR: 19 (10 Apr 2021 14:00) (13 - 23)  SpO2: 99% (10 Apr 2021 14:00) (91% - 99%)    Vent settings   Mode: AC/ CMV (Assist Control/ Continuous Mandatory Ventilation), RR (machine): 20, TV (machine): 450, FiO2: 40, PEEP: 12, ITime: 1, MAP: 15, PIP: 20    I&O's Detail  2021 07:01  -  10 Apr 2021 07:00  --------------------------------------------------------  IN:    Cisatracurium: 86.4 mL    dextrose 5%: 880 mL    dextrose 5%: 400 mL    FentaNYL: 204 mL    Free Water: 250 mL    IV PiggyBack: 500 mL    IV PiggyBack: 250 mL    Nepro: 312 mL    Propofol: 14.4 mL    Propofol: 403.2 mL    Vital High Protein: 240 mL  Total IN: 3540 mL    OUT:    Indwelling Catheter - Urethral (mL): 2945 mL    Stool (mL): 2 mL  Total OUT: 2947 mL    Total NET: 593 mL    LABS:                        8.2    8.45  )-----------( 235      ( 10 Apr 2021 04:57 )             28.8     04-10    146<H>  |  107  |  37<H>  ----------------------------<  203<H>  4.8   |  29  |  1.72<H>    Ca    7.9<L>      10 Apr 2021 04:57  Phos  4.1     04-10  Mg     2.8     04-10    TPro  5.4<L>  /  Alb  2.4<L>  /  TBili  0.2  /  DBili  x   /  AST  33  /  ALT  31  /  AlkPhos  70  04-10    ABG - ( 10 Apr 2021 12:14 )  pH, Arterial: 7.38  pH, Blood: x     /  pCO2: 50    /  pO2: 96    / HCO3: 29    / Base Excess: 3.5   /  SaO2: 97        D-dimer 528  Ferritin 104  CRP 32.7        POCT Blood Glucose.: 232 mg/dL (10 Apr 2021 12:04)  POCT Blood Glucose.: 186 mg/dL (10 Apr 2021 06:23)  POCT Blood Glucose.: 242 mg/dL (2021 22:02)  POCT Blood Glucose.: 256 mg/dL (2021 17:38)    Culture - Blood (collected 2021 17:53)  Source: .Blood Blood  Preliminary Report (2021 18:00):    No growth at 1 day.    Culture - Blood (collected 2021 17:53)  Source: .Blood Blood  Preliminary Report (2021 18:00):    No growth at 1 day.    Culture - Sputum (collected 2021 17:36)  Source: .Sputum Sputum  Gram Stain (2021 18:11):    No epithelial cells seen    Few WBC's    Few-moderate Yeast  Final Report (10 Apr 2021 08:33):    Numerous Keila dubliniensis    No other  Normal Respiratory Marleni present    Urinalysis Basic - ( 2021 18:47 )    Color: Yellow / Appearance: Clear / S.025 / pH: x  Gluc: x / Ketone: NEGATIVE  / Bili: Negative / Urobili: 0.2 E.U./dL   Blood: x / Protein: Trace mg/dL / Nitrite: NEGATIVE   Leuk Esterase: NEGATIVE / RBC: 5-10 /HPF / WBC < 5 /HPF   Sq Epi: x / Non Sq Epi: x / Bacteria: Present /HPF    RADIOLOGY  *CXr will be ordered and reviewed when patient de-proned    MEDICATIONS  (STANDING):  acetylcysteine 10%  Inhalation 4 milliLiter(s) Inhalation three times a day  albuterol/ipratropium for Nebulization 3 milliLiter(s) Nebulizer every 4 hours  chlorhexidine 0.12% Liquid 15 milliLiter(s) Oral Mucosa every 12 hours  chlorhexidine 2% Cloths 1 Application(s) Topical daily  cisatracurium Infusion 3 MICROgram(s)/kG/Min (21.6 mL/Hr) IV Continuous <Continuous> currently off  dexAMETHasone  Injectable 6 milliGRAM(s) IV Push every 24 hours  dextrose 40% Gel 15 Gram(s) Oral once  dextrose 5%. 1000 milliLiter(s) (50 mL/Hr) IV Continuous <Continuous>  dextrose 5%. 1000 milliLiter(s) (100 mL/Hr) IV Continuous <Continuous>  dextrose 50% Injectable 25 Gram(s) IV Push once  dextrose 50% Injectable 12.5 Gram(s) IV Push once  dextrose 50% Injectable 25 Gram(s) IV Push once  enoxaparin Injectable 40 milliGRAM(s) SubCutaneous every 12 hours  fentaNYL   Infusion. 0.5 MICROgram(s)/kG/Hr (6 mL/Hr) IV Continuous <Continuous> @ 1 mcg  glucagon  Injectable 1 milliGRAM(s) IntraMuscular once  insulin glargine Injectable (LANTUS) 12 Unit(s) SubCutaneous at bedtime  insulin regular  human corrective regimen sliding scale   SubCutaneous every 6 hours  midazolam Infusion 0.02 mG/kG/Hr (2.4 mL/Hr) IV Continuous <Continuous> currently off  norepinephrine Infusion 0.05 MICROgram(s)/kG/Min (11.3 mL/Hr) IV Continuous <Continuous> currently off  pantoprazole  Injectable 40 milliGRAM(s) IV Push daily  petrolatum Ophthalmic Ointment 1 Application(s) Both EYES every 6 hours  piperacillin/tazobactam IVPB.. 4.5 Gram(s) IV Intermittent every 6 hours  polyethylene glycol 3350 17 Gram(s) Oral every 12 hours  propofol Infusion 10 MICROgram(s)/kG/Min (7.2 mL/Hr) IV Continuous <Continuous> currently @ 40 mcg  QUEtiapine 25 milliGRAM(s) Oral daily  QUEtiapine 50 milliGRAM(s) Oral at bedtime  senna 2 Tablet(s) Oral at bedtime    MEDICATIONS  (PRN):  acetaminophen    Suspension .. 650 milliGRAM(s) Oral every 6 hours PRN Temp greater or equal to 38C (100.4F)  sodium chloride 0.9% lock flush 10 milliLiter(s) IV Push every 1 hour PRN Pre/post blood products, medications, blood draw, and to maintain line patency    PHYSICAL EXAM: limited as patient is proned  GENERAL: obese, intubated and sedated  SKIN/HAIR/NAILS: Skin warm and clammy. Nails without clubbing or cyanosis. CR<2 secs. No lesions, rash, petechiae, erythema or ecchymoses. Anicteric.   HEAD AND NECK: The skull is NC/AT. unable to assess thoroughly as patient proned.  THORAX/LUNGS: Thorax is symmetric with good expansion, assisted by mechanical ventilation. Lung sounds diminished throughout with scattered rhonchi.  CARDIOVASCULAR: No JVD. Carotid upstrokes are brisk, without bruits. +S1 and S2, RRR; no extra heart sounds or M/R/G.  ABDOMEN/: unable to assess, proned, Lechuga catheter in place.  PERIPHERAL VASCULAR: Extremities are warm, radial and dorsalis pedis pulses +1 and symmetric. No clubbing, no cyanosis.  MUSCULOSKELETAL: No active ROM at this time. No evidence of swelling or deformity.  NEUROLOGICAL: unable to assess, patient stimulated by stimuli such as suctioning. not following commands, sedated and intubated.  LINES (DATES): L IJ inserted in ED by fellow , radial art line changed on , IVL    Assessment and Plan   Patient is a 57 yo M with PMHx of MU and gout who presented to ED with c/o progressively worsening SOB now admitted to ICU for management of AHRF in the setting of COViD    NEURO  #Sedated   -propofol and fentanyl for rass -4   -Seroquel regimen added to avoid delirium  -Will maintain sedated for ventilator setting tolerance  -Neuro checks per ICU protocol, pain/sedation meds assessed and addressed, will titrate down as able to maintain vent synchrony    RESPIRATORY  #Acute hypoxic hypercapneic respiratory failure in the setting of COViD PNA  -Patient initially presented with O2 saturation of 14% and cyanotic requiring immediate intubation  -CXr with diffuse bilateral opacities with + COVID19 PCR  -Elevated inflammatory markers  -Patient currently proned  -On AC/VC mechanical Ventilation:  Mode: AC/ CMV; RR: 20; TV: 450; FiO2: 40; PEEP: 12  -PF ratio 257 with this morning's ABG  -Will Wean FiO2/PEEP as tolerated    #COViD  -CXr with diffuse bilateral opacities with + COVID19 PCR  -Start Date  Remdesivir and Decadron 6 mg (x 10 day course)  -Remdesevir course given - per chart review (was briefly d/c'd due to renal fxn worsening but was REORDERED)    #MU  -As per patient's wife, patient has MU on CPAP  -Patient sleeps with 2-3 pillows at night, attributed to MU    CARDIOVASCULAR  #RV dilation  -Bedside POCUS demonstrating dilated RV and mildly enlarged pulm artery possibly 2/2 MU vs PE (unlikely)  -Echo: Severe hypokinesis of the entire anteroseptum/inferoseptum. Akinesis of the mid to apical anterolateral region, apical anterior, apical inferior and true apex. Mild hypokinesis of the basal anterolateral, basal anterior and basal inferior regions. The estimated left ventricular systolic function is 15%. The right ventricle is mildly dilated. Right ventricular systolic function is mildly reduced. No pericardial effusion is seen.  -Based on these findings, cardiology was consulted  -Appreciate cardiac recs  -As per spouse, no hx of cardiac disease and no change in ADLs or activity tolerance prior to current presentation  -Holding off on diuretics as per primary team    #LV Dysfunction  As per Cardiology:  -Echo reviewed. Images are poor due to body habitus but suggest a stress cardiomyopathy pattern (takotsubo cardiomyopathy) rather than an ischemic event  -EKG reviewed showing NSR, with non specific ST changes and poor R wave progression in the precordial leads likely due to body habitus  -Clinically patient is warm and well perfused and making urine.  -Patient's LV dysfunction appears to be mutifactorial: COVID, Morbid Obesity, Sleep Apnea. Myocarditis is also a possibility; now exacerbateed in setting of COVIDPNA  -Unfortunately patient is unable to undergo further ischemic or confirmatory imaging/workup  -Hold off BB or ACE/ARB therapy while on pressors with Renal impairement  -EKG from  NSR with RBBB, without ischemic changes  -Please keep K>4 and Mg>2  -Will need repeat ECHO post extubation to re-eval EF, if still depressed with wall motion abn will pursue ischemic work up  -Agree with diuresis for now and continuing with current management with Levo as mixed venous and clinical picture does not correlate with cardiogenic shock at this time.    GI  -No active issues  -Nasoenteral feeding currently being held as NGT is not patent, will reassess and place new one if indicated  -Protonix IVP for GI ppx    RENAL//F&E  #Acute Kidney Injury  -SCr stable, trending down  -Baseline creatinine unknown, no reported kidney disease  -Adequate urine output   -Electrolytes stable  -Strict I&O monitoring  -Avoid nephrotoxic medications    ID  #COVID PNA   -Plan as above  -Remains on steroid rx, remdesivir course completed    #Severe Sepsis  -Patient presents with tachycardia, tachypnea with a pulmonary process, lactate 12, WBC 15.85, PCT 0.17; meeting criteria for severe sepsis; currently on exam, patient well perfused with adequate pulses, adequate UO, unable to assess mentation at this time  -Source likely COVID19 PNA with possible superimposed bacterial PNA  -Broad spectrum abx started, vanco and zosyn started 4/5 AM, de-escalated to ceftriaxone, now d/c'd  -Decadron and remdesivir started  for COVID19  -Patient febrile  and  with copious creamy secretions, Zosyn started 4/9 x 7-10 day course  -Maintain MAP > 65 mmHg  -Monitor urine output    ENDOCRINE  #Hyperglycemia  -q6hr correctional scale ordered, will adjust as needed, may need premeal  -Lantus 12 units  -Blood glucose trend as above  -Decadron started 4/5 AM  -A1c 6.2    HEME  #Anemia  -Hgb 11 on admission; unknown baseline. No evidence of bleed.  -Hgb today 82  -Trend CBC  -Maintain active T+S  -Transfuse for Hb <7    -DVT ppx with Lovenox BID    DISPO/GOALS OF CARE:  Patient requires continuous monitoring with bedside rhythm monitoring, arterial line, pulse oximetry, ventilator monitoring and intermittent blood gas analysis. Care plan discussed with ICU care team. Patient remains critical at this time.   SUBJECTIVE  INTERVAL HPI/OVERNIGHT EVENTS: Overnight there were no acute events. Hypernatremia improving from yesterday.    REVIEW OF SYSTEMS:   See HPI (as per chart review), unable to obtain 2/2 ETT and sedation    OBJECTIVE  Vital Signs Last 24 Hrs  T(C): 37.7 (10 Apr 2021 12:14), Max: 37.7 (2021 18:00)  T(F): 99.8 (10 Apr 2021 12:14), Max: 99.9 (2021 18:00)  HR: 51 (10 Apr 2021 14:00) (43 - 66)  BP: -140s  BP(mean): MAP 70-90s  RR: 19 (10 Apr 2021 14:00) (13 - 23)  SpO2: 99% (10 Apr 2021 14:00) (91% - 99%)    Vent settings   Mode: AC/ CMV (Assist Control/ Continuous Mandatory Ventilation), RR (machine): 20, TV (machine): 450, FiO2: 40, PEEP: 12, ITime: 1, MAP: 15, PIP: 20    I&O's Detail  2021 07:01  -  10 Apr 2021 07:00  --------------------------------------------------------  IN:    Cisatracurium: 86.4 mL    dextrose 5%: 880 mL    dextrose 5%: 400 mL    FentaNYL: 204 mL    Free Water: 250 mL    IV PiggyBack: 500 mL    IV PiggyBack: 250 mL    Nepro: 312 mL    Propofol: 14.4 mL    Propofol: 403.2 mL    Vital High Protein: 240 mL  Total IN: 3540 mL    OUT:    Indwelling Catheter - Urethral (mL): 2945 mL    Stool (mL): 2 mL  Total OUT: 2947 mL    Total NET: 593 mL    LABS:                        8.2    8.45  )-----------( 235      ( 10 Apr 2021 04:57 )             28.8     04-10    146<H>  |  107  |  37<H>  ----------------------------<  203<H>  4.8   |  29  |  1.72<H>    Ca    7.9<L>      10 Apr 2021 04:57  Phos  4.1     04-10  Mg     2.8     04-10    TPro  5.4<L>  /  Alb  2.4<L>  /  TBili  0.2  /  DBili  x   /  AST  33  /  ALT  31  /  AlkPhos  70  04-10    ABG - ( 10 Apr 2021 12:14 )  pH, Arterial: 7.38  pH, Blood: x     /  pCO2: 50    /  pO2: 96    / HCO3: 29    / Base Excess: 3.5   /  SaO2: 97        D-dimer 528  Ferritin 104  CRP 32.7        POCT Blood Glucose.: 232 mg/dL (10 Apr 2021 12:04)  POCT Blood Glucose.: 186 mg/dL (10 Apr 2021 06:23)  POCT Blood Glucose.: 242 mg/dL (2021 22:02)  POCT Blood Glucose.: 256 mg/dL (2021 17:38)    Culture - Blood (collected 2021 17:53)  Source: .Blood Blood  Preliminary Report (2021 18:00):    No growth at 1 day.    Culture - Blood (collected 2021 17:53)  Source: .Blood Blood  Preliminary Report (2021 18:00):    No growth at 1 day.    Culture - Sputum (collected 2021 17:36)  Source: .Sputum Sputum  Gram Stain (2021 18:11):    No epithelial cells seen    Few WBC's    Few-moderate Yeast  Final Report (10 Apr 2021 08:33):    Numerous Keila dubliniensis    No other  Normal Respiratory Marleni present    Urinalysis Basic - ( 2021 18:47 )    Color: Yellow / Appearance: Clear / S.025 / pH: x  Gluc: x / Ketone: NEGATIVE  / Bili: Negative / Urobili: 0.2 E.U./dL   Blood: x / Protein: Trace mg/dL / Nitrite: NEGATIVE   Leuk Esterase: NEGATIVE / RBC: 5-10 /HPF / WBC < 5 /HPF   Sq Epi: x / Non Sq Epi: x / Bacteria: Present /HPF    RADIOLOGY  *CXr will be ordered and reviewed when patient de-proned    MEDICATIONS  (STANDING):  acetylcysteine 10%  Inhalation 4 milliLiter(s) Inhalation three times a day  albuterol/ipratropium for Nebulization 3 milliLiter(s) Nebulizer every 4 hours  chlorhexidine 0.12% Liquid 15 milliLiter(s) Oral Mucosa every 12 hours  chlorhexidine 2% Cloths 1 Application(s) Topical daily  cisatracurium Infusion 3 MICROgram(s)/kG/Min (21.6 mL/Hr) IV Continuous <Continuous> currently off  dexAMETHasone  Injectable 6 milliGRAM(s) IV Push every 24 hours  dextrose 40% Gel 15 Gram(s) Oral once  dextrose 5%. 1000 milliLiter(s) (50 mL/Hr) IV Continuous <Continuous>  dextrose 5%. 1000 milliLiter(s) (100 mL/Hr) IV Continuous <Continuous>  dextrose 50% Injectable 25 Gram(s) IV Push once  dextrose 50% Injectable 12.5 Gram(s) IV Push once  dextrose 50% Injectable 25 Gram(s) IV Push once  enoxaparin Injectable 40 milliGRAM(s) SubCutaneous every 12 hours  fentaNYL   Infusion. 0.5 MICROgram(s)/kG/Hr (6 mL/Hr) IV Continuous <Continuous> @ 1 mcg  glucagon  Injectable 1 milliGRAM(s) IntraMuscular once  insulin glargine Injectable (LANTUS) 12 Unit(s) SubCutaneous at bedtime  insulin regular  human corrective regimen sliding scale   SubCutaneous every 6 hours  midazolam Infusion 0.02 mG/kG/Hr (2.4 mL/Hr) IV Continuous <Continuous> currently off  norepinephrine Infusion 0.05 MICROgram(s)/kG/Min (11.3 mL/Hr) IV Continuous <Continuous> currently off  pantoprazole  Injectable 40 milliGRAM(s) IV Push daily  petrolatum Ophthalmic Ointment 1 Application(s) Both EYES every 6 hours  piperacillin/tazobactam IVPB.. 4.5 Gram(s) IV Intermittent every 6 hours  polyethylene glycol 3350 17 Gram(s) Oral every 12 hours  propofol Infusion 10 MICROgram(s)/kG/Min (7.2 mL/Hr) IV Continuous <Continuous> currently @ 40 mcg  QUEtiapine 25 milliGRAM(s) Oral daily  QUEtiapine 50 milliGRAM(s) Oral at bedtime  senna 2 Tablet(s) Oral at bedtime    MEDICATIONS  (PRN):  acetaminophen    Suspension .. 650 milliGRAM(s) Oral every 6 hours PRN Temp greater or equal to 38C (100.4F)  sodium chloride 0.9% lock flush 10 milliLiter(s) IV Push every 1 hour PRN Pre/post blood products, medications, blood draw, and to maintain line patency    PHYSICAL EXAM: limited as patient is proned  GENERAL: obese, intubated and sedated  SKIN/HAIR/NAILS: Skin warm and clammy. Nails without clubbing or cyanosis. CR<2 secs. No lesions, rash, petechiae, erythema or ecchymoses. Anicteric.   HEAD AND NECK: The skull is NC/AT. unable to assess thoroughly as patient proned.  THORAX/LUNGS: Thorax is symmetric with good expansion, assisted by mechanical ventilation. Lung sounds diminished throughout with scattered rhonchi.  CARDIOVASCULAR: No JVD. Carotid upstrokes are brisk, without bruits. +S1 and S2, RRR; no extra heart sounds or M/R/G.  ABDOMEN/: unable to assess, proned, Lechuga catheter in place.  PERIPHERAL VASCULAR: Extremities are warm, radial and dorsalis pedis pulses +1 and symmetric. No clubbing, no cyanosis.  MUSCULOSKELETAL: No active ROM at this time. No evidence of swelling or deformity.  NEUROLOGICAL: unable to assess, patient stimulated by stimuli such as suctioning. not following commands, sedated and intubated.  LINES (DATES): L IJ inserted in ED by fellow , radial art line changed on , IVL    Assessment and Plan   Patient is a 57 yo M with PMHx of MU and gout who presented to ED with c/o progressively worsening SOB now admitted to ICU for management of AHRF in the setting of COViD    NEURO  #Sedated   -propofol and fentanyl for rass -4   -Seroquel regimen added to avoid delirium  -Will maintain sedated for ventilator setting tolerance  -Neuro checks per ICU protocol, pain/sedation meds assessed and addressed, will titrate down as able to maintain vent synchrony    RESPIRATORY  #Acute hypoxic hypercapneic respiratory failure in the setting of COViD PNA  -Patient initially presented with O2 saturation of 14% and cyanotic requiring immediate intubation  -CXr with diffuse bilateral opacities with + COVID19 PCR  -Elevated inflammatory markers  -Patient currently proned  -On AC/VC mechanical Ventilation:  Mode: AC/ CMV; RR: 20; TV: 450; FiO2: 40; PEEP: 12  -PF ratio 257 with this morning's ABG  -Will Wean FiO2/PEEP as tolerated    #COViD  -CXr with diffuse bilateral opacities with + COVID19 PCR  -Start Date  Remdesivir and Decadron 6 mg (x 10 day course)  -Remdesevir course given - per chart review (was briefly d/c'd due to renal fxn worsening but was REORDERED)    #MU  -As per patient's wife, patient has MU on CPAP  -Patient sleeps with 2-3 pillows at night, attributed to MU    CARDIOVASCULAR  #RV dilation  -Bedside POCUS demonstrating dilated RV and mildly enlarged pulm artery possibly 2/2 MU vs PE (unlikely)  -Echo: Severe hypokinesis of the entire anteroseptum/inferoseptum. Akinesis of the mid to apical anterolateral region, apical anterior, apical inferior and true apex. Mild hypokinesis of the basal anterolateral, basal anterior and basal inferior regions. The estimated left ventricular systolic function is 15%. The right ventricle is mildly dilated. Right ventricular systolic function is mildly reduced. No pericardial effusion is seen.  -Based on these findings, cardiology was consulted  -Appreciate cardiac recs  -As per spouse, no hx of cardiac disease and no change in ADLs or activity tolerance prior to current presentation  -Holding off on diuretics as per primary team    #LV Dysfunction  As per Cardiology:  -Echo reviewed. Images are poor due to body habitus but suggest a stress cardiomyopathy pattern (takotsubo cardiomyopathy) rather than an ischemic event  -EKG reviewed showing NSR, with non specific ST changes and poor R wave progression in the precordial leads likely due to body habitus  -Clinically patient is warm and well perfused and making urine.  -Patient's LV dysfunction appears to be mutifactorial: COVID, Morbid Obesity, Sleep Apnea. Myocarditis is also a possibility; now exacerbateed in setting of COVIDPNA  -Unfortunately patient is unable to undergo further ischemic or confirmatory imaging/workup  -Hold off BB or ACE/ARB therapy while on pressors with Renal impairement  -EKG from  NSR with RBBB, without ischemic changes  -Please keep K>4 and Mg>2  -Will need repeat ECHO post extubation to re-eval EF, if still depressed with wall motion abn will pursue ischemic work up  -Agree with diuresis for now and continuing with current management with Levo as mixed venous and clinical picture does not correlate with cardiogenic shock at this time.    GI  -No active issues  -Nasoenteral feeding currently being held as NGT is not patent, will reassess and place new one if indicated  -Protonix IVP for GI ppx    RENAL//F&E  #Acute Kidney Injury  -SCr stable, trending down  -Baseline creatinine unknown, no reported kidney disease  -Adequate urine output   -Electrolytes stable  -Strict I&O monitoring  -Avoid nephrotoxic medications    #Hypernatremia  -On , D5W @ 80cc/hr and free water flushes for hypernatremia 150  -Today Na is 146, fluids d/c'd and continuing with free water 250 mL q 8hr    ID  #COVID PNA   -Plan as above  -Remains on steroid rx, remdesivir course completed    #Severe Sepsis  -Patient presents with tachycardia, tachypnea with a pulmonary process, lactate 12, WBC 15.85, PCT 0.17; meeting criteria for severe sepsis; currently on exam, patient well perfused with adequate pulses, adequate UO, unable to assess mentation at this time  -Source likely COVID19 PNA with possible superimposed bacterial PNA  -Broad spectrum abx started, vanco and zosyn started 4/5 AM, de-escalated to ceftriaxone, now d/c'd  -Decadron and remdesivir started  for COVID19  -Patient febrile  and  with copious creamy secretions, Zosyn started 4/9 x 7-10 day course  -Maintain MAP > 65 mmHg  -Monitor urine output    ENDOCRINE  #Hyperglycemia  -q6hr correctional scale ordered, will adjust as needed, may need premeal  -Lantus 12 units  -Blood glucose trend as above  -Decadron started 4/5 AM  -A1c 6.2    HEME  #Anemia  -Hgb 11 on admission; unknown baseline. No evidence of bleed.  -Hgb today 82  -Trend CBC  -Maintain active T+S  -Transfuse for Hb <7    -DVT ppx with Lovenox BID    DISPO/GOALS OF CARE:  Patient requires continuous monitoring with bedside rhythm monitoring, arterial line, pulse oximetry, ventilator monitoring and intermittent blood gas analysis. Care plan discussed with ICU care team. Patient remains critical at this time.   SUBJECTIVE  INTERVAL HPI/OVERNIGHT EVENTS: Overnight there were no acute events. Hypernatremia improving from yesterday.    REVIEW OF SYSTEMS:   See HPI (as per chart review), unable to obtain 2/2 ETT and sedation    OBJECTIVE  Vital Signs Last 24 Hrs  T(C): 37.7 (10 Apr 2021 12:14), Max: 37.7 (2021 18:00)  T(F): 99.8 (10 Apr 2021 12:14), Max: 99.9 (2021 18:00)  HR: 51 (10 Apr 2021 14:00) (43 - 66)  BP: -140s  BP(mean): MAP 70-90s  RR: 19 (10 Apr 2021 14:00) (13 - 23)  SpO2: 99% (10 Apr 2021 14:00) (91% - 99%)    Vent settings   Mode: AC/ CMV (Assist Control/ Continuous Mandatory Ventilation), RR (machine): 20, TV (machine): 450, FiO2: 40, PEEP: 12, ITime: 1, MAP: 15, PIP: 20    I&O's Detail  2021 07:01  -  10 Apr 2021 07:00  --------------------------------------------------------  IN:    Cisatracurium: 86.4 mL    dextrose 5%: 880 mL    dextrose 5%: 400 mL    FentaNYL: 204 mL    Free Water: 250 mL    IV PiggyBack: 500 mL    IV PiggyBack: 250 mL    Nepro: 312 mL    Propofol: 14.4 mL    Propofol: 403.2 mL    Vital High Protein: 240 mL  Total IN: 3540 mL    OUT:    Indwelling Catheter - Urethral (mL): 2945 mL    Stool (mL): 2 mL  Total OUT: 2947 mL    Total NET: 593 mL    LABS:                        8.2    8.45  )-----------( 235      ( 10 Apr 2021 04:57 )             28.8     04-10    146<H>  |  107  |  37<H>  ----------------------------<  203<H>  4.8   |  29  |  1.72<H>    Ca    7.9<L>      10 Apr 2021 04:57  Phos  4.1     04-10  Mg     2.8     04-10    TPro  5.4<L>  /  Alb  2.4<L>  /  TBili  0.2  /  DBili  x   /  AST  33  /  ALT  31  /  AlkPhos  70  04-10    ABG - ( 10 Apr 2021 12:14 )  pH, Arterial: 7.38  pH, Blood: x     /  pCO2: 50    /  pO2: 96    / HCO3: 29    / Base Excess: 3.5   /  SaO2: 97        D-dimer 528  Ferritin 104  CRP 32.7        POCT Blood Glucose.: 232 mg/dL (10 Apr 2021 12:04)  POCT Blood Glucose.: 186 mg/dL (10 Apr 2021 06:23)  POCT Blood Glucose.: 242 mg/dL (2021 22:02)  POCT Blood Glucose.: 256 mg/dL (2021 17:38)    Culture - Blood (collected 2021 17:53)  Source: .Blood Blood  Preliminary Report (2021 18:00):    No growth at 1 day.    Culture - Blood (collected 2021 17:53)  Source: .Blood Blood  Preliminary Report (2021 18:00):    No growth at 1 day.    Culture - Sputum (collected 2021 17:36)  Source: .Sputum Sputum  Gram Stain (2021 18:11):    No epithelial cells seen    Few WBC's    Few-moderate Yeast  Final Report (10 Apr 2021 08:33):    Numerous Keila dubliniensis    No other  Normal Respiratory Marleni present    Urinalysis Basic - ( 2021 18:47 )    Color: Yellow / Appearance: Clear / S.025 / pH: x  Gluc: x / Ketone: NEGATIVE  / Bili: Negative / Urobili: 0.2 E.U./dL   Blood: x / Protein: Trace mg/dL / Nitrite: NEGATIVE   Leuk Esterase: NEGATIVE / RBC: 5-10 /HPF / WBC < 5 /HPF   Sq Epi: x / Non Sq Epi: x / Bacteria: Present /HPF    RADIOLOGY  *CXr will be ordered and reviewed when patient de-proned    MEDICATIONS  (STANDING):  acetylcysteine 10%  Inhalation 4 milliLiter(s) Inhalation three times a day  albuterol/ipratropium for Nebulization 3 milliLiter(s) Nebulizer every 4 hours  chlorhexidine 0.12% Liquid 15 milliLiter(s) Oral Mucosa every 12 hours  chlorhexidine 2% Cloths 1 Application(s) Topical daily  cisatracurium Infusion 3 MICROgram(s)/kG/Min (21.6 mL/Hr) IV Continuous <Continuous> currently off  dexAMETHasone  Injectable 6 milliGRAM(s) IV Push every 24 hours  dextrose 40% Gel 15 Gram(s) Oral once  dextrose 5%. 1000 milliLiter(s) (50 mL/Hr) IV Continuous <Continuous>  dextrose 5%. 1000 milliLiter(s) (100 mL/Hr) IV Continuous <Continuous>  dextrose 50% Injectable 25 Gram(s) IV Push once  dextrose 50% Injectable 12.5 Gram(s) IV Push once  dextrose 50% Injectable 25 Gram(s) IV Push once  enoxaparin Injectable 40 milliGRAM(s) SubCutaneous every 12 hours  fentaNYL   Infusion. 0.5 MICROgram(s)/kG/Hr (6 mL/Hr) IV Continuous <Continuous> @ 1 mcg  glucagon  Injectable 1 milliGRAM(s) IntraMuscular once  insulin glargine Injectable (LANTUS) 12 Unit(s) SubCutaneous at bedtime  insulin regular  human corrective regimen sliding scale   SubCutaneous every 6 hours  midazolam Infusion 0.02 mG/kG/Hr (2.4 mL/Hr) IV Continuous <Continuous> currently off  norepinephrine Infusion 0.05 MICROgram(s)/kG/Min (11.3 mL/Hr) IV Continuous <Continuous> currently off  pantoprazole  Injectable 40 milliGRAM(s) IV Push daily  petrolatum Ophthalmic Ointment 1 Application(s) Both EYES every 6 hours  piperacillin/tazobactam IVPB.. 4.5 Gram(s) IV Intermittent every 6 hours  polyethylene glycol 3350 17 Gram(s) Oral every 12 hours  propofol Infusion 10 MICROgram(s)/kG/Min (7.2 mL/Hr) IV Continuous <Continuous> currently @ 40 mcg  QUEtiapine 25 milliGRAM(s) Oral daily  QUEtiapine 50 milliGRAM(s) Oral at bedtime  senna 2 Tablet(s) Oral at bedtime    MEDICATIONS  (PRN):  acetaminophen    Suspension .. 650 milliGRAM(s) Oral every 6 hours PRN Temp greater or equal to 38C (100.4F)  sodium chloride 0.9% lock flush 10 milliLiter(s) IV Push every 1 hour PRN Pre/post blood products, medications, blood draw, and to maintain line patency    PHYSICAL EXAM: limited as patient is proned  GENERAL: obese, intubated and sedated  SKIN/HAIR/NAILS: Skin warm and clammy. Nails without clubbing or cyanosis. CR<2 secs. No lesions, rash, petechiae, erythema or ecchymoses. Anicteric.   HEAD AND NECK: The skull is NC/AT. unable to assess thoroughly as patient proned.  THORAX/LUNGS: Thorax is symmetric with good expansion, assisted by mechanical ventilation. Lung sounds diminished throughout with scattered rhonchi.  CARDIOVASCULAR: No JVD. Carotid upstrokes are brisk, without bruits. +S1 and S2, RRR; no extra heart sounds or M/R/G.  ABDOMEN/: unable to assess, proned, Lechuga catheter in place. Last BM 4/10 as per nursing.  PERIPHERAL VASCULAR: Extremities are warm, radial and dorsalis pedis pulses +1 and symmetric. No clubbing, no cyanosis.  MUSCULOSKELETAL: No active ROM at this time. No evidence of swelling or deformity.  NEUROLOGICAL: unable to assess, patient stimulated by stimuli such as suctioning. not following commands, sedated and intubated.  LINES (DATES): L IJ inserted in ED by fellow , radial art line changed on , IVL    Assessment and Plan   Patient is a 55 yo M with PMHx of MU and gout who presented to ED with c/o progressively worsening SOB now admitted to ICU for management of AHRF in the setting of COViD    NEURO  #Sedated   -propofol and fentanyl for rass -4   -Seroquel regimen added to avoid delirium  -Will maintain sedated for ventilator setting tolerance  -Neuro checks per ICU protocol, pain/sedation meds assessed and addressed, will titrate down as able to maintain vent synchrony    RESPIRATORY  #Acute hypoxic hypercapneic respiratory failure in the setting of COViD PNA  -Patient initially presented with O2 saturation of 14% and cyanotic requiring immediate intubation  -CXr with diffuse bilateral opacities with + COVID19 PCR  -Elevated inflammatory markers  -Patient currently proned  -On AC/VC mechanical Ventilation:  Mode: AC/ CMV; RR: 20; TV: 450; FiO2: 40; PEEP: 12  -PF ratio 257 with this morning's ABG  -Will Wean FiO2/PEEP as tolerated    #COViD  -CXr with diffuse bilateral opacities with + COVID19 PCR  -Start Date  Remdesivir and Decadron 6 mg (x 10 day course)  -Remdesevir course given - per chart review (was briefly d/c'd due to renal fxn worsening but was REORDERED)    #MU  -As per patient's wife, patient has MU on CPAP  -Patient sleeps with 2-3 pillows at night, attributed to MU    CARDIOVASCULAR  #RV dilation  -Bedside POCUS demonstrating dilated RV and mildly enlarged pulm artery possibly 2/2 MU vs PE (unlikely)  -Echo: Severe hypokinesis of the entire anteroseptum/inferoseptum. Akinesis of the mid to apical anterolateral region, apical anterior, apical inferior and true apex. Mild hypokinesis of the basal anterolateral, basal anterior and basal inferior regions. The estimated left ventricular systolic function is 15%. The right ventricle is mildly dilated. Right ventricular systolic function is mildly reduced. No pericardial effusion is seen.  -Based on these findings, cardiology was consulted  -Appreciate cardiac recs  -As per spouse, no hx of cardiac disease and no change in ADLs or activity tolerance prior to current presentation  -Holding off on diuretics as per primary team    #LV Dysfunction  As per Cardiology:  -Echo reviewed. Images are poor due to body habitus but suggest a stress cardiomyopathy pattern (takotsubo cardiomyopathy) rather than an ischemic event  -EKG reviewed showing NSR, with non specific ST changes and poor R wave progression in the precordial leads likely due to body habitus  -Clinically patient is warm and well perfused and making urine.  -Patient's LV dysfunction appears to be mutifactorial: COVID, Morbid Obesity, Sleep Apnea. Myocarditis is also a possibility; now exacerbateed in setting of COVIDPNA  -Unfortunately patient is unable to undergo further ischemic or confirmatory imaging/workup  -Hold off BB or ACE/ARB therapy while on pressors with Renal impairement  -EKG from  NSR with RBBB, without ischemic changes  -Please keep K>4 and Mg>2  -Will need repeat ECHO post extubation to re-eval EF, if still depressed with wall motion abn will pursue ischemic work up  -Agree with diuresis for now and continuing with current management with Levo as mixed venous and clinical picture does not correlate with cardiogenic shock at this time.    GI  -No active issues  -Nasoenteral feeding currently being held as NGT is not patent, will reassess and place new one if indicated  -Protonix IVP for GI ppx    RENAL//F&E  #Acute Kidney Injury  -SCr stable, trending down  -Baseline creatinine unknown, no reported kidney disease  -Adequate urine output   -Electrolytes stable  -Strict I&O monitoring  -Avoid nephrotoxic medications    #Hypernatremia  -On , D5W @ 80cc/hr and free water flushes for hypernatremia 150  -Today Na is 146, fluids d/c'd and continuing with free water 250 mL q 8hr    ID  #COVID PNA   -Plan as above  -Remains on steroid rx, remdesivir course completed    #Severe Sepsis  -Patient presents with tachycardia, tachypnea with a pulmonary process, lactate 12, WBC 15.85, PCT 0.17; meeting criteria for severe sepsis; currently on exam, patient well perfused with adequate pulses, adequate UO, unable to assess mentation at this time  -Source likely COVID19 PNA with possible superimposed bacterial PNA  -Broad spectrum abx started, vanco and zosyn started 4/5 AM, de-escalated to ceftriaxone, now d/c'd  -Decadron and remdesivir started  for COVID19  -Patient febrile  and  with copious creamy secretions, Zosyn started 4/9 x 7-10 day course  -Maintain MAP > 65 mmHg  -Monitor urine output    ENDOCRINE  #Hyperglycemia  -q6hr correctional scale ordered, will adjust as needed, may need premeal  -Lantus 12 units  -Blood glucose trend as above  -Decadron started 4/5 AM  -A1c 6.2    HEME  #Anemia  -Hgb 11 on admission; unknown baseline. No evidence of bleed.  -Hgb today 82  -Trend CBC  -Maintain active T+S  -Transfuse for Hb <7    -DVT ppx with Lovenox BID    DISPO/GOALS OF CARE:  Patient requires continuous monitoring with bedside rhythm monitoring, arterial line, pulse oximetry, ventilator monitoring and intermittent blood gas analysis. Care plan discussed with ICU care team. Patient remains critical at this time.

## 2021-04-10 NOTE — CHART NOTE - NSCHARTNOTEFT_GEN_A_CORE
Patient deproned at 1055 AM with RN, RRT, senior resident at bedside.  Mild dependent facial edema, no pressure injuries noted.   Patient on Fentanyl and Propofol gtts.   Patient hemodynamically stable post deproning,  Airway and all lines patent.  ABG and CXr ordered.  Turn and position q 2 hours per nursing staff.

## 2021-04-10 NOTE — CHART NOTE - NSCHARTNOTEFT_GEN_A_CORE
Patient proned at 1900 pm with Fellow, Senior reident and RRT at bedside. Pressure points offloaded. Allevyns intact on all bony prominences.   Patient remains on Propofol and Fentanyl gtts.  Patient tolerated well, saturating 98% on 40% FiO2/PEEP 10.  All lines cleared and infusing appropriately.  Airway patent post proning, secretions cleared by RRT.  ABG ordered one hour after proning.

## 2021-04-10 NOTE — PROGRESS NOTE ADULT - ATTENDING COMMENTS
Patient seen and examined with house-staff during bedside rounds.  Resident note read, including vitals, physical findings, laboratory data, and radiological reports.   Revisions included below.  Direct personal management at bed side and extensive interpretation of the data.  Plan was outlined and discussed in details with the housestaff.  Decision making of high complexity  Action taken for acute disease activity to reflect the level of care provided:  - medication reconciliation  - review laboratory data  Patient is clinically stable.  The patient the PO2 FiO2 ratio is improving we will proning.  We will continue proning for recruitment.  His peak pressure is 25 and plateau in 20 in the prone position.  The fluid status is stable.  The patient is off pressors.  The patient is off paralytic.  The patient is on propofol and fentanyl.  The creatinine is stable.  Liver function normalized.  Continue Zosyn patient is afebrile on Zosyn no continue for another 7 to 10 days.  Blood cultures negative.  The lines were done 5 days ago

## 2021-04-10 NOTE — PROVIDER CONTACT NOTE (OTHER) - SITUATION
Pt receive tube feedings.  Pump started alarming "flow error".  RN attempted to flush DHT with water, to no avail.

## 2021-04-11 LAB
ALBUMIN SERPL ELPH-MCNC: 2.4 G/DL — LOW (ref 3.3–5)
ALP SERPL-CCNC: 68 U/L — SIGNIFICANT CHANGE UP (ref 40–120)
ALT FLD-CCNC: 28 U/L — SIGNIFICANT CHANGE UP (ref 10–45)
ANION GAP SERPL CALC-SCNC: 8 MMOL/L — SIGNIFICANT CHANGE UP (ref 5–17)
AST SERPL-CCNC: 33 U/L — SIGNIFICANT CHANGE UP (ref 10–40)
BASE EXCESS BLDA CALC-SCNC: 5.8 MMOL/L — HIGH (ref -2–3)
BASE EXCESS BLDA CALC-SCNC: 5.8 MMOL/L — HIGH (ref -2–3)
BASE EXCESS BLDA CALC-SCNC: 6.6 MMOL/L — HIGH (ref -2–3)
BASOPHILS # BLD AUTO: 0.01 K/UL — SIGNIFICANT CHANGE UP (ref 0–0.2)
BASOPHILS NFR BLD AUTO: 0.1 % — SIGNIFICANT CHANGE UP (ref 0–2)
BILIRUB SERPL-MCNC: 0.3 MG/DL — SIGNIFICANT CHANGE UP (ref 0.2–1.2)
BLD GP AB SCN SERPL QL: NEGATIVE — SIGNIFICANT CHANGE UP
BUN SERPL-MCNC: 37 MG/DL — HIGH (ref 7–23)
CALCIUM SERPL-MCNC: 7.9 MG/DL — LOW (ref 8.4–10.5)
CHLORIDE SERPL-SCNC: 108 MMOL/L — SIGNIFICANT CHANGE UP (ref 96–108)
CO2 SERPL-SCNC: 31 MMOL/L — SIGNIFICANT CHANGE UP (ref 22–31)
CREAT SERPL-MCNC: 1.42 MG/DL — HIGH (ref 0.5–1.3)
CRP SERPL-MCNC: 59.5 MG/L — HIGH (ref 0–4)
D DIMER BLD IA.RAPID-MCNC: 501 NG/ML DDU — HIGH
EOSINOPHIL # BLD AUTO: 0.08 K/UL — SIGNIFICANT CHANGE UP (ref 0–0.5)
EOSINOPHIL NFR BLD AUTO: 0.7 % — SIGNIFICANT CHANGE UP (ref 0–6)
FERRITIN SERPL-MCNC: 97 NG/ML — SIGNIFICANT CHANGE UP (ref 30–400)
GAS PNL BLDA: SIGNIFICANT CHANGE UP
GLUCOSE BLDC GLUCOMTR-MCNC: 147 MG/DL — HIGH (ref 70–99)
GLUCOSE BLDC GLUCOMTR-MCNC: 147 MG/DL — HIGH (ref 70–99)
GLUCOSE BLDC GLUCOMTR-MCNC: 184 MG/DL — HIGH (ref 70–99)
GLUCOSE BLDC GLUCOMTR-MCNC: 232 MG/DL — HIGH (ref 70–99)
GLUCOSE SERPL-MCNC: 159 MG/DL — HIGH (ref 70–99)
HCO3 BLDA-SCNC: 31 MMOL/L — HIGH (ref 21–28)
HCO3 BLDA-SCNC: 31 MMOL/L — HIGH (ref 21–28)
HCO3 BLDA-SCNC: 32 MMOL/L — HIGH (ref 21–28)
HCT VFR BLD CALC: 28.5 % — LOW (ref 39–50)
HGB BLD-MCNC: 8.3 G/DL — LOW (ref 13–17)
IMM GRANULOCYTES NFR BLD AUTO: 2.3 % — HIGH (ref 0–1.5)
LYMPHOCYTES # BLD AUTO: 0.66 K/UL — LOW (ref 1–3.3)
LYMPHOCYTES # BLD AUTO: 6 % — LOW (ref 13–44)
MAGNESIUM SERPL-MCNC: 2.6 MG/DL — SIGNIFICANT CHANGE UP (ref 1.6–2.6)
MCHC RBC-ENTMCNC: 23.1 PG — LOW (ref 27–34)
MCHC RBC-ENTMCNC: 29.1 GM/DL — LOW (ref 32–36)
MCV RBC AUTO: 79.4 FL — LOW (ref 80–100)
MONOCYTES # BLD AUTO: 0.68 K/UL — SIGNIFICANT CHANGE UP (ref 0–0.9)
MONOCYTES NFR BLD AUTO: 6.2 % — SIGNIFICANT CHANGE UP (ref 2–14)
NEUTROPHILS # BLD AUTO: 9.24 K/UL — HIGH (ref 1.8–7.4)
NEUTROPHILS NFR BLD AUTO: 84.7 % — HIGH (ref 43–77)
NRBC # BLD: 0 /100 WBCS — SIGNIFICANT CHANGE UP (ref 0–0)
PCO2 BLDA: 48 MMHG — SIGNIFICANT CHANGE UP (ref 35–48)
PCO2 BLDA: 51 MMHG — HIGH (ref 35–48)
PCO2 BLDA: 51 MMHG — HIGH (ref 35–48)
PH BLDA: 7.4 — SIGNIFICANT CHANGE UP (ref 7.35–7.45)
PH BLDA: 7.41 — SIGNIFICANT CHANGE UP (ref 7.35–7.45)
PH BLDA: 7.42 — SIGNIFICANT CHANGE UP (ref 7.35–7.45)
PHOSPHATE SERPL-MCNC: 2.8 MG/DL — SIGNIFICANT CHANGE UP (ref 2.5–4.5)
PLATELET # BLD AUTO: 279 K/UL — SIGNIFICANT CHANGE UP (ref 150–400)
PO2 BLDA: 141 MMHG — HIGH (ref 83–108)
PO2 BLDA: 82 MMHG — LOW (ref 83–108)
PO2 BLDA: 91 MMHG — SIGNIFICANT CHANGE UP (ref 83–108)
POTASSIUM SERPL-MCNC: 4.8 MMOL/L — SIGNIFICANT CHANGE UP (ref 3.5–5.3)
POTASSIUM SERPL-SCNC: 4.8 MMOL/L — SIGNIFICANT CHANGE UP (ref 3.5–5.3)
PROT SERPL-MCNC: 5.7 G/DL — LOW (ref 6–8.3)
RBC # BLD: 3.59 M/UL — LOW (ref 4.2–5.8)
RBC # FLD: 17.8 % — HIGH (ref 10.3–14.5)
RH IG SCN BLD-IMP: POSITIVE — SIGNIFICANT CHANGE UP
SAO2 % BLDA: 97 % — SIGNIFICANT CHANGE UP (ref 95–100)
SAO2 % BLDA: 97 % — SIGNIFICANT CHANGE UP (ref 95–100)
SAO2 % BLDA: 99 % — SIGNIFICANT CHANGE UP (ref 95–100)
SODIUM SERPL-SCNC: 147 MMOL/L — HIGH (ref 135–145)
WBC # BLD: 10.92 K/UL — HIGH (ref 3.8–10.5)
WBC # FLD AUTO: 10.92 K/UL — HIGH (ref 3.8–10.5)

## 2021-04-11 PROCEDURE — 71045 X-RAY EXAM CHEST 1 VIEW: CPT | Mod: 26

## 2021-04-11 PROCEDURE — 99233 SBSQ HOSP IP/OBS HIGH 50: CPT | Mod: GC

## 2021-04-11 PROCEDURE — 99254 IP/OBS CNSLTJ NEW/EST MOD 60: CPT

## 2021-04-11 RX ADMIN — INSULIN HUMAN 2 UNIT(S): 100 INJECTION, SOLUTION SUBCUTANEOUS at 18:00

## 2021-04-11 RX ADMIN — Medication 3 MILLILITER(S): at 00:11

## 2021-04-11 RX ADMIN — PIPERACILLIN AND TAZOBACTAM 200 GRAM(S): 4; .5 INJECTION, POWDER, LYOPHILIZED, FOR SOLUTION INTRAVENOUS at 11:07

## 2021-04-11 RX ADMIN — POLYETHYLENE GLYCOL 3350 17 GRAM(S): 17 POWDER, FOR SOLUTION ORAL at 17:31

## 2021-04-11 RX ADMIN — ENOXAPARIN SODIUM 40 MILLIGRAM(S): 100 INJECTION SUBCUTANEOUS at 17:31

## 2021-04-11 RX ADMIN — INSULIN GLARGINE 12 UNIT(S): 100 INJECTION, SOLUTION SUBCUTANEOUS at 22:43

## 2021-04-11 RX ADMIN — Medication 3 MILLILITER(S): at 09:22

## 2021-04-11 RX ADMIN — Medication 650 MILLIGRAM(S): at 08:38

## 2021-04-11 RX ADMIN — Medication 650 MILLIGRAM(S): at 10:47

## 2021-04-11 RX ADMIN — PROPOFOL 7.2 MICROGRAM(S)/KG/MIN: 10 INJECTION, EMULSION INTRAVENOUS at 12:52

## 2021-04-11 RX ADMIN — INSULIN HUMAN 2 UNIT(S): 100 INJECTION, SOLUTION SUBCUTANEOUS at 06:24

## 2021-04-11 RX ADMIN — Medication 1 APPLICATION(S): at 11:15

## 2021-04-11 RX ADMIN — PROPOFOL 7.2 MICROGRAM(S)/KG/MIN: 10 INJECTION, EMULSION INTRAVENOUS at 23:19

## 2021-04-11 RX ADMIN — FENTANYL CITRATE 6 MICROGRAM(S)/KG/HR: 50 INJECTION INTRAVENOUS at 12:17

## 2021-04-11 RX ADMIN — Medication 1 APPLICATION(S): at 05:34

## 2021-04-11 RX ADMIN — PIPERACILLIN AND TAZOBACTAM 200 GRAM(S): 4; .5 INJECTION, POWDER, LYOPHILIZED, FOR SOLUTION INTRAVENOUS at 04:39

## 2021-04-11 RX ADMIN — Medication 3 MILLILITER(S): at 20:00

## 2021-04-11 RX ADMIN — PROPOFOL 7.2 MICROGRAM(S)/KG/MIN: 10 INJECTION, EMULSION INTRAVENOUS at 17:20

## 2021-04-11 RX ADMIN — PIPERACILLIN AND TAZOBACTAM 200 GRAM(S): 4; .5 INJECTION, POWDER, LYOPHILIZED, FOR SOLUTION INTRAVENOUS at 23:22

## 2021-04-11 RX ADMIN — QUETIAPINE FUMARATE 50 MILLIGRAM(S): 200 TABLET, FILM COATED ORAL at 21:04

## 2021-04-11 RX ADMIN — Medication 1 APPLICATION(S): at 23:21

## 2021-04-11 RX ADMIN — ENOXAPARIN SODIUM 40 MILLIGRAM(S): 100 INJECTION SUBCUTANEOUS at 05:31

## 2021-04-11 RX ADMIN — INSULIN HUMAN 2 UNIT(S): 100 INJECTION, SOLUTION SUBCUTANEOUS at 12:51

## 2021-04-11 RX ADMIN — PROPOFOL 7.2 MICROGRAM(S)/KG/MIN: 10 INJECTION, EMULSION INTRAVENOUS at 01:40

## 2021-04-11 RX ADMIN — INSULIN HUMAN 4: 100 INJECTION, SOLUTION SUBCUTANEOUS at 12:51

## 2021-04-11 RX ADMIN — PANTOPRAZOLE SODIUM 40 MILLIGRAM(S): 20 TABLET, DELAYED RELEASE ORAL at 11:07

## 2021-04-11 RX ADMIN — PROPOFOL 7.2 MICROGRAM(S)/KG/MIN: 10 INJECTION, EMULSION INTRAVENOUS at 09:29

## 2021-04-11 RX ADMIN — PROPOFOL 7.2 MICROGRAM(S)/KG/MIN: 10 INJECTION, EMULSION INTRAVENOUS at 21:00

## 2021-04-11 RX ADMIN — POLYETHYLENE GLYCOL 3350 17 GRAM(S): 17 POWDER, FOR SOLUTION ORAL at 05:32

## 2021-04-11 RX ADMIN — PROPOFOL 7.2 MICROGRAM(S)/KG/MIN: 10 INJECTION, EMULSION INTRAVENOUS at 05:53

## 2021-04-11 RX ADMIN — INSULIN HUMAN 2: 100 INJECTION, SOLUTION SUBCUTANEOUS at 18:00

## 2021-04-11 RX ADMIN — PROPOFOL 7.2 MICROGRAM(S)/KG/MIN: 10 INJECTION, EMULSION INTRAVENOUS at 02:41

## 2021-04-11 RX ADMIN — PIPERACILLIN AND TAZOBACTAM 200 GRAM(S): 4; .5 INJECTION, POWDER, LYOPHILIZED, FOR SOLUTION INTRAVENOUS at 17:31

## 2021-04-11 RX ADMIN — SENNA PLUS 2 TABLET(S): 8.6 TABLET ORAL at 21:06

## 2021-04-11 RX ADMIN — CHLORHEXIDINE GLUCONATE 1 APPLICATION(S): 213 SOLUTION TOPICAL at 11:16

## 2021-04-11 RX ADMIN — Medication 6 MILLIGRAM(S): at 05:31

## 2021-04-11 RX ADMIN — QUETIAPINE FUMARATE 25 MILLIGRAM(S): 200 TABLET, FILM COATED ORAL at 11:08

## 2021-04-11 RX ADMIN — CHLORHEXIDINE GLUCONATE 15 MILLILITER(S): 213 SOLUTION TOPICAL at 05:31

## 2021-04-11 RX ADMIN — CHLORHEXIDINE GLUCONATE 15 MILLILITER(S): 213 SOLUTION TOPICAL at 17:30

## 2021-04-11 RX ADMIN — Medication 1 APPLICATION(S): at 18:54

## 2021-04-11 RX ADMIN — Medication 3 MILLILITER(S): at 05:37

## 2021-04-11 NOTE — CONSULT NOTE ADULT - ASSESSMENT
56M h/o MU on home CPAP p/w hypoxic respiratory failure due to COVID-19 PNA now on vent.  Course c/b persistent fever despite zosyn since 4/9.  CXR showed worsening infiltrate.  Sputum Cx only grew C. dublinensis likely represents colonization.  DDx of fever includes COVID-19 PNA, bacterial PNA, CLABSI, or COVID-19 associated invasive pulmonary aspergillosis (CAPA).  CAPA has been reported in recent literature with variable incidence (4-35%) and timing (3 to >14 days since ICU admissions).   Although CAPA seems to be a/w high mortality, it is unclear if preemptive strategies is beneficial.  It is also difficult to distinguish from invasive disease from colonization when biomarker is positive or aspergillus positive from respiratory culture.  Since currently patient respiratory status is unchanged and hemodynamically stable, it is reasonable to first do fever work-up.    References:   Invasive aspergillosis in critically ill patients: Review of definitions and diagnostic approaches  Mycoses. 2021 Mar 24.    J Infect Dis. 2021 Mar 26;[id number].  Navigating the uncertainties of COVID-19 associated aspergillosis (CAPA): A comparison with influenza associated aspergillosis (IAPA)      - cont zosyn 4.5g IV q6h   - f/u fungitell and galactomannon  - send aspergillus antibody serum  - CT chest   - send BCx from central line and peripheral     Team 2 will follow you.    Alyssa Polk MD, MS  Infectious Disease attending  work cell [phone]

## 2021-04-11 NOTE — CHART NOTE - NSCHARTNOTEFT_GEN_A_CORE
Patient proned at 2230 pm with reident, intern, RN and RRT at bedside. Pressure points offloaded. Allevyns intact on all bony prominences.   Patient tolerated well, saturating >94& on 40% FiO2.  All lines cleared and infusing appropriately.  Airway patent post proning.  ABG ordered one hour after proning.

## 2021-04-11 NOTE — PROGRESS NOTE ADULT - ASSESSMENT
Patient is a 57 yo M with PMHx of MU and gout who presented to ED with c/o progressively worsening SOB now admitted to ICU for management of AHRF in the setting of COViD    NEURO  #Sedated   -propofol and fentanyl for rass -4   -Seroquel regimen added to avoid delirium  -Will maintain sedated for ventilator setting tolerance  -Neuro checks per ICU protocol, pain/sedation meds assessed and addressed, will titrate down as able to maintain vent synchrony    RESPIRATORY  #Acute hypoxic hypercapneic respiratory failure in the setting of COViD PNA  -Patient initially presented with O2 saturation of 14% and cyanotic requiring immediate intubation  -CXr with diffuse bilateral opacities with + COVID19 PCR  -Elevated inflammatory markers  -Patient currently proned, unproned later today   -On AC/VC mechanical Ventilation:  Mode: AC/ CMV; RR: 20; TV: 450; FiO2: 40; PEEP: 10  -PF ratio 202 with this morning's ABG, post proning ABG >300.  -Will Wean FiO2/PEEP as tolerated    #COViD  -CXr with diffuse bilateral opacities with + COVID19 PCR  -Start Date 4/5 Remdesivir and Decadron 6 mg (x 10 day course)  -Remdesevir course given 4/5-4/9 per chart review (was briefly d/c'd due to renal fxn worsening but was REORDERED)    #MU  -As per patient's wife, patient has MU on CPAP  -Patient sleeps with 2-3 pillows at night, attributed to MU    CARDIOVASCULAR  #RV dilation  -Bedside POCUS demonstrating dilated RV and mildly enlarged pulm artery possibly 2/2 MU vs PE (unlikely)  -Echo: Severe hypokinesis of the entire anteroseptum/inferoseptum. Akinesis of the mid to apical anterolateral region, apical anterior, apical inferior and true apex. Mild hypokinesis of the basal anterolateral, basal anterior and basal inferior regions. The estimated left ventricular systolic function is 15%. The right ventricle is mildly dilated. Right ventricular systolic function is mildly reduced. No pericardial effusion is seen.  -Based on these findings, cardiology was consulted  -Appreciate cardiac recs  -As per spouse, no hx of cardiac disease and no change in ADLs or activity tolerance prior to current presentation    #LV Dysfunction  As per Cardiology:  -Echo reviewed. Images are poor due to body habitus but suggest a stress cardiomyopathy pattern (takotsubo cardiomyopathy) rather than an ischemic event  -EKG reviewed showing NSR, with non specific ST changes and poor R wave progression in the precordial leads likely due to body habitus  -Clinically patient is warm and well perfused and making urine.  -Patient's LV dysfunction appears to be mutifactorial: COVID, Morbid Obesity, Sleep Apnea. Myocarditis is also a possibility; now exacerbateed in setting of COVIDPNA  -Unfortunately patient is unable to undergo further ischemic or confirmatory imaging/workup  -Hold off BB or ACE/ARB therapy while on pressors with Renal impairment  -EKG from 4/5 NSR with RBBB, without ischemic changes  -Please keep K>4 and Mg>2  -Will need repeat ECHO post extubation to re-eval EF, if still depressed with wall motion abn will pursue ischemic work up  -Agree with diuresis for now and continuing with current management with Levo as mixed venous and clinical picture does not correlate with cardiogenic shock at this time.    GI  -No active issues  -Nasoenteral feeding currently being held as NGT is not patent, will reassess and place new one if indicated  -Protonix IVP for GI ppx    RENAL//F&E  #Acute Kidney Injury  -SCr stable, trending down  -Baseline creatinine unknown, no reported kidney disease  -Adequate urine output   -Electrolytes stable  -Strict I&O monitoring  -Avoid nephrotoxic medications    #Hypernatremia  -On 4/9, D5W @ 80cc/hr and free water flushes for hypernatremia 150  -Today Na is 147, fluids d/c'd and continuing with free water 250 mL q 8hr, will calculate FW and see he needs further flushes    ID  #COVID PNA   -Plan as above  -Remains on steroid rx, remdesivir course completed    #Severe Sepsis  -Patient presents with tachycardia, tachypnea with a pulmonary process, lactate 12, WBC 15.85, PCT 0.17; meeting criteria for severe sepsis; currently on exam, patient well perfused with adequate pulses, adequate UO, unable to assess mentation at this time  -Source likely COVID19 PNA with possible superimposed bacterial or fungal PNA  -Broad spectrum abx started, vanco and zosyn started 4/5 AM, de-escalated to ceftriaxone, now d/c'd  -Decadron and remdesivir started 4/5 for COVID19  -Patient febrile 4/8 and 4/9 with copious creamy secretions, Zosyn started 4/9 x 7-10 day course  - As pt is persistently spiking fevers throughout the day without impovement, will add voriconazole pending ID approval.   -Maintain MAP > 65 mmHg  -Monitor urine output    ENDOCRINE  #Hyperglycemia  -q6hr correctional scale ordered, will adjust as needed, may need premeal  -Lantus 12 units  -Blood glucose trend as above  -Decadron started 4/5 AM  -A1c 6.2    HEME  #Anemia  -Hgb 11 on admission; unknown baseline. No evidence of bleed.  -Hgb today 82  -Trend CBC  -Maintain active T+S  -Transfuse for Hb <7    -DVT ppx with Lovenox BID

## 2021-04-11 NOTE — PROGRESS NOTE ADULT - ATTENDING COMMENTS
Patient seen and examined with house-staff during bedside rounds.  Resident note read, including vitals, physical findings, laboratory data, and radiological reports.   Revisions included below.  Direct personal management at bed side and extensive interpretation of the data.  Plan was outlined and discussed in details with the housestaff.  Decision making of high complexity  Action taken for acute disease activity to reflect the level of care provided:  - medication reconciliation  - review laboratory data  Patient is clinically stable.  The temperature curve is coming down.  The blood culture negative.  Sputum grew yeast.  Patient responded to Zosyn and will continue for 7 to 10 days.  The PO2 FiO2 ratio 205 and his ventilation improved with decrease in the CO2 and increase in the pH.  We will evaluate the PO2 FiO2 ratio once in supine position and will evaluate in his patient will require further proning.  Continue bronchial dilators and Mucomyst.  Pulmonary toilet.  Patient has a negative fluid balance without any diuretics is mobilizing his third space.  The renal function is improved and creatinine decreased.  LFTs are normal.  The blood sugar is controlled on insulin.  Continue sedation allergy analgesic as is today and will evaluate tomorrow with decreasing the propofol.  The patient is off pressors and the shock state resolved and the patient is off paralytics

## 2021-04-11 NOTE — PROGRESS NOTE ADULT - SUBJECTIVE AND OBJECTIVE BOX
SHARON MCMAHAN, 56y, Male  MRN-[id number]  Patient is a 56y old  Male who presents with a chief complaint of SOB (10 Apr 2021 08:00)      OVERNIGHT EVENTS:     SUBJECTIVE:  -------------------------------------------------------------------------------  VITAL SIGNS:  Vital Signs Last 24 Hrs  T(C): 37.8 (11 Apr 2021 04:31), Max: 38.2 (10 Apr 2021 21:00)  T(F): 100 (11 Apr 2021 04:31), Max: 100.8 (10 Apr 2021 21:00)  HR: 61 (11 Apr 2021 07:00) (51 - 67)  BP: --  BP(mean): --  RR: 18 (11 Apr 2021 07:00) (9 - 23)  SpO2: 96% (11 Apr 2021 07:00) (96% - 100%)  Mode: AC/ CMV (Assist Control/ Continuous Mandatory Ventilation), RR (machine): 20, TV (machine): 450, FiO2: 40, PEEP: 10, ITime: 1, MAP: 14, PIP: 19    I&O's Summary    10 Apr 2021 07:01  -  11 Apr 2021 07:00  --------------------------------------------------------  IN: 1532.6 mL / OUT: 2430 mL / NET: -897.4 mL        PHYSICAL EXAM:    General: NAD, well developed  HEENT: NC/AT; EOMI, PERRLA, anicteric sclera; moist mucosal membranes.  Neck: supple, trachea midline  Cardiovascular: RRR, +S1/S2; NO M/R/G  Respiratory: CTA B/L; no W/R/R  Gastrointestinal: soft, NT/ND; +BSx4  Extremities: WWP; no edema or cyanosis  Vascular: 2+ radial, DP/PT pulses B/L  Neurological: AAOx3; no focal deficits    ALLERGIES:  Allergies    No Known Allergies    Intolerances        MEDICATIONS:  MEDICATIONS  (STANDING):  acetylcysteine 10%  Inhalation 4 milliLiter(s) Inhalation three times a day  albuterol/ipratropium for Nebulization 3 milliLiter(s) Nebulizer every 4 hours  chlorhexidine 0.12% Liquid 15 milliLiter(s) Oral Mucosa every 12 hours  chlorhexidine 2% Cloths 1 Application(s) Topical daily  cisatracurium Infusion 3 MICROgram(s)/kG/Min (21.6 mL/Hr) IV Continuous <Continuous>  dexAMETHasone  Injectable 6 milliGRAM(s) IV Push every 24 hours  dextrose 40% Gel 15 Gram(s) Oral once  dextrose 5%. 1000 milliLiter(s) (50 mL/Hr) IV Continuous <Continuous>  dextrose 5%. 1000 milliLiter(s) (100 mL/Hr) IV Continuous <Continuous>  dextrose 50% Injectable 25 Gram(s) IV Push once  dextrose 50% Injectable 12.5 Gram(s) IV Push once  dextrose 50% Injectable 25 Gram(s) IV Push once  enoxaparin Injectable 40 milliGRAM(s) SubCutaneous every 12 hours  fentaNYL   Infusion. 0.5 MICROgram(s)/kG/Hr (6 mL/Hr) IV Continuous <Continuous>  glucagon  Injectable 1 milliGRAM(s) IntraMuscular once  insulin glargine Injectable (LANTUS) 12 Unit(s) SubCutaneous at bedtime  insulin regular  human corrective regimen sliding scale   SubCutaneous every 6 hours  insulin regular  human recombinant 2 Unit(s) SubCutaneous every 6 hours  midazolam Infusion 0.02 mG/kG/Hr (2.4 mL/Hr) IV Continuous <Continuous>  norepinephrine Infusion 0.05 MICROgram(s)/kG/Min (11.3 mL/Hr) IV Continuous <Continuous>  pantoprazole  Injectable 40 milliGRAM(s) IV Push daily  petrolatum Ophthalmic Ointment 1 Application(s) Both EYES every 6 hours  piperacillin/tazobactam IVPB.. 4.5 Gram(s) IV Intermittent every 6 hours  polyethylene glycol 3350 17 Gram(s) Oral every 12 hours  propofol Infusion 10 MICROgram(s)/kG/Min (7.2 mL/Hr) IV Continuous <Continuous>  QUEtiapine 25 milliGRAM(s) Oral daily  QUEtiapine 50 milliGRAM(s) Oral at bedtime  senna 2 Tablet(s) Oral at bedtime    MEDICATIONS  (PRN):  acetaminophen    Suspension .. 650 milliGRAM(s) Oral every 6 hours PRN Temp greater or equal to 38C (100.4F)  sodium chloride 0.9% lock flush 10 milliLiter(s) IV Push every 1 hour PRN Pre/post blood products, medications, blood draw, and to maintain line patency      -------------------------------------------------------------------------------  LABS:                        8.3    10.92 )-----------( 279      ( 11 Apr 2021 05:11 )             28.5     04-11    147<H>  |  108  |  37<H>  ----------------------------<  159<H>  4.8   |  31  |  1.42<H>    Ca    7.9<L>      11 Apr 2021 05:11  Phos  2.8     04-11  Mg     2.6     04-11    TPro  5.7<L>  /  Alb  2.4<L>  /  TBili  0.3  /  DBili  x   /  AST  33  /  ALT  28  /  AlkPhos  68  04-11    LIVER FUNCTIONS - ( 11 Apr 2021 05:11 )  Alb: 2.4 g/dL / Pro: 5.7 g/dL / ALK PHOS: 68 U/L / ALT: 28 U/L / AST: 33 U/L / GGT: x             ABG - ( 11 Apr 2021 05:18 )  pH, Arterial: 7.41  pH, Blood: x     /  pCO2: 51    /  pO2: 82    / HCO3: 32    / Base Excess: 6.6   /  SaO2: 97                Lactate, Blood: 1.0 mmol/L (04-09 @ 02:17)    CARDIAC MARKERS ( 10 Apr 2021 04:57 )  x     / x     / 429 U/L / x     / x            CAPILLARY BLOOD GLUCOSE      POCT Blood Glucose.: 147 mg/dL (11 Apr 2021 06:15)  POCT Blood Glucose.: 189 mg/dL (10 Apr 2021 22:02)  POCT Blood Glucose.: 245 mg/dL (10 Apr 2021 17:42)  POCT Blood Glucose.: 232 mg/dL (10 Apr 2021 12:04)        Culture - Blood (collected 08 Apr 2021 17:53)  Source: .Blood Blood  Preliminary Report (10 Apr 2021 18:00):    No growth at 2 days.    Culture - Blood (collected 08 Apr 2021 17:53)  Source: .Blood Blood  Preliminary Report (10 Apr 2021 18:00):    No growth at 2 days.    Culture - Sputum (collected 08 Apr 2021 17:36)  Source: .Sputum Sputum  Gram Stain (08 Apr 2021 18:11):    No epithelial cells seen    Few WBC's    Few-moderate Yeast  Final Report (10 Apr 2021 08:33):    Numerous Keila dubliniensis    No other  Normal Respiratory Marleni present        RADIOLOGY & ADDITIONAL TESTS: Reviewed.   SHARON MCMAHAN, 56y, Male  [id number]  Patient is a 56y old  Male who presents with a chief complaint of SOB (10 Apr 2021 08:00)      OVERNIGHT EVENTS: HANS.    SUBJECTIVE: Pt seen and examined at bedside this AM. Proned, intubated sedated but not paralyzed.  -------------------------------------------------------------------------------  VITAL SIGNS:  Vital Signs Last 24 Hrs  T(C): 37.8 (11 Apr 2021 04:31), Max: 38.2 (10 Apr 2021 21:00)  T(F): 100 (11 Apr 2021 04:31), Max: 100.8 (10 Apr 2021 21:00)  HR: 61 (11 Apr 2021 07:00) (51 - 67)  BP: --  BP(mean): --  RR: 18 (11 Apr 2021 07:00) (9 - 23)  SpO2: 96% (11 Apr 2021 07:00) (96% - 100%)  Mode: AC/ CMV (Assist Control/ Continuous Mandatory Ventilation), RR (machine): 20, TV (machine): 450, FiO2: 40, PEEP: 10, ITime: 1, MAP: 14, PIP: 19    I&O's Summary    10 Apr 2021 07:01  -  11 Apr 2021 07:00  --------------------------------------------------------  IN: 1532.6 mL / OUT: 2430 mL / NET: -897.4 mL        PHYSICAL EXAM: while proned    General: intubated, sedated   HEENT: NC/AT;anicteric sclera; moist mucosal membranes.  Neck: supple, trachea midline  Cardiovascular: RRR, +S1/S2; NO M/R/G  Respiratory: CTA B/L; no W/R/R  Extremities: WWP; no edema or cyanosis  Vascular: 2+ radial, DP/PT pulses B/L  Neurological: AAOx0 sedated. Not awakening to light touch, voice, or pain;     ALLERGIES:  Allergies    No Known Allergies    Intolerances        MEDICATIONS:  MEDICATIONS  (STANDING):  acetylcysteine 10%  Inhalation 4 milliLiter(s) Inhalation three times a day  albuterol/ipratropium for Nebulization 3 milliLiter(s) Nebulizer every 4 hours  chlorhexidine 0.12% Liquid 15 milliLiter(s) Oral Mucosa every 12 hours  chlorhexidine 2% Cloths 1 Application(s) Topical daily  cisatracurium Infusion 3 MICROgram(s)/kG/Min (21.6 mL/Hr) IV Continuous <Continuous>  dexAMETHasone  Injectable 6 milliGRAM(s) IV Push every 24 hours  dextrose 40% Gel 15 Gram(s) Oral once  dextrose 5%. 1000 milliLiter(s) (50 mL/Hr) IV Continuous <Continuous>  dextrose 5%. 1000 milliLiter(s) (100 mL/Hr) IV Continuous <Continuous>  dextrose 50% Injectable 25 Gram(s) IV Push once  dextrose 50% Injectable 12.5 Gram(s) IV Push once  dextrose 50% Injectable 25 Gram(s) IV Push once  enoxaparin Injectable 40 milliGRAM(s) SubCutaneous every 12 hours  fentaNYL   Infusion. 0.5 MICROgram(s)/kG/Hr (6 mL/Hr) IV Continuous <Continuous>  glucagon  Injectable 1 milliGRAM(s) IntraMuscular once  insulin glargine Injectable (LANTUS) 12 Unit(s) SubCutaneous at bedtime  insulin regular  human corrective regimen sliding scale   SubCutaneous every 6 hours  insulin regular  human recombinant 2 Unit(s) SubCutaneous every 6 hours  midazolam Infusion 0.02 mG/kG/Hr (2.4 mL/Hr) IV Continuous <Continuous>  norepinephrine Infusion 0.05 MICROgram(s)/kG/Min (11.3 mL/Hr) IV Continuous <Continuous>  pantoprazole  Injectable 40 milliGRAM(s) IV Push daily  petrolatum Ophthalmic Ointment 1 Application(s) Both EYES every 6 hours  piperacillin/tazobactam IVPB.. 4.5 Gram(s) IV Intermittent every 6 hours  polyethylene glycol 3350 17 Gram(s) Oral every 12 hours  propofol Infusion 10 MICROgram(s)/kG/Min (7.2 mL/Hr) IV Continuous <Continuous>  QUEtiapine 25 milliGRAM(s) Oral daily  QUEtiapine 50 milliGRAM(s) Oral at bedtime  senna 2 Tablet(s) Oral at bedtime    MEDICATIONS  (PRN):  acetaminophen    Suspension .. 650 milliGRAM(s) Oral every 6 hours PRN Temp greater or equal to 38C (100.4F)  sodium chloride 0.9% lock flush 10 milliLiter(s) IV Push every 1 hour PRN Pre/post blood products, medications, blood draw, and to maintain line patency      -------------------------------------------------------------------------------  LABS:                        8.3    10.92 )-----------( 279      ( 11 Apr 2021 05:11 )             28.5     04-11    147<H>  |  108  |  37<H>  ----------------------------<  159<H>  4.8   |  31  |  1.42<H>    Ca    7.9<L>      11 Apr 2021 05:11  Phos  2.8     04-11  Mg     2.6     04-11    TPro  5.7<L>  /  Alb  2.4<L>  /  TBili  0.3  /  DBili  x   /  AST  33  /  ALT  28  /  AlkPhos  68  04-11    LIVER FUNCTIONS - ( 11 Apr 2021 05:11 )  Alb: 2.4 g/dL / Pro: 5.7 g/dL / ALK PHOS: 68 U/L / ALT: 28 U/L / AST: 33 U/L / GGT: x             ABG - ( 11 Apr 2021 05:18 )  pH, Arterial: 7.41  pH, Blood: x     /  pCO2: 51    /  pO2: 82    / HCO3: 32    / Base Excess: 6.6   /  SaO2: 97                Lactate, Blood: 1.0 mmol/L (04-09 @ 02:17)    CARDIAC MARKERS ( 10 Apr 2021 04:57 )  x     / x     / 429 U/L / x     / x            CAPILLARY BLOOD GLUCOSE      POCT Blood Glucose.: 147 mg/dL (11 Apr 2021 06:15)  POCT Blood Glucose.: 189 mg/dL (10 Apr 2021 22:02)  POCT Blood Glucose.: 245 mg/dL (10 Apr 2021 17:42)  POCT Blood Glucose.: 232 mg/dL (10 Apr 2021 12:04)        Culture - Blood (collected 08 Apr 2021 17:53)  Source: .Blood Blood  Preliminary Report (10 Apr 2021 18:00):    No growth at 2 days.    Culture - Blood (collected 08 Apr 2021 17:53)  Source: .Blood Blood  Preliminary Report (10 Apr 2021 18:00):    No growth at 2 days.    Culture - Sputum (collected 08 Apr 2021 17:36)  Source: .Sputum Sputum  Gram Stain (08 Apr 2021 18:11):    No epithelial cells seen    Few WBC's    Few-moderate Yeast  Final Report (10 Apr 2021 08:33):    Numerous Keila dubliniensis    No other  Normal Respiratory Marleni present        RADIOLOGY & ADDITIONAL TESTS: Reviewed.

## 2021-04-11 NOTE — CONSULT NOTE ADULT - SUBJECTIVE AND OBJECTIVE BOX
INFECTIOUS DISEASES INITIAL CONSULT NOTE    HPI: 56M h/o MU on home CPAP p/w SOB on 4/5, found to be in hypoxic respiratory failure due to COVID-19 PNA requiring emergent intubation.  He was initially placed on vanc/zosyn on 4/5 then discontinued as bacterial infection unlikely. RDV and decadron started on 4/5. WBC on admisson was 15 then improved now to 10. vent was also 100% initially then downtitrated quickly, now stable at 40%. On 4/8 he started to spike fever so he was pan-cultured. UA neg, sputum culture grew C. dubliniensis, BCx ngtd x 2 sets.  He has been on zosyn since 4/9 and he remains febrile.  CXR 4/11 showed progression on infiltrate.  ICU team concerned for aspergillosis.  ID was consulted for work-up for possible aspergillosis.   Patient is intubated and sedated, unable to provide history.      Hospital Course:  VS in ED: , /105 -> 211/127, RR 44, SpO2 51% on NRB  Labs significant for: WBC 15.85, Hb 11.0. MCV 38.2, D-dimer 1002, fibrinogen 450, Na 131, Cl 91, CO2 14, anion gap 26, Cr 1.58, calcium 8.3, AST 57, .4, lactate 12.2,   VBG pH 7.16, pCO2 46, pO2 1.02, HCO3 16, covid-positive.   CXR diffuse bilateral infiltrates (05 Apr 2021 11:12)      PAST MEDICAL & SURGICAL HISTORY:  Sleep apnea    Gout        Review of Systems:   unable to obtain       ANTIBIOTICS:  MEDICATIONS  (STANDING):  acetylcysteine 10%  Inhalation 4 milliLiter(s) Inhalation three times a day  albuterol/ipratropium for Nebulization 3 milliLiter(s) Nebulizer every 4 hours  chlorhexidine 0.12% Liquid 15 milliLiter(s) Oral Mucosa every 12 hours  chlorhexidine 2% Cloths 1 Application(s) Topical daily  cisatracurium Infusion 3 MICROgram(s)/kG/Min (21.6 mL/Hr) IV Continuous <Continuous>  dexAMETHasone  Injectable 6 milliGRAM(s) IV Push every 24 hours  dextrose 40% Gel 15 Gram(s) Oral once  dextrose 5%. 1000 milliLiter(s) (50 mL/Hr) IV Continuous <Continuous>  dextrose 5%. 1000 milliLiter(s) (100 mL/Hr) IV Continuous <Continuous>  dextrose 50% Injectable 25 Gram(s) IV Push once  dextrose 50% Injectable 12.5 Gram(s) IV Push once  dextrose 50% Injectable 25 Gram(s) IV Push once  enoxaparin Injectable 40 milliGRAM(s) SubCutaneous every 12 hours  fentaNYL   Infusion. 0.5 MICROgram(s)/kG/Hr (6 mL/Hr) IV Continuous <Continuous>  glucagon  Injectable 1 milliGRAM(s) IntraMuscular once  insulin glargine Injectable (LANTUS) 12 Unit(s) SubCutaneous at bedtime  insulin regular  human corrective regimen sliding scale   SubCutaneous every 6 hours  insulin regular  human recombinant 2 Unit(s) SubCutaneous every 6 hours  midazolam Infusion 0.02 mG/kG/Hr (2.4 mL/Hr) IV Continuous <Continuous>  norepinephrine Infusion 0.05 MICROgram(s)/kG/Min (11.3 mL/Hr) IV Continuous <Continuous>  pantoprazole  Injectable 40 milliGRAM(s) IV Push daily  petrolatum Ophthalmic Ointment 1 Application(s) Both EYES every 6 hours  piperacillin/tazobactam IVPB.. 4.5 Gram(s) IV Intermittent every 6 hours  polyethylene glycol 3350 17 Gram(s) Oral every 12 hours  propofol Infusion 10 MICROgram(s)/kG/Min (7.2 mL/Hr) IV Continuous <Continuous>  QUEtiapine 25 milliGRAM(s) Oral daily  QUEtiapine 50 milliGRAM(s) Oral at bedtime  senna 2 Tablet(s) Oral at bedtime    MEDICATIONS  (PRN):  acetaminophen    Suspension .. 650 milliGRAM(s) Oral every 6 hours PRN Temp greater or equal to 38C (100.4F)  sodium chloride 0.9% lock flush 10 milliLiter(s) IV Push every 1 hour PRN Pre/post blood products, medications, blood draw, and to maintain line patency      Allergies    No Known Allergies    Intolerances        SOCIAL HISTORY:    FAMILY HISTORY:   no FH leading to current infection    Vital Signs Last 24 Hrs  T(C): 37.3 (11 Apr 2021 21:00), Max: 38.8 (11 Apr 2021 10:47)  T(F): 99.2 (11 Apr 2021 21:00), Max: 101.9 (11 Apr 2021 10:47)  HR: 67 (11 Apr 2021 21:00) (44 - 67)  BP: --  BP(mean): --  RR: 21 (11 Apr 2021 21:00) (10 - 23)  SpO2: 95% (11 Apr 2021 21:00) (95% - 100%)    04-10-21 @ 07:01  -  04-11-21 @ 07:00  --------------------------------------------------------  IN: 2032.6 mL / OUT: 2530 mL / NET: -497.4 mL    04-11-21 @ 07:01  -  04-11-21 @ 22:00  --------------------------------------------------------  IN: 1372.2 mL / OUT: 915 mL / NET: 457.2 mL        PHYSICAL EXAM:  Constitutional: intubated, agitated   Eyes: the sclera and conjunctiva were normal.   ENT: the ears and nose were normal in appearance. ET tube placed   Neck: the appearance of the neck was normal and the neck was supple.   Pulmonary: ronchi throughout   Heart: heart rate was normal and rhythm regular, normal S1 and S2  Abdomen: normal bowel sounds, soft  Neurological: withdraw from pain, doesn't respond to command       LABS:                        8.3    10.92 )-----------( 279      ( 11 Apr 2021 05:11 )             28.5     04-11    147<H>  |  108  |  37<H>  ----------------------------<  159<H>  4.8   |  31  |  1.42<H>    Ca    7.9<L>      11 Apr 2021 05:11  Phos  2.8     04-11  Mg     2.6     04-11    TPro  5.7<L>  /  Alb  2.4<L>  /  TBili  0.3  /  DBili  x   /  AST  33  /  ALT  28  /  AlkPhos  68  04-11          MICROBIOLOGY:  4/8 UA neg  4/8 BCx neg x2  4/9 Sputum Cx: C. albicans  4/6 COVID spike ab +  4/5 legionella urine ag-  4/5 sputum cx normal  4/5 COVID PCR +   4/5 BCx -  4/5 HIV neg     RADIOLOGY & ADDITIONAL STUDIES:

## 2021-04-12 LAB
ALBUMIN SERPL ELPH-MCNC: 2.2 G/DL — LOW (ref 3.3–5)
ALBUMIN SERPL ELPH-MCNC: 2.4 G/DL — LOW (ref 3.3–5)
ALP SERPL-CCNC: 74 U/L — SIGNIFICANT CHANGE UP (ref 40–120)
ALP SERPL-CCNC: 82 U/L — SIGNIFICANT CHANGE UP (ref 40–120)
ALT FLD-CCNC: 28 U/L — SIGNIFICANT CHANGE UP (ref 10–45)
ALT FLD-CCNC: 30 U/L — SIGNIFICANT CHANGE UP (ref 10–45)
ANION GAP SERPL CALC-SCNC: 10 MMOL/L — SIGNIFICANT CHANGE UP (ref 5–17)
ANION GAP SERPL CALC-SCNC: 11 MMOL/L — SIGNIFICANT CHANGE UP (ref 5–17)
ANION GAP SERPL CALC-SCNC: 15 MMOL/L — SIGNIFICANT CHANGE UP (ref 5–17)
ANISOCYTOSIS BLD QL: SLIGHT — SIGNIFICANT CHANGE UP
AST SERPL-CCNC: 38 U/L — SIGNIFICANT CHANGE UP (ref 10–40)
AST SERPL-CCNC: 49 U/L — HIGH (ref 10–40)
BASE EXCESS BLDA CALC-SCNC: 1.6 MMOL/L — SIGNIFICANT CHANGE UP (ref -2–3)
BASE EXCESS BLDA CALC-SCNC: 3.8 MMOL/L — HIGH (ref -2–3)
BASOPHILS # BLD AUTO: 0 K/UL — SIGNIFICANT CHANGE UP (ref 0–0.2)
BASOPHILS NFR BLD AUTO: 0 % — SIGNIFICANT CHANGE UP (ref 0–2)
BILIRUB SERPL-MCNC: 0.4 MG/DL — SIGNIFICANT CHANGE UP (ref 0.2–1.2)
BILIRUB SERPL-MCNC: 0.4 MG/DL — SIGNIFICANT CHANGE UP (ref 0.2–1.2)
BUN SERPL-MCNC: 44 MG/DL — HIGH (ref 7–23)
BUN SERPL-MCNC: 57 MG/DL — HIGH (ref 7–23)
BUN SERPL-MCNC: 59 MG/DL — HIGH (ref 7–23)
CALCIUM SERPL-MCNC: 7.8 MG/DL — LOW (ref 8.4–10.5)
CALCIUM SERPL-MCNC: 7.9 MG/DL — LOW (ref 8.4–10.5)
CALCIUM SERPL-MCNC: 8.4 MG/DL — SIGNIFICANT CHANGE UP (ref 8.4–10.5)
CHLORIDE SERPL-SCNC: 103 MMOL/L — SIGNIFICANT CHANGE UP (ref 96–108)
CHLORIDE SERPL-SCNC: 106 MMOL/L — SIGNIFICANT CHANGE UP (ref 96–108)
CHLORIDE SERPL-SCNC: 106 MMOL/L — SIGNIFICANT CHANGE UP (ref 96–108)
CO2 SERPL-SCNC: 24 MMOL/L — SIGNIFICANT CHANGE UP (ref 22–31)
CO2 SERPL-SCNC: 27 MMOL/L — SIGNIFICANT CHANGE UP (ref 22–31)
CO2 SERPL-SCNC: 27 MMOL/L — SIGNIFICANT CHANGE UP (ref 22–31)
CREAT SERPL-MCNC: 2 MG/DL — HIGH (ref 0.5–1.3)
CREAT SERPL-MCNC: 2.63 MG/DL — HIGH (ref 0.5–1.3)
CREAT SERPL-MCNC: 3.16 MG/DL — HIGH (ref 0.5–1.3)
CRP SERPL-MCNC: 125.3 MG/L — HIGH (ref 0–4)
EOSINOPHIL # BLD AUTO: 0.5 K/UL — SIGNIFICANT CHANGE UP (ref 0–0.5)
EOSINOPHIL NFR BLD AUTO: 3.8 % — SIGNIFICANT CHANGE UP (ref 0–6)
GAS PNL BLDA: SIGNIFICANT CHANGE UP
GAS PNL BLDA: SIGNIFICANT CHANGE UP
GIANT PLATELETS BLD QL SMEAR: PRESENT — SIGNIFICANT CHANGE UP
GLUCOSE BLDC GLUCOMTR-MCNC: 130 MG/DL — HIGH (ref 70–99)
GLUCOSE BLDC GLUCOMTR-MCNC: 157 MG/DL — HIGH (ref 70–99)
GLUCOSE BLDC GLUCOMTR-MCNC: 159 MG/DL — HIGH (ref 70–99)
GLUCOSE BLDC GLUCOMTR-MCNC: 218 MG/DL — HIGH (ref 70–99)
GLUCOSE SERPL-MCNC: 155 MG/DL — HIGH (ref 70–99)
GLUCOSE SERPL-MCNC: 158 MG/DL — HIGH (ref 70–99)
GLUCOSE SERPL-MCNC: 238 MG/DL — HIGH (ref 70–99)
GRAM STN FLD: SIGNIFICANT CHANGE UP
GRAM STN FLD: SIGNIFICANT CHANGE UP
HCO3 BLDA-SCNC: 27 MMOL/L — SIGNIFICANT CHANGE UP (ref 21–28)
HCO3 BLDA-SCNC: 28 MMOL/L — SIGNIFICANT CHANGE UP (ref 21–28)
HCT VFR BLD CALC: 32 % — LOW (ref 39–50)
HGB BLD-MCNC: 9.3 G/DL — LOW (ref 13–17)
HYPOCHROMIA BLD QL: SIGNIFICANT CHANGE UP
LYMPHOCYTES # BLD AUTO: 0.5 K/UL — LOW (ref 1–3.3)
LYMPHOCYTES # BLD AUTO: 3.8 % — LOW (ref 13–44)
MAGNESIUM SERPL-MCNC: 2.6 MG/DL — SIGNIFICANT CHANGE UP (ref 1.6–2.6)
MANUAL SMEAR VERIFICATION: SIGNIFICANT CHANGE UP
MCHC RBC-ENTMCNC: 23.1 PG — LOW (ref 27–34)
MCHC RBC-ENTMCNC: 29.1 GM/DL — LOW (ref 32–36)
MCV RBC AUTO: 79.4 FL — LOW (ref 80–100)
METAMYELOCYTES # FLD: 1 % — HIGH (ref 0–0)
MICROCYTES BLD QL: SLIGHT — SIGNIFICANT CHANGE UP
MONOCYTES # BLD AUTO: 1.24 K/UL — HIGH (ref 0–0.9)
MONOCYTES NFR BLD AUTO: 9.5 % — SIGNIFICANT CHANGE UP (ref 2–14)
MYELOCYTES NFR BLD: 1 % — HIGH (ref 0–0)
NEUTROPHILS # BLD AUTO: 10.57 K/UL — HIGH (ref 1.8–7.4)
NEUTROPHILS NFR BLD AUTO: 80 % — HIGH (ref 43–77)
NEUTS BAND # BLD: 0.9 % — SIGNIFICANT CHANGE UP (ref 0–8)
NRBC # BLD: 1 /100 — HIGH (ref 0–0)
NRBC # BLD: SIGNIFICANT CHANGE UP /100 WBCS (ref 0–0)
PCO2 BLDA: 44 MMHG — SIGNIFICANT CHANGE UP (ref 35–48)
PCO2 BLDA: 44 MMHG — SIGNIFICANT CHANGE UP (ref 35–48)
PH BLDA: 7.4 — SIGNIFICANT CHANGE UP (ref 7.35–7.45)
PH BLDA: 7.43 — SIGNIFICANT CHANGE UP (ref 7.35–7.45)
PHOSPHATE SERPL-MCNC: 3.2 MG/DL — SIGNIFICANT CHANGE UP (ref 2.5–4.5)
PLAT MORPH BLD: ABNORMAL
PLATELET # BLD AUTO: 320 K/UL — SIGNIFICANT CHANGE UP (ref 150–400)
PO2 BLDA: 108 MMHG — SIGNIFICANT CHANGE UP (ref 83–108)
PO2 BLDA: 98 MMHG — SIGNIFICANT CHANGE UP (ref 83–108)
POIKILOCYTOSIS BLD QL AUTO: SLIGHT — SIGNIFICANT CHANGE UP
POLYCHROMASIA BLD QL SMEAR: SLIGHT — SIGNIFICANT CHANGE UP
POTASSIUM SERPL-MCNC: 4.4 MMOL/L — SIGNIFICANT CHANGE UP (ref 3.5–5.3)
POTASSIUM SERPL-MCNC: 4.5 MMOL/L — SIGNIFICANT CHANGE UP (ref 3.5–5.3)
POTASSIUM SERPL-MCNC: 5.1 MMOL/L — SIGNIFICANT CHANGE UP (ref 3.5–5.3)
POTASSIUM SERPL-SCNC: 4.4 MMOL/L — SIGNIFICANT CHANGE UP (ref 3.5–5.3)
POTASSIUM SERPL-SCNC: 4.5 MMOL/L — SIGNIFICANT CHANGE UP (ref 3.5–5.3)
POTASSIUM SERPL-SCNC: 5.1 MMOL/L — SIGNIFICANT CHANGE UP (ref 3.5–5.3)
PROT SERPL-MCNC: 5.8 G/DL — LOW (ref 6–8.3)
PROT SERPL-MCNC: 6.1 G/DL — SIGNIFICANT CHANGE UP (ref 6–8.3)
RBC # BLD: 4.03 M/UL — LOW (ref 4.2–5.8)
RBC # FLD: 18.2 % — HIGH (ref 10.3–14.5)
RBC BLD AUTO: SIGNIFICANT CHANGE UP
SAO2 % BLDA: 98 % — SIGNIFICANT CHANGE UP (ref 95–100)
SAO2 % BLDA: 98 % — SIGNIFICANT CHANGE UP (ref 95–100)
SMUDGE CELLS # BLD: PRESENT — SIGNIFICANT CHANGE UP
SODIUM SERPL-SCNC: 142 MMOL/L — SIGNIFICANT CHANGE UP (ref 135–145)
SODIUM SERPL-SCNC: 143 MMOL/L — SIGNIFICANT CHANGE UP (ref 135–145)
SODIUM SERPL-SCNC: 144 MMOL/L — SIGNIFICANT CHANGE UP (ref 135–145)
SPECIMEN SOURCE: SIGNIFICANT CHANGE UP
SPECIMEN SOURCE: SIGNIFICANT CHANGE UP
STOMATOCYTES BLD QL SMEAR: SLIGHT — SIGNIFICANT CHANGE UP
WBC # BLD: 13.06 K/UL — HIGH (ref 3.8–10.5)
WBC # FLD AUTO: 13.06 K/UL — HIGH (ref 3.8–10.5)

## 2021-04-12 PROCEDURE — 76937 US GUIDE VASCULAR ACCESS: CPT | Mod: 26,59

## 2021-04-12 PROCEDURE — 99232 SBSQ HOSP IP/OBS MODERATE 35: CPT

## 2021-04-12 PROCEDURE — 99233 SBSQ HOSP IP/OBS HIGH 50: CPT | Mod: 25

## 2021-04-12 PROCEDURE — 31622 DX BRONCHOSCOPE/WASH: CPT | Mod: GC

## 2021-04-12 PROCEDURE — 36000 PLACE NEEDLE IN VEIN: CPT | Mod: 59

## 2021-04-12 PROCEDURE — 71045 X-RAY EXAM CHEST 1 VIEW: CPT | Mod: 26

## 2021-04-12 PROCEDURE — 36556 INSERT NON-TUNNEL CV CATH: CPT

## 2021-04-12 PROCEDURE — 71045 X-RAY EXAM CHEST 1 VIEW: CPT | Mod: 26,77

## 2021-04-12 RX ORDER — SODIUM CHLORIDE 9 MG/ML
1000 INJECTION, SOLUTION INTRAVENOUS ONCE
Refills: 0 | Status: COMPLETED | OUTPATIENT
Start: 2021-04-12 | End: 2021-04-12

## 2021-04-12 RX ORDER — VORICONAZOLE 10 MG/ML
400 INJECTION, POWDER, LYOPHILIZED, FOR SOLUTION INTRAVENOUS EVERY 12 HOURS
Refills: 0 | Status: DISCONTINUED | OUTPATIENT
Start: 2021-04-13 | End: 2021-04-14

## 2021-04-12 RX ORDER — VORICONAZOLE 10 MG/ML
600 INJECTION, POWDER, LYOPHILIZED, FOR SOLUTION INTRAVENOUS ONCE
Refills: 0 | Status: COMPLETED | OUTPATIENT
Start: 2021-04-12 | End: 2021-04-12

## 2021-04-12 RX ORDER — SODIUM CHLORIDE 9 MG/ML
500 INJECTION, SOLUTION INTRAVENOUS ONCE
Refills: 0 | Status: DISCONTINUED | OUTPATIENT
Start: 2021-04-12 | End: 2021-04-12

## 2021-04-12 RX ORDER — MIDAZOLAM HYDROCHLORIDE 1 MG/ML
4 INJECTION, SOLUTION INTRAMUSCULAR; INTRAVENOUS ONCE
Refills: 0 | Status: DISCONTINUED | OUTPATIENT
Start: 2021-04-12 | End: 2021-04-12

## 2021-04-12 RX ORDER — VORICONAZOLE 10 MG/ML
550 INJECTION, POWDER, LYOPHILIZED, FOR SOLUTION INTRAVENOUS ONCE
Refills: 0 | Status: COMPLETED | OUTPATIENT
Start: 2021-04-12 | End: 2021-04-12

## 2021-04-12 RX ORDER — CHLORHEXIDINE GLUCONATE 213 G/1000ML
1 SOLUTION TOPICAL
Refills: 0 | Status: DISCONTINUED | OUTPATIENT
Start: 2021-04-12 | End: 2021-04-14

## 2021-04-12 RX ADMIN — PROPOFOL 7.2 MICROGRAM(S)/KG/MIN: 10 INJECTION, EMULSION INTRAVENOUS at 15:43

## 2021-04-12 RX ADMIN — ENOXAPARIN SODIUM 40 MILLIGRAM(S): 100 INJECTION SUBCUTANEOUS at 17:45

## 2021-04-12 RX ADMIN — CHLORHEXIDINE GLUCONATE 15 MILLILITER(S): 213 SOLUTION TOPICAL at 17:45

## 2021-04-12 RX ADMIN — PROPOFOL 7.2 MICROGRAM(S)/KG/MIN: 10 INJECTION, EMULSION INTRAVENOUS at 02:30

## 2021-04-12 RX ADMIN — Medication 650 MILLIGRAM(S): at 04:37

## 2021-04-12 RX ADMIN — Medication 1 APPLICATION(S): at 17:46

## 2021-04-12 RX ADMIN — INSULIN GLARGINE 12 UNIT(S): 100 INJECTION, SOLUTION SUBCUTANEOUS at 22:43

## 2021-04-12 RX ADMIN — QUETIAPINE FUMARATE 25 MILLIGRAM(S): 200 TABLET, FILM COATED ORAL at 12:37

## 2021-04-12 RX ADMIN — Medication 3 MILLILITER(S): at 01:15

## 2021-04-12 RX ADMIN — POLYETHYLENE GLYCOL 3350 17 GRAM(S): 17 POWDER, FOR SOLUTION ORAL at 05:17

## 2021-04-12 RX ADMIN — Medication 1 APPLICATION(S): at 12:26

## 2021-04-12 RX ADMIN — INSULIN HUMAN 4: 100 INJECTION, SOLUTION SUBCUTANEOUS at 12:02

## 2021-04-12 RX ADMIN — Medication 650 MILLIGRAM(S): at 05:18

## 2021-04-12 RX ADMIN — Medication 650 MILLIGRAM(S): at 11:55

## 2021-04-12 RX ADMIN — VORICONAZOLE 125 MILLIGRAM(S): 10 INJECTION, POWDER, LYOPHILIZED, FOR SOLUTION INTRAVENOUS at 11:55

## 2021-04-12 RX ADMIN — PROPOFOL 7.2 MICROGRAM(S)/KG/MIN: 10 INJECTION, EMULSION INTRAVENOUS at 22:51

## 2021-04-12 RX ADMIN — Medication 3 MILLILITER(S): at 04:27

## 2021-04-12 RX ADMIN — Medication 3 MILLILITER(S): at 20:11

## 2021-04-12 RX ADMIN — Medication 6 MILLIGRAM(S): at 05:20

## 2021-04-12 RX ADMIN — Medication 1 APPLICATION(S): at 23:07

## 2021-04-12 RX ADMIN — CHLORHEXIDINE GLUCONATE 1 APPLICATION(S): 213 SOLUTION TOPICAL at 12:38

## 2021-04-12 RX ADMIN — PIPERACILLIN AND TAZOBACTAM 200 GRAM(S): 4; .5 INJECTION, POWDER, LYOPHILIZED, FOR SOLUTION INTRAVENOUS at 12:37

## 2021-04-12 RX ADMIN — SENNA PLUS 2 TABLET(S): 8.6 TABLET ORAL at 22:44

## 2021-04-12 RX ADMIN — QUETIAPINE FUMARATE 50 MILLIGRAM(S): 200 TABLET, FILM COATED ORAL at 22:43

## 2021-04-12 RX ADMIN — VORICONAZOLE 125 MILLIGRAM(S): 10 INJECTION, POWDER, LYOPHILIZED, FOR SOLUTION INTRAVENOUS at 22:42

## 2021-04-12 RX ADMIN — Medication 650 MILLIGRAM(S): at 22:44

## 2021-04-12 RX ADMIN — CHLORHEXIDINE GLUCONATE 15 MILLILITER(S): 213 SOLUTION TOPICAL at 05:16

## 2021-04-12 RX ADMIN — PIPERACILLIN AND TAZOBACTAM 200 GRAM(S): 4; .5 INJECTION, POWDER, LYOPHILIZED, FOR SOLUTION INTRAVENOUS at 05:15

## 2021-04-12 RX ADMIN — Medication 650 MILLIGRAM(S): at 12:00

## 2021-04-12 RX ADMIN — Medication 1 APPLICATION(S): at 05:18

## 2021-04-12 RX ADMIN — PROPOFOL 7.2 MICROGRAM(S)/KG/MIN: 10 INJECTION, EMULSION INTRAVENOUS at 09:06

## 2021-04-12 RX ADMIN — INSULIN HUMAN 2 UNIT(S): 100 INJECTION, SOLUTION SUBCUTANEOUS at 05:39

## 2021-04-12 RX ADMIN — PROPOFOL 7.2 MICROGRAM(S)/KG/MIN: 10 INJECTION, EMULSION INTRAVENOUS at 12:37

## 2021-04-12 RX ADMIN — PROPOFOL 7.2 MICROGRAM(S)/KG/MIN: 10 INJECTION, EMULSION INTRAVENOUS at 05:40

## 2021-04-12 RX ADMIN — INSULIN HUMAN 2 UNIT(S): 100 INJECTION, SOLUTION SUBCUTANEOUS at 18:12

## 2021-04-12 RX ADMIN — INSULIN HUMAN 2 UNIT(S): 100 INJECTION, SOLUTION SUBCUTANEOUS at 12:02

## 2021-04-12 RX ADMIN — FENTANYL CITRATE 6 MICROGRAM(S)/KG/HR: 50 INJECTION INTRAVENOUS at 09:06

## 2021-04-12 RX ADMIN — MIDAZOLAM HYDROCHLORIDE 4 MILLIGRAM(S): 1 INJECTION, SOLUTION INTRAMUSCULAR; INTRAVENOUS at 18:12

## 2021-04-12 RX ADMIN — Medication 3 MILLILITER(S): at 08:30

## 2021-04-12 RX ADMIN — INSULIN HUMAN 2: 100 INJECTION, SOLUTION SUBCUTANEOUS at 18:12

## 2021-04-12 RX ADMIN — PIPERACILLIN AND TAZOBACTAM 200 GRAM(S): 4; .5 INJECTION, POWDER, LYOPHILIZED, FOR SOLUTION INTRAVENOUS at 17:45

## 2021-04-12 RX ADMIN — ENOXAPARIN SODIUM 40 MILLIGRAM(S): 100 INJECTION SUBCUTANEOUS at 05:17

## 2021-04-12 RX ADMIN — SODIUM CHLORIDE 1000 MILLILITER(S): 9 INJECTION, SOLUTION INTRAVENOUS at 07:56

## 2021-04-12 RX ADMIN — PANTOPRAZOLE SODIUM 40 MILLIGRAM(S): 20 TABLET, DELAYED RELEASE ORAL at 12:37

## 2021-04-12 NOTE — PROGRESS NOTE ADULT - ASSESSMENT
56M h/o MU on home CPAP p/w hypoxic respiratory failure due to COVID-19 PNA now on vent.  Course c/b persistent fever despite zosyn since 4/9.  CXR showed worsening infiltrate.  Sputum Cx only grew C. dublinensis likely represents colonization.  DDx of fever includes COVID-19 PNA, bacterial PNA, CLABSI, or COVID-19 associated invasive pulmonary aspergillosis (CAPA).  Planned for bronch today.    - cont zosyn 4.5g IV q6h   - start empiric voriconazole 600mg IV q12h x 2 doses followed by 400mg IV q12h   - f/u fungitell and galactomannon  - f/u aspergillus antibody  - CT chest   - if patient goes to bronch, please send bacterial, fungal, galactomannon       Team 2 will follow you.    Alyssa Polk MD, MS  Infectious Disease attending  work cell [phone]

## 2021-04-12 NOTE — CHART NOTE - NSCHARTNOTEFT_GEN_A_CORE
[facility] PRE-BRONCHOSCOPY RISK ASSESSMENT  ------------------------------------------------------------    Procedure Date: 4/13/2021    Provider Name: Dr. Wade    Reason for Bronchoscopy: Obtain BAL for bacterial and fungal cultures    Location of Procedure (check one):   [  ] Endoscopy  [  ] Emergency Department  [  x] Intensive Care Unit  [  ] Operating Room  [  ] Other: _________    RISK ASSESSMENT  I. Patient symptoms (check all that apply): >/ 3 of these = SIGNIFICANT RISK for TB  [  ] Coughing > 2 to 3 weeks                 [  ] Unexplained fever >/ 2 weeks   [  ] Unusual weakness or fatigue  [  ] Unexplained weight loss > 10lbs.  [  ] Hemoptysis                                   [  ] Unusual or night sweating  [ x ] NON-APPLICABLE    II. TB history (Check all that apply): >/ 1 of these = SIGNIFICANT RISK for TB  [  ] Sputum smear/culture (+) for acid fast bacilli (AFB)                           [  ] Abnormal CXR or CT suggestive of TB; date(s) & description __________  [  ] Positive TB skin or blood test; date: __________                               [  ] On medication for latent TB or disease; list medication(s) ____________  [  ] TB diagnosed in the past; year treated: _______                                [  ] Inadequately treated TB  [  ] Current close contact of a person known or suspected to have TB  [ x ] NON-APPLICABLE    III. Additional Risk factors for TB (Check all that apply): Consider these in relation to symptoms and history  [  ] Person has conditions placing them at higher risk for TB disease (i.e. immunosuppressive therapy)  [  ] Person has lived in a country for 3 months or more where TB is common  [  ] Person lives in a high risk environment for TB (i.e. long term care, health care worker, incarcerated, homeless)  [  ] Person injects illicit drugs  [  ] Person is HIV positive or at high risk for HIV    ****************************************************************  BASED ON THE TB RISK ASSESSMENT ABOVE, THE PATIENT'S RISK FOR TB IS:                       [ x ] LOW RISK FOR TB            [  ] SIGNIFICANT RISK FOR TB  ****************************************************************    IV. Based on the Determined Risk for TB, the following Action(s) are Recommended:  [ x ] Low risk for TB infection --> Proceed with the diagnostic procedure    [  ] Significant risk for TB infection:  1. Perform the procedure in a negative pressure room, with appropriate personal protective equipment (PPE) for healthcare personnel (i.e. N95 respirator)    2. If it is not feasible to move the patient or defer the procedure:    a. Use a single-bedded room in a low traffic area to perform the bronchoscopy procedure    b. Place a portable high-efficiency particulate air (HEPA) filter in the space prior to starting the procedure and keep the door closed. Refer to Infection Control policy titled "Tuberculosis Control Strategy Plan" for additional information.    c. All healthcare personnel in the procedure room shall wear and N95 disposible respirator.    3. Documentation of the tuberculosis risk assessment is to be included in the patient's medical record.

## 2021-04-12 NOTE — CHART NOTE - NSCHARTNOTEFT_GEN_A_CORE
Admitting Diagnosis:   Patient is a 56y old  Male who presents with a chief complaint of SOB (12 Apr 2021 12:14)      PAST MEDICAL & SURGICAL HISTORY:  Sleep apnea        Current Nutrition Order:   NPO (for bronchoscopy today)  Vital High Protein @52ml/hr x24hrs +3 LPS/day via NGT. Provides 1248ml TV, 1548kcal (w/o propofol), 2403kcal (w/ propofol), 154g pro, 2g pro/kg IBW, 1043ml FW.   *see recs below (no LPS needed)    PO Intake: Good (%) [   ]  Fair (50-75%) [   ] Poor (<25%) [   ] - N/A    GI Issues:   Started on bowel regimen 4/6, last BM 4/11  Ordered for miralax, senna, protonix  No emesis/regurgitation w/ feeeds     Pain:  KYLEE 2/2 intubated/sedated    Skin Integrity:  Jagdeep 6  Intact    Labs:   04-12    144  |  106  |  44<H>  ----------------------------<  155<H>  4.5   |  27  |  2.00<H>    Ca    8.4      12 Apr 2021 04:33  Phos  3.2     04-12  Mg     2.6     04-12    TPro  6.1  /  Alb  2.4<L>  /  TBili  0.4  /  DBili  x   /  AST  38  /  ALT  30  /  AlkPhos  74  04-12    CAPILLARY BLOOD GLUCOSE      POCT Blood Glucose.: 218 mg/dL (12 Apr 2021 11:58)  POCT Blood Glucose.: 159 mg/dL (12 Apr 2021 04:59)  POCT Blood Glucose.: 147 mg/dL (11 Apr 2021 22:04)  POCT Blood Glucose.: 184 mg/dL (11 Apr 2021 17:37)      Medications:  MEDICATIONS  (STANDING):  albuterol/ipratropium for Nebulization 3 milliLiter(s) Nebulizer every 4 hours  chlorhexidine 0.12% Liquid 15 milliLiter(s) Oral Mucosa every 12 hours  chlorhexidine 2% Cloths 1 Application(s) Topical daily  dexAMETHasone  Injectable 6 milliGRAM(s) IV Push every 24 hours  dextrose 40% Gel 15 Gram(s) Oral once  dextrose 5%. 1000 milliLiter(s) (50 mL/Hr) IV Continuous <Continuous>  dextrose 5%. 1000 milliLiter(s) (100 mL/Hr) IV Continuous <Continuous>  dextrose 50% Injectable 25 Gram(s) IV Push once  dextrose 50% Injectable 12.5 Gram(s) IV Push once  dextrose 50% Injectable 25 Gram(s) IV Push once  enoxaparin Injectable 40 milliGRAM(s) SubCutaneous every 12 hours  fentaNYL   Infusion. 0.5 MICROgram(s)/kG/Hr (6 mL/Hr) IV Continuous <Continuous>  glucagon  Injectable 1 milliGRAM(s) IntraMuscular once  insulin glargine Injectable (LANTUS) 12 Unit(s) SubCutaneous at bedtime  insulin regular  human corrective regimen sliding scale   SubCutaneous every 6 hours  insulin regular  human recombinant 2 Unit(s) SubCutaneous every 6 hours  pantoprazole  Injectable 40 milliGRAM(s) IV Push daily  petrolatum Ophthalmic Ointment 1 Application(s) Both EYES every 6 hours  piperacillin/tazobactam IVPB.. 4.5 Gram(s) IV Intermittent every 6 hours  polyethylene glycol 3350 17 Gram(s) Oral every 12 hours  propofol Infusion 10 MICROgram(s)/kG/Min (7.2 mL/Hr) IV Continuous <Continuous>  QUEtiapine 25 milliGRAM(s) Oral daily  QUEtiapine 50 milliGRAM(s) Oral at bedtime  senna 2 Tablet(s) Oral at bedtime  voriconazole IVPB 600 milliGRAM(s) IV Intermittent once    MEDICATIONS  (PRN):  acetaminophen    Suspension .. 650 milliGRAM(s) Oral every 6 hours PRN Temp greater or equal to 38C (100.4F)  sodium chloride 0.9% lock flush 10 milliLiter(s) IV Push every 1 hour PRN Pre/post blood products, medications, blood draw, and to maintain line patency      Admitted Anthropometrics:  Weight: 5'10"; ABW 120kg; IBW: 75.4kg; %IBW: 166%; BMI: 37    Weight Change:   KYLEE 2/2 intubated/sedated    Estimated energy needs:   IBW (75.4kg) used for calculations as pt is >100% of IBW and critically ill  Nutrient needs based on [facility] standards of care for maintenance in older adults.   Needs adjusted for age and hypermetabolic/inflammatory state 2/2 COVID+ and oxygen demands  Energy: 1885-2262kcal (25-30cal/kg)  Protein: 106-121g pro (1.4-1.6g/kg pro)  Fluids per team    Subjective:   Patient is a 57 yo M with PMHx of MU and gout who presented to ED with c/o progressively worsening SOB now admitted to ICU for management of AHRF in the setting of COVID    Unable to conduct a face to face interview or nutrition-focused physical exam due to limited contact restrictions related to the pt's medical condition and isolation precautions. Pt discussed with MICU team on rounds. Currently intubated on AC/CMV mode. Off paralytics. Sedated on fentanyl with propofol infusing @32.4ml/hr x24hrs (providing 855kcal from lipids/day). MAP 86- off pressors at this time. Tmax 100.5- ordered for tylenol and cooling blanket. Unable to obtain diet/weight history at this time 2/2 intubated/sedated. Dobhoff clamped and NPO for bronchoscopy. Notable labs: POCT , 147, 184, BUN 44, Cr 2. See EN recs below for current propofol rate. RD to follow.     Nutrition Diagnosis:  Increased nutrient needs RT increased demand for kcal/pro 2/2 hypermetabolic state AEB oxygen demands and viral infection COVID+  [  ] No active nutrition diagnosis at this time  [  ] Current medical condition precludes nutrition intervention    Goal: Pt to meet >/=75% EER consistently with good tolerance via most medically appropriate route.     Recommendations:  1. With continued intubation, improving renal function, and at current propofol rate, recommend Vital High Protein @55ml/hr x24hrs via NGT. Provides 1320ml TV, 1320kcal (w/o propofol), 2175kcal (w/ propofol), 115g pro, 1.53g pro/kg IBW, 1103ml FW.   2. If to require Nepro- recommend Nepro @20ml/hr x24hrs +4 LPS/day via NGT. Will adjust specific pending propofol dose.   Will adjust rate pending propofol titration and any changes in renal function.   3. Additional free H2O per team. Monitor for s/s intolerance; maintain aspiration precautions at all times.     4.  Monitor lytes and replete prn.   5.  Pain and bowel regimens per team  6. Please obtain updated TG daily.   *d/w team. order placed for MD verification.     Education: n/a    Risk Level: High [   x] Moderate [   ] Low [   ].

## 2021-04-12 NOTE — PROGRESS NOTE ADULT - SUBJECTIVE AND OBJECTIVE BOX
SUBJECTIVE  24 hour events:     REVIEW OF SYSTEMS:   See HPI (as per chart review), unable to obtain 2/2 ETT and sedation                                    OBJECTIVE  Vital Signs Last 24 Hrs  T(C): 38.3 (12 Apr 2021 08:58), Max: 38.8 (11 Apr 2021 10:47)  T(F): 100.9 (12 Apr 2021 08:58), Max: 101.9 (11 Apr 2021 10:47)  HR: 64 (12 Apr 2021 08:00) (44 - 69)  BP: 135/71 (12 Apr 2021 07:00) (135/70 - 150/79)  BP(mean): 96 (12 Apr 2021 07:00) (92 - 106)  RR: 22 (12 Apr 2021 08:00) (10 - 22)  SpO2: 98% (12 Apr 2021 08:00) (95% - 100%) HFNC 50L/50%    I&O's Detail  11 Apr 2021 07:01  -  12 Apr 2021 07:00  --------------------------------------------------------  IN:    FentaNYL: 276 mL    Free Water: 500 mL    Propofol: 691.2 mL    Vital High Protein: 630 mL  Total IN: 2097.2 mL    OUT:    Indwelling Catheter - Urethral (mL): 1375 mL  Total OUT: 1375 mL    Total NET: 722.2 mL    LABS:                        9.3    13.06 )-----------( 320      ( 12 Apr 2021 04:33 )             32.0     04-12    144  |  106  |  44<H>  ----------------------------<  155<H>  4.5   |  27  |  2.00<H>    Ca    8.4      12 Apr 2021 04:33  Phos  3.2     04-12  Mg     2.6     04-12    TPro  6.1  /  Alb  2.4<L>  /  TBili  0.4  /  DBili  x   /  AST  38  /  ALT  30  /  AlkPhos  74  04-12    ABG - ( 12 Apr 2021 04:50 )  pH, Arterial: 7.43  pH, Blood: x     /  pCO2: 44    /  pO2: 98    / HCO3: 28    / Base Excess: 3.8   /  SaO2: 98      PF ratio 245   Culture - Sputum . (04.08.21 @ 17:36)   Gram Stain:   No epithelial cells seen   Few WBC's   Few-moderate Yeast   Specimen Source: .Sputum Sputum   Culture Results:   Numerous Keila dubliniensis   No other Normal Respiratory Marleni present   Culture - Blood (04.08.21 @ 17:53)   Specimen Source: .Blood Blood   Culture Results:   No growth at 3 days.     MEDICATIONS  (STANDING):  acetylcysteine 10%  Inhalation 4 milliLiter(s) Inhalation three times a day  albuterol/ipratropium for Nebulization 3 milliLiter(s) Nebulizer every 4 hours  chlorhexidine 0.12% Liquid 15 milliLiter(s) Oral Mucosa every 12 hours  chlorhexidine 2% Cloths 1 Application(s) Topical daily  dexAMETHasone  Injectable 6 milliGRAM(s) IV Push every 24 hours  dextrose 40% Gel 15 Gram(s) Oral once  dextrose 5%. 1000 milliLiter(s) (50 mL/Hr) IV Continuous <Continuous>  dextrose 5%. 1000 milliLiter(s) (100 mL/Hr) IV Continuous <Continuous>  dextrose 50% Injectable 25 Gram(s) IV Push once  dextrose 50% Injectable 12.5 Gram(s) IV Push once  dextrose 50% Injectable 25 Gram(s) IV Push once  enoxaparin Injectable 40 milliGRAM(s) SubCutaneous every 12 hours  fentaNYL   Infusion. 0.5 MICROgram(s)/kG/Hr (6 mL/Hr) IV Continuous <Continuous> @ 1 mcg  glucagon  Injectable 1 milliGRAM(s) IntraMuscular once  insulin glargine Injectable (LANTUS) 12 Unit(s) SubCutaneous at bedtime  insulin regular  human corrective regimen sliding scale   SubCutaneous every 6 hours  insulin regular  human recombinant 2 Unit(s) SubCutaneous every 6 hours  pantoprazole  Injectable 40 milliGRAM(s) IV Push daily  petrolatum Ophthalmic Ointment 1 Application(s) Both EYES every 6 hours  piperacillin/tazobactam IVPB.. 4.5 Gram(s) IV Intermittent every 6 hours  polyethylene glycol 3350 17 Gram(s) Oral every 12 hours  propofol Infusion 10 MICROgram(s)/kG/Min (7.2 mL/Hr) IV Continuous <Continuous> @ 45 mcg  QUEtiapine 25 milliGRAM(s) Oral daily  QUEtiapine 50 milliGRAM(s) Oral at bedtime  senna 2 Tablet(s) Oral at bedtime    MEDICATIONS  (PRN):  acetaminophen    Suspension .. 650 milliGRAM(s) Oral every 6 hours PRN Temp greater or equal to 38C (100.4F)  sodium chloride 0.9% lock flush 10 milliLiter(s) IV Push every 1 hour PRN Pre/post blood products, medications, blood draw, and to maintain line patency       SUBJECTIVE  24 hour events:     REVIEW OF SYSTEMS:   See HPI (as per chart review), unable to obtain 2/2 ETT and sedation                                    OBJECTIVE  Vital Signs Last 24 Hrs  T(C): 38.3 (12 Apr 2021 08:58), Max: 38.8 (11 Apr 2021 10:47)  T(F): 100.9 (12 Apr 2021 08:58), Max: 101.9 (11 Apr 2021 10:47)  HR: 64 (12 Apr 2021 08:00) (44 - 69)  BP: 135/71 (12 Apr 2021 07:00) (135/70 - 150/79)  BP(mean): 96 (12 Apr 2021 07:00) (92 - 106)  RR: 22 (12 Apr 2021 08:00) (10 - 22)  SpO2: 98% (12 Apr 2021 08:00) (95% - 100%) HFNC 50L/50%    I&O's Detail  11 Apr 2021 07:01  -  12 Apr 2021 07:00  --------------------------------------------------------  IN:    FentaNYL: 276 mL    Free Water: 500 mL    Propofol: 691.2 mL    Vital High Protein: 630 mL  Total IN: 2097.2 mL    OUT:    Indwelling Catheter - Urethral (mL): 1375 mL  Total OUT: 1375 mL    Total NET: 722.2 mL    LABS:                        9.3    13.06 )-----------( 320      ( 12 Apr 2021 04:33 )             32.0     04-12    144  |  106  |  44<H>  ----------------------------<  155<H>  4.5   |  27  |  2.00<H>    Ca    8.4      12 Apr 2021 04:33  Phos  3.2     04-12  Mg     2.6     04-12    TPro  6.1  /  Alb  2.4<L>  /  TBili  0.4  /  DBili  x   /  AST  38  /  ALT  30  /  AlkPhos  74  04-12    ABG - ( 12 Apr 2021 04:50 )  pH, Arterial: 7.43  pH, Blood: x     /  pCO2: 44    /  pO2: 98    / HCO3: 28    / Base Excess: 3.8   /  SaO2: 98      PF ratio 245   Culture - Sputum . (04.08.21 @ 17:36)   Gram Stain:   No epithelial cells seen   Few WBC's   Few-moderate Yeast   Specimen Source: .Sputum Sputum   Culture Results:   Numerous Keila dubliniensis   No other Normal Respiratory Marleni present   Culture - Blood (04.08.21 @ 17:53)   Specimen Source: .Blood Blood   Culture Results:   No growth at 3 days.     MEDICATIONS  (STANDING):  acetylcysteine 10%  Inhalation 4 milliLiter(s) Inhalation three times a day  albuterol/ipratropium for Nebulization 3 milliLiter(s) Nebulizer every 4 hours  chlorhexidine 0.12% Liquid 15 milliLiter(s) Oral Mucosa every 12 hours  chlorhexidine 2% Cloths 1 Application(s) Topical daily  dexAMETHasone  Injectable 6 milliGRAM(s) IV Push every 24 hours  dextrose 40% Gel 15 Gram(s) Oral once  dextrose 5%. 1000 milliLiter(s) (50 mL/Hr) IV Continuous <Continuous>  dextrose 5%. 1000 milliLiter(s) (100 mL/Hr) IV Continuous <Continuous>  dextrose 50% Injectable 25 Gram(s) IV Push once  dextrose 50% Injectable 12.5 Gram(s) IV Push once  dextrose 50% Injectable 25 Gram(s) IV Push once  enoxaparin Injectable 40 milliGRAM(s) SubCutaneous every 12 hours  fentaNYL   Infusion. 0.5 MICROgram(s)/kG/Hr (6 mL/Hr) IV Continuous <Continuous> @ 1 mcg  glucagon  Injectable 1 milliGRAM(s) IntraMuscular once  insulin glargine Injectable (LANTUS) 12 Unit(s) SubCutaneous at bedtime  insulin regular  human corrective regimen sliding scale   SubCutaneous every 6 hours  insulin regular  human recombinant 2 Unit(s) SubCutaneous every 6 hours  pantoprazole  Injectable 40 milliGRAM(s) IV Push daily  petrolatum Ophthalmic Ointment 1 Application(s) Both EYES every 6 hours  piperacillin/tazobactam IVPB.. 4.5 Gram(s) IV Intermittent every 6 hours  polyethylene glycol 3350 17 Gram(s) Oral every 12 hours  propofol Infusion 10 MICROgram(s)/kG/Min (7.2 mL/Hr) IV Continuous <Continuous> @ 45 mcg  QUEtiapine 25 milliGRAM(s) Oral daily  QUEtiapine 50 milliGRAM(s) Oral at bedtime  senna 2 Tablet(s) Oral at bedtime    MEDICATIONS  (PRN):  acetaminophen    Suspension .. 650 milliGRAM(s) Oral every 6 hours PRN Temp greater or equal to 38C (100.4F)  sodium chloride 0.9% lock flush 10 milliLiter(s) IV Push every 1 hour PRN Pre/post blood products, medications, blood draw, and to maintain line patency    PHYSICAL EXAM: limited as patient is proned  GENERAL: obese, intubated and sedated  SKIN/HAIR/NAILS: Skin warm and dry. Nails without clubbing or cyanosis. CR<2 secs. No lesions, rash, petechiae, erythema or ecchymoses. Anicteric.   HEAD AND NECK: The skull is NC/AT. unable to assess thoroughly as patient proned. Periorbital edema noted.  THORAX/LUNGS: Thorax is symmetric with good expansion, assisted by mechanical ventilation. Lung sounds diminished throughout with coarse rhonchi R>L.  CARDIOVASCULAR: No JVD. Carotid upstrokes are brisk, without bruits. +S1 and S2, RRR; no extra heart sounds or M/R/G.  ABDOMEN/: unable to assess, proned, Lechuga catheter in place. Last BM 4/10 as per nursing.  PERIPHERAL VASCULAR: Extremities are warm, radial and dorsalis pedis pulses +2 and symmetric. +2 generalized and dependent edema noted. No clubbing, no cyanosis.  MUSCULOSKELETAL: No active ROM at this time. No evidence of swelling or deformity.  NEUROLOGICAL: unable to assess, patient stimulated by stimuli such as suctioning. not following commands, sedated and intubated.  LINES (DATES): L IJ inserted in ED by fellow 4/5, radial art line changed on 4/9, IVL    Assessment and Plan   Patient is a 57 yo M with PMHx of MU and gout who presented to ED with c/o progressively worsening SOB now admitted to ICU for management of AHRF in the setting of COViD    NEURO  #Sedated   -propofol and fentanyl for rass -4   -Seroquel regimen added to avoid delirium  -Will maintain sedated for ventilator setting tolerance  -Neuro checks per ICU protocol, pain/sedation meds assessed and addressed, will titrate down as able to maintain vent synchrony    RESPIRATORY  #Acute hypoxic hypercapnic respiratory failure in the setting of COViD PNA  -Patient initially presented with O2 saturation of 14% and cyanotic requiring immediate intubation  -CXr with diffuse bilateral opacities with + COVID19 PCR  -Elevated inflammatory markers  -Patient currently proned  -On AC/VC mechanical Ventilation:  Mode: AC/ CMV; RR: 20; TV: 450; FiO2: 40; PEEP: 10  -PF ratio 245 with this morning's ABG  -Will Wean FiO2/decrease PEEP to 8 as tolerated    #COViD  -CXr with diffuse bilateral opacities with + COVID19 PCR  -Start Date 4/5 Remdesivir and Decadron 6 mg (x 10 day course)  -Remdesevir course given 4/5-4/9 per chart review (was briefly d/c'd due to renal fxn worsening but was REORDERED)    #MU  -As per patient's wife, patient has MU on CPAP  -Patient sleeps with 2-3 pillows at night, attributed to MU    CARDIOVASCULAR  #RV dilation  -Bedside POCUS demonstrating dilated RV and mildly enlarged pulm artery possibly 2/2 MU vs PE (unlikely)  -Echo: Severe hypokinesis of the entire anteroseptum/inferoseptum. Akinesis of the mid to apical anterolateral region, apical anterior, apical inferior and true apex. Mild hypokinesis of the basal anterolateral, basal anterior and basal inferior regions. The estimated left ventricular systolic function is 15%. The right ventricle is mildly dilated. Right ventricular systolic function is mildly reduced. No pericardial effusion is seen.  -Based on these findings, cardiology was consulted  -Appreciate cardiac recs  -As per spouse, no hx of cardiac disease and no change in ADLs or activity tolerance prior to current presentation  -Holding off on diuretics as per primary team    #LV Dysfunction  As per Cardiology:  -Echo reviewed. Images are poor due to body habitus but suggest a stress cardiomyopathy pattern (takotsubo cardiomyopathy) rather than an ischemic event  -EKG reviewed showing NSR, with non specific ST changes and poor R wave progression in the precordial leads likely due to body habitus  -Clinically patient is warm and well perfused and making urine.  -Patient's LV dysfunction appears to be mutifactorial: COVID, Morbid Obesity, Sleep Apnea. Myocarditis is also a possibility; now exacerbateed in setting of COVIDPNA  -Unfortunately patient is unable to undergo further ischemic or confirmatory imaging/workup  -Hold off BB or ACE/ARB therapy while on pressors with Renal impairement  -EKG from 4/5 NSR with RBBB, without ischemic changes  -Please keep K>4 and Mg>2  -Will need repeat ECHO post extubation to re-eval EF, if still depressed with wall motion abn will pursue ischemic work up  -Agree with diuresis for now and continuing with current management with Levo as mixed venous and clinical picture does not correlate with cardiogenic shock at this time.    GI  -No active issues  -Nasoenteral feeding currently being held, plan for bronch  -Protonix IVP for GI ppx    RENAL//F&E  #Acute Kidney Injury  -SCr bumped to 2 from 1.42 yesterday  -Urine output decreased to 30 mL/r overnight  -1L LR bolus ordered, will monitor UO  -Baseline creatinine unknown, no reported kidney disease  -Electrolytes stable  -Strict I&O monitoring  -Avoid nephrotoxic medications    #Hypernatremia  -Today Na is 144  -Will reassess need for free water 250 mL q 8hr    ID  #COVID PNA   -Plan as above  -Remains on steroid rx, remdesivir course completed    #Leukocytosis  -Patient TMax 4/11 38.8  -Today WBC 13K up from 10.9  -ID consulted, appreciate recs  -f/u fungitell and galactomannon, aspergillus Ab  -will consider CT chest with primary team  -send BCx from central line and peripheral   -Zosyn started 4/9 x 7-10 day course  -Consider line changes  -Maintain MAP > 65 mmHg  -Monitor urine output    ENDOCRINE  #Hyperglycemia  -q6hr correctional scale ordered, will adjust as needed, may need premeal  -Lantus 12 units, regular insuliln 2 units as per order  -Blood glucose trend   -Decadron started 4/5 AM  -A1c 6.2    HEME  #Anemia  -Hgb 11 on admission; unknown baseline. No evidence of bleed.  -Hgb today 9.3  -Trend CBC  -Maintain active T+S  -Transfuse for Hb <7    -DVT ppx with Lovenox BID    DISPO/GOALS OF CARE:  Patient requires continuous monitoring with bedside rhythm monitoring, arterial line, pulse oximetry, ventilator monitoring and intermittent blood gas analysis. Care plan discussed with ICU care team. Patient remains critical at this time.     SUBJECTIVE  24 hour events: no acute events, currently proned, patient febrile    REVIEW OF SYSTEMS:   See HPI (as per chart review), unable to obtain 2/2 ETT and sedation                                    OBJECTIVE  Vital Signs Last 24 Hrs  T(C): 38.3 (12 Apr 2021 08:58), Max: 38.8 (11 Apr 2021 10:47)  T(F): 100.9 (12 Apr 2021 08:58), Max: 101.9 (11 Apr 2021 10:47)  HR: 64 (12 Apr 2021 08:00) (44 - 69)  BP: 135/71 (12 Apr 2021 07:00) (135/70 - 150/79)  BP(mean): 96 (12 Apr 2021 07:00) (92 - 106)  RR: 22 (12 Apr 2021 08:00) (10 - 22)  SpO2: 98% (12 Apr 2021 08:00) (95% - 100%) HFNC 50L/50%    I&O's Detail  11 Apr 2021 07:01  -  12 Apr 2021 07:00  --------------------------------------------------------  IN:    FentaNYL: 276 mL    Free Water: 500 mL    Propofol: 691.2 mL    Vital High Protein: 630 mL  Total IN: 2097.2 mL    OUT:    Indwelling Catheter - Urethral (mL): 1375 mL  Total OUT: 1375 mL    Total NET: 722.2 mL    LABS:                        9.3    13.06 )-----------( 320      ( 12 Apr 2021 04:33 )             32.0     04-12    144  |  106  |  44<H>  ----------------------------<  155<H>  4.5   |  27  |  2.00<H>    Ca    8.4      12 Apr 2021 04:33  Phos  3.2     04-12  Mg     2.6     04-12    TPro  6.1  /  Alb  2.4<L>  /  TBili  0.4  /  DBili  x   /  AST  38  /  ALT  30  /  AlkPhos  74  04-12    ABG - ( 12 Apr 2021 04:50 )  pH, Arterial: 7.43  pH, Blood: x     /  pCO2: 44    /  pO2: 98    / HCO3: 28    / Base Excess: 3.8   /  SaO2: 98      PF ratio 245   Culture - Sputum . (04.08.21 @ 17:36)   Gram Stain:   No epithelial cells seen   Few WBC's   Few-moderate Yeast   Specimen Source: .Sputum Sputum   Culture Results:   Numerous Keila dubliniensis   No other Normal Respiratory Marleni present   Culture - Blood (04.08.21 @ 17:53)   Specimen Source: .Blood Blood   Culture Results:   No growth at 3 days.     MEDICATIONS  (STANDING):  acetylcysteine 10%  Inhalation 4 milliLiter(s) Inhalation three times a day  albuterol/ipratropium for Nebulization 3 milliLiter(s) Nebulizer every 4 hours  chlorhexidine 0.12% Liquid 15 milliLiter(s) Oral Mucosa every 12 hours  chlorhexidine 2% Cloths 1 Application(s) Topical daily  dexAMETHasone  Injectable 6 milliGRAM(s) IV Push every 24 hours  dextrose 40% Gel 15 Gram(s) Oral once  dextrose 5%. 1000 milliLiter(s) (50 mL/Hr) IV Continuous <Continuous>  dextrose 5%. 1000 milliLiter(s) (100 mL/Hr) IV Continuous <Continuous>  dextrose 50% Injectable 25 Gram(s) IV Push once  dextrose 50% Injectable 12.5 Gram(s) IV Push once  dextrose 50% Injectable 25 Gram(s) IV Push once  enoxaparin Injectable 40 milliGRAM(s) SubCutaneous every 12 hours  fentaNYL   Infusion. 0.5 MICROgram(s)/kG/Hr (6 mL/Hr) IV Continuous <Continuous> @ 1 mcg  glucagon  Injectable 1 milliGRAM(s) IntraMuscular once  insulin glargine Injectable (LANTUS) 12 Unit(s) SubCutaneous at bedtime  insulin regular  human corrective regimen sliding scale   SubCutaneous every 6 hours  insulin regular  human recombinant 2 Unit(s) SubCutaneous every 6 hours  pantoprazole  Injectable 40 milliGRAM(s) IV Push daily  petrolatum Ophthalmic Ointment 1 Application(s) Both EYES every 6 hours  piperacillin/tazobactam IVPB.. 4.5 Gram(s) IV Intermittent every 6 hours  polyethylene glycol 3350 17 Gram(s) Oral every 12 hours  propofol Infusion 10 MICROgram(s)/kG/Min (7.2 mL/Hr) IV Continuous <Continuous> @ 45 mcg  QUEtiapine 25 milliGRAM(s) Oral daily  QUEtiapine 50 milliGRAM(s) Oral at bedtime  senna 2 Tablet(s) Oral at bedtime    MEDICATIONS  (PRN):  acetaminophen    Suspension .. 650 milliGRAM(s) Oral every 6 hours PRN Temp greater or equal to 38C (100.4F)  sodium chloride 0.9% lock flush 10 milliLiter(s) IV Push every 1 hour PRN Pre/post blood products, medications, blood draw, and to maintain line patency    PHYSICAL EXAM: limited as patient is proned  GENERAL: obese, intubated and sedated  SKIN/HAIR/NAILS: Skin warm and dry. Nails without clubbing or cyanosis. CR<2 secs. No lesions, rash, petechiae, erythema or ecchymoses. Anicteric.   HEAD AND NECK: The skull is NC/AT. unable to assess thoroughly as patient proned. Periorbital edema noted.  THORAX/LUNGS: Thorax is symmetric with good expansion, assisted by mechanical ventilation. Lung sounds diminished throughout with coarse rhonchi R>L.  CARDIOVASCULAR: No JVD. Carotid upstrokes are brisk, without bruits. +S1 and S2, RRR; no extra heart sounds or M/R/G.  ABDOMEN/: unable to assess, proned, Lechuga catheter in place. Last BM 4/10 as per nursing.  PERIPHERAL VASCULAR: Extremities are warm, radial and dorsalis pedis pulses +2 and symmetric. +2 generalized and dependent edema noted. No clubbing, no cyanosis.  MUSCULOSKELETAL: No active ROM at this time. No evidence of swelling or deformity.  NEUROLOGICAL: unable to assess, patient stimulated by stimuli such as suctioning. not following commands, sedated and intubated.  LINES (DATES): L IJ inserted in ED by fellow 4/5, radial art line changed on 4/9, IVL    Assessment and Plan   Patient is a 55 yo M with PMHx of MU and gout who presented to ED with c/o progressively worsening SOB now admitted to ICU for management of AHRF in the setting of COViD    NEURO  #Sedated   -propofol and fentanyl for rass -4   -Seroquel regimen added to avoid delirium  -Will maintain sedated for ventilator setting tolerance  -Neuro checks per ICU protocol, pain/sedation meds assessed and addressed, will titrate down as able to maintain vent synchrony    RESPIRATORY  #Acute hypoxic hypercapnic respiratory failure in the setting of COViD PNA  -Patient initially presented with O2 saturation of 14% and cyanotic requiring immediate intubation  -CXr with diffuse bilateral opacities with + COVID19 PCR  -Elevated inflammatory markers  -Patient currently proned  -On AC/VC mechanical Ventilation:  Mode: AC/ CMV; RR: 20; TV: 450; FiO2: 40; PEEP: 10  -PF ratio 245 with this morning's ABG  -Will Wean FiO2/decrease PEEP to 8 as tolerated    #COViD  -CXr with diffuse bilateral opacities with + COVID19 PCR  -Start Date 4/5 Remdesivir and Decadron 6 mg (x 10 day course)  -Remdesevir course given 4/5-4/9 per chart review (was briefly d/c'd due to renal fxn worsening but was REORDERED)    #MU  -As per patient's wife, patient has MU on CPAP  -Patient sleeps with 2-3 pillows at night, attributed to MU    CARDIOVASCULAR  #RV dilation  -Bedside POCUS demonstrating dilated RV and mildly enlarged pulm artery possibly 2/2 MU vs PE (unlikely)  -Echo: Severe hypokinesis of the entire anteroseptum/inferoseptum. Akinesis of the mid to apical anterolateral region, apical anterior, apical inferior and true apex. Mild hypokinesis of the basal anterolateral, basal anterior and basal inferior regions. The estimated left ventricular systolic function is 15%. The right ventricle is mildly dilated. Right ventricular systolic function is mildly reduced. No pericardial effusion is seen.  -Based on these findings, cardiology was consulted  -Appreciate cardiac recs  -As per spouse, no hx of cardiac disease and no change in ADLs or activity tolerance prior to current presentation  -Holding off on diuretics as per primary team    #LV Dysfunction  As per Cardiology:  -Echo reviewed. Images are poor due to body habitus but suggest a stress cardiomyopathy pattern (takotsubo cardiomyopathy) rather than an ischemic event  -EKG reviewed showing NSR, with non specific ST changes and poor R wave progression in the precordial leads likely due to body habitus  -Clinically patient is warm and well perfused and making urine.  -Patient's LV dysfunction appears to be mutifactorial: COVID, Morbid Obesity, Sleep Apnea. Myocarditis is also a possibility; now exacerbateed in setting of COVIDPNA  -Unfortunately patient is unable to undergo further ischemic or confirmatory imaging/workup  -Hold off BB or ACE/ARB therapy while on pressors with Renal impairement  -EKG from 4/5 NSR with RBBB, without ischemic changes  -Please keep K>4 and Mg>2  -Will need repeat ECHO post extubation to re-eval EF, if still depressed with wall motion abn will pursue ischemic work up  -Agree with diuresis for now and continuing with current management with Levo as mixed venous and clinical picture does not correlate with cardiogenic shock at this time.    GI  -No active issues  -Nasoenteral feeding currently being held, plan for bronch  -Protonix IVP for GI ppx    RENAL//F&E  #Acute Kidney Injury  -SCr bumped to 2 from 1.42 yesterday  -Urine output decreased to 30 mL/r overnight  -1L LR bolus ordered, will monitor UO  -Baseline creatinine unknown, no reported kidney disease  -Electrolytes stable  -Strict I&O monitoring  -Avoid nephrotoxic medications    #Hypernatremia  -Today Na is 144  -Will reassess need for free water 250 mL q 8hr    ID  #COVID PNA   -Plan as above  -Remains on steroid rx, remdesivir course completed    #Leukocytosis  -Patient TMax 4/11 38.8  -Today WBC 13K up from 10.9  -ID consulted, appreciate recs  -f/u fungitell and galactomannon, aspergillus Ab  -Peripheral blooc cultures ordered x 2  -Zosyn started 4/9 x 7-10 day course  -Will change central line  -Maintain MAP > 65 mmHg  -Monitor urine output    ENDOCRINE  #Hyperglycemia  -q6hr correctional scale ordered, will adjust as needed, may need premeal  -Lantus 12 units, regular insuliln 2 units as per order  -Blood glucose trend   -Decadron started 4/5 AM  -A1c 6.2    HEME  #Anemia  -Hgb 11 on admission; unknown baseline. No evidence of bleed.  -Hgb today 9.3  -Trend CBC  -Maintain active T+S  -Transfuse for Hb <7    -DVT ppx with Lovenox BID    DISPO/GOALS OF CARE:  Patient requires continuous monitoring with bedside rhythm monitoring, arterial line, pulse oximetry, ventilator monitoring and intermittent blood gas analysis. Care plan discussed with ICU care team. Patient remains critical at this time.

## 2021-04-12 NOTE — BRIEF OPERATIVE NOTE - OPERATION/FINDINGS
Procedure:  Informed consent was obtained. Timeout was performed. Patient was given versed 4mg for sedation. Large Disposable bronchoscope was inserted through ETT and advanced to distal trachea. Bilateral airways were examined to segmental and subsegmental levels. Bronchial lavage was performed in the right lower lobe and BAL was performed in the lingula. The bronchoscope was subsequently withdrawn and the procedure was completed.     Findings:  Rosemarie were sharp. Tracheal and bronchial mucosa appeared normal without hyperemia. Patient had thick secretions in right mainstem bronchi, right lower lobe and left upper and lower lobes that were suctioned. There were no foreign bodies, endobronchial lesions, mucosal changes, signs of aspiration or atelectasis and there were no anomalous airways. There was normal dynamic airway collapse.

## 2021-04-12 NOTE — PROGRESS NOTE ADULT - SUBJECTIVE AND OBJECTIVE BOX
INFECTIOUS DISEASES CONSULT FOLLOW-UP NOTE    INTERVAL HPI/OVERNIGHT EVENTS:  Continues to be febrile to 102  WBC uptrending to 13  remains on 40% vent     ROS:   unable to obtain     ANTIBIOTICS/RELEVANT:    MEDICATIONS  (STANDING):  albuterol/ipratropium for Nebulization 3 milliLiter(s) Nebulizer every 4 hours  chlorhexidine 0.12% Liquid 15 milliLiter(s) Oral Mucosa every 12 hours  chlorhexidine 2% Cloths 1 Application(s) Topical daily  dexAMETHasone  Injectable 6 milliGRAM(s) IV Push every 24 hours  dextrose 40% Gel 15 Gram(s) Oral once  dextrose 5%. 1000 milliLiter(s) (50 mL/Hr) IV Continuous <Continuous>  dextrose 5%. 1000 milliLiter(s) (100 mL/Hr) IV Continuous <Continuous>  dextrose 50% Injectable 25 Gram(s) IV Push once  dextrose 50% Injectable 12.5 Gram(s) IV Push once  dextrose 50% Injectable 25 Gram(s) IV Push once  enoxaparin Injectable 40 milliGRAM(s) SubCutaneous every 12 hours  fentaNYL   Infusion. 0.5 MICROgram(s)/kG/Hr (6 mL/Hr) IV Continuous <Continuous>  glucagon  Injectable 1 milliGRAM(s) IntraMuscular once  insulin glargine Injectable (LANTUS) 12 Unit(s) SubCutaneous at bedtime  insulin regular  human corrective regimen sliding scale   SubCutaneous every 6 hours  insulin regular  human recombinant 2 Unit(s) SubCutaneous every 6 hours  pantoprazole  Injectable 40 milliGRAM(s) IV Push daily  petrolatum Ophthalmic Ointment 1 Application(s) Both EYES every 6 hours  piperacillin/tazobactam IVPB.. 4.5 Gram(s) IV Intermittent every 6 hours  polyethylene glycol 3350 17 Gram(s) Oral every 12 hours  propofol Infusion 10 MICROgram(s)/kG/Min (7.2 mL/Hr) IV Continuous <Continuous>  QUEtiapine 25 milliGRAM(s) Oral daily  QUEtiapine 50 milliGRAM(s) Oral at bedtime  senna 2 Tablet(s) Oral at bedtime  voriconazole IVPB 600 milliGRAM(s) IV Intermittent once    MEDICATIONS  (PRN):  acetaminophen    Suspension .. 650 milliGRAM(s) Oral every 6 hours PRN Temp greater or equal to 38C (100.4F)  sodium chloride 0.9% lock flush 10 milliLiter(s) IV Push every 1 hour PRN Pre/post blood products, medications, blood draw, and to maintain line patency        Vital Signs Last 24 Hrs  T(C): 38.4 (12 Apr 2021 10:00), Max: 38.4 (12 Apr 2021 09:00)  T(F): 101.2 (12 Apr 2021 10:00), Max: 101.2 (12 Apr 2021 09:00)  HR: 62 (12 Apr 2021 10:00) (44 - 69)  BP: 152/74 (12 Apr 2021 10:00) (135/70 - 152/74)  BP(mean): 105 (12 Apr 2021 10:00) (92 - 106)  RR: 24 (12 Apr 2021 10:00) (10 - 26)  SpO2: 96% (12 Apr 2021 10:00) (95% - 100%)    04-11-21 @ 07:01  -  04-12-21 @ 07:00  --------------------------------------------------------  IN: 2097.2 mL / OUT: 1375 mL / NET: 722.2 mL    04-12-21 @ 07:01  -  04-12-21 @ 12:16  --------------------------------------------------------  IN: 192 mL / OUT: 105 mL / NET: 87 mL      PHYSICAL EXAM:  Constitutional: intubated and sedated  Eyes: the sclera and conjunctiva were normal.   ENT: the ears and nose were normal in appearance. ET tube placed   Neck: the appearance of the neck was normal and the neck was supple.   Pulmonary: ronchi throughout   Heart: heart rate was normal and rhythm regular, normal S1 and S2  Abdomen: normal bowel sounds, soft  Neurological: withdraw from pain, sedated         LABS:                        9.3    13.06 )-----------( 320      ( 12 Apr 2021 04:33 )             32.0     04-12    144  |  106  |  44<H>  ----------------------------<  155<H>  4.5   |  27  |  2.00<H>    Ca    8.4      12 Apr 2021 04:33  Phos  3.2     04-12  Mg     2.6     04-12    TPro  6.1  /  Alb  2.4<L>  /  TBili  0.4  /  DBili  x   /  AST  38  /  ALT  30  /  AlkPhos  74  04-12          MICROBIOLOGY:  4/8 UA neg  4/8 BCx neg x2  4/9 Sputum Cx: C. albicans  4/6 COVID spike ab +  4/5 legionella urine ag-  4/5 sputum cx normal  4/5 COVID PCR +   4/5 BCx -  4/5 HIV neg       RADIOLOGY & ADDITIONAL STUDIES:  Reviewed

## 2021-04-12 NOTE — BRIEF OPERATIVE NOTE - COMMENTS
Patient tolerated procedure well. Patient tolerated procedure well.  No complication follow on cultures

## 2021-04-12 NOTE — PROCEDURE NOTE - NSPROCDETAILS_GEN_ALL_CORE
guidewire recovered/lumen(s) aspirated and flushed/sterile dressing applied/ultrasound guidance with use of sterile gel and probe cove guidewire recovered/lumen(s) aspirated and flushed/sterile dressing applied/sterile technique, catheter placed/ultrasound guidance with use of sterile gel and probe cove

## 2021-04-12 NOTE — PROGRESS NOTE ADULT - ATTENDING COMMENTS
Patient seen and examined with house-staff during bedside rounds.  Resident note read, including vitals, physical findings, laboratory data, and radiological reports.   Revisions included below.  Direct personal management at bed side and extensive interpretation of the data.  Plan was outlined and discussed in details with the housestaff.  Decision making of high complexity  Action taken for acute disease activity to reflect the level of care provided:  - medication reconciliation  - review laboratory data  Patient is stable.  Patient is still spiking fever on Zosyn.  Had a discussion with the infectious disease regarding approval for voriconazole.  The patient will be approved voriconazole.  The patient is unstable for CT scan of the chest.  We will proceed with bronchoscopy with precautions.  Continue Zosyn.  Follow-up on cultures.  The lines are new.  The creatinine increased to most likely related to the fever.  Avoid any diuresis.  Blood sugar is better controlled.  Patient is hemodynamically stable off pressor.  The patient is still requiring sedations and analgesics.  Patient is still being proned Patient seen and examined with house-staff during bedside rounds.  Resident note read, including vitals, physical findings, laboratory data, and radiological reports.   Revisions included below.  Direct personal management at bed side and extensive interpretation of the data.  Plan was outlined and discussed in details with the housestaff.  Decision making of high complexity  Action taken for acute disease activity to reflect the level of care provided:  - medication reconciliation  - review laboratory data  Patient is stable.  Patient is still spiking fever on Zosyn.  Had a discussion with the infectious disease regarding approval for voriconazole.  The patient will be approved voriconazole.  The patient is unstable for CT scan of the chest.  We will proceed with bronchoscopy with precautions.  Continue Zosyn.  Follow-up on cultures.  The lines are new.  The creatinine increased to most likely related to the fever.  Avoid any diuresis.  Blood sugar is better controlled.  Patient is hemodynamically stable off pressor.  The patient is still requiring sedations and analgesics.  Patient is still being proned.  Continue water bolus and Na decreased.  Decreased peep

## 2021-04-13 LAB
ALBUMIN SERPL ELPH-MCNC: 2.2 G/DL — LOW (ref 3.3–5)
ALP SERPL-CCNC: 57 U/L — SIGNIFICANT CHANGE UP (ref 40–120)
ALT FLD-CCNC: 26 U/L — SIGNIFICANT CHANGE UP (ref 10–45)
ANION GAP SERPL CALC-SCNC: 10 MMOL/L — SIGNIFICANT CHANGE UP (ref 5–17)
ANION GAP SERPL CALC-SCNC: 10 MMOL/L — SIGNIFICANT CHANGE UP (ref 5–17)
AST SERPL-CCNC: 31 U/L — SIGNIFICANT CHANGE UP (ref 10–40)
B PERT IGG+IGM PNL SER: SIGNIFICANT CHANGE UP
BASE EXCESS BLDA CALC-SCNC: 3.1 MMOL/L — HIGH (ref -2–3)
BASOPHILS # BLD AUTO: 0.01 K/UL — SIGNIFICANT CHANGE UP (ref 0–0.2)
BASOPHILS NFR BLD AUTO: 0.1 % — SIGNIFICANT CHANGE UP (ref 0–2)
BILIRUB SERPL-MCNC: 0.2 MG/DL — SIGNIFICANT CHANGE UP (ref 0.2–1.2)
BUN SERPL-MCNC: 48 MG/DL — HIGH (ref 7–23)
BUN SERPL-MCNC: 48 MG/DL — HIGH (ref 7–23)
CALCIUM SERPL-MCNC: 8 MG/DL — LOW (ref 8.4–10.5)
CALCIUM SERPL-MCNC: 8 MG/DL — LOW (ref 8.4–10.5)
CHLORIDE SERPL-SCNC: 108 MMOL/L — SIGNIFICANT CHANGE UP (ref 96–108)
CHLORIDE SERPL-SCNC: 109 MMOL/L — HIGH (ref 96–108)
CO2 SERPL-SCNC: 26 MMOL/L — SIGNIFICANT CHANGE UP (ref 22–31)
CO2 SERPL-SCNC: 26 MMOL/L — SIGNIFICANT CHANGE UP (ref 22–31)
COLOR FLD: SIGNIFICANT CHANGE UP
COMMENT - FLUIDS: SIGNIFICANT CHANGE UP
CREAT SERPL-MCNC: 1.57 MG/DL — HIGH (ref 0.5–1.3)
CREAT SERPL-MCNC: 2.06 MG/DL — HIGH (ref 0.5–1.3)
CRP SERPL-MCNC: 112.7 MG/L — HIGH (ref 0–4)
CULTURE RESULTS: SIGNIFICANT CHANGE UP
CULTURE RESULTS: SIGNIFICANT CHANGE UP
D DIMER BLD IA.RAPID-MCNC: 382 NG/ML DDU — HIGH
EOSINOPHIL # BLD AUTO: 0.14 K/UL — SIGNIFICANT CHANGE UP (ref 0–0.5)
EOSINOPHIL NFR BLD AUTO: 1.4 % — SIGNIFICANT CHANGE UP (ref 0–6)
FERRITIN SERPL-MCNC: 124 NG/ML — SIGNIFICANT CHANGE UP (ref 30–400)
FLUID INTAKE SUBSTANCE CLASS: SIGNIFICANT CHANGE UP
FLUID SEGMENTED GRANULOCYTES: 95 % — SIGNIFICANT CHANGE UP
FUNGITELL: 288 PG/ML — HIGH
GALACTOMANNAN AG SERPL-ACNC: <0.5 INDEX — SIGNIFICANT CHANGE UP
GLUCOSE BLDC GLUCOMTR-MCNC: 145 MG/DL — HIGH (ref 70–99)
GLUCOSE BLDC GLUCOMTR-MCNC: 200 MG/DL — HIGH (ref 70–99)
GLUCOSE BLDC GLUCOMTR-MCNC: 232 MG/DL — HIGH (ref 70–99)
GLUCOSE BLDC GLUCOMTR-MCNC: 250 MG/DL — HIGH (ref 70–99)
GLUCOSE SERPL-MCNC: 137 MG/DL — HIGH (ref 70–99)
GLUCOSE SERPL-MCNC: 290 MG/DL — HIGH (ref 70–99)
HCO3 BLDA-SCNC: 28 MMOL/L — SIGNIFICANT CHANGE UP (ref 21–28)
HCT VFR BLD CALC: 26.9 % — LOW (ref 39–50)
HGB BLD-MCNC: 8 G/DL — LOW (ref 13–17)
IMM GRANULOCYTES NFR BLD AUTO: 1.5 % — SIGNIFICANT CHANGE UP (ref 0–1.5)
LYMPHOCYTES # BLD AUTO: 0.79 K/UL — LOW (ref 1–3.3)
LYMPHOCYTES # BLD AUTO: 7.9 % — LOW (ref 13–44)
MAGNESIUM SERPL-MCNC: 2.7 MG/DL — HIGH (ref 1.6–2.6)
MCHC RBC-ENTMCNC: 22.9 PG — LOW (ref 27–34)
MCHC RBC-ENTMCNC: 29.7 GM/DL — LOW (ref 32–36)
MCV RBC AUTO: 76.9 FL — LOW (ref 80–100)
MONOCYTES # BLD AUTO: 0.51 K/UL — SIGNIFICANT CHANGE UP (ref 0–0.9)
MONOCYTES NFR BLD AUTO: 5.1 % — SIGNIFICANT CHANGE UP (ref 2–14)
MONOS+MACROS # FLD: 5 % — SIGNIFICANT CHANGE UP
NEUTROPHILS # BLD AUTO: 8.34 K/UL — HIGH (ref 1.8–7.4)
NEUTROPHILS NFR BLD AUTO: 84 % — HIGH (ref 43–77)
NIGHT BLUE STAIN TISS: SIGNIFICANT CHANGE UP
NRBC # BLD: 0 /100 WBCS — SIGNIFICANT CHANGE UP (ref 0–0)
PCO2 BLDA: 43 MMHG — SIGNIFICANT CHANGE UP (ref 35–48)
PH BLDA: 7.43 — SIGNIFICANT CHANGE UP (ref 7.35–7.45)
PHOSPHATE SERPL-MCNC: 4.1 MG/DL — SIGNIFICANT CHANGE UP (ref 2.5–4.5)
PLATELET # BLD AUTO: 285 K/UL — SIGNIFICANT CHANGE UP (ref 150–400)
PO2 BLDA: 98 MMHG — SIGNIFICANT CHANGE UP (ref 83–108)
POTASSIUM SERPL-MCNC: 4 MMOL/L — SIGNIFICANT CHANGE UP (ref 3.5–5.3)
POTASSIUM SERPL-MCNC: 4.6 MMOL/L — SIGNIFICANT CHANGE UP (ref 3.5–5.3)
POTASSIUM SERPL-SCNC: 4 MMOL/L — SIGNIFICANT CHANGE UP (ref 3.5–5.3)
POTASSIUM SERPL-SCNC: 4.6 MMOL/L — SIGNIFICANT CHANGE UP (ref 3.5–5.3)
PROT SERPL-MCNC: 5.3 G/DL — LOW (ref 6–8.3)
RBC # BLD: 3.5 M/UL — LOW (ref 4.2–5.8)
RBC # FLD: 18.6 % — HIGH (ref 10.3–14.5)
RCV VOL RI: 216 /UL — HIGH (ref 0–0)
SAO2 % BLDA: 97 % — SIGNIFICANT CHANGE UP (ref 95–100)
SODIUM SERPL-SCNC: 144 MMOL/L — SIGNIFICANT CHANGE UP (ref 135–145)
SODIUM SERPL-SCNC: 145 MMOL/L — SIGNIFICANT CHANGE UP (ref 135–145)
SPECIMEN SOURCE FLD: SIGNIFICANT CHANGE UP
SPECIMEN SOURCE: SIGNIFICANT CHANGE UP
TOTAL NUCLEATED CELL COUNT, BODY FLUID: 3050 /UL — SIGNIFICANT CHANGE UP
TRIGL SERPL-MCNC: 209 MG/DL — HIGH
TUBE TYPE: SIGNIFICANT CHANGE UP
WBC # BLD: 9.94 K/UL — SIGNIFICANT CHANGE UP (ref 3.8–10.5)
WBC # FLD AUTO: 9.94 K/UL — SIGNIFICANT CHANGE UP (ref 3.8–10.5)

## 2021-04-13 PROCEDURE — 88112 CYTOPATH CELL ENHANCE TECH: CPT | Mod: 26

## 2021-04-13 PROCEDURE — 99233 SBSQ HOSP IP/OBS HIGH 50: CPT | Mod: GC

## 2021-04-13 PROCEDURE — 71045 X-RAY EXAM CHEST 1 VIEW: CPT | Mod: 26

## 2021-04-13 PROCEDURE — 88305 TISSUE EXAM BY PATHOLOGIST: CPT | Mod: 26

## 2021-04-13 PROCEDURE — 93308 TTE F-UP OR LMTD: CPT | Mod: 26

## 2021-04-13 PROCEDURE — 93321 DOPPLER ECHO F-UP/LMTD STD: CPT | Mod: 26

## 2021-04-13 PROCEDURE — 99232 SBSQ HOSP IP/OBS MODERATE 35: CPT

## 2021-04-13 RX ORDER — LACTULOSE 10 G/15ML
10 SOLUTION ORAL EVERY 4 HOURS
Refills: 0 | Status: DISCONTINUED | OUTPATIENT
Start: 2021-04-13 | End: 2021-04-18

## 2021-04-13 RX ORDER — PROPOFOL 10 MG/ML
10 INJECTION, EMULSION INTRAVENOUS
Qty: 1000 | Refills: 0 | Status: DISCONTINUED | OUTPATIENT
Start: 2021-04-13 | End: 2021-04-16

## 2021-04-13 RX ORDER — LABETALOL HCL 100 MG
10 TABLET ORAL ONCE
Refills: 0 | Status: DISCONTINUED | OUTPATIENT
Start: 2021-04-13 | End: 2021-04-13

## 2021-04-13 RX ORDER — DEXMEDETOMIDINE HYDROCHLORIDE IN 0.9% SODIUM CHLORIDE 4 UG/ML
0.4 INJECTION INTRAVENOUS
Qty: 400 | Refills: 0 | Status: DISCONTINUED | OUTPATIENT
Start: 2021-04-13 | End: 2021-04-16

## 2021-04-13 RX ORDER — FENTANYL CITRATE 50 UG/ML
0.5 INJECTION INTRAVENOUS
Qty: 2500 | Refills: 0 | Status: DISCONTINUED | OUTPATIENT
Start: 2021-04-13 | End: 2021-04-15

## 2021-04-13 RX ORDER — QUETIAPINE FUMARATE 200 MG/1
50 TABLET, FILM COATED ORAL DAILY
Refills: 0 | Status: DISCONTINUED | OUTPATIENT
Start: 2021-04-13 | End: 2021-04-14

## 2021-04-13 RX ORDER — AMLODIPINE BESYLATE 2.5 MG/1
5 TABLET ORAL DAILY
Refills: 0 | Status: DISCONTINUED | OUTPATIENT
Start: 2021-04-13 | End: 2021-04-15

## 2021-04-13 RX ORDER — HYDRALAZINE HCL 50 MG
10 TABLET ORAL ONCE
Refills: 0 | Status: COMPLETED | OUTPATIENT
Start: 2021-04-13 | End: 2021-04-13

## 2021-04-13 RX ADMIN — VORICONAZOLE 125 MILLIGRAM(S): 10 INJECTION, POWDER, LYOPHILIZED, FOR SOLUTION INTRAVENOUS at 22:11

## 2021-04-13 RX ADMIN — LACTULOSE 10 GRAM(S): 10 SOLUTION ORAL at 12:15

## 2021-04-13 RX ADMIN — LACTULOSE 10 GRAM(S): 10 SOLUTION ORAL at 17:43

## 2021-04-13 RX ADMIN — Medication 3 MILLILITER(S): at 12:56

## 2021-04-13 RX ADMIN — INSULIN HUMAN 2 UNIT(S): 100 INJECTION, SOLUTION SUBCUTANEOUS at 22:13

## 2021-04-13 RX ADMIN — DEXMEDETOMIDINE HYDROCHLORIDE IN 0.9% SODIUM CHLORIDE 12 MICROGRAM(S)/KG/HR: 4 INJECTION INTRAVENOUS at 10:49

## 2021-04-13 RX ADMIN — QUETIAPINE FUMARATE 50 MILLIGRAM(S): 200 TABLET, FILM COATED ORAL at 11:47

## 2021-04-13 RX ADMIN — POLYETHYLENE GLYCOL 3350 17 GRAM(S): 17 POWDER, FOR SOLUTION ORAL at 05:03

## 2021-04-13 RX ADMIN — ENOXAPARIN SODIUM 40 MILLIGRAM(S): 100 INJECTION SUBCUTANEOUS at 05:03

## 2021-04-13 RX ADMIN — Medication 3 MILLILITER(S): at 03:33

## 2021-04-13 RX ADMIN — PIPERACILLIN AND TAZOBACTAM 200 GRAM(S): 4; .5 INJECTION, POWDER, LYOPHILIZED, FOR SOLUTION INTRAVENOUS at 11:46

## 2021-04-13 RX ADMIN — CHLORHEXIDINE GLUCONATE 1 APPLICATION(S): 213 SOLUTION TOPICAL at 01:34

## 2021-04-13 RX ADMIN — Medication 3 MILLILITER(S): at 17:18

## 2021-04-13 RX ADMIN — PIPERACILLIN AND TAZOBACTAM 200 GRAM(S): 4; .5 INJECTION, POWDER, LYOPHILIZED, FOR SOLUTION INTRAVENOUS at 17:42

## 2021-04-13 RX ADMIN — POLYETHYLENE GLYCOL 3350 17 GRAM(S): 17 POWDER, FOR SOLUTION ORAL at 17:42

## 2021-04-13 RX ADMIN — Medication 3 MILLILITER(S): at 19:39

## 2021-04-13 RX ADMIN — LACTULOSE 10 GRAM(S): 10 SOLUTION ORAL at 22:11

## 2021-04-13 RX ADMIN — CHLORHEXIDINE GLUCONATE 15 MILLILITER(S): 213 SOLUTION TOPICAL at 17:39

## 2021-04-13 RX ADMIN — ENOXAPARIN SODIUM 40 MILLIGRAM(S): 100 INJECTION SUBCUTANEOUS at 17:42

## 2021-04-13 RX ADMIN — INSULIN GLARGINE 12 UNIT(S): 100 INJECTION, SOLUTION SUBCUTANEOUS at 22:10

## 2021-04-13 RX ADMIN — INSULIN HUMAN 4: 100 INJECTION, SOLUTION SUBCUTANEOUS at 11:45

## 2021-04-13 RX ADMIN — AMLODIPINE BESYLATE 5 MILLIGRAM(S): 2.5 TABLET ORAL at 15:21

## 2021-04-13 RX ADMIN — FENTANYL CITRATE 6 MICROGRAM(S)/KG/HR: 50 INJECTION INTRAVENOUS at 01:43

## 2021-04-13 RX ADMIN — Medication 1 APPLICATION(S): at 05:03

## 2021-04-13 RX ADMIN — INSULIN HUMAN 2 UNIT(S): 100 INJECTION, SOLUTION SUBCUTANEOUS at 06:10

## 2021-04-13 RX ADMIN — INSULIN HUMAN 4: 100 INJECTION, SOLUTION SUBCUTANEOUS at 17:37

## 2021-04-13 RX ADMIN — PANTOPRAZOLE SODIUM 40 MILLIGRAM(S): 20 TABLET, DELAYED RELEASE ORAL at 11:46

## 2021-04-13 RX ADMIN — Medication 3 MILLILITER(S): at 09:04

## 2021-04-13 RX ADMIN — Medication 10 MILLIGRAM(S): at 16:44

## 2021-04-13 RX ADMIN — CHLORHEXIDINE GLUCONATE 15 MILLILITER(S): 213 SOLUTION TOPICAL at 05:02

## 2021-04-13 RX ADMIN — QUETIAPINE FUMARATE 50 MILLIGRAM(S): 200 TABLET, FILM COATED ORAL at 22:10

## 2021-04-13 RX ADMIN — Medication 650 MILLIGRAM(S): at 01:34

## 2021-04-13 RX ADMIN — INSULIN HUMAN 2 UNIT(S): 100 INJECTION, SOLUTION SUBCUTANEOUS at 17:39

## 2021-04-13 RX ADMIN — CHLORHEXIDINE GLUCONATE 1 APPLICATION(S): 213 SOLUTION TOPICAL at 05:03

## 2021-04-13 RX ADMIN — FENTANYL CITRATE 6 MICROGRAM(S)/KG/HR: 50 INJECTION INTRAVENOUS at 10:45

## 2021-04-13 RX ADMIN — SENNA PLUS 2 TABLET(S): 8.6 TABLET ORAL at 23:34

## 2021-04-13 RX ADMIN — PIPERACILLIN AND TAZOBACTAM 200 GRAM(S): 4; .5 INJECTION, POWDER, LYOPHILIZED, FOR SOLUTION INTRAVENOUS at 05:03

## 2021-04-13 RX ADMIN — VORICONAZOLE 125 MILLIGRAM(S): 10 INJECTION, POWDER, LYOPHILIZED, FOR SOLUTION INTRAVENOUS at 11:36

## 2021-04-13 RX ADMIN — PROPOFOL 7.2 MICROGRAM(S)/KG/MIN: 10 INJECTION, EMULSION INTRAVENOUS at 06:31

## 2021-04-13 RX ADMIN — LACTULOSE 10 GRAM(S): 10 SOLUTION ORAL at 14:10

## 2021-04-13 RX ADMIN — PIPERACILLIN AND TAZOBACTAM 200 GRAM(S): 4; .5 INJECTION, POWDER, LYOPHILIZED, FOR SOLUTION INTRAVENOUS at 00:28

## 2021-04-13 RX ADMIN — PROPOFOL 7.2 MICROGRAM(S)/KG/MIN: 10 INJECTION, EMULSION INTRAVENOUS at 01:43

## 2021-04-13 RX ADMIN — PROPOFOL 7.2 MICROGRAM(S)/KG/MIN: 10 INJECTION, EMULSION INTRAVENOUS at 10:45

## 2021-04-13 RX ADMIN — Medication 6 MILLIGRAM(S): at 05:03

## 2021-04-13 RX ADMIN — CHLORHEXIDINE GLUCONATE 1 APPLICATION(S): 213 SOLUTION TOPICAL at 11:37

## 2021-04-13 RX ADMIN — INSULIN HUMAN 2 UNIT(S): 100 INJECTION, SOLUTION SUBCUTANEOUS at 11:46

## 2021-04-13 NOTE — PROGRESS NOTE ADULT - SUBJECTIVE AND OBJECTIVE BOX
INTERVAL HPI/OVERNIGHT EVENTS: HANS    ROS: UTO      ANTIBIOTICS/RELEVANT:    MEDICATIONS  (STANDING):  albuterol/ipratropium for Nebulization 3 milliLiter(s) Nebulizer every 4 hours  amLODIPine   Tablet 5 milliGRAM(s) Oral daily  chlorhexidine 0.12% Liquid 15 milliLiter(s) Oral Mucosa every 12 hours  chlorhexidine 2% Cloths 1 Application(s) Topical daily  chlorhexidine 4% Liquid 1 Application(s) Topical <User Schedule>  dexMEDEtomidine Infusion 0.4 MICROgram(s)/kG/Hr (12 mL/Hr) IV Continuous <Continuous>  dextrose 40% Gel 15 Gram(s) Oral once  dextrose 5%. 1000 milliLiter(s) (50 mL/Hr) IV Continuous <Continuous>  dextrose 5%. 1000 milliLiter(s) (100 mL/Hr) IV Continuous <Continuous>  dextrose 50% Injectable 25 Gram(s) IV Push once  dextrose 50% Injectable 12.5 Gram(s) IV Push once  dextrose 50% Injectable 25 Gram(s) IV Push once  enoxaparin Injectable 40 milliGRAM(s) SubCutaneous every 12 hours  fentaNYL   Infusion. 0.5 MICROgram(s)/kG/Hr (6 mL/Hr) IV Continuous <Continuous>  glucagon  Injectable 1 milliGRAM(s) IntraMuscular once  insulin glargine Injectable (LANTUS) 12 Unit(s) SubCutaneous at bedtime  insulin regular  human corrective regimen sliding scale   SubCutaneous every 6 hours  insulin regular  human recombinant 2 Unit(s) SubCutaneous every 6 hours  lactulose Syrup 10 Gram(s) Oral every 4 hours  pantoprazole  Injectable 40 milliGRAM(s) IV Push daily  piperacillin/tazobactam IVPB.. 4.5 Gram(s) IV Intermittent every 6 hours  polyethylene glycol 3350 17 Gram(s) Oral every 12 hours  propofol Infusion 10 MICROgram(s)/kG/Min (7.2 mL/Hr) IV Continuous <Continuous>  QUEtiapine 50 milliGRAM(s) Oral daily  QUEtiapine 50 milliGRAM(s) Oral at bedtime  senna 2 Tablet(s) Oral at bedtime  voriconazole IVPB 400 milliGRAM(s) IV Intermittent every 12 hours    MEDICATIONS  (PRN):  acetaminophen    Suspension .. 650 milliGRAM(s) Oral every 6 hours PRN Temp greater or equal to 38C (100.4F)  sodium chloride 0.9% lock flush 10 milliLiter(s) IV Push every 1 hour PRN Pre/post blood products, medications, blood draw, and to maintain line patency        Vital Signs Last 24 Hrs  T(C): 36.2 (13 Apr 2021 17:48), Max: 37.4 (12 Apr 2021 18:00)  T(F): 97.1 (13 Apr 2021 17:48), Max: 99.3 (12 Apr 2021 18:00)  HR: 53 (13 Apr 2021 17:00) (41 - 73)  BP: 133/62 (13 Apr 2021 17:00) (102/51 - 176/84)  BP(mean): 89 (13 Apr 2021 17:00) (72 - 121)  RR: 19 (13 Apr 2021 17:00) (15 - 27)  SpO2: 97% (13 Apr 2021 17:00) (97% - 100%)    PHYSICAL EXAM:  deferred      LABS:                        8.0    9.94  )-----------( 285      ( 13 Apr 2021 05:20 )             26.9     04-13    144  |  108  |  48<H>  ----------------------------<  290<H>  4.6   |  26  |  1.57<H>    Ca    8.0<L>      13 Apr 2021 16:14  Phos  4.1     04-13  Mg     2.7     04-13    TPro  5.3<L>  /  Alb  2.2<L>  /  TBili  0.2  /  DBili  x   /  AST  31  /  ALT  26  /  AlkPhos  57  04-13          MICROBIOLOGY:    RADIOLOGY & ADDITIONAL STUDIES:

## 2021-04-13 NOTE — PROGRESS NOTE ADULT - ASSESSMENT
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 To f/u BAL studies/culture and serum fungal markers. Can continue empiric voriconazole 400mg q12h and Zosyn. Kelia dubliniensis in the respiratory tract is a probable colonizer.     ID Team 2

## 2021-04-13 NOTE — PROGRESS NOTE ADULT - ATTENDING COMMENTS
Patient seen and examined with house-staff during bedside rounds.  Resident note read, including vitals, physical findings, laboratory data, and radiological reports.   Revisions included below.  Direct personal management at bed side and extensive interpretation of the data.  Plan was outlined and discussed in details with the housestaff.  Decision making of high complexity  Action taken for acute disease activity to reflect the level of care provided:  - medication reconciliation  - review laboratory data  The patient is stable.  The gastricsin improved.  Patient had a bronchoscopy on the bronchial washings growing yeast.  Follow-up on the final culture.  The patient was started on voriconazole.  Renal function improved after repositioning the [place].  The patient is doing good urine output.  His gas exchange improved with a PEEP and is down to PEEP of 5.  Chest x-ray revealed right upper lobe infiltrate.  Patient is on Decadron and a total of 10 days.  Consider DC'd Decadron

## 2021-04-13 NOTE — PROGRESS NOTE ADULT - SUBJECTIVE AND OBJECTIVE BOX
SUBJECTIVE    24 hour events:     REVIEW OF SYSTEMS:   See HPI (as per chart review), unable to obtain 2/2 ETT and sedation                                    OBJECTIVE    Vital Signs Last 24 Hrs  T(C): 36.4 (13 Apr 2021 05:00), Max: 38.4 (12 Apr 2021 09:00)  T(F): 97.5 (13 Apr 2021 05:00), Max: 101.2 (12 Apr 2021 09:00)  HR: 50 (13 Apr 2021 07:00) (44 - 68)  BP: 102/51 (13 Apr 2021 06:00) (102/51 - 155/79)  BP(mean): 72 (13 Apr 2021 06:00) (72 - 109)  RR: 26 (13 Apr 2021 07:00) (22 - 30)  SpO2: 100% (13 Apr 2021 07:00) (93% - 100%)    Vent settings   Mode: AC/ CMV (Assist Control/ Continuous Mandatory Ventilation), RR (machine): 26, TV (machine): 450, FiO2: 40, PEEP: 8, ITime: 1, MAP: 12, PIP: 20    I&O's Detail    12 Apr 2021 07:01  -  13 Apr 2021 07:00  --------------------------------------------------------  IN:    FentaNYL: 288 mL    IV PiggyBack: 250 mL    IV PiggyBack: 200 mL    Oral Fluid: 100 mL    Propofol: 772 mL    Vital High Protein: 520 mL  Total IN: 2130 mL    OUT:    Indwelling Catheter - Urethral (mL): 3570 mL  Total OUT: 3570 mL    Total NET: -1440 mL      LABS:                        8.0    9.94  )-----------( 285      ( 13 Apr 2021 05:20 )             26.9     04-13    145  |  109<H>  |  48<H>  ----------------------------<  137<H>  4.0   |  26  |  2.06<H>    Ca    8.0<L>      13 Apr 2021 05:20  Phos  4.1     04-13  Mg     2.7     04-13    TPro  5.3<L>  /  Alb  2.2<L>  /  TBili  0.2  /  DBili  x   /  AST  31  /  ALT  26  /  AlkPhos  57  04-13      ABG - ( 13 Apr 2021 05:22 )  pH, Arterial: 7.43  pH, Blood: x     /  pCO2: 43    /  pO2: 98    / HCO3: 28    / Base Excess: 3.1   /  SaO2: 97        *Microbiology    Culture - Bronchial (collected 12 Apr 2021 19:49)  Source: Bronch Wash broncial wash  Gram Stain (12 Apr 2021 22:02):    No epithelial cells seen    Moderate WBC's    Rare to few Yeast    Culture - Bronchial (collected 12 Apr 2021 19:15)  Source: Lavage BAL  Gram Stain (12 Apr 2021 21:58):    No epithelial cells seen    Moderate WBC's    Few Yeast    Culture - Blood (collected 12 Apr 2021 12:05)  Source: .Blood Blood-Peripheral  Preliminary Report (13 Apr 2021 01:00):    No growth at 12 hours    Culture - Blood (collected 12 Apr 2021 12:05)  Source: .Blood Blood-Peripheral  Preliminary Report (13 Apr 2021 01:00):    No growth at 12 hours    RADIOLOGY    *CXr  -AM CXr ordered, will review with attending and fellow    MEDICATIONS  (STANDING):  albuterol/ipratropium for Nebulization 3 milliLiter(s) Nebulizer every 4 hours  chlorhexidine 0.12% Liquid 15 milliLiter(s) Oral Mucosa every 12 hours  chlorhexidine 2% Cloths 1 Application(s) Topical daily  chlorhexidine 4% Liquid 1 Application(s) Topical <User Schedule>  dexAMETHasone  Injectable 6 milliGRAM(s) IV Push every 24 hours  dextrose 40% Gel 15 Gram(s) Oral once  dextrose 5%. 1000 milliLiter(s) (50 mL/Hr) IV Continuous <Continuous>  dextrose 5%. 1000 milliLiter(s) (100 mL/Hr) IV Continuous <Continuous>  dextrose 50% Injectable 25 Gram(s) IV Push once  dextrose 50% Injectable 12.5 Gram(s) IV Push once  dextrose 50% Injectable 25 Gram(s) IV Push once  enoxaparin Injectable 40 milliGRAM(s) SubCutaneous every 12 hours  fentaNYL   Infusion. 0.5 MICROgram(s)/kG/Hr (6 mL/Hr) IV Continuous <Continuous>  glucagon  Injectable 1 milliGRAM(s) IntraMuscular once  insulin glargine Injectable (LANTUS) 12 Unit(s) SubCutaneous at bedtime  insulin regular  human corrective regimen sliding scale   SubCutaneous every 6 hours  insulin regular  human recombinant 2 Unit(s) SubCutaneous every 6 hours  pantoprazole  Injectable 40 milliGRAM(s) IV Push daily  petrolatum Ophthalmic Ointment 1 Application(s) Both EYES every 6 hours  piperacillin/tazobactam IVPB.. 4.5 Gram(s) IV Intermittent every 6 hours  polyethylene glycol 3350 17 Gram(s) Oral every 12 hours  propofol Infusion 10 MICROgram(s)/kG/Min (7.2 mL/Hr) IV Continuous <Continuous>  QUEtiapine 25 milliGRAM(s) Oral daily  QUEtiapine 50 milliGRAM(s) Oral at bedtime  senna 2 Tablet(s) Oral at bedtime  voriconazole IVPB 400 milliGRAM(s) IV Intermittent every 12 hours    MEDICATIONS  (PRN):  acetaminophen    Suspension .. 650 milliGRAM(s) Oral every 6 hours PRN Temp greater or equal to 38C (100.4F)  sodium chloride 0.9% lock flush 10 milliLiter(s) IV Push every 1 hour PRN Pre/post blood products, medications, blood draw, and to maintain line patency   SUBJECTIVE  24 hour events: no acute events, afebrile at this time, sedation decreased this AM    REVIEW OF SYSTEMS: See HPI (as per chart review), unable to obtain 2/2 ETT and sedation                                    OBJECTIVE  Vital Signs Last 24 Hrs  T(C): 36.4 (13 Apr 2021 05:00), Max: 38.4 (12 Apr 2021 09:00)  T(F): 97.5 (13 Apr 2021 05:00), Max: 101.2 (12 Apr 2021 09:00)  HR: 50 (13 Apr 2021 07:00) (44 - 68)  BP: 102/51 (13 Apr 2021 06:00) (102/51 - 155/79); SBP on art line 120's-150's  BP(mean): 72 (13 Apr 2021 06:00) (72 - 109); MAP on art line 70-90's  RR: 26 (13 Apr 2021 07:00) (22 - 30)  SpO2: 100% (13 Apr 2021 07:00) (93% - 100%)    Vent settings   Mode: AC/ CMV (Assist Control/ Continuous Mandatory Ventilation)  RR (machine): 26, TV (machine): 450, FiO2: 40, PEEP: 8, ITime: 1, MAP: 12, PIP: 20    I&O's Detail    12 Apr 2021 07:01  -  13 Apr 2021 07:00  --------------------------------------------------------  IN:    FentaNYL: 288 mL    IV PiggyBack: 250 mL    IV PiggyBack: 200 mL    Oral Fluid: 100 mL    Propofol: 772 mL    Vital High Protein: 520 mL  Total IN: 2130 mL    OUT:    Indwelling Catheter - Urethral (mL): 3570 mL  Total OUT: 3570 mL    Total NET: -1440 mL      LABS:                        8.0    9.94  )-----------( 285      ( 13 Apr 2021 05:20 )             26.9     04-13    145  |  109<H>  |  48<H>  ----------------------------<  137<H>  4.0   |  26  |  2.06<H>    Ca    8.0<L>      13 Apr 2021 05:20  Phos  4.1     04-13  Mg     2.7     04-13    TPro  5.3<L>  /  Alb  2.2<L>  /  TBili  0.2  /  DBili  x   /  AST  31  /  ALT  26  /  AlkPhos  57  04-13    ABG - ( 13 Apr 2021 05:22 )  pH, Arterial: 7.43  pH, Blood: x     /  pCO2: 43    /  pO2: 98    / HCO3: 28    / Base Excess: 3.1   /  SaO2: 97  PF ratio 245    D-Dimer 382  Ferritin 124  .7      *Microbiology  Culture - Bronchial (collected 12 Apr 2021 19:49)  Source: Bronch Wash broncial wash  Gram Stain (12 Apr 2021 22:02):    No epithelial cells seen    Moderate WBC's    Rare to few Yeast    Culture - Bronchial (collected 12 Apr 2021 19:15)  Source: Lavage BAL  Gram Stain (12 Apr 2021 21:58):    No epithelial cells seen    Moderate WBC's    Few Yeast    Culture - Blood (collected 12 Apr 2021 12:05)  Source: .Blood Blood-Peripheral  Preliminary Report (13 Apr 2021 01:00):    No growth at 12 hours    Culture - Blood (collected 12 Apr 2021 12:05)  Source: .Blood Blood-Peripheral  Preliminary Report (13 Apr 2021 01:00):    No growth at 12 hours    RADIOLOGY  *CXr  -AM CXr ordered, will review with attending and fellow    MEDICATIONS  (STANDING):  albuterol/ipratropium for Nebulization 3 milliLiter(s) Nebulizer every 4 hours  chlorhexidine 0.12% Liquid 15 milliLiter(s) Oral Mucosa every 12 hours  chlorhexidine 2% Cloths 1 Application(s) Topical daily  chlorhexidine 4% Liquid 1 Application(s) Topical <User Schedule>  dexAMETHasone  Injectable 6 milliGRAM(s) IV Push every 24 hours  dextrose 40% Gel 15 Gram(s) Oral once  dextrose 5%. 1000 milliLiter(s) (50 mL/Hr) IV Continuous <Continuous>  dextrose 5%. 1000 milliLiter(s) (100 mL/Hr) IV Continuous <Continuous>  dextrose 50% Injectable 25 Gram(s) IV Push once  dextrose 50% Injectable 12.5 Gram(s) IV Push once  dextrose 50% Injectable 25 Gram(s) IV Push once  enoxaparin Injectable 40 milliGRAM(s) SubCutaneous every 12 hours  fentaNYL   Infusion. 0.5 MICROgram(s)/kG/Hr (6 mL/Hr) IV Continuous <Continuous> @ 1 mcg  glucagon  Injectable 1 milliGRAM(s) IntraMuscular once  insulin glargine Injectable (LANTUS) 12 Unit(s) SubCutaneous at bedtime  insulin regular  human corrective regimen sliding scale   SubCutaneous every 6 hours  insulin regular  human recombinant 2 Unit(s) SubCutaneous every 6 hours  pantoprazole  Injectable 40 milliGRAM(s) IV Push daily  petrolatum Ophthalmic Ointment 1 Application(s) Both EYES every 6 hours  piperacillin/tazobactam IVPB.. 4.5 Gram(s) IV Intermittent every 6 hours  polyethylene glycol 3350 17 Gram(s) Oral every 12 hours  propofol Infusion 10 MICROgram(s)/kG/Min (7.2 mL/Hr) IV Continuous <Continuous> @ 20 mcg  QUEtiapine 25 milliGRAM(s) Oral daily  QUEtiapine 50 milliGRAM(s) Oral at bedtime  senna 2 Tablet(s) Oral at bedtime  voriconazole IVPB 400 milliGRAM(s) IV Intermittent every 12 hours    MEDICATIONS  (PRN):  acetaminophen    Suspension .. 650 milliGRAM(s) Oral every 6 hours PRN Temp greater or equal to 38C (100.4F)  sodium chloride 0.9% lock flush 10 milliLiter(s) IV Push every 1 hour PRN Pre/post blood products, medications, blood draw, and to maintain line patency    PHYSICAL EXAM:   GENERAL: obese, intubated and sedated, restless when stimulated.  SKIN/HAIR/NAILS: Skin warm and dry. Nails without clubbing or cyanosis. CR<2 secs. No lesions, rash, petechiae, erythema or ecchymoses. Anicteric.   HEAD AND NECK: The skull is NC/AT. B/L Sclera white. 2 mm PERRL. Periorbital edema improving. Nasal mucous membrane dry with no erythema or drainage. NGT in place. Trachea midline, neck supple.   THORAX/LUNGS: Thorax is symmetric with good expansion, assisted by mechanical ventilation. Lung sounds diminished throughout with coarse rhonchi.  CARDIOVASCULAR: No JVD. Carotid upstrokes are brisk, without bruits. +S1 and S2, RRR; no extra heart sounds or M/R/G.  ABDOMEN/: Abdomen is distended with +BS x 4. It is soft, no palpable masses; Alas catheter in place. Last BM 4/11 as per flowsheet.  PERIPHERAL VASCULAR: Extremities are warm, radial and dorsalis pedis pulses +1 and symmetric. +1 generalized and dependent edema noted. No clubbing, no cyanosis.  MUSCULOSKELETAL: Strong hand  B/L,; active ROM moderately impaired on lower extremities 2/2 sedation. No evidence of swelling or deformity.  NEUROLOGICAL: Patient able to follow simple commands, restless when stimulated, wrist restraints in place to prevent patient harm; moderately sedated (rass -2, 3) and intubated.  LINES (DATES): R IJ TLC 4/12, R  radial art line 4/9, IVL    Assessment and Plan   Patient is a 55 yo M with PMHx of MU and gout who presented to ED with c/o progressively worsening SOB now admitted to ICU for management of AHRF in the setting of COViD    NEURO  #Sedated   -propofol and fentanyl, current rass -3  -Patient following minimal commands, will transition to precedex   -Seroquel regimen added to avoid delirium  -Neuro checks per ICU protocol, pain/sedation meds assessed and addressed  -Continue with wrist restraints to prevent patient harm    RESPIRATORY  #Acute hypoxic hypercapnic respiratory failure in the setting of COViD PNA  -Patient initially presented with O2 saturation of 14% and cyanotic requiring immediate intubation  -CXr with diffuse bilateral opacities with + COVID19 PCR  -Elevated inflammatory markers  -On AC/VC mechanical Ventilation:  Mode: AC/ CMV; RR: 26; TV: 450; FiO2: 40; PEEP: 5  -PF ratio 245 with this morning's ABG  -Will attempt CPAP trials for potential extubation    #COViD  -CXr with diffuse bilateral opacities with + COVID19 PCR  -Start Date 4/5 Remdesivir and Decadron 6 mg (x 10 day course)  -Remdesevir course given 4/5-4/9 per chart review (was briefly d/c'd due to renal fxn worsening but was REORDERED and completed)    #MU  -As per patient's wife, patient has MU on CPAP  -Patient sleeps with 2-3 pillows at night, attributed to MU    CARDIOVASCULAR  #RV dilation  -Bedside POCUS demonstrating dilated RV and mildly enlarged pulm artery possibly 2/2 MU vs PE (unlikely)  -Echo: Severe hypokinesis of the entire anteroseptum/inferoseptum. Akinesis of the mid to apical anterolateral region, apical anterior, apical inferior and true apex. Mild hypokinesis of the basal anterolateral, basal anterior and basal inferior regions. The estimated left ventricular systolic function is 15%. The right ventricle is mildly dilated. Right ventricular systolic function is mildly reduced. No pericardial effusion is seen.  -Based on these findings, cardiology was consulted  -Appreciate cardiac recs  -As per spouse, no hx of cardiac disease and no change in ADLs or activity tolerance prior to current presentation  -Holding off on diuretics as per primary team    #LV Dysfunction  As per Cardiology:  -Echo reviewed. Images are poor due to body habitus but suggest a stress cardiomyopathy pattern (takotsubo cardiomyopathy) rather than an ischemic event  -EKG reviewed showing NSR, with non specific ST changes and poor R wave progression in the precordial leads likely due to body habitus  -Clinically patient is warm and well perfused and making urine.  -Patient's LV dysfunction appears to be mutifactorial: COVID, Morbid Obesity, Sleep Apnea. Myocarditis is also a possibility; now exacerbateed in setting of COVIDPNA  -Unfortunately patient is unable to undergo further ischemic or confirmatory imaging/workup  -Hold off BB or ACE/ARB therapy while on pressors with Renal impairement  -EKG from 4/5 NSR with RBBB, without ischemic changes  -Please keep K>4 and Mg>2  -Will need repeat ECHO post extubation to re-eval EF, if still depressed with wall motion abn will pursue ischemic work up  -Agree with diuresis for now and continuing with current management with Levo as mixed venous and clinical picture does not correlate with cardiogenic shock at this time.    GI  -No active issues  -Nasoenteral feeding at goal  -Protonix IVP for GI ppx    RENAL//F&E  #Acute Kidney Injury  -SCr peak to 3.16 yesterday likely 2/2 alas obstruction  -Today SCr. trending down to 2.06  -Alas catheter changed 4/12  -Urine output for 24hr 1.2L  -Baseline creatinine unknown, no reported kidney disease  -Electrolytes stable  -Strict I&O monitoring  -Avoid nephrotoxic medications    #Hypernatremia  -Today Na is 143  -Will reassess need for free water 250 mL q 8hr    ID  #COVID PNA   -Plan as above  -Remains on steroid rx, remdesivir course completed    #Leukocytosis  -Patient TMax 4/12 38.4, today afebrile  -Today WBC 9.9K downtrending  -ID ok'd Vori (loading doses x 2 given, now on 400 mg q12hr)  -Daily EKG for QTc monitoring  -f/u fungitell and galactomannon, aspergillus Ab  -f/u BAL sputum samples taken from bronch yesterday 4/12  -Peripheral blood cultures ordered x 2, no growth at 12 hrs  -Zosyn started 4/9 x 7-10 day course  -Central line changed 4/12  -Maintain MAP > 65 mmHg  -Monitor urine output    ENDOCRINE  #Hyperglycemia  -q6hr correctional scale ordered, will adjust as needed, may need premeal  -Lantus 12 units, regular insuliln 2 units as per order  -Blood glucose trend   -Decadron started 4/5 AM  -A1c 6.2    HEME  #Anemia  -Hgb 11 on admission; unknown baseline. No evidence of bleed.  -Hgb today 8  -Trend CBC  -Maintain active T+S  -Transfuse for Hb <7    -DVT ppx with Lovenox BID    DISPO/GOALS OF CARE:  Patient requires continuous monitoring with bedside rhythm monitoring, arterial line, pulse oximetry, ventilator monitoring and intermittent blood gas analysis. Care plan discussed with ICU care team. Patient remains critical at this time.     SUBJECTIVE  24 hour events: no acute events, afebrile at this time, sedation decreased this AM    REVIEW OF SYSTEMS: See HPI (as per chart review), unable to obtain 2/2 ETT and sedation                                    OBJECTIVE  Vital Signs Last 24 Hrs  T(C): 36.4 (13 Apr 2021 05:00), Max: 38.4 (12 Apr 2021 09:00)  T(F): 97.5 (13 Apr 2021 05:00), Max: 101.2 (12 Apr 2021 09:00)  HR: 50 (13 Apr 2021 07:00) (44 - 68)  BP: 102/51 (13 Apr 2021 06:00) (102/51 - 155/79); SBP on art line 120's-150's  BP(mean): 72 (13 Apr 2021 06:00) (72 - 109); MAP on art line 70-90's  RR: 26 (13 Apr 2021 07:00) (22 - 30)  SpO2: 100% (13 Apr 2021 07:00) (93% - 100%)    Vent settings   Mode: AC/ CMV (Assist Control/ Continuous Mandatory Ventilation)  RR (machine): 26, TV (machine): 450, FiO2: 40, PEEP: 8, ITime: 1, MAP: 12, PIP: 20    I&O's Detail    12 Apr 2021 07:01  -  13 Apr 2021 07:00  --------------------------------------------------------  IN:    FentaNYL: 288 mL    IV PiggyBack: 250 mL    IV PiggyBack: 200 mL    Oral Fluid: 100 mL    Propofol: 772 mL    Vital High Protein: 520 mL  Total IN: 2130 mL    OUT:    Indwelling Catheter - Urethral (mL): 3570 mL  Total OUT: 3570 mL    Total NET: -1440 mL      LABS:                        8.0    9.94  )-----------( 285      ( 13 Apr 2021 05:20 )             26.9     04-13    145  |  109<H>  |  48<H>  ----------------------------<  137<H>  4.0   |  26  |  2.06<H>    Ca    8.0<L>      13 Apr 2021 05:20  Phos  4.1     04-13  Mg     2.7     04-13    TPro  5.3<L>  /  Alb  2.2<L>  /  TBili  0.2  /  DBili  x   /  AST  31  /  ALT  26  /  AlkPhos  57  04-13    ABG - ( 13 Apr 2021 05:22 )  pH, Arterial: 7.43  pH, Blood: x     /  pCO2: 43    /  pO2: 98    / HCO3: 28    / Base Excess: 3.1   /  SaO2: 97  PF ratio 245    D-Dimer 382  Ferritin 124  .7      *Microbiology  Culture - Bronchial (collected 12 Apr 2021 19:49)  Source: Bronch Wash broncial wash  Gram Stain (12 Apr 2021 22:02):    No epithelial cells seen    Moderate WBC's    Rare to few Yeast    Culture - Bronchial (collected 12 Apr 2021 19:15)  Source: Lavage BAL  Gram Stain (12 Apr 2021 21:58):    No epithelial cells seen    Moderate WBC's    Few Yeast    Culture - Blood (collected 12 Apr 2021 12:05)  Source: .Blood Blood-Peripheral  Preliminary Report (13 Apr 2021 01:00):    No growth at 12 hours    Culture - Blood (collected 12 Apr 2021 12:05)  Source: .Blood Blood-Peripheral  Preliminary Report (13 Apr 2021 01:00):    No growth at 12 hours    RADIOLOGY  *CXr  -AM CXr ordered, will review with attending and fellow    MEDICATIONS  (STANDING):  albuterol/ipratropium for Nebulization 3 milliLiter(s) Nebulizer every 4 hours  chlorhexidine 0.12% Liquid 15 milliLiter(s) Oral Mucosa every 12 hours  chlorhexidine 2% Cloths 1 Application(s) Topical daily  chlorhexidine 4% Liquid 1 Application(s) Topical <User Schedule>  dexAMETHasone  Injectable 6 milliGRAM(s) IV Push every 24 hours  dextrose 40% Gel 15 Gram(s) Oral once  dextrose 5%. 1000 milliLiter(s) (50 mL/Hr) IV Continuous <Continuous>  dextrose 5%. 1000 milliLiter(s) (100 mL/Hr) IV Continuous <Continuous>  dextrose 50% Injectable 25 Gram(s) IV Push once  dextrose 50% Injectable 12.5 Gram(s) IV Push once  dextrose 50% Injectable 25 Gram(s) IV Push once  enoxaparin Injectable 40 milliGRAM(s) SubCutaneous every 12 hours  fentaNYL   Infusion. 0.5 MICROgram(s)/kG/Hr (6 mL/Hr) IV Continuous <Continuous> @ 1 mcg  glucagon  Injectable 1 milliGRAM(s) IntraMuscular once  insulin glargine Injectable (LANTUS) 12 Unit(s) SubCutaneous at bedtime  insulin regular  human corrective regimen sliding scale   SubCutaneous every 6 hours  insulin regular  human recombinant 2 Unit(s) SubCutaneous every 6 hours  pantoprazole  Injectable 40 milliGRAM(s) IV Push daily  petrolatum Ophthalmic Ointment 1 Application(s) Both EYES every 6 hours  piperacillin/tazobactam IVPB.. 4.5 Gram(s) IV Intermittent every 6 hours  polyethylene glycol 3350 17 Gram(s) Oral every 12 hours  propofol Infusion 10 MICROgram(s)/kG/Min (7.2 mL/Hr) IV Continuous <Continuous> @ 20 mcg  QUEtiapine 25 milliGRAM(s) Oral daily  QUEtiapine 50 milliGRAM(s) Oral at bedtime  senna 2 Tablet(s) Oral at bedtime  voriconazole IVPB 400 milliGRAM(s) IV Intermittent every 12 hours    MEDICATIONS  (PRN):  acetaminophen    Suspension .. 650 milliGRAM(s) Oral every 6 hours PRN Temp greater or equal to 38C (100.4F)  sodium chloride 0.9% lock flush 10 milliLiter(s) IV Push every 1 hour PRN Pre/post blood products, medications, blood draw, and to maintain line patency    PHYSICAL EXAM:   GENERAL: obese, intubated and sedated, restless when stimulated.  SKIN/HAIR/NAILS: Skin warm and dry. Nails without clubbing or cyanosis. CR<2 secs. No lesions, rash, petechiae, erythema or ecchymoses. Anicteric.   HEAD AND NECK: The skull is NC/AT. B/L Sclera white. 2 mm PERRL. Periorbital edema improving. Nasal mucous membrane dry with no erythema or drainage. NGT in place. Trachea midline, neck supple.   THORAX/LUNGS: Thorax is symmetric with good expansion, assisted by mechanical ventilation. Lung sounds diminished throughout with coarse rhonchi.  CARDIOVASCULAR: No JVD. Carotid upstrokes are brisk, without bruits. +S1 and S2, RRR; no extra heart sounds or M/R/G.  ABDOMEN/: Abdomen is distended with +BS x 4. It is soft, no palpable masses; Alas catheter in place. Last BM 4/11 as per flowsheet.  PERIPHERAL VASCULAR: Extremities are warm, radial and dorsalis pedis pulses +1 and symmetric. +1 generalized and dependent edema noted. No clubbing, no cyanosis.  MUSCULOSKELETAL: Strong hand  B/L,; active ROM moderately impaired on lower extremities 2/2 sedation. No evidence of swelling or deformity.  NEUROLOGICAL: Patient able to follow simple commands, restless when stimulated, wrist restraints in place to prevent patient harm; moderately sedated (rass -2, 3) and intubated.  LINES (DATES): R IJ TLC 4/12, R  radial art line 4/9, IVL    Assessment and Plan   Patient is a 57 yo M with PMHx of MU and gout who presented to ED with c/o progressively worsening SOB now admitted to ICU for management of AHRF in the setting of COViD    NEURO  #Sedated   -propofol and fentanyl, current rass -3  -Patient following minimal commands, will transition to precedex   -Seroquel regimen added to avoid delirium, will increase as needed   -Neuro checks per ICU protocol, pain/sedation meds assessed and addressed  -Continue with wrist restraints to prevent patient harm    RESPIRATORY  #Acute hypoxic hypercapnic respiratory failure in the setting of COViD PNA  -Patient initially presented with O2 saturation of 14% and cyanotic requiring immediate intubation  -CXr with diffuse bilateral opacities with + COVID19 PCR  -Elevated inflammatory markers  -On AC/VC mechanical Ventilation:  Mode: AC/ CMV; RR: 26; TV: 450; FiO2: 40; PEEP: 5  -PF ratio 245 with this morning's ABG  -Will attempt CPAP trials for potential extubation    #COViD  -CXr with diffuse bilateral opacities with + COVID19 PCR  -Start Date 4/5 Remdesivir and Decadron 6 mg (x 10 day course)  -Decadron d/c'd after 4/13 AM dose due to current clinical presentation  -Remdesevir course given 4/5-4/9 per chart review (was briefly d/c'd due to renal fxn worsening but was REORDERED and completed)    #MU  -As per patient's wife, patient has MU on CPAP  -Patient sleeps with 2-3 pillows at night, attributed to MU    CARDIOVASCULAR  #RV dilation  -Bedside POCUS demonstrating dilated RV and mildly enlarged pulm artery possibly 2/2 MU vs PE (unlikely)  -Echo: Severe hypokinesis of the entire anteroseptum/inferoseptum. Akinesis of the mid to apical anterolateral region, apical anterior, apical inferior and true apex. Mild hypokinesis of the basal anterolateral, basal anterior and basal inferior regions. The estimated left ventricular systolic function is 15%. The right ventricle is mildly dilated. Right ventricular systolic function is mildly reduced. No pericardial effusion is seen.  -Based on these findings, cardiology was consulted  -As per spouse, no hx of cardiac disease and no change in ADLs or activity tolerance prior to current presentation  -Holding off on diuretics as per primary team    #LV Dysfunction  As per Cardiology:  -Echo reviewed. Images are poor due to body habitus but suggest a stress cardiomyopathy pattern (takotsubo cardiomyopathy) rather than an ischemic event  -EKG reviewed showing NSR, with non specific ST changes and poor R wave progression in the precordial leads likely due to body habitus  -Clinically patient is warm and well perfused and making urine.  -Patient's LV dysfunction appears to be mutifactorial: COVID, Morbid Obesity, Sleep Apnea. Myocarditis is also a possibility; now exacerbateed in setting of COVIDPNA  -Unfortunately patient is unable to undergo further ischemic or confirmatory imaging/workup  -Hold off BB or ACE/ARB therapy while on pressors with Renal impairement  -EKG with RBBB, without ischemic changes  -Please keep K>4 and Mg>2  -Repeat ECHO ordered for EF re-evaluation; if still depressed with wall motion abn will pursue ischemic work up    GI  -No active issues  -Nasoenteral feeding at goal, will adjust now that propofol will be decreased  -Protonix IVP for GI ppx    RENAL//F&E  #Acute Kidney Injury  -SCr peak to 3.16 yesterday likely 2/2 alas obstruction  -Today SCr. trending down to 2.06  -Alas catheter changed 4/12  -Urine output for 24hr 1.2L  -Baseline creatinine unknown, no reported kidney disease  -Electrolytes stable  -Strict I&O monitoring  -Avoid nephrotoxic medications    #Hypernatremia  -Today Na is 143  -Will reassess need for free water 250 mL q 8hr    ID  #COVID PNA   -Plan as above  -Remains on steroid rx, remdesivir course completed    #Leukocytosis  -Patient TMax 4/12 38.4, today afebrile  -Today WBC 9.9K downtrending  -ID ok'd Vori (loading doses x 2 given, now on 400 mg q12hr)  -Daily EKG for QTc monitoring, QTc today 451  -f/u fungitell and galactomannon, aspergillus Ab  -f/u BAL sputum samples taken from bronch yesterday 4/12, showing yeast as of now  -Peripheral blood cultures ordered x 2, no growth at 12 hrs  -Zosyn started 4/9 x 7-10 day course  -Central line changed 4/12  -Maintain MAP > 65 mmHg  -Monitor urine output    ENDOCRINE  #Hyperglycemia  -q6hr correctional scale ordered, will adjust as needed, may need premeal  -Lantus 12 units, regular insuliln 2 units as per order  -Blood glucose trend   -Decadron started 4/5 AM, d/c'd 4/13  -A1c 6.2    HEME  #Anemia  -Hgb 11 on admission; unknown baseline. No evidence of bleed.  -Hgb today 8  -Trend CBC  -Maintain active T+S  -Transfuse for Hb <7    -DVT ppx with Lovenox BID    DISPO/GOALS OF CARE:  Patient requires continuous monitoring with bedside rhythm monitoring, arterial line, pulse oximetry, ventilator monitoring and intermittent blood gas analysis. Care plan discussed with ICU care team. Patient remains critical at this time.

## 2021-04-14 LAB
-  COAGULASE NEGATIVE STAPHYLOCOCCUS: SIGNIFICANT CHANGE UP
ALBUMIN SERPL ELPH-MCNC: 2.3 G/DL — LOW (ref 3.3–5)
ALP SERPL-CCNC: 83 U/L — SIGNIFICANT CHANGE UP (ref 40–120)
ALT FLD-CCNC: 31 U/L — SIGNIFICANT CHANGE UP (ref 10–45)
ANION GAP SERPL CALC-SCNC: 10 MMOL/L — SIGNIFICANT CHANGE UP (ref 5–17)
AST SERPL-CCNC: 34 U/L — SIGNIFICANT CHANGE UP (ref 10–40)
BASE EXCESS BLDA CALC-SCNC: 0.8 MMOL/L — SIGNIFICANT CHANGE UP (ref -2–3)
BASOPHILS # BLD AUTO: 0.01 K/UL — SIGNIFICANT CHANGE UP (ref 0–0.2)
BASOPHILS NFR BLD AUTO: 0.1 % — SIGNIFICANT CHANGE UP (ref 0–2)
BILIRUB SERPL-MCNC: 0.3 MG/DL — SIGNIFICANT CHANGE UP (ref 0.2–1.2)
BLD GP AB SCN SERPL QL: NEGATIVE — SIGNIFICANT CHANGE UP
BUN SERPL-MCNC: 44 MG/DL — HIGH (ref 7–23)
CALCIUM SERPL-MCNC: 8.3 MG/DL — LOW (ref 8.4–10.5)
CHLORIDE SERPL-SCNC: 110 MMOL/L — HIGH (ref 96–108)
CK SERPL-CCNC: 440 U/L — HIGH (ref 30–200)
CO2 SERPL-SCNC: 27 MMOL/L — SIGNIFICANT CHANGE UP (ref 22–31)
CREAT SERPL-MCNC: 1.52 MG/DL — HIGH (ref 0.5–1.3)
CRP SERPL-MCNC: 58.9 MG/L — HIGH (ref 0–4)
CULTURE RESULTS: SIGNIFICANT CHANGE UP
CULTURE RESULTS: SIGNIFICANT CHANGE UP
D DIMER BLD IA.RAPID-MCNC: 714 NG/ML DDU — HIGH
EOSINOPHIL # BLD AUTO: 0.04 K/UL — SIGNIFICANT CHANGE UP (ref 0–0.5)
EOSINOPHIL NFR BLD AUTO: 0.3 % — SIGNIFICANT CHANGE UP (ref 0–6)
FERRITIN SERPL-MCNC: 146 NG/ML — SIGNIFICANT CHANGE UP (ref 30–400)
GLUCOSE BLDC GLUCOMTR-MCNC: 134 MG/DL — HIGH (ref 70–99)
GLUCOSE BLDC GLUCOMTR-MCNC: 136 MG/DL — HIGH (ref 70–99)
GLUCOSE BLDC GLUCOMTR-MCNC: 147 MG/DL — HIGH (ref 70–99)
GLUCOSE BLDC GLUCOMTR-MCNC: 200 MG/DL — HIGH (ref 70–99)
GLUCOSE BLDC GLUCOMTR-MCNC: 210 MG/DL — HIGH (ref 70–99)
GLUCOSE SERPL-MCNC: 190 MG/DL — HIGH (ref 70–99)
GRAM STN FLD: SIGNIFICANT CHANGE UP
HCO3 BLDA-SCNC: 25 MMOL/L — SIGNIFICANT CHANGE UP (ref 21–28)
HCT VFR BLD CALC: 31.8 % — LOW (ref 39–50)
HGB BLD-MCNC: 9.2 G/DL — LOW (ref 13–17)
IMM GRANULOCYTES NFR BLD AUTO: 1.2 % — SIGNIFICANT CHANGE UP (ref 0–1.5)
LYMPHOCYTES # BLD AUTO: 1.24 K/UL — SIGNIFICANT CHANGE UP (ref 1–3.3)
LYMPHOCYTES # BLD AUTO: 10.7 % — LOW (ref 13–44)
MAGNESIUM SERPL-MCNC: 2.5 MG/DL — SIGNIFICANT CHANGE UP (ref 1.6–2.6)
MCHC RBC-ENTMCNC: 22.9 PG — LOW (ref 27–34)
MCHC RBC-ENTMCNC: 28.9 GM/DL — LOW (ref 32–36)
MCV RBC AUTO: 79.1 FL — LOW (ref 80–100)
METHOD TYPE: SIGNIFICANT CHANGE UP
MONOCYTES # BLD AUTO: 0.74 K/UL — SIGNIFICANT CHANGE UP (ref 0–0.9)
MONOCYTES NFR BLD AUTO: 6.4 % — SIGNIFICANT CHANGE UP (ref 2–14)
NEUTROPHILS # BLD AUTO: 9.39 K/UL — HIGH (ref 1.8–7.4)
NEUTROPHILS NFR BLD AUTO: 81.3 % — HIGH (ref 43–77)
NRBC # BLD: 0 /100 WBCS — SIGNIFICANT CHANGE UP (ref 0–0)
PCO2 BLDA: 37 MMHG — SIGNIFICANT CHANGE UP (ref 35–48)
PH BLDA: 7.44 — SIGNIFICANT CHANGE UP (ref 7.35–7.45)
PHOSPHATE SERPL-MCNC: 2.7 MG/DL — SIGNIFICANT CHANGE UP (ref 2.5–4.5)
PLATELET # BLD AUTO: 362 K/UL — SIGNIFICANT CHANGE UP (ref 150–400)
PO2 BLDA: 118 MMHG — HIGH (ref 83–108)
POTASSIUM SERPL-MCNC: 3.7 MMOL/L — SIGNIFICANT CHANGE UP (ref 3.5–5.3)
POTASSIUM SERPL-SCNC: 3.7 MMOL/L — SIGNIFICANT CHANGE UP (ref 3.5–5.3)
PROCALCITONIN SERPL-MCNC: 0.12 NG/ML — HIGH (ref 0.02–0.1)
PROT SERPL-MCNC: 5.9 G/DL — LOW (ref 6–8.3)
RBC # BLD: 4.02 M/UL — LOW (ref 4.2–5.8)
RBC # FLD: 19.1 % — HIGH (ref 10.3–14.5)
RH IG SCN BLD-IMP: POSITIVE — SIGNIFICANT CHANGE UP
SAO2 % BLDA: 98 % — SIGNIFICANT CHANGE UP (ref 95–100)
SODIUM SERPL-SCNC: 147 MMOL/L — HIGH (ref 135–145)
SPECIMEN SOURCE: SIGNIFICANT CHANGE UP
SPECIMEN SOURCE: SIGNIFICANT CHANGE UP
WBC # BLD: 11.56 K/UL — HIGH (ref 3.8–10.5)
WBC # FLD AUTO: 11.56 K/UL — HIGH (ref 3.8–10.5)

## 2021-04-14 PROCEDURE — 71045 X-RAY EXAM CHEST 1 VIEW: CPT | Mod: 26,77

## 2021-04-14 PROCEDURE — 99233 SBSQ HOSP IP/OBS HIGH 50: CPT | Mod: GC

## 2021-04-14 PROCEDURE — 99232 SBSQ HOSP IP/OBS MODERATE 35: CPT

## 2021-04-14 PROCEDURE — 71045 X-RAY EXAM CHEST 1 VIEW: CPT | Mod: 26

## 2021-04-14 PROCEDURE — 74018 RADEX ABDOMEN 1 VIEW: CPT | Mod: 26

## 2021-04-14 RX ORDER — QUETIAPINE FUMARATE 200 MG/1
75 TABLET, FILM COATED ORAL EVERY 24 HOURS
Refills: 0 | Status: DISCONTINUED | OUTPATIENT
Start: 2021-04-15 | End: 2021-04-18

## 2021-04-14 RX ORDER — SODIUM CHLORIDE 9 MG/ML
1000 INJECTION, SOLUTION INTRAVENOUS
Refills: 0 | Status: DISCONTINUED | OUTPATIENT
Start: 2021-04-14 | End: 2021-04-15

## 2021-04-14 RX ORDER — QUETIAPINE FUMARATE 200 MG/1
100 TABLET, FILM COATED ORAL EVERY 24 HOURS
Refills: 0 | Status: DISCONTINUED | OUTPATIENT
Start: 2021-04-14 | End: 2021-04-18

## 2021-04-14 RX ORDER — SODIUM CHLORIDE 9 MG/ML
1000 INJECTION, SOLUTION INTRAVENOUS
Refills: 0 | Status: DISCONTINUED | OUTPATIENT
Start: 2021-04-14 | End: 2021-04-14

## 2021-04-14 RX ADMIN — PROPOFOL 7.2 MICROGRAM(S)/KG/MIN: 10 INJECTION, EMULSION INTRAVENOUS at 20:14

## 2021-04-14 RX ADMIN — Medication 3 MILLILITER(S): at 21:25

## 2021-04-14 RX ADMIN — AMLODIPINE BESYLATE 5 MILLIGRAM(S): 2.5 TABLET ORAL at 05:26

## 2021-04-14 RX ADMIN — Medication 3 MILLILITER(S): at 05:57

## 2021-04-14 RX ADMIN — INSULIN HUMAN 0: 100 INJECTION, SOLUTION SUBCUTANEOUS at 12:13

## 2021-04-14 RX ADMIN — INSULIN HUMAN 4: 100 INJECTION, SOLUTION SUBCUTANEOUS at 01:57

## 2021-04-14 RX ADMIN — INSULIN HUMAN 2: 100 INJECTION, SOLUTION SUBCUTANEOUS at 07:19

## 2021-04-14 RX ADMIN — PIPERACILLIN AND TAZOBACTAM 200 GRAM(S): 4; .5 INJECTION, POWDER, LYOPHILIZED, FOR SOLUTION INTRAVENOUS at 02:02

## 2021-04-14 RX ADMIN — VORICONAZOLE 125 MILLIGRAM(S): 10 INJECTION, POWDER, LYOPHILIZED, FOR SOLUTION INTRAVENOUS at 10:35

## 2021-04-14 RX ADMIN — PIPERACILLIN AND TAZOBACTAM 200 GRAM(S): 4; .5 INJECTION, POWDER, LYOPHILIZED, FOR SOLUTION INTRAVENOUS at 13:36

## 2021-04-14 RX ADMIN — POLYETHYLENE GLYCOL 3350 17 GRAM(S): 17 POWDER, FOR SOLUTION ORAL at 05:26

## 2021-04-14 RX ADMIN — INSULIN HUMAN 2 UNIT(S): 100 INJECTION, SOLUTION SUBCUTANEOUS at 12:17

## 2021-04-14 RX ADMIN — Medication 3 MILLILITER(S): at 01:05

## 2021-04-14 RX ADMIN — CHLORHEXIDINE GLUCONATE 15 MILLILITER(S): 213 SOLUTION TOPICAL at 05:27

## 2021-04-14 RX ADMIN — Medication 10 MILLIGRAM(S): at 13:38

## 2021-04-14 RX ADMIN — LACTULOSE 10 GRAM(S): 10 SOLUTION ORAL at 02:02

## 2021-04-14 RX ADMIN — POLYETHYLENE GLYCOL 3350 17 GRAM(S): 17 POWDER, FOR SOLUTION ORAL at 19:21

## 2021-04-14 RX ADMIN — LACTULOSE 10 GRAM(S): 10 SOLUTION ORAL at 19:18

## 2021-04-14 RX ADMIN — PROPOFOL 7.2 MICROGRAM(S)/KG/MIN: 10 INJECTION, EMULSION INTRAVENOUS at 06:23

## 2021-04-14 RX ADMIN — Medication 650 MILLIGRAM(S): at 19:22

## 2021-04-14 RX ADMIN — Medication 3 MILLILITER(S): at 19:00

## 2021-04-14 RX ADMIN — Medication 650 MILLIGRAM(S): at 02:45

## 2021-04-14 RX ADMIN — Medication 650 MILLIGRAM(S): at 20:15

## 2021-04-14 RX ADMIN — QUETIAPINE FUMARATE 50 MILLIGRAM(S): 200 TABLET, FILM COATED ORAL at 13:36

## 2021-04-14 RX ADMIN — INSULIN HUMAN 2 UNIT(S): 100 INJECTION, SOLUTION SUBCUTANEOUS at 18:18

## 2021-04-14 RX ADMIN — Medication 3 MILLILITER(S): at 12:59

## 2021-04-14 RX ADMIN — PROPOFOL 7.2 MICROGRAM(S)/KG/MIN: 10 INJECTION, EMULSION INTRAVENOUS at 00:53

## 2021-04-14 RX ADMIN — LACTULOSE 10 GRAM(S): 10 SOLUTION ORAL at 10:37

## 2021-04-14 RX ADMIN — FENTANYL CITRATE 6 MICROGRAM(S)/KG/HR: 50 INJECTION INTRAVENOUS at 00:55

## 2021-04-14 RX ADMIN — Medication 3 MILLILITER(S): at 08:55

## 2021-04-14 RX ADMIN — CHLORHEXIDINE GLUCONATE 1 APPLICATION(S): 213 SOLUTION TOPICAL at 07:17

## 2021-04-14 RX ADMIN — DEXMEDETOMIDINE HYDROCHLORIDE IN 0.9% SODIUM CHLORIDE 12 MICROGRAM(S)/KG/HR: 4 INJECTION INTRAVENOUS at 12:01

## 2021-04-14 RX ADMIN — ENOXAPARIN SODIUM 40 MILLIGRAM(S): 100 INJECTION SUBCUTANEOUS at 19:17

## 2021-04-14 RX ADMIN — LACTULOSE 10 GRAM(S): 10 SOLUTION ORAL at 05:27

## 2021-04-14 RX ADMIN — PIPERACILLIN AND TAZOBACTAM 200 GRAM(S): 4; .5 INJECTION, POWDER, LYOPHILIZED, FOR SOLUTION INTRAVENOUS at 05:26

## 2021-04-14 RX ADMIN — CHLORHEXIDINE GLUCONATE 1 APPLICATION(S): 213 SOLUTION TOPICAL at 13:34

## 2021-04-14 RX ADMIN — SODIUM CHLORIDE 70 MILLILITER(S): 9 INJECTION, SOLUTION INTRAVENOUS at 13:56

## 2021-04-14 RX ADMIN — DEXMEDETOMIDINE HYDROCHLORIDE IN 0.9% SODIUM CHLORIDE 12 MICROGRAM(S)/KG/HR: 4 INJECTION INTRAVENOUS at 19:23

## 2021-04-14 RX ADMIN — CHLORHEXIDINE GLUCONATE 15 MILLILITER(S): 213 SOLUTION TOPICAL at 18:32

## 2021-04-14 RX ADMIN — PROPOFOL 7.2 MICROGRAM(S)/KG/MIN: 10 INJECTION, EMULSION INTRAVENOUS at 13:08

## 2021-04-14 RX ADMIN — ENOXAPARIN SODIUM 40 MILLIGRAM(S): 100 INJECTION SUBCUTANEOUS at 05:26

## 2021-04-14 RX ADMIN — PIPERACILLIN AND TAZOBACTAM 200 GRAM(S): 4; .5 INJECTION, POWDER, LYOPHILIZED, FOR SOLUTION INTRAVENOUS at 19:19

## 2021-04-14 RX ADMIN — PANTOPRAZOLE SODIUM 40 MILLIGRAM(S): 20 TABLET, DELAYED RELEASE ORAL at 13:37

## 2021-04-14 RX ADMIN — SODIUM CHLORIDE 100 MILLILITER(S): 9 INJECTION, SOLUTION INTRAVENOUS at 19:22

## 2021-04-14 RX ADMIN — LACTULOSE 10 GRAM(S): 10 SOLUTION ORAL at 13:38

## 2021-04-14 RX ADMIN — DEXMEDETOMIDINE HYDROCHLORIDE IN 0.9% SODIUM CHLORIDE 12 MICROGRAM(S)/KG/HR: 4 INJECTION INTRAVENOUS at 08:08

## 2021-04-14 NOTE — PROGRESS NOTE ADULT - SUBJECTIVE AND OBJECTIVE BOX
INFECTIOUS DISEASES CONSULT FOLLOW-UP NOTE    INTERVAL HPI/OVERNIGHT EVENTS:  Fever curse downtrending, 100.9 fever  Remains on 40% vent      ROS:   unable to obtain       ANTIBIOTICS/RELEVANT:    MEDICATIONS  (STANDING):  albuterol/ipratropium for Nebulization 3 milliLiter(s) Nebulizer every 4 hours  amLODIPine   Tablet 5 milliGRAM(s) Oral daily  chlorhexidine 0.12% Liquid 15 milliLiter(s) Oral Mucosa every 12 hours  chlorhexidine 2% Cloths 1 Application(s) Topical daily  dexMEDEtomidine Infusion 0.4 MICROgram(s)/kG/Hr (12 mL/Hr) IV Continuous <Continuous>  dextrose 40% Gel 15 Gram(s) Oral once  dextrose 5%. 1000 milliLiter(s) (50 mL/Hr) IV Continuous <Continuous>  dextrose 5%. 1000 milliLiter(s) (100 mL/Hr) IV Continuous <Continuous>  dextrose 5%. 1000 milliLiter(s) (70 mL/Hr) IV Continuous <Continuous>  dextrose 50% Injectable 25 Gram(s) IV Push once  dextrose 50% Injectable 25 Gram(s) IV Push once  dextrose 50% Injectable 12.5 Gram(s) IV Push once  enoxaparin Injectable 40 milliGRAM(s) SubCutaneous every 12 hours  fentaNYL   Infusion. 0.5 MICROgram(s)/kG/Hr (6 mL/Hr) IV Continuous <Continuous>  glucagon  Injectable 1 milliGRAM(s) IntraMuscular once  insulin glargine Injectable (LANTUS) 12 Unit(s) SubCutaneous at bedtime  insulin regular  human corrective regimen sliding scale   SubCutaneous every 6 hours  insulin regular  human recombinant 2 Unit(s) SubCutaneous every 6 hours  lactulose Syrup 10 Gram(s) Oral every 4 hours  pantoprazole  Injectable 40 milliGRAM(s) IV Push daily  piperacillin/tazobactam IVPB.. 4.5 Gram(s) IV Intermittent every 6 hours  polyethylene glycol 3350 17 Gram(s) Oral every 12 hours  propofol Infusion 10 MICROgram(s)/kG/Min (7.2 mL/Hr) IV Continuous <Continuous>  QUEtiapine 50 milliGRAM(s) Oral daily  QUEtiapine 50 milliGRAM(s) Oral at bedtime  senna 2 Tablet(s) Oral at bedtime    MEDICATIONS  (PRN):  acetaminophen    Suspension .. 650 milliGRAM(s) Oral every 6 hours PRN Temp greater or equal to 38C (100.4F)  sodium chloride 0.9% lock flush 10 milliLiter(s) IV Push every 1 hour PRN Pre/post blood products, medications, blood draw, and to maintain line patency        Vital Signs Last 24 Hrs  T(C): 36.2 (14 Apr 2021 14:24), Max: 38.3 (14 Apr 2021 05:33)  T(F): 97.2 (14 Apr 2021 14:24), Max: 100.9 (14 Apr 2021 05:33)  HR: 54 (14 Apr 2021 14:00) (41 - 80)  BP: 108/57 (14 Apr 2021 10:00) (96/53 - 172/82)  BP(mean): 80 (14 Apr 2021 10:00) (70 - 118)  RR: 34 (14 Apr 2021 14:00) (15 - 34)  SpO2: 98% (14 Apr 2021 14:00) (95% - 100%)    04-13-21 @ 07:01  -  04-14-21 @ 07:00  --------------------------------------------------------  IN: 1964 mL / OUT: 3470 mL / NET: -1506 mL    04-14-21 @ 07:01  -  04-14-21 @ 14:58  --------------------------------------------------------  IN: 369.6 mL / OUT: 865 mL / NET: -495.4 mL      PHYSICAL EXAM:  Constitutional: intubated and sedated  Eyes: the sclera and conjunctiva were normal.   ENT: the ears and nose were normal in appearance. ET tube placed   Neck: the appearance of the neck was normal and the neck was supple.   Pulmonary: ronchi throughout   Heart: heart rate was normal and rhythm regular, normal S1 and S2  Abdomen: normal bowel sounds, soft  Neurological: withdraw from pain, sedated           LABS:                        9.2    11.56 )-----------( 362      ( 14 Apr 2021 06:46 )             31.8     04-14    147<H>  |  110<H>  |  44<H>  ----------------------------<  190<H>  3.7   |  27  |  1.52<H>    Ca    8.3<L>      14 Apr 2021 06:47  Phos  2.7     04-14  Mg     2.5     04-14    TPro  5.9<L>  /  Alb  2.3<L>  /  TBili  0.3  /  DBili  x   /  AST  34  /  ALT  31  /  AlkPhos  83  04-14          MICROBIOLOGY:  4/13 BAL fungal p  4/13 BAL AFB p  4/13 BAL Cx: C. dubliniensis   4/12 BCx 1/4 bottle CoNS   4/8 UA neg  4/8 BCx neg x2  4/9 Sputum Cx: C. albicans  4/6 COVID spike ab +  4/5 legionella urine ag-  4/5 sputum cx normal  4/5 COVID PCR +   4/5 BCx -  4/5 HIV neg     Galactomannon neg  Fungitell 288     RADIOLOGY & ADDITIONAL STUDIES:  Reviewed

## 2021-04-14 NOTE — PROGRESS NOTE ADULT - ASSESSMENT
Patient is a 57 yo M with PMHx of MU and gout who presented to ED with c/o progressively worsening SOB now admitted to ICU for management of AHRF in the setting of COViD    NEURO  #Sedated   -propofol and fentanyl, current rass -3  -Patient following minimal commands, will transition to precedex   -Seroquel regimen added to avoid delirium, will increase as needed   -Neuro checks per ICU protocol, pain/sedation meds assessed and addressed  -Continue with wrist restraints to prevent patient harm    RESPIRATORY  #Acute hypoxic hypercapnic respiratory failure in the setting of COViD PNA  -Patient initially presented with O2 saturation of 14% and cyanotic requiring immediate intubation  -CXr with diffuse bilateral opacities with + COVID19 PCR  -Elevated inflammatory markers  -On AC/VC mechanical Ventilation:  Mode: AC/ CMV; RR: 26; TV: 450; FiO2: 40; PEEP: 5  -PF ratio 245 with this morning's ABG  -Will attempt CPAP trials for potential extubation    #COViD  -CXr with diffuse bilateral opacities with + COVID19 PCR  -Start Date 4/5 Remdesivir and Decadron 6 mg (x 10 day course)  -Decadron d/c'd after 4/13 AM dose due to current clinical presentation  -Remdesevir course given 4/5-4/9 per chart review (was briefly d/c'd due to renal fxn worsening but was REORDERED and completed)    #MU  -As per patient's wife, patient has MU on CPAP  -Patient sleeps with 2-3 pillows at night, attributed to MU    CARDIOVASCULAR  #RV dilation  -Bedside POCUS demonstrating dilated RV and mildly enlarged pulm artery possibly 2/2 MU vs PE (unlikely)  -Echo: Severe hypokinesis of the entire anteroseptum/inferoseptum. Akinesis of the mid to apical anterolateral region, apical anterior, apical inferior and true apex. Mild hypokinesis of the basal anterolateral, basal anterior and basal inferior regions. The estimated left ventricular systolic function is 15%. The right ventricle is mildly dilated. Right ventricular systolic function is mildly reduced. No pericardial effusion is seen.  -Based on these findings, cardiology was consulted  -As per spouse, no hx of cardiac disease and no change in ADLs or activity tolerance prior to current presentation  -Holding off on diuretics as per primary team    #LV Dysfunction  As per Cardiology:  -Echo reviewed. Images are poor due to body habitus but suggest a stress cardiomyopathy pattern (takotsubo cardiomyopathy) rather than an ischemic event  -EKG reviewed showing NSR, with non specific ST changes and poor R wave progression in the precordial leads likely due to body habitus  -Clinically patient is warm and well perfused and making urine.  -Patient's LV dysfunction appears to be mutifactorial: COVID, Morbid Obesity, Sleep Apnea. Myocarditis is also a possibility; now exacerbateed in setting of COVIDPNA  -Unfortunately patient is unable to undergo further ischemic or confirmatory imaging/workup  -Hold off BB or ACE/ARB therapy while on pressors with Renal impairement  -EKG with RBBB, without ischemic changes  -Please keep K>4 and Mg>2  -Repeat ECHO ordered for EF re-evaluation; if still depressed with wall motion abn will pursue ischemic work up    GI  -No active issues  -Nasoenteral feeding at goal, will adjust now that propofol will be decreased  -Protonix IVP for GI ppx    RENAL//F&E  #Acute Kidney Injury  -SCr peak to 3.16 yesterday likely 2/2 alas obstruction  -Today SCr. trending down to 2.06  -Alas catheter changed 4/12  -Urine output for 24hr 1.2L  -Baseline creatinine unknown, no reported kidney disease  -Electrolytes stable  -Strict I&O monitoring  -Avoid nephrotoxic medications    #Hypernatremia  -Today Na is 143  -Will reassess need for free water 250 mL q 8hr    ID  #COVID PNA   -Plan as above  -Remains on steroid rx, remdesivir course completed    #Leukocytosis  -Patient TMax 4/12 38.4, today afebrile  -Today WBC 9.9K downtrending  -ID ok'd Vori (loading doses x 2 given, now on 400 mg q12hr)  -Daily EKG for QTc monitoring, QTc today 451  -f/u fungitell and galactomannon, aspergillus Ab  -f/u BAL sputum samples taken from bronch yesterday 4/12, showing yeast as of now  -Peripheral blood cultures ordered x 2, no growth at 12 hrs  -Zosyn started 4/9 x 7-10 day course  -Central line changed 4/12  -Maintain MAP > 65 mmHg  -Monitor urine output    ENDOCRINE  #Hyperglycemia  -q6hr correctional scale ordered, will adjust as needed, may need premeal  -Lantus 12 units, regular insuliln 2 units as per order  -Blood glucose trend   -Decadron started 4/5 AM, d/c'd 4/13  -A1c 6.2    HEME  #Anemia  -Hgb 11 on admission; unknown baseline. No evidence of bleed.  -Hgb today 8  -Trend CBC  -Maintain active T+S  -Transfuse for Hb <7    -DVT ppx with Lovenox BID    DISPO/GOALS OF CARE:  Patient requires continuous monitoring with bedside rhythm monitoring, arterial line, pulse oximetry, ventilator monitoring and intermittent blood gas analysis. Care plan discussed with ICU care team. Patient remains critical at this time. Patient is a 55 yo M with PMHx of MU and gout who presented to ED with c/o progressively worsening SOB admitted to ICU for management of AHRF in the setting of COVID.     NEURO  #Sedated   -propofol and fentanyl and precedex, goal to (0 to -1)  -Will increase precedex to come down on other drips, planning for CPAP trial today  -Seroquel regimen added to avoid delirium, will increase as needed   -Neuro checks per ICU protocol, pain/sedation meds assessed and addressed  -Continue with wrist restraints to prevent patient harm    RESPIRATORY  #Acute hypoxic hypercapnic respiratory failure in the setting of COViD PNA  -Patient initially presented with O2 saturation of 14% and cyanotic requiring immediate intubation  -CXr with diffuse bilateral opacities with + COVID19 PCR  -Elevated inflammatory markers  -On AC/VC mechanical Ventilation:  Mode: AC/ CMV; RR: 24; TV: 450; FiO2: 40; PEEP: 5  -PF ratio 295 with this morning's ABG  -Will attempt CPAP trials for potential extubation again today    #COVID  -CXr with diffuse bilateral opacities with + COVID19 PCR  -Start Date 4/5 Remdesivir and Decadron 6 mg (x 10 day course)  -Decadron d/c'd after 4/13 AM dose due to current clinical presentation  -Remdesevir course given 4/5-4/9 per chart review (was briefly d/c'd due to renal fxn worsening but was REORDERED and completed)    #MU  -As per patient's wife, patient has MU on CPAP  -Patient sleeps with 2-3 pillows at night, attributed to MU    CARDIOVASCULAR  #RV dilation  -Bedside POCUS demonstrating dilated RV and mildly enlarged pulm artery possibly 2/2 MU vs PE (unlikely)  -Echo: Severe hypokinesis of the entire anteroseptum/inferoseptum. Akinesis of the mid to apical anterolateral region, apical anterior, apical inferior and true apex. Mild hypokinesis of the basal anterolateral, basal anterior and basal inferior regions. The estimated left ventricular systolic function is 15%. (repeat 4/13 55%). The right ventricle is mildly dilated. Right ventricular systolic function is mildly reduced. No pericardial effusion is seen.  -Based on these findings, cardiology was consulted  -As per spouse, no hx of cardiac disease and no change in ADLs or activity tolerance prior to current presentation  -Holding off on diuretics as per primary team    #LV Dysfunction  As per Cardiology:  -Echo reviewed. Images were poor due to body habitus but suggest a stress cardiomyopathy pattern (takotsubo cardiomyopathy) rather than an ischemic event   -EKG reviewed showing NSR, with non specific ST changes and poor R wave progression in the precordial leads likely due to body habitus  -Clinically patient is warm and well perfused and making urine.  -Patient's LV dysfunction appears to be multifactorial COVID, Morbid Obesity, Sleep Apnea. Myocarditis is also a possibility; now exacerbated in setting of COVID PNA  -Unfortunately patient is unable to undergo further ischemic or confirmatory imaging/workup  -Hold off BB or ACE/ARB therapy while on pressors with Renal impairment  -EKG with RBBB, without ischemic changes  -Please keep K>4 and Mg>2  -Repeat ECHO 4/13 with EF 55%    GI  #Constipation:  -No BM since 4/11 per report, even on bowel regimen, wet read with abd xray showing dilated loops of bowel with no apparent fluid collection, will do dulcolax suppository to prompt BM  -Nasoenteral feeding at goal, will adjust now that propofol will be decreased  -Protonix IVP for GI ppx    RENAL//F&E  #Acute Kidney Injury- RESOLVING  -SCr peak to 3.16 yesterday likely 2/2 alas obstruction  -Today SCr. trending down from mid 2's over last few days => => 1.52  -Alas catheter changed 4/12  -Urine output for 24hr 1.2L  -Baseline creatinine unknown, no reported kidney disease  -Electrolytes stable  -Strict I&O monitoring  -Avoid nephrotoxic medications    #Hypernatremia  -Today Na is 147 from 143; stopped tube feeds since last night, continue to monitor  -Will reassess need for free water 250 mL q 8hr    ID  #COVID PNA   -Plan as above  -Remains on steroid rx, remdesivir course completed    #Fungal infxn  -Seen by ID, -ID ok'd Vori (loading doses x 2 given, now on 400 mg q12hr), continue  -Patient TMax 4/12 38.4, this am 100.8F axillary  -WBC 9.9K Yesterday) => 11.56 today; overall however, pt's o2 requirements coming down and inflammatory markers coming down and VSS. Repeat bcx sent 4/14 AM  -Daily EKG for QTc monitoring  -fungitell 288 and galactomannon, aspergillus Ab  -BAL sputum samples taken from bronch 4/12, showing yeast   -Peripheral blood cultures ordered x 2; one 4/12 grew coag - staph, likely contaminant  -Zosyn started 4/9 x 7-10 day course  -Central line changed 4/12  -Maintain MAP > 65 mmHg    #Leukocytosis  -See ID above, continue to monitor    ENDOCRINE  #Hyperglycemia  -q6hr correctional scale ordered, will adjust as needed, may need premeal  -Lantus 12 units, regular insulin 2 units as per order  -Blood glucose trend   -Decadron started 4/5 AM, d/c'd 4/13  -A1c 6.2    HEME  #Anemia  -Hgb 11 on admission; unknown baseline. No evidence of bleed.  -Hgb today 8  -Trend CBC  -Maintain active T+S  -Transfuse for Hb <7    -DVT ppx with Lovenox BID    DISPO/GOALS OF CARE:  Patient requires continuous monitoring with bedside rhythm monitoring, arterial line, pulse oximetry, ventilator monitoring and intermittent blood gas analysis. Care plan discussed with ICU care team. Patient remains critical at this time. Patient is a 55 yo M with PMHx of MU and gout who presented to ED with c/o progressively worsening SOB admitted to ICU for management of AHRF in the setting of COVID.     NEURO  #Sedated   -propofol and fentanyl and precedex, goal to (0 to -1)  -Will increase precedex to come down on other drips, planning for CPAP trial today  -Seroquel regimen added to avoid delirium, will increase as needed   -Neuro checks per ICU protocol, pain/sedation meds assessed and addressed  -Continue with wrist restraints to prevent patient harm    RESPIRATORY  #Acute hypoxic hypercapnic respiratory failure in the setting of COViD PNA  -Patient initially presented with O2 saturation of 14% and cyanotic requiring immediate intubation  -CXr with diffuse bilateral opacities with + COVID19 PCR  -Elevated inflammatory markers  -On AC/VC mechanical Ventilation:  Mode: AC/ CMV; RR: 24; TV: 450; FiO2: 40; PEEP: 5  -PF ratio 295 with this morning's ABG  -Will attempt CPAP trials for potential extubation again today    #COVID  -CXr with diffuse bilateral opacities with + COVID19 PCR  -Start Date 4/5 Remdesivir and Decadron 6 mg (x 10 day course)  -Decadron d/c'd after 4/13 AM dose due to current clinical presentation  -Remdesevir course given 4/5-4/9 per chart review (was briefly d/c'd due to renal fxn worsening but was REORDERED and completed)    #MU  -As per patient's wife, patient has MU on CPAP  -Patient sleeps with 2-3 pillows at night, attributed to MU    CARDIOVASCULAR  #RV dilation  -Bedside POCUS demonstrating dilated RV and mildly enlarged pulm artery possibly 2/2 MU vs PE (unlikely)  -Echo: Severe hypokinesis of the entire anteroseptum/inferoseptum. Akinesis of the mid to apical anterolateral region, apical anterior, apical inferior and true apex. Mild hypokinesis of the basal anterolateral, basal anterior and basal inferior regions. The estimated left ventricular systolic function is 15%. (repeat 4/13 55%). The right ventricle is mildly dilated. Right ventricular systolic function is mildly reduced. No pericardial effusion is seen.  -Based on these findings, cardiology was consulted  -As per spouse, no hx of cardiac disease and no change in ADLs or activity tolerance prior to current presentation  -Holding off on diuretics as per primary team    #LV Dysfunction  As per Cardiology:  -Echo reviewed. Images were poor due to body habitus but suggest a stress cardiomyopathy pattern (takotsubo cardiomyopathy) rather than an ischemic event   -EKG reviewed showing NSR, with non specific ST changes and poor R wave progression in the precordial leads likely due to body habitus  -Clinically patient is warm and well perfused and making urine.  -Patient's LV dysfunction appears to be multifactorial COVID, Morbid Obesity, Sleep Apnea. Myocarditis is also a possibility; now exacerbated in setting of COVID PNA  -Unfortunately patient is unable to undergo further ischemic or confirmatory imaging/workup  -Hold off BB or ACE/ARB therapy while on pressors with Renal impairment  -EKG with RBBB, without ischemic changes  -Please keep K>4 and Mg>2  -Repeat ECHO 4/13 with EF 55%    GI  #Constipation:  -No BM since 4/11 per report, even on bowel regimen, wet read with abd xray showing dilated loops of bowel with no apparent fluid collection, will do dulcolax suppository to prompt BM  -Nasoenteral feeding at goal, will adjust now that propofol will be decreased  -Protonix IVP for GI ppx    RENAL//F&E  #Acute Kidney Injury- RESOLVING  -SCr peaked to 3.16 likely 2/2 alas obstruction  -Today SCr. trending down from mid 2's over last few days => => 1.52  -Alas catheter changed 4/12  -Urine output for 24hr 1.2L  -Baseline creatinine unknown, no reported kidney disease  -Electrolytes stable  -Strict I&O monitoring  -Avoid nephrotoxic medications    #Hypernatremia  -Today Na is 147 from 143; stopped tube feeds since last night, stopped free water flushes, put on D5 at 70cc/hr for 20 hr starting 2 pm  -Will reassess need for free water 250 mL q 8hr    ID  #COVID PNA   -Plan as above  -Remains on steroid rx, remdesivir course completed    #Fungal infxn  -Seen by ID, -ID ok'd Vori (loading doses x 2 given, now on 400 mg q12hr), continue  -Patient TMax 4/12 38.4, this am 100.8F axillary  -WBC 9.9K Yesterday) => 11.56 today; overall however, pt's o2 requirements coming down and inflammatory markers coming down and VSS. Repeat bcx sent 4/14 AM  -Daily EKG for QTc monitoring  -fungitell 288 and galactomannon, aspergillus Ab  -BAL sputum samples taken from bronch 4/12, showing yeast   -Peripheral blood cultures ordered x 2; one 4/12 grew coag - staph, likely contaminant  -Zosyn started 4/9 x 7-10 day course  -Central line changed 4/12  -Maintain MAP > 65 mmHg    #Leukocytosis  -See ID above, continue to monitor    ENDOCRINE  #Hyperglycemia  -q6hr correctional scale ordered, will adjust as needed, may need premeal  -Lantus 12 units, regular insulin 2 units as per order  -Blood glucose trend   -Decadron started 4/5 AM, d/c'd 4/13  -A1c 6.2    HEME  #Anemia  -Hgb 11 on admission; unknown baseline. No evidence of bleed.  -Hgb today 8  -Trend CBC  -Maintain active T+S  -Transfuse for Hb <7    -DVT ppx with Lovenox BID    DISPO/GOALS OF CARE:  Patient requires continuous monitoring with bedside rhythm monitoring, arterial line, pulse oximetry, ventilator monitoring and intermittent blood gas analysis. Care plan discussed with ICU care team. Patient remains critical at this time.

## 2021-04-14 NOTE — PROGRESS NOTE ADULT - ASSESSMENT
56M h/o MU on home CPAP p/w hypoxic respiratory failure due to COVID-19 PNA now on vent.  Course c/b persistent fever despite zosyn since 4/9.  Sputum Cx and BAL Cx only grew C. dublinensis so far, likely represents colonization.  Galactomannon neg, fungitell 288.  Overall improving, may be extubated.  Source of fever likely COVID-19 PNA, less likely bacterial or fungal.     - cont zosyn 4.5g IV q6h for now  - cont voriconazole 400mg IV q12h for now  - f/u pending BAL studies   - f/u galactomannon BAL   - f/u aspergillus antibody      Team 2 will follow you.  Dr. Fu will take over the care tomorrow.    Alyssa Polk MD, MS  Infectious Disease attending  work cell [phone]

## 2021-04-14 NOTE — PROGRESS NOTE ADULT - SUBJECTIVE AND OBJECTIVE BOX
IN PROGRESS IN PROGRESS    Overnight Events: Febrile to 100.8 (axillary). Tylenol given. 4/12 culture showed Gram + cocci in clusters    Subjective: Patient seen and examined at bedside. Patient unable to participate in exam given intubated/sedated. Per discussion w/ nurse, last BM 4/11    [OBJECTIVE]:  Vital Signs:  T(F): , Max: 100.9 (04-14-21 @ 05:33)  HR:  (41 - 80)  BP:  (96/53 - 176/84)  BP(mean):  (70 - 121)  RR:  (15 - 33)  SpO2:  (95% - 100%)  Wt(kg): --  CVP(cm H2O): --  Mode: AC/ CMV (Assist Control/ Continuous Mandatory Ventilation), RR (machine): 24, RR (patient): 26, TV (machine): 450, FiO2: 40, PEEP: 5, ITime: 1, MAP: 11, PIP: 18    04-13 @ 07:01  -  04-14 @ 07:00  --------------------------------------------------------  IN: 1964 mL / OUT: 3470 mL / NET: -1506 mL    04-14 @ 07:01  -  04-14 @ 10:24  --------------------------------------------------------  IN: 0 mL / OUT: 100 mL / NET: -100 mL    CAPILLARY BLOOD GLUCOSE    POCT Blood Glucose.: 200 mg/dL (14 Apr 2021 06:26)    Physical Exam:  T(F): 100.7 (04-14-21 @ 09:00)  HR: 55 (04-14-21 @ 10:00)  BP: 108/57 (04-14-21 @ 10:00)  RR: 21 (04-14-21 @ 10:00)  SpO2: 95% (04-14-21 @ 10:00)  Wt(kg): --    GENERAL: obese, intubated and sedated, restless when stimulated.  SKIN/HAIR/NAILS: Skin warm and dry. Nails without clubbing or cyanosis. CR<2 secs. No lesions, rash, petechiae, erythema or ecchymoses. Anicteric.   HEAD AND NECK: The skull is NC/AT. B/L Sclera white. 2 mm PERRL. Periorbital edema improving. Nasal mucous membrane dry with no erythema or drainage. NGT in place. Trachea midline, neck supple.   THORAX/LUNGS: Thorax is symmetric with good expansion, assisted by mechanical ventilation. Lung sounds diminished throughout with coarse rhonchi.  CARDIOVASCULAR: No JVD. Carotid upstrokes are brisk, without bruits. +S1 and S2, RRR; no extra heart sounds or M/R/G.  ABDOMEN/: Abdomen is distended with +BS x 4. It is soft, no palpable masses; Lechuga catheter in place. Last BM 4/11 as per flowsheet.  PERIPHERAL VASCULAR: Extremities are warm, radial and dorsalis pedis pulses +1 and symmetric. +1 generalized and dependent edema noted. No clubbing, no cyanosis.  MUSCULOSKELETAL: Strong hand  B/L,; active ROM moderately impaired on lower extremities 2/2 sedation. No evidence of swelling or deformity.  NEUROLOGICAL: Patient able to follow simple commands, restless when stimulated, wrist restraints in place to prevent patient harm; moderately sedated (rass -2, 3) and intubated.  LINES (DATES): R IJ TLC 4/12, R  radial art line 4/9, IVL    Medications:  MEDICATIONS  (STANDING):  albuterol/ipratropium for Nebulization 3 milliLiter(s) Nebulizer every 4 hours  amLODIPine   Tablet 5 milliGRAM(s) Oral daily  chlorhexidine 0.12% Liquid 15 milliLiter(s) Oral Mucosa every 12 hours  chlorhexidine 2% Cloths 1 Application(s) Topical daily  chlorhexidine 4% Liquid 1 Application(s) Topical <User Schedule>  dexMEDEtomidine Infusion 0.4 MICROgram(s)/kG/Hr (12 mL/Hr) IV Continuous <Continuous>  dextrose 40% Gel 15 Gram(s) Oral once  dextrose 5%. 1000 milliLiter(s) (50 mL/Hr) IV Continuous <Continuous>  dextrose 5%. 1000 milliLiter(s) (100 mL/Hr) IV Continuous <Continuous>  dextrose 50% Injectable 25 Gram(s) IV Push once  dextrose 50% Injectable 25 Gram(s) IV Push once  dextrose 50% Injectable 12.5 Gram(s) IV Push once  enoxaparin Injectable 40 milliGRAM(s) SubCutaneous every 12 hours  fentaNYL   Infusion. 0.5 MICROgram(s)/kG/Hr (6 mL/Hr) IV Continuous <Continuous>  glucagon  Injectable 1 milliGRAM(s) IntraMuscular once  insulin glargine Injectable (LANTUS) 12 Unit(s) SubCutaneous at bedtime  insulin regular  human corrective regimen sliding scale   SubCutaneous every 6 hours  insulin regular  human recombinant 2 Unit(s) SubCutaneous every 6 hours  lactulose Syrup 10 Gram(s) Oral every 4 hours  pantoprazole  Injectable 40 milliGRAM(s) IV Push daily  piperacillin/tazobactam IVPB.. 4.5 Gram(s) IV Intermittent every 6 hours  polyethylene glycol 3350 17 Gram(s) Oral every 12 hours  propofol Infusion 10 MICROgram(s)/kG/Min (7.2 mL/Hr) IV Continuous <Continuous>  QUEtiapine 50 milliGRAM(s) Oral daily  QUEtiapine 50 milliGRAM(s) Oral at bedtime  senna 2 Tablet(s) Oral at bedtime  voriconazole IVPB 400 milliGRAM(s) IV Intermittent every 12 hours    MEDICATIONS  (PRN):  acetaminophen    Suspension .. 650 milliGRAM(s) Oral every 6 hours PRN Temp greater or equal to 38C (100.4F)  sodium chloride 0.9% lock flush 10 milliLiter(s) IV Push every 1 hour PRN Pre/post blood products, medications, blood draw, and to maintain line patency    Allergies:  Allergies    No Known Allergies    Intolerances    Labs:                        9.2    11.56 )-----------( 362      ( 14 Apr 2021 06:46 )             31.8     04-14    147<H>  |  110<H>  |  44<H>  ----------------------------<  190<H>  3.7   |  27  |  1.52<H>    Ca    8.3<L>      14 Apr 2021 06:47  Phos  2.7     04-14  Mg     2.5     04-14    TPro  5.9<L>  /  Alb  2.3<L>  /  TBili  0.3  /  DBili  x   /  AST  34  /  ALT  31  /  AlkPhos  83  04-14    Radiology and other tests:   IN PROGRESS    Overnight Events: Febrile to 100.8 (axillary). Tylenol given. 4/12 culture showed Gram + cocci in clusters    Subjective: Patient seen and examined at bedside. Patient unable to participate in exam given intubated/sedated. Per discussion w/ nurse, last BM 4/11    [OBJECTIVE]:  Vital Signs:  T(F): , Max: 100.9 (04-14-21 @ 05:33)  HR:  (41 - 80)  BP:  (96/53 - 176/84)  BP(mean):  (70 - 121)  RR:  (15 - 33)  SpO2:  (95% - 100%)  Wt(kg): --  CVP(cm H2O): --  Mode: AC/ CMV (Assist Control/ Continuous Mandatory Ventilation), RR (machine): 24, RR (patient): 26, TV (machine): 450, FiO2: 40, PEEP: 5, ITime: 1, MAP: 11, PIP: 18    04-13 @ 07:01  -  04-14 @ 07:00  --------------------------------------------------------  IN: 1964 mL / OUT: 3470 mL / NET: -1506 mL    04-14 @ 07:01  -  04-14 @ 10:24  --------------------------------------------------------  IN: 0 mL / OUT: 100 mL / NET: -100 mL    CAPILLARY BLOOD GLUCOSE    POCT Blood Glucose.: 200 mg/dL (14 Apr 2021 06:26)    Physical Exam:  T(F): 100.7 (04-14-21 @ 09:00)  HR: 55 (04-14-21 @ 10:00)  BP: 108/57 (04-14-21 @ 10:00)  RR: 21 (04-14-21 @ 10:00)  SpO2: 95% (04-14-21 @ 10:00)  Wt(kg): --    GENERAL: obese, intubated and sedated, albeit w/ intermittent mild limb movement this AM when seen   SKIN/HAIR/NAILS: Skin warm and dry. Nails without clubbing or cyanosis. CR<2 secs. No lesions, rash, petechiae, erythema or ecchymoses. Anicteric.   HEAD AND NECK: The skull is NC/AT. B/L Sclera white. 2 mm PERRL. Periorbital edema improving. Nasal mucous membrane dry with no erythema or drainage. NGT in place. Trachea midline, neck supple.   THORAX/LUNGS: Thorax is symmetric with good expansion, assisted by mechanical ventilation. Lung sounds diminished throughout with coarse rhonchi.  CARDIOVASCULAR: No JVD. Carotid upstrokes are brisk, without bruits. +S1 and S2, RRR; no extra heart sounds or M/R/G.  ABDOMEN/: Abdomen is distended with +BS x 4. It is soft, no palpable masses; Lechuga catheter in place. Last BM 4/11 as per flowsheet.  PERIPHERAL VASCULAR: Extremities are warm, radial and dorsalis pedis pulses +1 and symmetric. +1 generalized and dependent edema noted. No clubbing, no cyanosis.  MUSCULOSKELETAL: Strong hand  B/L,; active ROM moderately impaired on lower extremities 2/2 sedation. No evidence of swelling or deformity.  NEUROLOGICAL: Patient able to follow simple commands, restless when stimulated, wrist restraints in place to prevent patient harm; moderately sedated (rass -2, 3) and intubated.  LINES (DATES): R IJ TLC 4/12, R  radial art line 4/9, IVL    Medications:  MEDICATIONS  (STANDING):  albuterol/ipratropium for Nebulization 3 milliLiter(s) Nebulizer every 4 hours  amLODIPine   Tablet 5 milliGRAM(s) Oral daily  chlorhexidine 0.12% Liquid 15 milliLiter(s) Oral Mucosa every 12 hours  chlorhexidine 2% Cloths 1 Application(s) Topical daily  chlorhexidine 4% Liquid 1 Application(s) Topical <User Schedule>  dexMEDEtomidine Infusion 0.4 MICROgram(s)/kG/Hr (12 mL/Hr) IV Continuous <Continuous>  dextrose 40% Gel 15 Gram(s) Oral once  dextrose 5%. 1000 milliLiter(s) (50 mL/Hr) IV Continuous <Continuous>  dextrose 5%. 1000 milliLiter(s) (100 mL/Hr) IV Continuous <Continuous>  dextrose 50% Injectable 25 Gram(s) IV Push once  dextrose 50% Injectable 25 Gram(s) IV Push once  dextrose 50% Injectable 12.5 Gram(s) IV Push once  enoxaparin Injectable 40 milliGRAM(s) SubCutaneous every 12 hours  fentaNYL   Infusion. 0.5 MICROgram(s)/kG/Hr (6 mL/Hr) IV Continuous <Continuous>  glucagon  Injectable 1 milliGRAM(s) IntraMuscular once  insulin glargine Injectable (LANTUS) 12 Unit(s) SubCutaneous at bedtime  insulin regular  human corrective regimen sliding scale   SubCutaneous every 6 hours  insulin regular  human recombinant 2 Unit(s) SubCutaneous every 6 hours  lactulose Syrup 10 Gram(s) Oral every 4 hours  pantoprazole  Injectable 40 milliGRAM(s) IV Push daily  piperacillin/tazobactam IVPB.. 4.5 Gram(s) IV Intermittent every 6 hours  polyethylene glycol 3350 17 Gram(s) Oral every 12 hours  propofol Infusion 10 MICROgram(s)/kG/Min (7.2 mL/Hr) IV Continuous <Continuous>  QUEtiapine 50 milliGRAM(s) Oral daily  QUEtiapine 50 milliGRAM(s) Oral at bedtime  senna 2 Tablet(s) Oral at bedtime  voriconazole IVPB 400 milliGRAM(s) IV Intermittent every 12 hours    MEDICATIONS  (PRN):  acetaminophen    Suspension .. 650 milliGRAM(s) Oral every 6 hours PRN Temp greater or equal to 38C (100.4F)  sodium chloride 0.9% lock flush 10 milliLiter(s) IV Push every 1 hour PRN Pre/post blood products, medications, blood draw, and to maintain line patency    Allergies:  Allergies    No Known Allergies    Intolerances    Labs:                        9.2    11.56 )-----------( 362      ( 14 Apr 2021 06:46 )             31.8     04-14    147<H>  |  110<H>  |  44<H>  ----------------------------<  190<H>  3.7   |  27  |  1.52<H>    Ca    8.3<L>      14 Apr 2021 06:47  Phos  2.7     04-14  Mg     2.5     04-14    TPro  5.9<L>  /  Alb  2.3<L>  /  TBili  0.3  /  DBili  x   /  AST  34  /  ALT  31  /  AlkPhos  83  04-14    Radiology and other tests:   Overnight Events: Febrile to 100.8 (axillary). Tylenol given. 4/12 culture showed Gram + cocci in clusters    Subjective: Patient seen and examined at bedside. Patient unable to participate in exam given intubated/sedated. Per discussion w/ nurse, last BM 4/11    [OBJECTIVE]:  Vital Signs:  T(F): , Max: 100.9 (04-14-21 @ 05:33)  HR:  (41 - 80)  BP:  (96/53 - 176/84)  BP(mean):  (70 - 121)  RR:  (15 - 33)  SpO2:  (95% - 100%)  Wt(kg): --  CVP(cm H2O): --  Mode: AC/ CMV (Assist Control/ Continuous Mandatory Ventilation), RR (machine): 24, RR (patient): 26, TV (machine): 450, FiO2: 40, PEEP: 5, ITime: 1, MAP: 11, PIP: 18    04-13 @ 07:01  -  04-14 @ 07:00  --------------------------------------------------------  IN: 1964 mL / OUT: 3470 mL / NET: -1506 mL    04-14 @ 07:01  -  04-14 @ 10:24  --------------------------------------------------------  IN: 0 mL / OUT: 100 mL / NET: -100 mL    CAPILLARY BLOOD GLUCOSE    POCT Blood Glucose.: 200 mg/dL (14 Apr 2021 06:26)    Physical Exam:  T(F): 100.7 (04-14-21 @ 09:00)  HR: 55 (04-14-21 @ 10:00)  BP: 108/57 (04-14-21 @ 10:00)  RR: 21 (04-14-21 @ 10:00)  SpO2: 95% (04-14-21 @ 10:00)  Wt(kg): --    GENERAL: obese, intubated and sedated, albeit w/ intermittent mild limb movement this AM when seen   SKIN/HAIR/NAILS: Skin warm and dry. Nails without clubbing or cyanosis. CR<2 secs. No lesions, rash, petechiae, erythema or ecchymoses. Anicteric.   HEAD AND NECK: The skull is NC/AT. B/L Sclera white. 2 mm PERRL. Periorbital edema improving. Nasal mucous membrane dry with no erythema or drainage. NGT in place. Trachea midline, neck supple.   THORAX/LUNGS: Thorax is symmetric with good expansion, assisted by mechanical ventilation. Lung sounds diminished throughout with coarse rhonchi throughout  CARDIOVASCULAR: No JVD. Carotid upstrokes are brisk, without bruits. +S1 and S2, RRR; no extra heart sounds or M/R/G.  ABDOMEN/: Abdomen is distended with +BS x 4. It is soft, no palpable masses; Lechuga catheter in place. Last BM 4/11 still per nurse  PERIPHERAL VASCULAR: Extremities are warm, radial and dorsalis pedis pulses +1 and symmetric. +1 generalized and dependent edema noted. No clubbing, no cyanosis.  MUSCULOSKELETAL: active ROM moderately impaired on lower extremities 2/2 sedation. No evidence of swelling or deformity.  NEUROLOGICAL: Patient able to follow simple commands, restless when stimulated, wrist restraints in place to prevent patient harm; moderately sedated and intubated.  LINES (DATES): R IJ TLC 4/12, R  radial art line 4/9, IVL  Drips: Prec 0.8, fent 1, prop 25  P/F ratio 295    Medications:  MEDICATIONS  (STANDING):  albuterol/ipratropium for Nebulization 3 milliLiter(s) Nebulizer every 4 hours  amLODIPine   Tablet 5 milliGRAM(s) Oral daily  chlorhexidine 0.12% Liquid 15 milliLiter(s) Oral Mucosa every 12 hours  chlorhexidine 2% Cloths 1 Application(s) Topical daily  chlorhexidine 4% Liquid 1 Application(s) Topical <User Schedule>  dexMEDEtomidine Infusion 0.4 MICROgram(s)/kG/Hr (12 mL/Hr) IV Continuous <Continuous>  dextrose 40% Gel 15 Gram(s) Oral once  dextrose 5%. 1000 milliLiter(s) (50 mL/Hr) IV Continuous <Continuous>  dextrose 5%. 1000 milliLiter(s) (100 mL/Hr) IV Continuous <Continuous>  dextrose 50% Injectable 25 Gram(s) IV Push once  dextrose 50% Injectable 25 Gram(s) IV Push once  dextrose 50% Injectable 12.5 Gram(s) IV Push once  enoxaparin Injectable 40 milliGRAM(s) SubCutaneous every 12 hours  fentaNYL   Infusion. 0.5 MICROgram(s)/kG/Hr (6 mL/Hr) IV Continuous <Continuous>  glucagon  Injectable 1 milliGRAM(s) IntraMuscular once  insulin glargine Injectable (LANTUS) 12 Unit(s) SubCutaneous at bedtime  insulin regular  human corrective regimen sliding scale   SubCutaneous every 6 hours  insulin regular  human recombinant 2 Unit(s) SubCutaneous every 6 hours  lactulose Syrup 10 Gram(s) Oral every 4 hours  pantoprazole  Injectable 40 milliGRAM(s) IV Push daily  piperacillin/tazobactam IVPB.. 4.5 Gram(s) IV Intermittent every 6 hours  polyethylene glycol 3350 17 Gram(s) Oral every 12 hours  propofol Infusion 10 MICROgram(s)/kG/Min (7.2 mL/Hr) IV Continuous <Continuous>  QUEtiapine 50 milliGRAM(s) Oral daily  QUEtiapine 50 milliGRAM(s) Oral at bedtime  senna 2 Tablet(s) Oral at bedtime  voriconazole IVPB 400 milliGRAM(s) IV Intermittent every 12 hours    MEDICATIONS  (PRN):  acetaminophen    Suspension .. 650 milliGRAM(s) Oral every 6 hours PRN Temp greater or equal to 38C (100.4F)  sodium chloride 0.9% lock flush 10 milliLiter(s) IV Push every 1 hour PRN Pre/post blood products, medications, blood draw, and to maintain line patency    Allergies:  Allergies    No Known Allergies    Intolerances    Labs:                        9.2    11.56 )-----------( 362      ( 14 Apr 2021 06:46 )             31.8     04-14    147<H>  |  110<H>  |  44<H>  ----------------------------<  190<H>  3.7   |  27  |  1.52<H>    Ca    8.3<L>      14 Apr 2021 06:47  Phos  2.7     04-14  Mg     2.5     04-14    TPro  5.9<L>  /  Alb  2.3<L>  /  TBili  0.3  /  DBili  x   /  AST  34  /  ALT  31  /  AlkPhos  83  04-14    Radiology and other tests:

## 2021-04-14 NOTE — PROGRESS NOTE ADULT - ATTENDING COMMENTS
Patient seen and examined with house-staff during bedside rounds.  Resident note read, including vitals, physical findings, laboratory data, and radiological reports.   Revisions included below.  Direct personal management at bed side and extensive interpretation of the data.  Plan was outlined and discussed in details with the housestaff.  Decision making of high complexity  Action taken for acute disease activity to reflect the level of care provided:  - medication reconciliation  - review laboratory data  Patient clinically stable.  He had a fever overnight but the patient has new lines.  Cultures are consistent with yeast which is covered with voriconazole.  The liver function and renal function are improving.  Gas exchange improving and he is in the minimal setting on the ventilator with a PEEP of 5.  Patient is off pressors.  The patient is on voriconazole and Zosyn.  That could be drug related fever.  Patient is on propofol Precedex and fentanyl.  We wean off the fentanyl and propofol and await the patient and attempt to trial of weaning and extubation

## 2021-04-15 LAB
A FLAVUS AB FLD QL: NEGATIVE — SIGNIFICANT CHANGE UP
A NIGER AB FLD QL: NEGATIVE — SIGNIFICANT CHANGE UP
A NIGER AB FLD QL: NEGATIVE — SIGNIFICANT CHANGE UP
ALBUMIN SERPL ELPH-MCNC: 2.6 G/DL — LOW (ref 3.3–5)
ALP SERPL-CCNC: 87 U/L — SIGNIFICANT CHANGE UP (ref 40–120)
ALT FLD-CCNC: 71 U/L — HIGH (ref 10–45)
ANION GAP SERPL CALC-SCNC: 11 MMOL/L — SIGNIFICANT CHANGE UP (ref 5–17)
ANION GAP SERPL CALC-SCNC: 12 MMOL/L — SIGNIFICANT CHANGE UP (ref 5–17)
APPEARANCE UR: ABNORMAL
APTT BLD: 21.5 SEC — LOW (ref 27.5–35.5)
AST SERPL-CCNC: 68 U/L — HIGH (ref 10–40)
BACTERIA # UR AUTO: PRESENT /HPF
BASE EXCESS BLDA CALC-SCNC: 1 MMOL/L — SIGNIFICANT CHANGE UP (ref -2–3)
BASOPHILS # BLD AUTO: 0.03 K/UL — SIGNIFICANT CHANGE UP (ref 0–0.2)
BASOPHILS NFR BLD AUTO: 0.2 % — SIGNIFICANT CHANGE UP (ref 0–2)
BILIRUB SERPL-MCNC: 0.6 MG/DL — SIGNIFICANT CHANGE UP (ref 0.2–1.2)
BILIRUB UR-MCNC: NEGATIVE — SIGNIFICANT CHANGE UP
BUN SERPL-MCNC: 34 MG/DL — HIGH (ref 7–23)
BUN SERPL-MCNC: 46 MG/DL — HIGH (ref 7–23)
CALCIUM SERPL-MCNC: 8.4 MG/DL — SIGNIFICANT CHANGE UP (ref 8.4–10.5)
CALCIUM SERPL-MCNC: 8.5 MG/DL — SIGNIFICANT CHANGE UP (ref 8.4–10.5)
CHLORIDE SERPL-SCNC: 107 MMOL/L — SIGNIFICANT CHANGE UP (ref 96–108)
CHLORIDE SERPL-SCNC: 110 MMOL/L — HIGH (ref 96–108)
CK SERPL-CCNC: 282 U/L — HIGH (ref 30–200)
CO2 SERPL-SCNC: 21 MMOL/L — LOW (ref 22–31)
CO2 SERPL-SCNC: 24 MMOL/L — SIGNIFICANT CHANGE UP (ref 22–31)
COLOR SPEC: YELLOW — SIGNIFICANT CHANGE UP
CREAT ?TM UR-MCNC: 178 MG/DL — SIGNIFICANT CHANGE UP
CREAT SERPL-MCNC: 1.69 MG/DL — HIGH (ref 0.5–1.3)
CREAT SERPL-MCNC: 2.06 MG/DL — HIGH (ref 0.5–1.3)
DIFF PNL FLD: ABNORMAL
EOSINOPHIL # BLD AUTO: 0.23 K/UL — SIGNIFICANT CHANGE UP (ref 0–0.5)
EOSINOPHIL NFR BLD AUTO: 1.3 % — SIGNIFICANT CHANGE UP (ref 0–6)
EPI CELLS # UR: ABNORMAL /HPF (ref 0–5)
GAS PNL BLDA: SIGNIFICANT CHANGE UP
GLUCOSE BLDC GLUCOMTR-MCNC: 167 MG/DL — HIGH (ref 70–99)
GLUCOSE BLDC GLUCOMTR-MCNC: 181 MG/DL — HIGH (ref 70–99)
GLUCOSE BLDC GLUCOMTR-MCNC: 196 MG/DL — HIGH (ref 70–99)
GLUCOSE SERPL-MCNC: 163 MG/DL — HIGH (ref 70–99)
GLUCOSE SERPL-MCNC: 198 MG/DL — HIGH (ref 70–99)
GLUCOSE UR QL: NEGATIVE — SIGNIFICANT CHANGE UP
GRAN CASTS # UR COMP ASSIST: ABNORMAL /LPF
HCO3 BLDA-SCNC: 24 MMOL/L — SIGNIFICANT CHANGE UP (ref 21–28)
HCT VFR BLD CALC: 32.5 % — LOW (ref 39–50)
HGB BLD-MCNC: 9.7 G/DL — LOW (ref 13–17)
IMM GRANULOCYTES NFR BLD AUTO: 0.7 % — SIGNIFICANT CHANGE UP (ref 0–1.5)
INR BLD: 1.28 — HIGH (ref 0.88–1.16)
KETONES UR-MCNC: NEGATIVE — SIGNIFICANT CHANGE UP
LACTATE SERPL-SCNC: 1.4 MMOL/L — SIGNIFICANT CHANGE UP (ref 0.5–2)
LEUKOCYTE ESTERASE UR-ACNC: NEGATIVE — SIGNIFICANT CHANGE UP
LYMPHOCYTES # BLD AUTO: 1.95 K/UL — SIGNIFICANT CHANGE UP (ref 1–3.3)
LYMPHOCYTES # BLD AUTO: 11.1 % — LOW (ref 13–44)
MAGNESIUM SERPL-MCNC: 2 MG/DL — SIGNIFICANT CHANGE UP (ref 1.6–2.6)
MCHC RBC-ENTMCNC: 23.7 PG — LOW (ref 27–34)
MCHC RBC-ENTMCNC: 29.8 GM/DL — LOW (ref 32–36)
MCV RBC AUTO: 79.3 FL — LOW (ref 80–100)
MONOCYTES # BLD AUTO: 1.01 K/UL — HIGH (ref 0–0.9)
MONOCYTES NFR BLD AUTO: 5.8 % — SIGNIFICANT CHANGE UP (ref 2–14)
NEUTROPHILS # BLD AUTO: 14.21 K/UL — HIGH (ref 1.8–7.4)
NEUTROPHILS NFR BLD AUTO: 80.9 % — HIGH (ref 43–77)
NITRITE UR-MCNC: NEGATIVE — SIGNIFICANT CHANGE UP
NRBC # BLD: 0 /100 WBCS — SIGNIFICANT CHANGE UP (ref 0–0)
OSMOLALITY UR: 522 MOSM/KG — SIGNIFICANT CHANGE UP (ref 300–900)
PCO2 BLDA: 34 MMHG — LOW (ref 35–48)
PH BLDA: 7.47 — HIGH (ref 7.35–7.45)
PH UR: 6 — SIGNIFICANT CHANGE UP (ref 5–8)
PLATELET # BLD AUTO: 300 K/UL — SIGNIFICANT CHANGE UP (ref 150–400)
PO2 BLDA: 66 MMHG — LOW (ref 83–108)
POTASSIUM SERPL-MCNC: 3.8 MMOL/L — SIGNIFICANT CHANGE UP (ref 3.5–5.3)
POTASSIUM SERPL-MCNC: 3.9 MMOL/L — SIGNIFICANT CHANGE UP (ref 3.5–5.3)
POTASSIUM SERPL-SCNC: 3.8 MMOL/L — SIGNIFICANT CHANGE UP (ref 3.5–5.3)
POTASSIUM SERPL-SCNC: 3.9 MMOL/L — SIGNIFICANT CHANGE UP (ref 3.5–5.3)
PROT SERPL-MCNC: 6.4 G/DL — SIGNIFICANT CHANGE UP (ref 6–8.3)
PROT UR-MCNC: 100 MG/DL
PROTHROM AB SERPL-ACNC: 15.2 SEC — HIGH (ref 10.6–13.6)
RBC # BLD: 4.1 M/UL — LOW (ref 4.2–5.8)
RBC # FLD: 19.3 % — HIGH (ref 10.3–14.5)
RBC CASTS # UR COMP ASSIST: ABNORMAL /HPF
SAO2 % BLDA: 93 % — LOW (ref 95–100)
SODIUM SERPL-SCNC: 140 MMOL/L — SIGNIFICANT CHANGE UP (ref 135–145)
SODIUM SERPL-SCNC: 145 MMOL/L — SIGNIFICANT CHANGE UP (ref 135–145)
SODIUM UR-SCNC: 61 MMOL/L — SIGNIFICANT CHANGE UP
SP GR SPEC: 1.02 — SIGNIFICANT CHANGE UP (ref 1–1.03)
TRIGL SERPL-MCNC: 299 MG/DL — HIGH
URATE CRY FLD QL MICRO: ABNORMAL /HPF
UROBILINOGEN FLD QL: 1 E.U./DL — SIGNIFICANT CHANGE UP
UUN UR-MCNC: 662 MG/DL — SIGNIFICANT CHANGE UP
WBC # BLD: 17.56 K/UL — HIGH (ref 3.8–10.5)
WBC # FLD AUTO: 17.56 K/UL — HIGH (ref 3.8–10.5)
WBC UR QL: < 5 /HPF — SIGNIFICANT CHANGE UP

## 2021-04-15 PROCEDURE — 99233 SBSQ HOSP IP/OBS HIGH 50: CPT | Mod: GC

## 2021-04-15 PROCEDURE — 71045 X-RAY EXAM CHEST 1 VIEW: CPT | Mod: 26

## 2021-04-15 PROCEDURE — 74018 RADEX ABDOMEN 1 VIEW: CPT | Mod: 26

## 2021-04-15 PROCEDURE — 99232 SBSQ HOSP IP/OBS MODERATE 35: CPT

## 2021-04-15 RX ORDER — SODIUM CHLORIDE 9 MG/ML
500 INJECTION INTRAMUSCULAR; INTRAVENOUS; SUBCUTANEOUS ONCE
Refills: 0 | Status: COMPLETED | OUTPATIENT
Start: 2021-04-15 | End: 2021-04-15

## 2021-04-15 RX ORDER — METOCLOPRAMIDE HCL 10 MG
10 TABLET ORAL ONCE
Refills: 0 | Status: COMPLETED | OUTPATIENT
Start: 2021-04-15 | End: 2021-04-16

## 2021-04-15 RX ORDER — VORICONAZOLE 10 MG/ML
400 INJECTION, POWDER, LYOPHILIZED, FOR SOLUTION INTRAVENOUS EVERY 12 HOURS
Refills: 0 | Status: DISCONTINUED | OUTPATIENT
Start: 2021-04-15 | End: 2021-04-15

## 2021-04-15 RX ORDER — ACETAMINOPHEN 500 MG
1000 TABLET ORAL ONCE
Refills: 0 | Status: COMPLETED | OUTPATIENT
Start: 2021-04-15 | End: 2021-04-15

## 2021-04-15 RX ORDER — ACETAMINOPHEN 500 MG
1000 TABLET ORAL ONCE
Refills: 0 | Status: COMPLETED | OUTPATIENT
Start: 2021-04-15 | End: 2021-04-16

## 2021-04-15 RX ORDER — POTASSIUM CHLORIDE 20 MEQ
20 PACKET (EA) ORAL ONCE
Refills: 0 | Status: COMPLETED | OUTPATIENT
Start: 2021-04-15 | End: 2021-04-15

## 2021-04-15 RX ORDER — METOCLOPRAMIDE HCL 10 MG
10 TABLET ORAL DAILY
Refills: 0 | Status: DISCONTINUED | OUTPATIENT
Start: 2021-04-15 | End: 2021-04-15

## 2021-04-15 RX ORDER — METHYLNALTREXONE BROMIDE 12 MG/.6ML
9 INJECTION, SOLUTION SUBCUTANEOUS ONCE
Refills: 0 | Status: COMPLETED | OUTPATIENT
Start: 2021-04-15 | End: 2021-04-15

## 2021-04-15 RX ORDER — FENTANYL CITRATE 50 UG/ML
100 INJECTION INTRAVENOUS
Refills: 0 | Status: DISCONTINUED | OUTPATIENT
Start: 2021-04-15 | End: 2021-04-16

## 2021-04-15 RX ADMIN — PIPERACILLIN AND TAZOBACTAM 200 GRAM(S): 4; .5 INJECTION, POWDER, LYOPHILIZED, FOR SOLUTION INTRAVENOUS at 06:40

## 2021-04-15 RX ADMIN — Medication 3 MILLILITER(S): at 02:21

## 2021-04-15 RX ADMIN — Medication 650 MILLIGRAM(S): at 22:41

## 2021-04-15 RX ADMIN — QUETIAPINE FUMARATE 100 MILLIGRAM(S): 200 TABLET, FILM COATED ORAL at 22:42

## 2021-04-15 RX ADMIN — Medication 3 MILLILITER(S): at 05:45

## 2021-04-15 RX ADMIN — LACTULOSE 10 GRAM(S): 10 SOLUTION ORAL at 06:00

## 2021-04-15 RX ADMIN — LACTULOSE 10 GRAM(S): 10 SOLUTION ORAL at 06:43

## 2021-04-15 RX ADMIN — DEXMEDETOMIDINE HYDROCHLORIDE IN 0.9% SODIUM CHLORIDE 12 MICROGRAM(S)/KG/HR: 4 INJECTION INTRAVENOUS at 09:10

## 2021-04-15 RX ADMIN — Medication 650 MILLIGRAM(S): at 09:00

## 2021-04-15 RX ADMIN — PIPERACILLIN AND TAZOBACTAM 200 GRAM(S): 4; .5 INJECTION, POWDER, LYOPHILIZED, FOR SOLUTION INTRAVENOUS at 17:49

## 2021-04-15 RX ADMIN — LACTULOSE 10 GRAM(S): 10 SOLUTION ORAL at 09:04

## 2021-04-15 RX ADMIN — FENTANYL CITRATE 100 MICROGRAM(S): 50 INJECTION INTRAVENOUS at 23:19

## 2021-04-15 RX ADMIN — QUETIAPINE FUMARATE 75 MILLIGRAM(S): 200 TABLET, FILM COATED ORAL at 06:44

## 2021-04-15 RX ADMIN — LACTULOSE 10 GRAM(S): 10 SOLUTION ORAL at 22:41

## 2021-04-15 RX ADMIN — POLYETHYLENE GLYCOL 3350 17 GRAM(S): 17 POWDER, FOR SOLUTION ORAL at 17:46

## 2021-04-15 RX ADMIN — Medication 400 MILLIGRAM(S): at 12:16

## 2021-04-15 RX ADMIN — INSULIN HUMAN 2: 100 INJECTION, SOLUTION SUBCUTANEOUS at 06:47

## 2021-04-15 RX ADMIN — SENNA PLUS 2 TABLET(S): 8.6 TABLET ORAL at 22:41

## 2021-04-15 RX ADMIN — CHLORHEXIDINE GLUCONATE 15 MILLILITER(S): 213 SOLUTION TOPICAL at 06:44

## 2021-04-15 RX ADMIN — LACTULOSE 10 GRAM(S): 10 SOLUTION ORAL at 13:49

## 2021-04-15 RX ADMIN — PROPOFOL 7.2 MICROGRAM(S)/KG/MIN: 10 INJECTION, EMULSION INTRAVENOUS at 13:04

## 2021-04-15 RX ADMIN — PIPERACILLIN AND TAZOBACTAM 200 GRAM(S): 4; .5 INJECTION, POWDER, LYOPHILIZED, FOR SOLUTION INTRAVENOUS at 11:05

## 2021-04-15 RX ADMIN — DEXMEDETOMIDINE HYDROCHLORIDE IN 0.9% SODIUM CHLORIDE 12 MICROGRAM(S)/KG/HR: 4 INJECTION INTRAVENOUS at 10:41

## 2021-04-15 RX ADMIN — INSULIN HUMAN 2 UNIT(S): 100 INJECTION, SOLUTION SUBCUTANEOUS at 11:33

## 2021-04-15 RX ADMIN — LACTULOSE 10 GRAM(S): 10 SOLUTION ORAL at 17:46

## 2021-04-15 RX ADMIN — INSULIN HUMAN 2: 100 INJECTION, SOLUTION SUBCUTANEOUS at 11:33

## 2021-04-15 RX ADMIN — ENOXAPARIN SODIUM 40 MILLIGRAM(S): 100 INJECTION SUBCUTANEOUS at 17:46

## 2021-04-15 RX ADMIN — Medication 650 MILLIGRAM(S): at 08:57

## 2021-04-15 RX ADMIN — POLYETHYLENE GLYCOL 3350 17 GRAM(S): 17 POWDER, FOR SOLUTION ORAL at 06:43

## 2021-04-15 RX ADMIN — CHLORHEXIDINE GLUCONATE 1 APPLICATION(S): 213 SOLUTION TOPICAL at 11:10

## 2021-04-15 RX ADMIN — INSULIN HUMAN 2: 100 INJECTION, SOLUTION SUBCUTANEOUS at 17:43

## 2021-04-15 RX ADMIN — Medication 650 MILLIGRAM(S): at 23:04

## 2021-04-15 RX ADMIN — PIPERACILLIN AND TAZOBACTAM 200 GRAM(S): 4; .5 INJECTION, POWDER, LYOPHILIZED, FOR SOLUTION INTRAVENOUS at 23:05

## 2021-04-15 RX ADMIN — PIPERACILLIN AND TAZOBACTAM 200 GRAM(S): 4; .5 INJECTION, POWDER, LYOPHILIZED, FOR SOLUTION INTRAVENOUS at 00:25

## 2021-04-15 RX ADMIN — AMLODIPINE BESYLATE 5 MILLIGRAM(S): 2.5 TABLET ORAL at 06:43

## 2021-04-15 RX ADMIN — SODIUM CHLORIDE 500 MILLILITER(S): 9 INJECTION INTRAMUSCULAR; INTRAVENOUS; SUBCUTANEOUS at 23:04

## 2021-04-15 RX ADMIN — Medication 3 MILLILITER(S): at 22:43

## 2021-04-15 RX ADMIN — Medication 650 MILLIGRAM(S): at 00:25

## 2021-04-15 RX ADMIN — QUETIAPINE FUMARATE 100 MILLIGRAM(S): 200 TABLET, FILM COATED ORAL at 00:24

## 2021-04-15 RX ADMIN — DEXMEDETOMIDINE HYDROCHLORIDE IN 0.9% SODIUM CHLORIDE 12 MICROGRAM(S)/KG/HR: 4 INJECTION INTRAVENOUS at 15:23

## 2021-04-15 RX ADMIN — FENTANYL CITRATE 6 MICROGRAM(S)/KG/HR: 50 INJECTION INTRAVENOUS at 00:55

## 2021-04-15 RX ADMIN — Medication 1000 MILLIGRAM(S): at 13:04

## 2021-04-15 RX ADMIN — Medication 650 MILLIGRAM(S): at 00:55

## 2021-04-15 RX ADMIN — Medication 3 MILLILITER(S): at 08:10

## 2021-04-15 RX ADMIN — PANTOPRAZOLE SODIUM 40 MILLIGRAM(S): 20 TABLET, DELAYED RELEASE ORAL at 11:05

## 2021-04-15 RX ADMIN — DEXMEDETOMIDINE HYDROCHLORIDE IN 0.9% SODIUM CHLORIDE 12 MICROGRAM(S)/KG/HR: 4 INJECTION INTRAVENOUS at 13:03

## 2021-04-15 RX ADMIN — METHYLNALTREXONE BROMIDE 9 MILLIGRAM(S): 12 INJECTION, SOLUTION SUBCUTANEOUS at 10:32

## 2021-04-15 RX ADMIN — SENNA PLUS 2 TABLET(S): 8.6 TABLET ORAL at 00:27

## 2021-04-15 RX ADMIN — Medication 20 MILLIEQUIVALENT(S): at 08:57

## 2021-04-15 RX ADMIN — ENOXAPARIN SODIUM 40 MILLIGRAM(S): 100 INJECTION SUBCUTANEOUS at 06:43

## 2021-04-15 RX ADMIN — DEXMEDETOMIDINE HYDROCHLORIDE IN 0.9% SODIUM CHLORIDE 12 MICROGRAM(S)/KG/HR: 4 INJECTION INTRAVENOUS at 17:46

## 2021-04-15 RX ADMIN — LACTULOSE 10 GRAM(S): 10 SOLUTION ORAL at 00:24

## 2021-04-15 RX ADMIN — VORICONAZOLE 125 MILLIGRAM(S): 10 INJECTION, POWDER, LYOPHILIZED, FOR SOLUTION INTRAVENOUS at 13:17

## 2021-04-15 RX ADMIN — DEXMEDETOMIDINE HYDROCHLORIDE IN 0.9% SODIUM CHLORIDE 12 MICROGRAM(S)/KG/HR: 4 INJECTION INTRAVENOUS at 22:41

## 2021-04-15 RX ADMIN — FENTANYL CITRATE 100 MICROGRAM(S): 50 INJECTION INTRAVENOUS at 23:00

## 2021-04-15 NOTE — PROVIDER CONTACT NOTE (CHANGE IN STATUS NOTIFICATION) - SITUATION
Pt started vomiting meds around ETT as soon as they were given.  Also SBP is down in the low 80s to high 70s after previously being hypertensive.
Pt febrile up to 103.5F.

## 2021-04-15 NOTE — PROGRESS NOTE ADULT - SUBJECTIVE AND OBJECTIVE BOX
IN PROGRESS     in progress, including assessment andp lolis    Overnight Events: Per report, Febrile to 102.3. Tylenol given.     Subjective: Patient seen and examined at bedside. Pt still intubated/sedated this AM.   Per discussion w/ O/N nurse, pt had some more gas passed O/N, with some mild staining of sheet, but no BM.     [OBJECTIVE]:  Vital Signs:  T(F): , Max: 102.3 (04-14-21 @ 23:00)  HR:  (50 - 97)  BP:  (98/55 - 135/73)  BP(mean):  (71 - 98)  RR:  (10 - 48)  SpO2:  (83% - 100%)  Wt(kg): --  CVP(cm H2O): --  Mode: AC/ CMV (Assist Control/ Continuous Mandatory Ventilation), RR (machine): 24, RR (patient): 29, TV (machine): 450, FiO2: 60, PEEP: 5, ITime: 1, MAP: 5, PIP: 7    04-14 @ 07:01  -  04-15 @ 07:00  --------------------------------------------------------  IN: 2303.2 mL / OUT: 3465 mL / NET: -1161.8 mL    04-15 @ 07:01  -  04-15 @ 09:08  --------------------------------------------------------  IN: 152.8 mL / OUT: 100 mL / NET: 52.8 mL    CAPILLARY BLOOD GLUCOSE    POCT Blood Glucose.: 167 mg/dL (15 Apr 2021 06:05)    Physical Exam:  T(F): 100.2 (04-15-21 @ 06:00)  HR: 65 (04-15-21 @ 08:00)  BP: 109/64 (04-15-21 @ 08:00)  RR: 32 (04-15-21 @ 08:00)  SpO2: 93% (04-15-21 @ 08:00)  Wt(kg): --    GENERAL: obese, intubated and sedated, albeit w/ intermittent mild limb movement this AM when seen   SKIN/HAIR/NAILS: Skin warm and dry. Nails without clubbing or cyanosis. CR<2 secs. No lesions, rash, petechiae, erythema or ecchymoses. Anicteric.   HEAD AND NECK: The skull is NC/AT. B/L Sclera white. 2 mm PERRL. Nasal mucous membrane dry with no erythema or drainage. NGT in place. Trachea midline, neck supple.   THORAX/LUNGS: Thorax is symmetric with good expansion, assisted by mechanical ventilation. Lung sounds diminished throughout with coarse rhonchi throughout  CARDIOVASCULAR: No JVD. Carotid upstrokes are brisk, without bruits. +S1 and S2, RRR; no extra heart sounds or M/R/G.  ABDOMEN/: Abdomen is still distended with hyperactive +BS x 4. It is soft, no palpable masses; Lechuga catheter in place.   PERIPHERAL VASCULAR: Extremities are warm, radial and dorsalis pedis pulses +1 and symmetric. +1 generalized and dependent edema noted. No clubbing, no cyanosis.  MUSCULOSKELETAL: active ROM moderately impaired on lower extremities 2/2 sedation. No evidence of swelling or deformity.  NEUROLOGICAL: Patient able to follow simple commands, restless when stimulated, wrist restraints in place to prevent patient harm; moderately sedated and intubated.  LINES (DATES): R IJ TLC 4/12, R  radial art line 4/9, IVL  Drips: Prec 1.5, fent 0.8, prop 15    Medications:  MEDICATIONS  (STANDING):  albuterol/ipratropium for Nebulization 3 milliLiter(s) Nebulizer every 4 hours  amLODIPine   Tablet 5 milliGRAM(s) Oral daily  chlorhexidine 0.12% Liquid 15 milliLiter(s) Oral Mucosa every 12 hours  chlorhexidine 2% Cloths 1 Application(s) Topical daily  dexMEDEtomidine Infusion 0.4 MICROgram(s)/kG/Hr (12 mL/Hr) IV Continuous <Continuous>  dextrose 40% Gel 15 Gram(s) Oral once  dextrose 5%. 1000 milliLiter(s) (50 mL/Hr) IV Continuous <Continuous>  dextrose 5%. 1000 milliLiter(s) (100 mL/Hr) IV Continuous <Continuous>  dextrose 5%. 1000 milliLiter(s) (100 mL/Hr) IV Continuous <Continuous>  dextrose 50% Injectable 25 Gram(s) IV Push once  dextrose 50% Injectable 12.5 Gram(s) IV Push once  dextrose 50% Injectable 25 Gram(s) IV Push once  enoxaparin Injectable 40 milliGRAM(s) SubCutaneous every 12 hours  fentaNYL   Infusion. 0.5 MICROgram(s)/kG/Hr (6 mL/Hr) IV Continuous <Continuous>  glucagon  Injectable 1 milliGRAM(s) IntraMuscular once  insulin glargine Injectable (LANTUS) 12 Unit(s) SubCutaneous at bedtime  insulin regular  human corrective regimen sliding scale   SubCutaneous every 6 hours  insulin regular  human recombinant 2 Unit(s) SubCutaneous every 6 hours  lactulose Syrup 10 Gram(s) Oral every 4 hours  methylnaltrexone Injectable 9 milliGRAM(s) SubCutaneous once  pantoprazole  Injectable 40 milliGRAM(s) IV Push daily  piperacillin/tazobactam IVPB.. 4.5 Gram(s) IV Intermittent every 6 hours  polyethylene glycol 3350 17 Gram(s) Oral every 12 hours  propofol Infusion 10 MICROgram(s)/kG/Min (7.2 mL/Hr) IV Continuous <Continuous>  QUEtiapine 75 milliGRAM(s) Oral every 24 hours  QUEtiapine 100 milliGRAM(s) Oral every 24 hours  senna 2 Tablet(s) Oral at bedtime    MEDICATIONS  (PRN):  acetaminophen    Suspension .. 650 milliGRAM(s) Oral every 6 hours PRN Temp greater or equal to 38C (100.4F)  sodium chloride 0.9% lock flush 10 milliLiter(s) IV Push every 1 hour PRN Pre/post blood products, medications, blood draw, and to maintain line patency    Allergies:  Allergies    No Known Allergies    Intolerances    Labs:                        9.7    17.56 )-----------( 300      ( 15 Apr 2021 07:48 )             32.5     04-15    145  |  110<H>  |  34<H>  ----------------------------<  163<H>  3.8   |  24  |  1.69<H>    Ca    8.4      15 Apr 2021 07:48  Phos  2.7     04-14  Mg     2.0     04-15    TPro  6.4  /  Alb  2.6<L>  /  TBili  0.6  /  DBili  x   /  AST  68<H>  /  ALT  71<H>  /  AlkPhos  87  04-15    PT/INR - ( 15 Apr 2021 07:48 )   PT: 15.2 sec;   INR: 1.28       PTT - ( 15 Apr 2021 07:48 )  PTT:21.5 sec    Radiology and other tests:   Overnight Events: Per report, Febrile to 102.3. Tylenol given.     Subjective: Patient seen and examined at bedside. Pt still intubated/sedated this AM.   Per discussion w/ O/N nurse, pt had some more gas passed O/N, with some mild staining of sheet, but no BM. Later had large BM after given methylnaltrexone    [OBJECTIVE]:  Vital Signs:  T(F): , Max: 102.3 (04-14-21 @ 23:00)  HR:  (50 - 97)  BP:  (98/55 - 135/73)  BP(mean):  (71 - 98)  RR:  (10 - 48)  SpO2:  (83% - 100%)  Wt(kg): --  CVP(cm H2O): --  Mode: AC/ CMV (Assist Control/ Continuous Mandatory Ventilation), RR (machine): 24, RR (patient): 29, TV (machine): 450, FiO2: 60, PEEP: 5, ITime: 1, MAP: 5, PIP: 7    04-14 @ 07:01  -  04-15 @ 07:00  --------------------------------------------------------  IN: 2303.2 mL / OUT: 3465 mL / NET: -1161.8 mL    04-15 @ 07:01  -  04-15 @ 09:08  --------------------------------------------------------  IN: 152.8 mL / OUT: 100 mL / NET: 52.8 mL    CAPILLARY BLOOD GLUCOSE    POCT Blood Glucose.: 167 mg/dL (15 Apr 2021 06:05)    Physical Exam:  T(F): 100.2 (04-15-21 @ 06:00)  HR: 65 (04-15-21 @ 08:00)  BP: 109/64 (04-15-21 @ 08:00)  RR: 32 (04-15-21 @ 08:00)  SpO2: 93% (04-15-21 @ 08:00)  Wt(kg): --    GENERAL: obese, intubated and sedated, albeit w/ intermittent mild limb movement this AM when seen   SKIN/HAIR/NAILS: Skin warm and dry. Nails without clubbing or cyanosis. CR<2 secs. No lesions, rash, petechiae, erythema or ecchymoses. Anicteric.   HEAD AND NECK: The skull is NC/AT. B/L Sclera white. 2 mm PERRL. Nasal mucous membrane dry with no erythema or drainage. NGT in place. Trachea midline, neck supple.   THORAX/LUNGS: Thorax is symmetric with good expansion, assisted by mechanical ventilation. Lung sounds diminished throughout with coarse rhonchi throughout  CARDIOVASCULAR: No JVD. Carotid upstrokes are brisk, without bruits. +S1 and S2, RRR; no extra heart sounds or M/R/G.  ABDOMEN/: Abdomen is still distended with hyperactive +BS x 4. It is soft, no palpable masses; Lechuga catheter in place.   PERIPHERAL VASCULAR: Extremities are warm, radial and dorsalis pedis pulses +1 and symmetric. +1 generalized and dependent edema noted. No clubbing, no cyanosis.  MUSCULOSKELETAL: active ROM moderately impaired on lower extremities 2/2 sedation. No evidence of swelling or deformity.  NEUROLOGICAL: Patient able to follow simple commands, restless when stimulated, wrist restraints in place to prevent patient harm; moderately sedated and intubated.  LINES (DATES): R IJ TLC 4/12, R  radial art line 4/9, IVL  Drips: Prec 1.5, fent 0.8, prop 15    Medications:  MEDICATIONS  (STANDING):  albuterol/ipratropium for Nebulization 3 milliLiter(s) Nebulizer every 4 hours  amLODIPine   Tablet 5 milliGRAM(s) Oral daily  chlorhexidine 0.12% Liquid 15 milliLiter(s) Oral Mucosa every 12 hours  chlorhexidine 2% Cloths 1 Application(s) Topical daily  dexMEDEtomidine Infusion 0.4 MICROgram(s)/kG/Hr (12 mL/Hr) IV Continuous <Continuous>  dextrose 40% Gel 15 Gram(s) Oral once  dextrose 5%. 1000 milliLiter(s) (50 mL/Hr) IV Continuous <Continuous>  dextrose 5%. 1000 milliLiter(s) (100 mL/Hr) IV Continuous <Continuous>  dextrose 5%. 1000 milliLiter(s) (100 mL/Hr) IV Continuous <Continuous>  dextrose 50% Injectable 25 Gram(s) IV Push once  dextrose 50% Injectable 12.5 Gram(s) IV Push once  dextrose 50% Injectable 25 Gram(s) IV Push once  enoxaparin Injectable 40 milliGRAM(s) SubCutaneous every 12 hours  fentaNYL   Infusion. 0.5 MICROgram(s)/kG/Hr (6 mL/Hr) IV Continuous <Continuous>  glucagon  Injectable 1 milliGRAM(s) IntraMuscular once  insulin glargine Injectable (LANTUS) 12 Unit(s) SubCutaneous at bedtime  insulin regular  human corrective regimen sliding scale   SubCutaneous every 6 hours  insulin regular  human recombinant 2 Unit(s) SubCutaneous every 6 hours  lactulose Syrup 10 Gram(s) Oral every 4 hours  methylnaltrexone Injectable 9 milliGRAM(s) SubCutaneous once  pantoprazole  Injectable 40 milliGRAM(s) IV Push daily  piperacillin/tazobactam IVPB.. 4.5 Gram(s) IV Intermittent every 6 hours  polyethylene glycol 3350 17 Gram(s) Oral every 12 hours  propofol Infusion 10 MICROgram(s)/kG/Min (7.2 mL/Hr) IV Continuous <Continuous>  QUEtiapine 75 milliGRAM(s) Oral every 24 hours  QUEtiapine 100 milliGRAM(s) Oral every 24 hours  senna 2 Tablet(s) Oral at bedtime    MEDICATIONS  (PRN):  acetaminophen    Suspension .. 650 milliGRAM(s) Oral every 6 hours PRN Temp greater or equal to 38C (100.4F)  sodium chloride 0.9% lock flush 10 milliLiter(s) IV Push every 1 hour PRN Pre/post blood products, medications, blood draw, and to maintain line patency    Allergies:  Allergies    No Known Allergies    Intolerances    Labs:                        9.7    17.56 )-----------( 300      ( 15 Apr 2021 07:48 )             32.5     04-15    145  |  110<H>  |  34<H>  ----------------------------<  163<H>  3.8   |  24  |  1.69<H>    Ca    8.4      15 Apr 2021 07:48  Phos  2.7     04-14  Mg     2.0     04-15    TPro  6.4  /  Alb  2.6<L>  /  TBili  0.6  /  DBili  x   /  AST  68<H>  /  ALT  71<H>  /  AlkPhos  87  04-15    PT/INR - ( 15 Apr 2021 07:48 )   PT: 15.2 sec;   INR: 1.28       PTT - ( 15 Apr 2021 07:48 )  PTT:21.5 sec    Radiology and other tests:

## 2021-04-15 NOTE — PROGRESS NOTE ADULT - SUBJECTIVE AND OBJECTIVE BOX
INTERVAL HPI/OVERNIGHT EVENTS: New fever and leukocytosis in context of probable aspiration event.    ROS: UTO      ANTIBIOTICS/RELEVANT:    MEDICATIONS  (STANDING):  albuterol/ipratropium for Nebulization 3 milliLiter(s) Nebulizer every 4 hours  amLODIPine   Tablet 5 milliGRAM(s) Oral daily  chlorhexidine 2% Cloths 1 Application(s) Topical daily  dexMEDEtomidine Infusion 0.4 MICROgram(s)/kG/Hr (12 mL/Hr) IV Continuous <Continuous>  dextrose 40% Gel 15 Gram(s) Oral once  dextrose 5%. 1000 milliLiter(s) (50 mL/Hr) IV Continuous <Continuous>  dextrose 5%. 1000 milliLiter(s) (100 mL/Hr) IV Continuous <Continuous>  dextrose 50% Injectable 25 Gram(s) IV Push once  dextrose 50% Injectable 12.5 Gram(s) IV Push once  dextrose 50% Injectable 25 Gram(s) IV Push once  enoxaparin Injectable 40 milliGRAM(s) SubCutaneous every 12 hours  glucagon  Injectable 1 milliGRAM(s) IntraMuscular once  insulin glargine Injectable (LANTUS) 12 Unit(s) SubCutaneous at bedtime  insulin regular  human corrective regimen sliding scale   SubCutaneous every 6 hours  lactulose Syrup 10 Gram(s) Oral every 4 hours  pantoprazole  Injectable 40 milliGRAM(s) IV Push daily  piperacillin/tazobactam IVPB.. 4.5 Gram(s) IV Intermittent every 6 hours  polyethylene glycol 3350 17 Gram(s) Oral every 12 hours  propofol Infusion 10 MICROgram(s)/kG/Min (7.2 mL/Hr) IV Continuous <Continuous>  QUEtiapine 75 milliGRAM(s) Oral every 24 hours  QUEtiapine 100 milliGRAM(s) Oral every 24 hours  senna 2 Tablet(s) Oral at bedtime  voriconazole IVPB 400 milliGRAM(s) IV Intermittent every 12 hours    MEDICATIONS  (PRN):  acetaminophen    Suspension .. 650 milliGRAM(s) Oral every 6 hours PRN Temp greater or equal to 38C (100.4F)  sodium chloride 0.9% lock flush 10 milliLiter(s) IV Push every 1 hour PRN Pre/post blood products, medications, blood draw, and to maintain line patency        Vital Signs Last 24 Hrs  T(C): 39.6 (15 Apr 2021 14:00), Max: 40.3 (15 Apr 2021 12:00)  T(F): 103.3 (15 Apr 2021 14:00), Max: 104.5 (15 Apr 2021 12:00)  HR: 62 (15 Apr 2021 14:00) (58 - 97)  BP: 103/58 (15 Apr 2021 14:00) (94/52 - 135/73)  BP(mean): 75 (15 Apr 2021 14:00) (67 - 98)  RR: 32 (15 Apr 2021 14:00) (10 - 48)  SpO2: 93% (15 Apr 2021 14:00) (80% - 100%)    PHYSICAL EXAM:  Constitutional:Well-developed, well nourished  Eyes:JANE, EOMI  Ear/Nose/Throat: no oral lesion, no sinus tenderness on percussion	  Neck:no JVD, no lymphadenopathy, supple  Respiratory: rhonchi anterior lung fields  Cardiovascular: S1S2 RRR, no murmurs  Gastrointestinal:soft, (+) BS, no HSM; distended  Extremities:no e/e/c  Vascular: DP Pulse:	right normal; left normal      LABS:                        9.7    17.56 )-----------( 300      ( 15 Apr 2021 07:48 )             32.5     04-15    145  |  110<H>  |  34<H>  ----------------------------<  163<H>  3.8   |  24  |  1.69<H>    Ca    8.4      15 Apr 2021 07:48  Phos  2.7     04-14  Mg     2.0     04-15    TPro  6.4  /  Alb  2.6<L>  /  TBili  0.6  /  DBili  x   /  AST  68<H>  /  ALT  71<H>  /  AlkPhos  87  04-15    PT/INR - ( 15 Apr 2021 07:48 )   PT: 15.2 sec;   INR: 1.28          PTT - ( 15 Apr 2021 07:48 )  PTT:21.5 sec  Urinalysis Basic - ( 15 Apr 2021 14:10 )    Color: Yellow / Appearance: SL Cloudy / S.025 / pH: x  Gluc: x / Ketone: NEGATIVE  / Bili: Negative / Urobili: 1.0 E.U./dL   Blood: x / Protein: 100 mg/dL / Nitrite: NEGATIVE   Leuk Esterase: NEGATIVE / RBC: Many /HPF / WBC < 5 /HPF   Sq Epi: x / Non Sq Epi: 5-10 /HPF / Bacteria: Present /HPF        MICROBIOLOGY: reviewed    RADIOLOGY & ADDITIONAL STUDIES: reviewed

## 2021-04-15 NOTE — PROGRESS NOTE ADULT - ASSESSMENT
57 yo male with severe COVID-19 PNA; course c/b fever; s/p BAL 4/12--cultures have so far only yielded Keila dubliniensis which is probably a respiratory tract colonizer (and not the etiology of the patient's fevers). Voriconazole is not indicated for respiratory tract colonization with Candida.  Suspect recent high fevers/leukocytosis due to aspiration event--reasonable to continue empiric Zosyn. Doubt COVID-19 associated pulmonary aspergillosis given BAL cultures without growth of mold, absence of cavitation on CXR, and time course relatively early for development of this rare entity. Moreover, would not attribute new fevers to discontinuation of voriconazole.    ID Team 2

## 2021-04-15 NOTE — PROGRESS NOTE ADULT - ATTENDING COMMENTS
Patient seen and examined with house-staff during bedside rounds.  Resident note read, including vitals, physical findings, laboratory data, and radiological reports.   Revisions included below.  Direct personal management at bed side and extensive interpretation of the data.  Plan was outlined and discussed in details with the housestaff.  Decision making of high complexity  Action taken for acute disease activity to reflect the level of care provided:  - medication reconciliation  - review laboratory data  The patient is still spiking fever.  That will restart if urine comes on.  We will follow-up on cultures and secluding urine.  The lines were changed.  No plan for weaning at this point.  The creatinine increased.  Blood sugar was controlled on insulin.  Pulmonary toilet.  BP is controlled on amlodipine.  ON seraquil.  On tube feed.  Fluid status is stable

## 2021-04-16 LAB
-  CEFAZOLIN: SIGNIFICANT CHANGE UP
-  CLINDAMYCIN: SIGNIFICANT CHANGE UP
-  ERYTHROMYCIN: SIGNIFICANT CHANGE UP
-  LINEZOLID: SIGNIFICANT CHANGE UP
-  OXACILLIN: SIGNIFICANT CHANGE UP
-  RIFAMPIN: SIGNIFICANT CHANGE UP
-  TRIMETHOPRIM/SULFAMETHOXAZOLE: SIGNIFICANT CHANGE UP
-  VANCOMYCIN: SIGNIFICANT CHANGE UP
ALBUMIN SERPL ELPH-MCNC: 2 G/DL — LOW (ref 3.3–5)
ALBUMIN SERPL ELPH-MCNC: 2.2 G/DL — LOW (ref 3.3–5)
ALP SERPL-CCNC: 82 U/L — SIGNIFICANT CHANGE UP (ref 40–120)
ALP SERPL-CCNC: 91 U/L — SIGNIFICANT CHANGE UP (ref 40–120)
ALT FLD-CCNC: 43 U/L — SIGNIFICANT CHANGE UP (ref 10–45)
ALT FLD-CCNC: 55 U/L — HIGH (ref 10–45)
ANION GAP SERPL CALC-SCNC: 11 MMOL/L — SIGNIFICANT CHANGE UP (ref 5–17)
ANION GAP SERPL CALC-SCNC: 18 MMOL/L — HIGH (ref 5–17)
ANISOCYTOSIS BLD QL: SLIGHT — SIGNIFICANT CHANGE UP
ANISOCYTOSIS BLD QL: SLIGHT — SIGNIFICANT CHANGE UP
AST SERPL-CCNC: 25 U/L — SIGNIFICANT CHANGE UP (ref 10–40)
AST SERPL-CCNC: 43 U/L — HIGH (ref 10–40)
BASE EXCESS BLDA CALC-SCNC: -5.7 MMOL/L — LOW (ref -2–3)
BASOPHILS # BLD AUTO: 0 K/UL — SIGNIFICANT CHANGE UP (ref 0–0.2)
BASOPHILS # BLD AUTO: 0.15 K/UL — SIGNIFICANT CHANGE UP (ref 0–0.2)
BASOPHILS NFR BLD AUTO: 0 % — SIGNIFICANT CHANGE UP (ref 0–2)
BASOPHILS NFR BLD AUTO: 0.9 % — SIGNIFICANT CHANGE UP (ref 0–2)
BILIRUB SERPL-MCNC: 0.4 MG/DL — SIGNIFICANT CHANGE UP (ref 0.2–1.2)
BILIRUB SERPL-MCNC: 0.7 MG/DL — SIGNIFICANT CHANGE UP (ref 0.2–1.2)
BUN SERPL-MCNC: 43 MG/DL — HIGH (ref 7–23)
BUN SERPL-MCNC: 52 MG/DL — HIGH (ref 7–23)
CALCIUM SERPL-MCNC: 7.7 MG/DL — LOW (ref 8.4–10.5)
CALCIUM SERPL-MCNC: 8.1 MG/DL — LOW (ref 8.4–10.5)
CHLORIDE SERPL-SCNC: 108 MMOL/L — SIGNIFICANT CHANGE UP (ref 96–108)
CHLORIDE SERPL-SCNC: 110 MMOL/L — HIGH (ref 96–108)
CO2 SERPL-SCNC: 17 MMOL/L — LOW (ref 22–31)
CO2 SERPL-SCNC: 20 MMOL/L — LOW (ref 22–31)
CREAT SERPL-MCNC: 1.99 MG/DL — HIGH (ref 0.5–1.3)
CREAT SERPL-MCNC: 2.12 MG/DL — HIGH (ref 0.5–1.3)
CRP SERPL-MCNC: 303.8 MG/L — HIGH (ref 0–4)
D DIMER BLD IA.RAPID-MCNC: 936 NG/ML DDU — HIGH
DACRYOCYTES BLD QL SMEAR: SLIGHT — SIGNIFICANT CHANGE UP
DACRYOCYTES BLD QL SMEAR: SLIGHT — SIGNIFICANT CHANGE UP
EOSINOPHIL # BLD AUTO: 0.17 K/UL — SIGNIFICANT CHANGE UP (ref 0–0.5)
EOSINOPHIL # BLD AUTO: 0.28 K/UL — SIGNIFICANT CHANGE UP (ref 0–0.5)
EOSINOPHIL NFR BLD AUTO: 0.8 % — SIGNIFICANT CHANGE UP (ref 0–6)
EOSINOPHIL NFR BLD AUTO: 1.7 % — SIGNIFICANT CHANGE UP (ref 0–6)
FERRITIN SERPL-MCNC: 344 NG/ML — SIGNIFICANT CHANGE UP (ref 30–400)
GAS PNL BLDA: SIGNIFICANT CHANGE UP
GIANT PLATELETS BLD QL SMEAR: PRESENT — SIGNIFICANT CHANGE UP
GLUCOSE BLDC GLUCOMTR-MCNC: 137 MG/DL — HIGH (ref 70–99)
GLUCOSE BLDC GLUCOMTR-MCNC: 151 MG/DL — HIGH (ref 70–99)
GLUCOSE BLDC GLUCOMTR-MCNC: 156 MG/DL — HIGH (ref 70–99)
GLUCOSE BLDC GLUCOMTR-MCNC: 167 MG/DL — HIGH (ref 70–99)
GLUCOSE BLDC GLUCOMTR-MCNC: 194 MG/DL — HIGH (ref 70–99)
GLUCOSE SERPL-MCNC: 170 MG/DL — HIGH (ref 70–99)
GLUCOSE SERPL-MCNC: 215 MG/DL — HIGH (ref 70–99)
GRAM STN FLD: SIGNIFICANT CHANGE UP
HCO3 BLDA-SCNC: 19 MMOL/L — LOW (ref 21–28)
HCT VFR BLD CALC: 29.8 % — LOW (ref 39–50)
HCT VFR BLD CALC: 32.2 % — LOW (ref 39–50)
HGB BLD-MCNC: 8.8 G/DL — LOW (ref 13–17)
HGB BLD-MCNC: 9.4 G/DL — LOW (ref 13–17)
HYPOCHROMIA BLD QL: SLIGHT — SIGNIFICANT CHANGE UP
LACTATE SERPL-SCNC: 1.1 MMOL/L — SIGNIFICANT CHANGE UP (ref 0.5–2)
LACTATE SERPL-SCNC: 1.6 MMOL/L — SIGNIFICANT CHANGE UP (ref 0.5–2)
LYMPHOCYTES # BLD AUTO: 0.34 K/UL — LOW (ref 1–3.3)
LYMPHOCYTES # BLD AUTO: 1.01 K/UL — SIGNIFICANT CHANGE UP (ref 1–3.3)
LYMPHOCYTES # BLD AUTO: 1.6 % — LOW (ref 13–44)
LYMPHOCYTES # BLD AUTO: 6.1 % — LOW (ref 13–44)
MACROCYTES BLD QL: SLIGHT — SIGNIFICANT CHANGE UP
MAGNESIUM SERPL-MCNC: 1.8 MG/DL — SIGNIFICANT CHANGE UP (ref 1.6–2.6)
MAGNESIUM SERPL-MCNC: 1.9 MG/DL — SIGNIFICANT CHANGE UP (ref 1.6–2.6)
MANUAL SMEAR VERIFICATION: SIGNIFICANT CHANGE UP
MANUAL SMEAR VERIFICATION: SIGNIFICANT CHANGE UP
MCHC RBC-ENTMCNC: 22.7 PG — LOW (ref 27–34)
MCHC RBC-ENTMCNC: 22.8 PG — LOW (ref 27–34)
MCHC RBC-ENTMCNC: 29.2 GM/DL — LOW (ref 32–36)
MCHC RBC-ENTMCNC: 29.5 GM/DL — LOW (ref 32–36)
MCV RBC AUTO: 77.2 FL — LOW (ref 80–100)
MCV RBC AUTO: 77.8 FL — LOW (ref 80–100)
METHOD TYPE: SIGNIFICANT CHANGE UP
MICROCYTES BLD QL: SLIGHT — SIGNIFICANT CHANGE UP
MICROCYTES BLD QL: SLIGHT — SIGNIFICANT CHANGE UP
MONOCYTES # BLD AUTO: 0.43 K/UL — SIGNIFICANT CHANGE UP (ref 0–0.9)
MONOCYTES # BLD AUTO: 0.53 K/UL — SIGNIFICANT CHANGE UP (ref 0–0.9)
MONOCYTES NFR BLD AUTO: 2.5 % — SIGNIFICANT CHANGE UP (ref 2–14)
MONOCYTES NFR BLD AUTO: 2.6 % — SIGNIFICANT CHANGE UP (ref 2–14)
MRSA PCR RESULT.: NEGATIVE — SIGNIFICANT CHANGE UP
NEUTROPHILS # BLD AUTO: 14.63 K/UL — HIGH (ref 1.8–7.4)
NEUTROPHILS # BLD AUTO: 19.91 K/UL — HIGH (ref 1.8–7.4)
NEUTROPHILS NFR BLD AUTO: 87 % — HIGH (ref 43–77)
NEUTROPHILS NFR BLD AUTO: 94.3 % — HIGH (ref 43–77)
NEUTS BAND # BLD: 1.7 % — SIGNIFICANT CHANGE UP (ref 0–8)
OVALOCYTES BLD QL SMEAR: SLIGHT — SIGNIFICANT CHANGE UP
PCO2 BLDA: 32 MMHG — LOW (ref 35–48)
PH BLDA: 7.38 — SIGNIFICANT CHANGE UP (ref 7.35–7.45)
PHOSPHATE SERPL-MCNC: 3.9 MG/DL — SIGNIFICANT CHANGE UP (ref 2.5–4.5)
PHOSPHATE SERPL-MCNC: 5.3 MG/DL — HIGH (ref 2.5–4.5)
PLAT MORPH BLD: ABNORMAL
PLAT MORPH BLD: NORMAL — SIGNIFICANT CHANGE UP
PLATELET # BLD AUTO: 299 K/UL — SIGNIFICANT CHANGE UP (ref 150–400)
PLATELET # BLD AUTO: 345 K/UL — SIGNIFICANT CHANGE UP (ref 150–400)
PO2 BLDA: 73 MMHG — LOW (ref 83–108)
POIKILOCYTOSIS BLD QL AUTO: SLIGHT — SIGNIFICANT CHANGE UP
POLYCHROMASIA BLD QL SMEAR: SLIGHT — SIGNIFICANT CHANGE UP
POTASSIUM SERPL-MCNC: 3.8 MMOL/L — SIGNIFICANT CHANGE UP (ref 3.5–5.3)
POTASSIUM SERPL-MCNC: 4 MMOL/L — SIGNIFICANT CHANGE UP (ref 3.5–5.3)
POTASSIUM SERPL-SCNC: 3.8 MMOL/L — SIGNIFICANT CHANGE UP (ref 3.5–5.3)
POTASSIUM SERPL-SCNC: 4 MMOL/L — SIGNIFICANT CHANGE UP (ref 3.5–5.3)
PROT SERPL-MCNC: 5.6 G/DL — LOW (ref 6–8.3)
PROT SERPL-MCNC: 6.3 G/DL — SIGNIFICANT CHANGE UP (ref 6–8.3)
RBC # BLD: 3.86 M/UL — LOW (ref 4.2–5.8)
RBC # BLD: 4.14 M/UL — LOW (ref 4.2–5.8)
RBC # FLD: 19.1 % — HIGH (ref 10.3–14.5)
RBC # FLD: 19.2 % — HIGH (ref 10.3–14.5)
RBC BLD AUTO: ABNORMAL
RBC BLD AUTO: ABNORMAL
S AUREUS DNA NOSE QL NAA+PROBE: NEGATIVE — SIGNIFICANT CHANGE UP
SAO2 % BLDA: 94 % — LOW (ref 95–100)
SCHISTOCYTES BLD QL AUTO: SLIGHT — SIGNIFICANT CHANGE UP
SMUDGE CELLS # BLD: PRESENT — SIGNIFICANT CHANGE UP
SMUDGE CELLS # BLD: PRESENT — SIGNIFICANT CHANGE UP
SODIUM SERPL-SCNC: 141 MMOL/L — SIGNIFICANT CHANGE UP (ref 135–145)
SODIUM SERPL-SCNC: 143 MMOL/L — SIGNIFICANT CHANGE UP (ref 135–145)
SPECIMEN SOURCE: SIGNIFICANT CHANGE UP
SPHEROCYTES BLD QL SMEAR: SLIGHT — SIGNIFICANT CHANGE UP
STOMATOCYTES BLD QL SMEAR: SLIGHT — SIGNIFICANT CHANGE UP
VARIANT LYMPHS # BLD: 0.8 % — SIGNIFICANT CHANGE UP (ref 0–6)
WBC # BLD: 16.49 K/UL — HIGH (ref 3.8–10.5)
WBC # BLD: 21.11 K/UL — HIGH (ref 3.8–10.5)
WBC # FLD AUTO: 16.49 K/UL — HIGH (ref 3.8–10.5)
WBC # FLD AUTO: 21.11 K/UL — HIGH (ref 3.8–10.5)

## 2021-04-16 PROCEDURE — 99232 SBSQ HOSP IP/OBS MODERATE 35: CPT

## 2021-04-16 PROCEDURE — 99291 CRITICAL CARE FIRST HOUR: CPT

## 2021-04-16 PROCEDURE — 71045 X-RAY EXAM CHEST 1 VIEW: CPT | Mod: 26

## 2021-04-16 PROCEDURE — 36620 INSERTION CATHETER ARTERY: CPT

## 2021-04-16 PROCEDURE — 36600 WITHDRAWAL OF ARTERIAL BLOOD: CPT | Mod: 59

## 2021-04-16 RX ORDER — ACETAMINOPHEN 500 MG
1000 TABLET ORAL ONCE
Refills: 0 | Status: COMPLETED | OUTPATIENT
Start: 2021-04-16 | End: 2021-04-16

## 2021-04-16 RX ORDER — SODIUM CHLORIDE 9 MG/ML
1000 INJECTION, SOLUTION INTRAVENOUS ONCE
Refills: 0 | Status: COMPLETED | OUTPATIENT
Start: 2021-04-16 | End: 2021-04-16

## 2021-04-16 RX ORDER — MIDAZOLAM HYDROCHLORIDE 1 MG/ML
8 INJECTION, SOLUTION INTRAMUSCULAR; INTRAVENOUS ONCE
Refills: 0 | Status: DISCONTINUED | OUTPATIENT
Start: 2021-04-16 | End: 2021-04-16

## 2021-04-16 RX ORDER — FENTANYL CITRATE 50 UG/ML
100 INJECTION INTRAVENOUS ONCE
Refills: 0 | Status: DISCONTINUED | OUTPATIENT
Start: 2021-04-16 | End: 2021-04-16

## 2021-04-16 RX ORDER — MEROPENEM 1 G/30ML
1000 INJECTION INTRAVENOUS EVERY 12 HOURS
Refills: 0 | Status: DISCONTINUED | OUTPATIENT
Start: 2021-04-16 | End: 2021-04-20

## 2021-04-16 RX ORDER — VANCOMYCIN HCL 1 G
1250 VIAL (EA) INTRAVENOUS ONCE
Refills: 0 | Status: COMPLETED | OUTPATIENT
Start: 2021-04-16 | End: 2021-04-16

## 2021-04-16 RX ORDER — DEXMEDETOMIDINE HYDROCHLORIDE IN 0.9% SODIUM CHLORIDE 4 UG/ML
0.4 INJECTION INTRAVENOUS
Qty: 400 | Refills: 0 | Status: DISCONTINUED | OUTPATIENT
Start: 2021-04-16 | End: 2021-04-17

## 2021-04-16 RX ORDER — MAGNESIUM SULFATE 500 MG/ML
1 VIAL (ML) INJECTION ONCE
Refills: 0 | Status: COMPLETED | OUTPATIENT
Start: 2021-04-16 | End: 2021-04-16

## 2021-04-16 RX ORDER — MIDAZOLAM HYDROCHLORIDE 1 MG/ML
0.02 INJECTION, SOLUTION INTRAMUSCULAR; INTRAVENOUS
Qty: 100 | Refills: 0 | Status: DISCONTINUED | OUTPATIENT
Start: 2021-04-16 | End: 2021-04-18

## 2021-04-16 RX ORDER — CHLORHEXIDINE GLUCONATE 213 G/1000ML
15 SOLUTION TOPICAL EVERY 12 HOURS
Refills: 0 | Status: DISCONTINUED | OUTPATIENT
Start: 2021-04-16 | End: 2021-04-30

## 2021-04-16 RX ORDER — PROPOFOL 10 MG/ML
10 INJECTION, EMULSION INTRAVENOUS
Qty: 1000 | Refills: 0 | Status: DISCONTINUED | OUTPATIENT
Start: 2021-04-16 | End: 2021-04-20

## 2021-04-16 RX ORDER — NOREPINEPHRINE BITARTRATE/D5W 8 MG/250ML
0.05 PLASTIC BAG, INJECTION (ML) INTRAVENOUS
Qty: 8 | Refills: 0 | Status: DISCONTINUED | OUTPATIENT
Start: 2021-04-16 | End: 2021-04-18

## 2021-04-16 RX ORDER — HEPARIN SODIUM 5000 [USP'U]/ML
7500 INJECTION INTRAVENOUS; SUBCUTANEOUS EVERY 8 HOURS
Refills: 0 | Status: DISCONTINUED | OUTPATIENT
Start: 2021-04-16 | End: 2021-04-20

## 2021-04-16 RX ORDER — MEROPENEM 1 G/30ML
1000 INJECTION INTRAVENOUS ONCE
Refills: 0 | Status: COMPLETED | OUTPATIENT
Start: 2021-04-16 | End: 2021-04-16

## 2021-04-16 RX ORDER — SODIUM CHLORIDE 9 MG/ML
1000 INJECTION, SOLUTION INTRAVENOUS
Refills: 0 | Status: DISCONTINUED | OUTPATIENT
Start: 2021-04-16 | End: 2021-04-16

## 2021-04-16 RX ADMIN — FENTANYL CITRATE 100 MICROGRAM(S): 50 INJECTION INTRAVENOUS at 03:49

## 2021-04-16 RX ADMIN — CHLORHEXIDINE GLUCONATE 15 MILLILITER(S): 213 SOLUTION TOPICAL at 17:53

## 2021-04-16 RX ADMIN — FENTANYL CITRATE 100 MICROGRAM(S): 50 INJECTION INTRAVENOUS at 03:19

## 2021-04-16 RX ADMIN — INSULIN GLARGINE 12 UNIT(S): 100 INJECTION, SOLUTION SUBCUTANEOUS at 21:51

## 2021-04-16 RX ADMIN — PROPOFOL 7.2 MICROGRAM(S)/KG/MIN: 10 INJECTION, EMULSION INTRAVENOUS at 23:02

## 2021-04-16 RX ADMIN — PROPOFOL 7.2 MICROGRAM(S)/KG/MIN: 10 INJECTION, EMULSION INTRAVENOUS at 10:14

## 2021-04-16 RX ADMIN — Medication 3 MILLILITER(S): at 00:32

## 2021-04-16 RX ADMIN — DEXMEDETOMIDINE HYDROCHLORIDE IN 0.9% SODIUM CHLORIDE 12 MICROGRAM(S)/KG/HR: 4 INJECTION INTRAVENOUS at 23:02

## 2021-04-16 RX ADMIN — DEXMEDETOMIDINE HYDROCHLORIDE IN 0.9% SODIUM CHLORIDE 12 MICROGRAM(S)/KG/HR: 4 INJECTION INTRAVENOUS at 18:31

## 2021-04-16 RX ADMIN — CHLORHEXIDINE GLUCONATE 1 APPLICATION(S): 213 SOLUTION TOPICAL at 12:42

## 2021-04-16 RX ADMIN — MEROPENEM 100 MILLIGRAM(S): 1 INJECTION INTRAVENOUS at 22:40

## 2021-04-16 RX ADMIN — HEPARIN SODIUM 7500 UNIT(S): 5000 INJECTION INTRAVENOUS; SUBCUTANEOUS at 21:51

## 2021-04-16 RX ADMIN — PROPOFOL 7.2 MICROGRAM(S)/KG/MIN: 10 INJECTION, EMULSION INTRAVENOUS at 16:17

## 2021-04-16 RX ADMIN — Medication 3 MILLILITER(S): at 05:21

## 2021-04-16 RX ADMIN — LACTULOSE 10 GRAM(S): 10 SOLUTION ORAL at 02:00

## 2021-04-16 RX ADMIN — FENTANYL CITRATE 100 MICROGRAM(S): 50 INJECTION INTRAVENOUS at 06:00

## 2021-04-16 RX ADMIN — Medication 400 MILLIGRAM(S): at 14:23

## 2021-04-16 RX ADMIN — MIDAZOLAM HYDROCHLORIDE 2.4 MG/KG/HR: 1 INJECTION, SOLUTION INTRAMUSCULAR; INTRAVENOUS at 12:42

## 2021-04-16 RX ADMIN — Medication 10 MILLIGRAM(S): at 14:23

## 2021-04-16 RX ADMIN — FENTANYL CITRATE 100 MICROGRAM(S): 50 INJECTION INTRAVENOUS at 05:51

## 2021-04-16 RX ADMIN — PROPOFOL 7.2 MICROGRAM(S)/KG/MIN: 10 INJECTION, EMULSION INTRAVENOUS at 17:53

## 2021-04-16 RX ADMIN — Medication 100 GRAM(S): at 14:23

## 2021-04-16 RX ADMIN — INSULIN HUMAN 2: 100 INJECTION, SOLUTION SUBCUTANEOUS at 17:31

## 2021-04-16 RX ADMIN — PROPOFOL 7.2 MICROGRAM(S)/KG/MIN: 10 INJECTION, EMULSION INTRAVENOUS at 12:42

## 2021-04-16 RX ADMIN — SODIUM CHLORIDE 2000 MILLILITER(S): 9 INJECTION, SOLUTION INTRAVENOUS at 10:50

## 2021-04-16 RX ADMIN — Medication 3 MILLILITER(S): at 20:02

## 2021-04-16 RX ADMIN — Medication 166.67 MILLIGRAM(S): at 12:41

## 2021-04-16 RX ADMIN — PIPERACILLIN AND TAZOBACTAM 200 GRAM(S): 4; .5 INJECTION, POWDER, LYOPHILIZED, FOR SOLUTION INTRAVENOUS at 05:49

## 2021-04-16 RX ADMIN — Medication 400 MILLIGRAM(S): at 05:56

## 2021-04-16 RX ADMIN — Medication 3 MILLILITER(S): at 08:27

## 2021-04-16 RX ADMIN — Medication 1000 MILLIGRAM(S): at 16:17

## 2021-04-16 RX ADMIN — MEROPENEM 100 MILLIGRAM(S): 1 INJECTION INTRAVENOUS at 11:20

## 2021-04-16 RX ADMIN — DEXMEDETOMIDINE HYDROCHLORIDE IN 0.9% SODIUM CHLORIDE 12 MICROGRAM(S)/KG/HR: 4 INJECTION INTRAVENOUS at 14:23

## 2021-04-16 RX ADMIN — MIDAZOLAM HYDROCHLORIDE 8 MILLIGRAM(S): 1 INJECTION, SOLUTION INTRAMUSCULAR; INTRAVENOUS at 12:42

## 2021-04-16 RX ADMIN — DEXMEDETOMIDINE HYDROCHLORIDE IN 0.9% SODIUM CHLORIDE 12 MICROGRAM(S)/KG/HR: 4 INJECTION INTRAVENOUS at 05:51

## 2021-04-16 RX ADMIN — PROPOFOL 7.2 MICROGRAM(S)/KG/MIN: 10 INJECTION, EMULSION INTRAVENOUS at 05:52

## 2021-04-16 RX ADMIN — DEXMEDETOMIDINE HYDROCHLORIDE IN 0.9% SODIUM CHLORIDE 12 MICROGRAM(S)/KG/HR: 4 INJECTION INTRAVENOUS at 10:13

## 2021-04-16 RX ADMIN — ENOXAPARIN SODIUM 40 MILLIGRAM(S): 100 INJECTION SUBCUTANEOUS at 05:49

## 2021-04-16 RX ADMIN — INSULIN HUMAN 2: 100 INJECTION, SOLUTION SUBCUTANEOUS at 12:06

## 2021-04-16 RX ADMIN — Medication 3 MILLILITER(S): at 16:00

## 2021-04-16 RX ADMIN — Medication 11.3 MICROGRAM(S)/KG/MIN: at 05:49

## 2021-04-16 RX ADMIN — Medication 3 MILLILITER(S): at 12:11

## 2021-04-16 RX ADMIN — Medication 1000 MILLIGRAM(S): at 06:00

## 2021-04-16 RX ADMIN — PANTOPRAZOLE SODIUM 40 MILLIGRAM(S): 20 TABLET, DELAYED RELEASE ORAL at 14:17

## 2021-04-16 RX ADMIN — DEXMEDETOMIDINE HYDROCHLORIDE IN 0.9% SODIUM CHLORIDE 12 MICROGRAM(S)/KG/HR: 4 INJECTION INTRAVENOUS at 02:28

## 2021-04-16 RX ADMIN — Medication 11.3 MICROGRAM(S)/KG/MIN: at 19:35

## 2021-04-16 RX ADMIN — HEPARIN SODIUM 7500 UNIT(S): 5000 INJECTION INTRAVENOUS; SUBCUTANEOUS at 14:17

## 2021-04-16 NOTE — PROGRESS NOTE ADULT - ATTENDING COMMENTS
Events noted. Increase temp and WBC with decrease in BP post vomiting and likely aspiration. Abx broadened to Meropenem and Sputum Cx pending. Pt volume resuscitated and repeat lactate normal. Sedated for vent synchrony and PEEP increased to 8.  Continue to decrease FiO2. NGT placed to suction.

## 2021-04-16 NOTE — PROCEDURE NOTE - NSPROCDETAILS_GEN_ALL_CORE
ultrasound-guided/location identified, draped/prepped, sterile technique used, needle inserted/introduced/positive blood return obtained via catheter/connected to a pressurized flush line/sutured in place/hemostasis with direct pressure, dressing applied/Seldinger technique/all materials/supplies accounted for at end of procedure

## 2021-04-16 NOTE — PROGRESS NOTE ADULT - SUBJECTIVE AND OBJECTIVE BOX
INTERVAL HPI/OVERNIGHT EVENTS: Ongoing fevers; +pressor requirement.    ROS: UTO      ANTIBIOTICS/RELEVANT:    MEDICATIONS  (STANDING):  albuterol/ipratropium for Nebulization 3 milliLiter(s) Nebulizer every 4 hours  chlorhexidine 0.12% Liquid 15 milliLiter(s) Oral Mucosa every 12 hours  chlorhexidine 2% Cloths 1 Application(s) Topical daily  dexMEDEtomidine Infusion 0.4 MICROgram(s)/kG/Hr (12 mL/Hr) IV Continuous <Continuous>  dextrose 40% Gel 15 Gram(s) Oral once  dextrose 5%. 1000 milliLiter(s) (50 mL/Hr) IV Continuous <Continuous>  dextrose 5%. 1000 milliLiter(s) (100 mL/Hr) IV Continuous <Continuous>  dextrose 50% Injectable 25 Gram(s) IV Push once  dextrose 50% Injectable 12.5 Gram(s) IV Push once  dextrose 50% Injectable 25 Gram(s) IV Push once  glucagon  Injectable 1 milliGRAM(s) IntraMuscular once  heparin   Injectable 7500 Unit(s) SubCutaneous every 8 hours  insulin glargine Injectable (LANTUS) 12 Unit(s) SubCutaneous at bedtime  insulin regular  human corrective regimen sliding scale   SubCutaneous every 6 hours  lactulose Syrup 10 Gram(s) Oral every 4 hours  meropenem  IVPB 1000 milliGRAM(s) IV Intermittent every 12 hours  midazolam Infusion 0.02 mG/kG/Hr (2.4 mL/Hr) IV Continuous <Continuous>  norepinephrine Infusion 0.05 MICROgram(s)/kG/Min (11.3 mL/Hr) IV Continuous <Continuous>  pantoprazole  Injectable 40 milliGRAM(s) IV Push daily  polyethylene glycol 3350 17 Gram(s) Oral every 12 hours  propofol Infusion 10 MICROgram(s)/kG/Min (7.2 mL/Hr) IV Continuous <Continuous>  QUEtiapine 75 milliGRAM(s) Oral every 24 hours  QUEtiapine 100 milliGRAM(s) Oral every 24 hours  senna 2 Tablet(s) Oral at bedtime    MEDICATIONS  (PRN):  acetaminophen    Suspension .. 650 milliGRAM(s) Oral every 6 hours PRN Temp greater or equal to 38C (100.4F)  sodium chloride 0.9% lock flush 10 milliLiter(s) IV Push every 1 hour PRN Pre/post blood products, medications, blood draw, and to maintain line patency        Vital Signs Last 24 Hrs  T(C): 37.5 (2021 14:49), Max: 39.2 (15 Apr 2021 16:00)  T(F): 99.5 (2021 14:49), Max: 102.5 (15 Apr 2021 16:00)  HR: 70 (2021 13:00) (53 - 78)  BP: 111/55 (2021 13:00) (79/51 - 153/72)  BP(mean): 77 (2021 13:00) (60 - 103)  RR: 30 (2021 13:00) (29 - 44)  SpO2: 98% (2021 13:00) (90% - 100%)    PHYSICAL EXAM:  Constitutional:Well-developed, well nourished  Eyes:JANE, EOMI  Ear/Nose/Throat: no oral lesion, no sinus tenderness on percussion	  Neck:no JVD, no lymphadenopathy, supple  Respiratory: CTA adams  Cardiovascular: S1S2 RRR, no murmurs  Gastrointestinal:soft, (+) BS, no HSM; +distended but soft   Extremities:no e/e/c  Vascular: DP Pulse:	right normal; left normal      LABS:                        9.4    21.11 )-----------( 345      ( 2021 10:15 )             32.2     04-16    143  |  108  |  52<H>  ----------------------------<  170<H>  4.0   |  17<L>  |  2.12<H>    Ca    8.1<L>      2021 06:37  Phos  5.3     04-16  Mg     1.8     04-16    TPro  6.3  /  Alb  2.2<L>  /  TBili  0.7  /  DBili  x   /  AST  43<H>  /  ALT  55<H>  /  AlkPhos  91  04-16    PT/INR - ( 15 Apr 2021 07:48 )   PT: 15.2 sec;   INR: 1.28          PTT - ( 15 Apr 2021 07:48 )  PTT:21.5 sec  Urinalysis Basic - ( 15 Apr 2021 14:10 )    Color: Yellow / Appearance: SL Cloudy / S.025 / pH: x  Gluc: x / Ketone: NEGATIVE  / Bili: Negative / Urobili: 1.0 E.U./dL   Blood: x / Protein: 100 mg/dL / Nitrite: NEGATIVE   Leuk Esterase: NEGATIVE / RBC: Many /HPF / WBC < 5 /HPF   Sq Epi: x / Non Sq Epi: 5-10 /HPF / Bacteria: Present /HPF        MICROBIOLOGY: reviewed    RADIOLOGY & ADDITIONAL STUDIES: reviewed

## 2021-04-16 NOTE — PROGRESS NOTE ADULT - SUBJECTIVE AND OBJECTIVE BOX
SUBJECTIVE      REVIEW OF SYSTEMS:   See HPI (as per chart review), unable to obtain 2/2 ETT and sedation    OBJECTIVE    Vital Signs Last 24 Hrs  T(C): 37.1 (2021 06:27), Max: 40.3 (15 Apr 2021 12:00)  T(F): 98.8 (2021 06:27), Max: 104.5 (15 Apr 2021 12:00)  HR: 76 (2021 07:54) (53 - 76)  BP: 150/70 (2021 07:00) (79/51 - 150/70)  BP(mean): 101 (2021 07:00) (60 - 102)  RR: 43 (2021 07:54) (29 - 44)  SpO2: 100% (2021 07:54) (80% - 100%)    Mode: AC/ CMV (Assist Control/ Continuous Mandatory Ventilation), RR (machine): 24, TV (machine): 450, FiO2: 80, PEEP: 5, ITime: 0.9, MAP: 13, PIP: 21    I&O's Detail    15 Apr 2021 07:01  -  2021 07:00  --------------------------------------------------------  IN:    Dexmedetomidine: 840 mL    dextrose 5%: 100 mL    FentaNYL: 36 mL    IV PiggyBack: 200 mL    Norepinephrine: 120 mL    Propofol: 259.2 mL  Total IN: 1555.2 mL    OUT:    Indwelling Catheter - Urethral (mL): 1365 mL  Total OUT: 1365 mL    LABS:    *microbiology    Culture - Blood (collected 15 Apr 2021 15:34)  Source: .Blood Blood  Preliminary Report (2021 04:00):    No growth at 12 hours    Culture - Blood (collected 15 Apr 2021 15:34)  Source: .Blood Blood  Preliminary Report (2021 04:00):    No growth at 12 hours    Culture - Blood (collected 2021 09:46)  Source: .Blood Blood  Preliminary Report (15 Apr 2021 10:01):    No growth at 1 day.    *UA  Urinalysis Basic - ( 15 Apr 2021 14:10 )    Color: Yellow / Appearance: SL Cloudy / S.025 / pH: x  Gluc: x / Ketone: NEGATIVE  / Bili: Negative / Urobili: 1.0 E.U./dL   Blood: x / Protein: 100 mg/dL / Nitrite: NEGATIVE   Leuk Esterase: NEGATIVE / RBC: Many /HPF / WBC < 5 /HPF   Sq Epi: x / Non Sq Epi: 5-10 /HPF / Bacteria: Present /HPF      RADIOLOGY    *CXr/Abd film    MEDICATIONS  (STANDING):  albuterol/ipratropium for Nebulization 3 milliLiter(s) Nebulizer every 4 hours  chlorhexidine 2% Cloths 1 Application(s) Topical daily  dexMEDEtomidine Infusion 0.4 MICROgram(s)/kG/Hr (12 mL/Hr) IV Continuous <Continuous>  dextrose 40% Gel 15 Gram(s) Oral once  dextrose 5% + sodium chloride 0.45%. 1000 milliLiter(s) (100 mL/Hr) IV Continuous <Continuous>  dextrose 5%. 1000 milliLiter(s) (50 mL/Hr) IV Continuous <Continuous>  dextrose 5%. 1000 milliLiter(s) (100 mL/Hr) IV Continuous <Continuous>  dextrose 50% Injectable 25 Gram(s) IV Push once  dextrose 50% Injectable 12.5 Gram(s) IV Push once  dextrose 50% Injectable 25 Gram(s) IV Push once  enoxaparin Injectable 40 milliGRAM(s) SubCutaneous every 12 hours  glucagon  Injectable 1 milliGRAM(s) IntraMuscular once  insulin glargine Injectable (LANTUS) 12 Unit(s) SubCutaneous at bedtime  insulin regular  human corrective regimen sliding scale   SubCutaneous every 6 hours  lactulose Syrup 10 Gram(s) Oral every 4 hours  metoclopramide Injectable 10 milliGRAM(s) IV Push once  norepinephrine Infusion 0.05 MICROgram(s)/kG/Min (11.3 mL/Hr) IV Continuous <Continuous>  pantoprazole  Injectable 40 milliGRAM(s) IV Push daily  piperacillin/tazobactam IVPB.. 4.5 Gram(s) IV Intermittent every 6 hours  polyethylene glycol 3350 17 Gram(s) Oral every 12 hours  propofol Infusion 10 MICROgram(s)/kG/Min (7.2 mL/Hr) IV Continuous <Continuous>  QUEtiapine 75 milliGRAM(s) Oral every 24 hours  QUEtiapine 100 milliGRAM(s) Oral every 24 hours  senna 2 Tablet(s) Oral at bedtime    MEDICATIONS  (PRN):  acetaminophen    Suspension .. 650 milliGRAM(s) Oral every 6 hours PRN Temp greater or equal to 38C (100.4F)  sodium chloride 0.9% lock flush 10 milliLiter(s) IV Push every 1 hour PRN Pre/post blood products, medications, blood draw, and to maintain line patency     SUBJECTIVE  Overnight events: patient remains febrile, tachypneic, now hypotensive requiring Levophed. FiO2 increased to 80%.    REVIEW OF SYSTEMS:   See HPI (as per chart review), unable to obtain 2/2 ETT and sedation    OBJECTIVE  Vital Signs Last 24 Hrs  T(C): 37.1 (2021 06:27), Max: 40.3 (15 Apr 2021 12:00)  T(F): 98.8 (2021 06:27), Max: 104.5 (15 Apr 2021 12:00)  HR: 76 (2021 07:54) (53 - 76)  BP: 150/70 (2021 07:00) (79/51 - 150/70)  BP(mean): 101 (2021 07:00) (60 - 102)  RR: 43 (2021 07:54) (29 - 44)  SpO2: 100% (2021 07:54) (80% - 100%)    Mode: AC/ CMV (Assist Control/ Continuous Mandatory Ventilation), RR (machine): 24, TV (machine): 450, FiO2: 80, PEEP: 5, ITime: 0.9, MAP: 13, PIP: 21    I&O's Detail  15 Apr 2021 07:01  -  2021 07:00  --------------------------------------------------------  IN:    Dexmedetomidine: 840 mL    dextrose 5%: 100 mL    FentaNYL: 36 mL    IV PiggyBack: 200 mL    Norepinephrine: 120 mL    Propofol: 259.2 mL  Total IN: 1555.2 mL    OUT:    Indwelling Catheter - Urethral (mL): 1365 mL  Total OUT: 1365 mL    LABS: **need to add them**    *Microbiology  Culture - Blood (collected 15 Apr 2021 15:34)  Source: .Blood Blood  Preliminary Report (2021 04:00):    No growth at 12 hours    Culture - Blood (collected 15 Apr 2021 15:34)  Source: .Blood Blood  Preliminary Report (2021 04:00):    No growth at 12 hours    Culture - Blood (collected 2021 09:46)  Source: .Blood Blood  Preliminary Report (15 Apr 2021 10:01):    No growth at 1 day.    *UA  Urinalysis Basic - ( 15 Apr 2021 14:10 )    Color: Yellow / Appearance: SL Cloudy / S.025 / pH: x  Gluc: x / Ketone: NEGATIVE  / Bili: Negative / Urobili: 1.0 E.U./dL   Blood: x / Protein: 100 mg/dL / Nitrite: NEGATIVE   Leuk Esterase: NEGATIVE / RBC: Many /HPF / WBC < 5 /HPF   Sq Epi: x / Non Sq Epi: 5-10 /HPF / Bacteria: Present /HPF    RADIOLOGY  *CXr from  and Abd film from 4/15 reviewed with attending and fellow during AM bedsiderounds    MEDICATIONS  (STANDING):  albuterol/ipratropium for Nebulization 3 milliLiter(s) Nebulizer every 4 hours  chlorhexidine 2% Cloths 1 Application(s) Topical daily  dexMEDEtomidine Infusion 0.4 MICROgram(s)/kG/Hr (12 mL/Hr) IV Continuous <Continuous> @ 1.5 mcg  dextrose 40% Gel 15 Gram(s) Oral once  dextrose 5% + sodium chloride 0.45%. 1000 milliLiter(s) (100 mL/Hr) IV Continuous <Continuous>  dextrose 5%. 1000 milliLiter(s) (50 mL/Hr) IV Continuous <Continuous>  dextrose 5%. 1000 milliLiter(s) (100 mL/Hr) IV Continuous <Continuous>  dextrose 50% Injectable 25 Gram(s) IV Push once  dextrose 50% Injectable 12.5 Gram(s) IV Push once  dextrose 50% Injectable 25 Gram(s) IV Push once  enoxaparin Injectable 40 milliGRAM(s) SubCutaneous every 12 hours  glucagon  Injectable 1 milliGRAM(s) IntraMuscular once  insulin glargine Injectable (LANTUS) 12 Unit(s) SubCutaneous at bedtime  insulin regular  human corrective regimen sliding scale   SubCutaneous every 6 hours  lactulose Syrup 10 Gram(s) Oral every 4 hours  metoclopramide Injectable 10 milliGRAM(s) IV Push once  norepinephrine Infusion 0.05 MICROgram(s)/kG/Min (11.3 mL/Hr) IV Continuous <Continuous> @ 0.26 mcg  pantoprazole  Injectable 40 milliGRAM(s) IV Push daily  piperacillin/tazobactam IVPB.. 4.5 Gram(s) IV Intermittent every 6 hours  polyethylene glycol 3350 17 Gram(s) Oral every 12 hours  propofol Infusion 10 MICROgram(s)/kG/Min (7.2 mL/Hr) IV Continuous <Continuous> @ 25 mcg  QUEtiapine 75 milliGRAM(s) Oral every 24 hours  QUEtiapine 100 milliGRAM(s) Oral every 24 hours  senna 2 Tablet(s) Oral at bedtime    MEDICATIONS  (PRN):  acetaminophen    Suspension .. 650 milliGRAM(s) Oral every 6 hours PRN Temp greater or equal to 38C (100.4F)  sodium chloride 0.9% lock flush 10 milliLiter(s) IV Push every 1 hour PRN Pre/post blood products, medications, blood draw, and to maintain line patency     SUBJECTIVE  Overnight events: patient remains febrile, tachypneic, now hypotensive requiring Levophed. FiO2 increased to 80%.    REVIEW OF SYSTEMS:   See HPI (as per chart review), unable to obtain 2/2 ETT and sedation    OBJECTIVE  Vital Signs Last 24 Hrs  T(C): 37.1 (2021 06:27), Max: 40.3 (15 Apr 2021 12:00)  T(F): 98.8 (2021 06:27), Max: 104.5 (15 Apr 2021 12:00)  HR: 76 (2021 07:54) (53 - 76)  BP: 150/70 (2021 07:00) (79/51 - 150/70)  BP(mean): 101 (2021 07:00) (60 - 102)  RR: 43 (2021 07:54) (29 - 44)  SpO2: 100% (2021 07:54) (80% - 100%)    Mode: AC/ CMV (Assist Control/ Continuous Mandatory Ventilation), RR (machine): 24, TV (machine): 450, FiO2: 80, PEEP: 5, ITime: 0.9, MAP: 13, PIP: 21    I&O's Detail  15 Apr 2021 07:01  -  2021 07:00  --------------------------------------------------------  IN:    Dexmedetomidine: 840 mL    dextrose 5%: 100 mL    FentaNYL: 36 mL    IV PiggyBack: 200 mL    Norepinephrine: 120 mL    Propofol: 259.2 mL  Total IN: 1555.2 mL    OUT:    Indwelling Catheter - Urethral (mL): 1365 mL  Total OUT: 1365 mL    LABS:                        9.4    21.11 )-----------( 345      ( 2021 10:15 )             32.2     04-16    143  |  108  |  52<H>  ----------------------------<  170<H>  4.0   |  17<L>  |  2.12<H>    Ca    8.1<L>      2021 06:37  Phos  5.3     04-16  Mg     1.8     04-16    TPro  6.3  /  Alb  2.2<L>  /  TBili  0.7  /  DBili  x   /  AST  43<H>  /  ALT  55<H>  /  AlkPhos  91  -    ABG - ( 2021 06:46 )  pH, Arterial: 7.38  pH, Blood: x     /  pCO2: 32    /  pO2: 73    / HCO3: 19    / Base Excess: -5.7  /  SaO2: 94      Lactate 1.6    D-dimer 936  CRP and ferritin pending    *Microbiology  Culture - Blood (collected 15 Apr 2021 15:34)  Source: .Blood Blood  Preliminary Report (2021 04:00):    No growth at 12 hours    Culture - Blood (collected 15 Apr 2021 15:34)  Source: .Blood Blood  Preliminary Report (2021 04:00):    No growth at 12 hours    Culture - Blood (collected 2021 09:46)  Source: .Blood Blood  Preliminary Report (15 Apr 2021 10:01):    No growth at 1 day.    Urinalysis Basic - ( 15 Apr 2021 14:10 )    Color: Yellow / Appearance: SL Cloudy / S.025 / pH: x  Gluc: x / Ketone: NEGATIVE  / Bili: Negative / Urobili: 1.0 E.U./dL   Blood: x / Protein: 100 mg/dL / Nitrite: NEGATIVE   Leuk Esterase: NEGATIVE / RBC: Many /HPF / WBC < 5 /HPF   Sq Epi: x / Non Sq Epi: 5-10 /HPF / Bacteria: Present /HPF    RADIOLOGY  *CXr from  and Abd film from 4/15 reviewed with attending and fellow during AM bedside rounds    MEDICATIONS  (STANDING):  albuterol/ipratropium for Nebulization 3 milliLiter(s) Nebulizer every 4 hours  chlorhexidine 2% Cloths 1 Application(s) Topical daily  dexMEDEtomidine Infusion 0.4 MICROgram(s)/kG/Hr (12 mL/Hr) IV Continuous <Continuous> @ 1.5 mcg  dextrose 40% Gel 15 Gram(s) Oral once  dextrose 5% + sodium chloride 0.45%. 1000 milliLiter(s) (100 mL/Hr) IV Continuous <Continuous>  dextrose 5%. 1000 milliLiter(s) (50 mL/Hr) IV Continuous <Continuous>  dextrose 5%. 1000 milliLiter(s) (100 mL/Hr) IV Continuous <Continuous>  dextrose 50% Injectable 25 Gram(s) IV Push once  dextrose 50% Injectable 12.5 Gram(s) IV Push once  dextrose 50% Injectable 25 Gram(s) IV Push once  enoxaparin Injectable 40 milliGRAM(s) SubCutaneous every 12 hours  glucagon  Injectable 1 milliGRAM(s) IntraMuscular once  insulin glargine Injectable (LANTUS) 12 Unit(s) SubCutaneous at bedtime  insulin regular  human corrective regimen sliding scale   SubCutaneous every 6 hours  lactulose Syrup 10 Gram(s) Oral every 4 hours  metoclopramide Injectable 10 milliGRAM(s) IV Push once  norepinephrine Infusion 0.05 MICROgram(s)/kG/Min (11.3 mL/Hr) IV Continuous <Continuous> @ 0.26 mcg  pantoprazole  Injectable 40 milliGRAM(s) IV Push daily  piperacillin/tazobactam IVPB.. 4.5 Gram(s) IV Intermittent every 6 hours  polyethylene glycol 3350 17 Gram(s) Oral every 12 hours  propofol Infusion 10 MICROgram(s)/kG/Min (7.2 mL/Hr) IV Continuous <Continuous> @ 25 mcg  QUEtiapine 75 milliGRAM(s) Oral every 24 hours  QUEtiapine 100 milliGRAM(s) Oral every 24 hours  senna 2 Tablet(s) Oral at bedtime    MEDICATIONS  (PRN):  acetaminophen    Suspension .. 650 milliGRAM(s) Oral every 6 hours PRN Temp greater or equal to 38C (100.4F)  sodium chloride 0.9% lock flush 10 milliLiter(s) IV Push every 1 hour PRN Pre/post blood products, medications, blood draw, and to maintain line patency    Physical Exam  GENERAL: obese, intubated and sedated, pt. seen moving upper extremtities intermittently to no command  SKIN/HAIR/NAILS: Skin cool and clammy. Nails without clubbing or cyanosis. CR<2 secs. No lesions, rash, petechiae, erythema or ecchymoses. Anicteric.   HEAD AND NECK: The skull is NC/AT. B/L Sclera white. 2 mm PERRL. Nasal mucous membrane dry with no erythema; yellow bile-like drainage noted from nostrils. NGT in place. Trachea midline, neck supple.   THORAX/LUNGS: Thorax is symmetric with poor expansion 2/2 tachypnea, assisted by mechanical ventilation. Lung sounds with rhonchi throughout R>L.   CARDIOVASCULAR: No JVD. Carotid upstrokes are brisk, without bruits. +S1 and S2, RRR; no extra heart sounds or M/R/G.  ABDOMEN/: Abdomen is distended with hypoactive BS in all 4 quadrants; hypertympanic; it is soft, no palpable masses but patient grimaces to palpation on upper qudrants; last BM 4/15 very large as per nursing report. Alas catheter in place.   PERIPHERAL VASCULAR: Extremities are cool, radial and dorsalis pedis pulses +1 and symmetric. +1 generalized and dependent edema noted. No clubbing, no cyanosis.  MUSCULOSKELETAL: active ROM moderately impaired on upper extremities 2/2 sedation. No evidence of swelling or deformity.  NEUROLOGICAL: Patient not able to follow simple commands, wrist restraints in place to prevent patient harm; moderately sedated and intubated.  LINES (DATES): R IJ TLC , R  radial art line , IVL    Assessment and Plan:   Patient is a 55 yo M with PMHx of MU and gout who presented to ED with c/o progressively worsening SOB admitted to ICU for management of AHRF in the setting of COVID.     NEURO  #Sedated   -propofol and fentanyl and precedex, goal to (0 to -1)  -Will increase precedex to come down on other drips, planning for CPAP trial today  -Seroquel regimen avoid delirium, will increase as needed, continue 75 daytime and 100 nighttime started   -Neuro checks per ICU protocol, pain/sedation meds assessed and addressed  -Continue with wrist restraints to prevent patient harm    RESPIRATORY  #Acute hypoxic hypercapnic respiratory failure in the setting of COVID PNA  -Patient initially presented with O2 saturation of 14% and cyanotic requiring immediate intubation  -CXr with diffuse bilateral opacities with + COVID19 PCR  -Elevated inflammatory markers  -On AC/VC mechanical Ventilation:  Mode: AC/ CMV; RR: 24; TV: 450; FiO2: 40; PEEP: 5  -Will attempt CPAP trials for potential extubation again today    #COVID  -CXr with diffuse bilateral opacities with + COVID19 PCR  -Start Date  Remdesivir and Decadron 6 mg (x 10 day course)  -Decadron d/c'd after  AM dose due to current clinical presentation  -Remdesevir course given - per chart review (was briefly d/c'd due to renal fxn worsening but was REORDERED and completed)    #MU  -As per patient's wife, patient has MU on CPAP  -Patient sleeps with 2-3 pillows at night, attributed to MU    CARDIOVASCULAR  #RV dilation  -Bedside POCUS demonstrating dilated RV and mildly enlarged pulm artery possibly 2/2 MU vs PE (unlikely)  -Echo: Severe hypokinesis of the entire anteroseptum/inferoseptum. Akinesis of the mid to apical anterolateral region, apical anterior, apical inferior and true apex. Mild hypokinesis of the basal anterolateral, basal anterior and basal inferior regions. The estimated left ventricular systolic function is 15%. (repeat  55%). The right ventricle is mildly dilated. Right ventricular systolic function is mildly reduced. No pericardial effusion is seen.  -Based on these findings, cardiology was consulted  -As per spouse, no hx of cardiac disease and no change in ADLs or activity tolerance prior to current presentation  -Holding off on diuretics as per primary team    #LV Dysfunction  As per Cardiology:  -Echo reviewed. Images were poor due to body habitus but suggest a stress cardiomyopathy pattern (takotsubo cardiomyopathy) rather than an ischemic event   -EKG reviewed showing NSR, with non specific ST changes and poor R wave progression in the precordial leads likely due to body habitus  -Clinically patient is warm and well perfused and making urine.  -Patient's LV dysfunction appears to be multifactorial COVID, Morbid Obesity, Sleep Apnea. Myocarditis is also a possibility; now exacerbated in setting of COVID PNA  -Unfortunately patient is unable to undergo further ischemic or confirmatory imaging/workup  -Hold off BB or ACE/ARB therapy while on pressors with Renal impairment  -EKG with RBBB, without ischemic changes  -Please keep K>4 and Mg>2  -Repeat ECHO  with EF 55%    GI  #Constipation:  -Still no BM since  per report, even on bowel regimen, wet read 4/15 xray with abd xray showing dilated loops of bowel with no apparent fluid collection, s/p dulcolax suppository  w/out BM. 4/15 trialed methylnaltrexone to aid w/ BM, with resultant BM- will continue to monitor  -Protonix IVP for GI ppx  -Tube feeds    RENAL//F&E  #Acute Kidney Injury- RESOLVING  -SCr peaked in [place] 3.16 likely 2/2 alas obstruction  -Trending down from mid 2's over last few days => => 1.52 => 1.69 4/15, will monitor; pt w/ fever likely has lots of insensible losses, will obtain and f/u urine lytes  -Alas catheter changed   -Baseline creatinine unknown, no reported kidney disease  -Electrolytes stable  -Strict I&O monitoring  -Avoid nephrotoxic medications    #Hypernatremia- RESOLVED  -Na 147 => 145 4/15 after  put on D5 at 70cc/hr for 20 hr starting 2 pm  -Continue to monitor    ID  #?Aspiration pneumonia  -Found  while trying to extubate w/ delirium, and numerous thick secretions in oral cavity requiring suctioning. 4/15 AM found w/ 102.3F oral temp and WBC increase to 18s, suspect possible aspiration pneumonia, pt is still on zosyn, with previous negative MRSA swab.  bcx NGTD, 4/15 CXR looks similar, if not mildly improved, but not great insp effort, so can't really assess for opac; lines were already changed recently; Will obtain two more bcx sets 4/15, ucx, continue zosyn    #COVID PNA   -Plan as above  -Remains on steroid rx, remdesivir course completed    #Pneumonia:  -Zosyn started 4/9 x 7-10 day course, last day scheduled as  but will have to reassess given possible aspiration event     #Fungal infxn- RESOLVING  -Seen by ID, -ID ok'd Vori (loading doses x 2 given, now on 400 mg q12hr), stopped  given improvement in pt status before medication was really started   -Daily EKG for QTc monitoring  -fungitell 288 and galactomannon, aspergillus Ab  -BAL sputum samples taken from bronch , showing yeast   -Peripheral blood cultures ordered x 2; one  grew coag - staph, likely contaminant  -BAL results showing candidal dublinensis- per ID, colonizer, will not require voriconazole much longer as likely just 2/2 colonizer, may not need    -Central line changed   -Maintain MAP > 65 mmHg    #Leukocytosis  -See ID above, continue to monitor    ENDOCRINE  #Hyperglycemia  -q6hr correctional scale ordered, will adjust as needed, may need premeal  -Lantus 12 units, regular insulin 2 units as per order  -Blood glucose trend   -Decadron started 4/5 AM, d/c'd   -A1c 6.2    HEME  #Anemia  -Hgb 11 on admission; unknown baseline. No evidence of bleed.  -Hgb today 9.7  -Trend CBC  -Maintain active T+S  -Transfuse for Hb <7    -DVT ppx with Lovenox BID    DISPO/GOALS OF CARE:  Patient requires continuous monitoring with bedside rhythm monitoring, arterial line, pulse oximetry, ventilator monitoring and intermittent blood gas analysis. Care plan discussed with ICU care team. Patient remains critical at this time.         SUBJECTIVE  Overnight events: patient remains febrile, tachypneic, now hypotensive requiring Levophed. FiO2 increased to 80%.    REVIEW OF SYSTEMS:   See HPI (as per chart review), unable to obtain 2/2 ETT and sedation    OBJECTIVE  Vital Signs Last 24 Hrs  T(C): 37.1 (2021 06:27), Max: 40.3 (15 Apr 2021 12:00)  T(F): 98.8 (2021 06:27), Max: 104.5 (15 Apr 2021 12:00)  HR: 76 (2021 07:54) (53 - 76)  BP: 150/70 (2021 07:00) (79/51 - 150/70)  BP(mean): 101 (2021 07:00) (60 - 102)  RR: 43 (2021 07:54) (29 - 44)  SpO2: 100% (2021 07:54) (80% - 100%)    Mode: AC/ CMV (Assist Control/ Continuous Mandatory Ventilation), RR (machine): 24, TV (machine): 450, FiO2: 80, PEEP: 5, ITime: 0.9, MAP: 13, PIP: 21    I&O's Detail  15 Apr 2021 07:01  -  2021 07:00  --------------------------------------------------------  IN:    Dexmedetomidine: 840 mL    dextrose 5%: 100 mL    FentaNYL: 36 mL    IV PiggyBack: 200 mL    Norepinephrine: 120 mL    Propofol: 259.2 mL  Total IN: 1555.2 mL    OUT:    Indwelling Catheter - Urethral (mL): 1365 mL  Total OUT: 1365 mL    LABS:                        9.4    21.11 )-----------( 345      ( 2021 10:15 )             32.2     04-16    143  |  108  |  52<H>  ----------------------------<  170<H>  4.0   |  17<L>  |  2.12<H>    Ca    8.1<L>      2021 06:37  Phos  5.3     04-16  Mg     1.8     04-16    TPro  6.3  /  Alb  2.2<L>  /  TBili  0.7  /  DBili  x   /  AST  43<H>  /  ALT  55<H>  /  AlkPhos  91  -    ABG - ( 2021 06:46 )  pH, Arterial: 7.38  pH, Blood: x     /  pCO2: 32    /  pO2: 73    / HCO3: 19    / Base Excess: -5.7  /  SaO2: 94      Lactate 1.6    D-dimer 936  CRP and ferritin pending    *Microbiology  Culture - Blood (collected 15 Apr 2021 15:34)  Source: .Blood Blood  Preliminary Report (2021 04:00):    No growth at 12 hours    Culture - Blood (collected 15 Apr 2021 15:34)  Source: .Blood Blood  Preliminary Report (2021 04:00):    No growth at 12 hours    Culture - Blood (collected 2021 09:46)  Source: .Blood Blood  Preliminary Report (15 Apr 2021 10:01):    No growth at 1 day.    Urinalysis Basic - ( 15 Apr 2021 14:10 )    Color: Yellow / Appearance: SL Cloudy / S.025 / pH: x  Gluc: x / Ketone: NEGATIVE  / Bili: Negative / Urobili: 1.0 E.U./dL   Blood: x / Protein: 100 mg/dL / Nitrite: NEGATIVE   Leuk Esterase: NEGATIVE / RBC: Many /HPF / WBC < 5 /HPF   Sq Epi: x / Non Sq Epi: 5-10 /HPF / Bacteria: Present /HPF    RADIOLOGY  *CXr from  and Abd film from 4/15 reviewed with attending and fellow during AM bedside rounds    MEDICATIONS  (STANDING):  albuterol/ipratropium for Nebulization 3 milliLiter(s) Nebulizer every 4 hours  chlorhexidine 2% Cloths 1 Application(s) Topical daily  dexMEDEtomidine Infusion 0.4 MICROgram(s)/kG/Hr (12 mL/Hr) IV Continuous <Continuous> @ 1.5 mcg  dextrose 40% Gel 15 Gram(s) Oral once  dextrose 5% + sodium chloride 0.45%. 1000 milliLiter(s) (100 mL/Hr) IV Continuous <Continuous>  dextrose 5%. 1000 milliLiter(s) (50 mL/Hr) IV Continuous <Continuous>  dextrose 5%. 1000 milliLiter(s) (100 mL/Hr) IV Continuous <Continuous>  dextrose 50% Injectable 25 Gram(s) IV Push once  dextrose 50% Injectable 12.5 Gram(s) IV Push once  dextrose 50% Injectable 25 Gram(s) IV Push once  enoxaparin Injectable 40 milliGRAM(s) SubCutaneous every 12 hours  glucagon  Injectable 1 milliGRAM(s) IntraMuscular once  insulin glargine Injectable (LANTUS) 12 Unit(s) SubCutaneous at bedtime  insulin regular  human corrective regimen sliding scale   SubCutaneous every 6 hours  lactulose Syrup 10 Gram(s) Oral every 4 hours  metoclopramide Injectable 10 milliGRAM(s) IV Push once  norepinephrine Infusion 0.05 MICROgram(s)/kG/Min (11.3 mL/Hr) IV Continuous <Continuous> @ 0.26 mcg  pantoprazole  Injectable 40 milliGRAM(s) IV Push daily  piperacillin/tazobactam IVPB.. 4.5 Gram(s) IV Intermittent every 6 hours  polyethylene glycol 3350 17 Gram(s) Oral every 12 hours  propofol Infusion 10 MICROgram(s)/kG/Min (7.2 mL/Hr) IV Continuous <Continuous> @ 25 mcg  QUEtiapine 75 milliGRAM(s) Oral every 24 hours  QUEtiapine 100 milliGRAM(s) Oral every 24 hours  senna 2 Tablet(s) Oral at bedtime    MEDICATIONS  (PRN):  acetaminophen    Suspension .. 650 milliGRAM(s) Oral every 6 hours PRN Temp greater or equal to 38C (100.4F)  sodium chloride 0.9% lock flush 10 milliLiter(s) IV Push every 1 hour PRN Pre/post blood products, medications, blood draw, and to maintain line patency    Physical Exam  GENERAL: obese, intubated and sedated, pt. seen moving upper extremtities intermittently to no command  SKIN/HAIR/NAILS: Skin cool and clammy. Nails without clubbing or cyanosis. CR<2 secs. No lesions, rash, petechiae, erythema or ecchymoses. Anicteric.   HEAD AND NECK: The skull is NC/AT. B/L Sclera white. 2 mm PERRL. Nasal mucous membrane dry with no erythema; yellow bile-like drainage noted from nostrils. NGT in place. Trachea midline, neck supple.   THORAX/LUNGS: Thorax is symmetric with poor expansion 2/2 tachypnea, assisted by mechanical ventilation. Lung sounds with rhonchi throughout R>L.   CARDIOVASCULAR: No JVD. Carotid upstrokes are brisk, without bruits. +S1 and S2, RRR; no extra heart sounds or M/R/G.  ABDOMEN/: Abdomen is distended with hypoactive BS in all 4 quadrants; hypertympanic; it is soft, no palpable masses but patient grimaces to palpation on upper qudrants; last BM 4/15 very large as per nursing report. Alas catheter in place.   PERIPHERAL VASCULAR: Extremities are cool, radial and dorsalis pedis pulses +1 and symmetric. +1 generalized and dependent edema noted. No clubbing, no cyanosis.  MUSCULOSKELETAL: active ROM moderately impaired on upper extremities 2/2 sedation. No evidence of swelling or deformity.  NEUROLOGICAL: Patient not able to follow simple commands, wrist restraints in place to prevent patient harm; moderately sedated and intubated.  LINES (DATES): R IJ TLC , R  radial art line , IVL    Assessment and Plan:   Patient is a 55 yo M with PMHx of MU and gout who presented to ED with c/o progressively worsening SOB admitted to ICU for management of AHRF in the setting of COVID.     NEURO  #Sedated   -propofol and precedex infusing at this time, goal RASS @ -2 to -3  -Patient not synchronous with vent, OK to increase propofol gtt   -Versed will be added to sedation regimen   -Seroquel regimen on board  -Neuro checks per ICU protocol, pain/sedation meds assessed and addressed  -Continue with wrist restraints to prevent patient harm    RESPIRATORY  #Acute hypoxic hypercapnic respiratory failure in the setting of COVID PNA  -Patient initially presented with O2 saturation of 14% and cyanotic requiring immediate intubation  -CXr with diffuse bilateral opacities with + COVID19 PCR  -Elevated inflammatory markers  -On AC/VC mechanical Ventilation:  Mode: AC/ CMV; RR: 24; TV: 450; FiO2: 80; PEEP: 5    #COVID  -CXr with diffuse bilateral opacities with + COVID19 PCR  -Start Date  Remdesivir and Decadron 6 mg (x 10 day course)  -Decadron d/c'd after 4/13 AM dose due to current clinical presentation  -Remdesevir course given - per chart review (was briefly d/c'd due to renal fxn worsening but was REORDERED and completed)    #MU  -As per patient's wife, patient has MU on CPAP  -Patient sleeps with 2-3 pillows at night, attributed to MU    CARDIOVASCULAR  #RV dilation  -Bedside POCUS demonstrating dilated RV and mildly enlarged pulm artery possibly 2/2 MU vs PE (unlikely)  -Echo: Severe hypokinesis of the entire anteroseptum/inferoseptum. Akinesis of the mid to apical anterolateral region, apical anterior, apical inferior and true apex. Mild hypokinesis of the basal anterolateral, basal anterior and basal inferior regions. The estimated left ventricular systolic function is 15%. (repeat  55%). The right ventricle is mildly dilated. Right ventricular systolic function is mildly reduced. No pericardial effusion is seen.  -Based on these findings, cardiology was consulted  -As per spouse, no hx of cardiac disease and no change in ADLs or activity tolerance prior to current presentation  -Holding off on diuretics as per primary team    #LV Dysfunction  As per Cardiology:  -Echo reviewed. Images were poor due to body habitus but suggest a stress cardiomyopathy pattern (takotsubo cardiomyopathy) rather than an ischemic event   -EKG reviewed showing NSR, with non specific ST changes and poor R wave progression in the precordial leads likely due to body habitus  -Clinically patient is warm and well perfused and making urine.  -Patient's LV dysfunction appears to be multifactorial COVID, Morbid Obesity, Sleep Apnea. Myocarditis is also a possibility; now exacerbated in setting of COVID PNA  -Unfortunately patient is unable to undergo further ischemic or confirmatory imaging/workup  -Hold off BB or ACE/ARB therapy while on pressors with Renal impairment  -EKG with RBBB, without ischemic changes  -Please keep K>4 and Mg>2  -Repeat ECHO : There is mild concentric left ventricular hypertrophy. The left ventricle is normal in size and systolic function with a calculated ejection fraction of 55%.  Right Ventricle: The right ventricle is normal in size. Right ventricular systolic function is normal. Pericardium: No pericardial effusion is seen.    GI  #Abdominal Distention  -Patient received methylnatrexone yesterday and had very large BM as per nursing report  -Patient with hypoactive BS and   -Still no BM since  per report, even on bowel regimen, wet read 4/15 xray with abd xray showing dilated loops of bowel with no apparent fluid collection, s/p dulcolax suppository  w/out BM. 4/15 trialed methylnaltrexone to aid w/ BM, with resultant BM- will continue to monitor  -Protonix IVP for GI ppx  -Tube feeds    RENAL//F&E  #Acute Kidney Injury- RESOLVING  -SCr peaked in [place] 3.16 likely 2/2 alas obstruction  -Trending down from mid 2's over last few days => => 1.52 => 1.69 4/15, will monitor; pt w/ fever likely has lots of insensible losses, will obtain and f/u urine lytes  -Alas catheter changed   -Baseline creatinine unknown, no reported kidney disease  -Electrolytes stable  -Strict I&O monitoring  -Avoid nephrotoxic medications    #Hypernatremia- RESOLVED  -Na 147 => 145 4/15 after  put on D5 at 70cc/hr for 20 hr starting 2 pm  -Continue to monitor    ID  #?Aspiration pneumonia  -Found  while trying to extubate w/ delirium, and numerous thick secretions in oral cavity requiring suctioning. 4/15 AM found w/ 102.3F oral temp and WBC increase to 18s, suspect possible aspiration pneumonia, pt is still on zosyn, with previous negative MRSA swab.  bcx NGTD, 4/15 CXR looks similar, if not mildly improved, but not great insp effort, so can't really assess for opac; lines were already changed recently; Will obtain two more bcx sets 4/15, ucx, continue zosyn    #COVID PNA   -Plan as above  -Remains on steroid rx, remdesivir course completed    #Pneumonia:  -Zosyn started 4/9 x 7-10 day course, last day scheduled as  but will have to reassess given possible aspiration event     #Fungal infxn- RESOLVING  -Seen by ID, -ID ok'd Vori (loading doses x 2 given, now on 400 mg q12hr), stopped  given improvement in pt status before medication was really started   -Daily EKG for QTc monitoring  -fungitell 288 and galactomannon, aspergillus Ab  -BAL sputum samples taken from bronch , showing yeast   -Peripheral blood cultures ordered x 2; one  grew coag - staph, likely contaminant  -BAL results showing candidal dublinensis- per ID, colonizer, will not require voriconazole much longer as likely just 2/2 colonizer, may not need    -Central line changed   -Maintain MAP > 65 mmHg    #Leukocytosis  -See ID above, continue to monitor    ENDOCRINE  #Hyperglycemia  -q6hr correctional scale ordered, will adjust as needed, may need premeal  -Lantus 12 units, regular insulin 2 units as per order  -Blood glucose trend   -Decadron started 4/5 AM, d/c'd   -A1c 6.2    HEME  #Anemia  -Hgb 11 on admission; unknown baseline. No evidence of bleed.  -Hgb today 9.7  -Trend CBC  -Maintain active T+S  -Transfuse for Hb <7    -DVT ppx with Lovenox BID    DISPO/GOALS OF CARE:  Patient requires continuous monitoring with bedside rhythm monitoring, arterial line, pulse oximetry, ventilator monitoring and intermittent blood gas analysis. Care plan discussed with ICU care team. Patient remains critical at this time.         SUBJECTIVE  Overnight events: patient remains febrile, tachypneic, now hypotensive requiring Levophed. FiO2 increased to 80%. Multiple emesis episodes overnight as per nursing team.    REVIEW OF SYSTEMS:   See HPI (as per chart review), unable to obtain 2/2 ETT and sedation    OBJECTIVE  Vital Signs Last 24 Hrs  T(C): 37.1 (2021 06:27), Max: 40.3 (15 Apr 2021 12:00)  T(F): 98.8 (2021 06:27), Max: 104.5 (15 Apr 2021 12:00)  HR: 76 (2021 07:54) (53 - 76)  BP: 150/70 (2021 07:00) (79/51 - 150/70)  BP(mean): 101 (2021 07:00) (60 - 102)  RR: 43 (2021 07:54) (29 - 44)  SpO2: 100% (2021 07:54) (80% - 100%)    Mode: AC/ CMV (Assist Control/ Continuous Mandatory Ventilation), RR (machine): 24, TV (machine): 450, FiO2: 80, PEEP: 5, ITime: 0.9, MAP: 13, PIP: 21    I&O's Detail  15 Apr 2021 07:01  -  2021 07:00  --------------------------------------------------------  IN:    Dexmedetomidine: 840 mL    dextrose 5%: 100 mL    FentaNYL: 36 mL    IV PiggyBack: 200 mL    Norepinephrine: 120 mL    Propofol: 259.2 mL  Total IN: 1555.2 mL    OUT:    Indwelling Catheter - Urethral (mL): 1365 mL  Total OUT: 1365 mL    LABS:                        9.4    21.11 )-----------( 345      ( 2021 10:15 )             32.2     04-16    143  |  108  |  52<H>  ----------------------------<  170<H>  4.0   |  17<L>  |  2.12<H>    Ca    8.1<L>      2021 06:37  Phos  5.3     04-16  Mg     1.8     -16    TPro  6.3  /  Alb  2.2<L>  /  TBili  0.7  /  DBili  x   /  AST  43<H>  /  ALT  55<H>  /  AlkPhos  91  -16    ABG - ( 2021 06:46 )  pH, Arterial: 7.38  pH, Blood: x     /  pCO2: 32    /  pO2: 73    / HCO3: 19    / Base Excess: -5.7  /  SaO2: 94      Lactate 1.6    D-dimer 936  CRP and ferritin pending    *Microbiology  Culture - Blood (collected 15 Apr 2021 15:34)  Source: .Blood Blood  Preliminary Report (2021 04:00):    No growth at 12 hours    Culture - Blood (collected 15 Apr 2021 15:34)  Source: .Blood Blood  Preliminary Report (2021 04:00):    No growth at 12 hours    Culture - Blood (collected 2021 09:46)  Source: .Blood Blood  Preliminary Report (15 Apr 2021 10:01):    No growth at 1 day.    Urinalysis Basic - ( 15 Apr 2021 14:10 )    Color: Yellow / Appearance: SL Cloudy / S.025 / pH: x  Gluc: x / Ketone: NEGATIVE  / Bili: Negative / Urobili: 1.0 E.U./dL   Blood: x / Protein: 100 mg/dL / Nitrite: NEGATIVE   Leuk Esterase: NEGATIVE / RBC: Many /HPF / WBC < 5 /HPF   Sq Epi: x / Non Sq Epi: 5-10 /HPF / Bacteria: Present /HPF    RADIOLOGY  *CXr from  and Abd film from 4/15 reviewed with attending and fellow during AM bedside rounds    MEDICATIONS  (STANDING):  albuterol/ipratropium for Nebulization 3 milliLiter(s) Nebulizer every 4 hours  chlorhexidine 2% Cloths 1 Application(s) Topical daily  dexMEDEtomidine Infusion 0.4 MICROgram(s)/kG/Hr (12 mL/Hr) IV Continuous <Continuous> @ 1.5 mcg  dextrose 40% Gel 15 Gram(s) Oral once  dextrose 5% + sodium chloride 0.45%. 1000 milliLiter(s) (100 mL/Hr) IV Continuous <Continuous>  dextrose 5%. 1000 milliLiter(s) (50 mL/Hr) IV Continuous <Continuous>  dextrose 5%. 1000 milliLiter(s) (100 mL/Hr) IV Continuous <Continuous>  dextrose 50% Injectable 25 Gram(s) IV Push once  dextrose 50% Injectable 12.5 Gram(s) IV Push once  dextrose 50% Injectable 25 Gram(s) IV Push once  enoxaparin Injectable 40 milliGRAM(s) SubCutaneous every 12 hours  glucagon  Injectable 1 milliGRAM(s) IntraMuscular once  insulin glargine Injectable (LANTUS) 12 Unit(s) SubCutaneous at bedtime  insulin regular  human corrective regimen sliding scale   SubCutaneous every 6 hours  lactulose Syrup 10 Gram(s) Oral every 4 hours  metoclopramide Injectable 10 milliGRAM(s) IV Push once  norepinephrine Infusion 0.05 MICROgram(s)/kG/Min (11.3 mL/Hr) IV Continuous <Continuous> @ 0.26 mcg  pantoprazole  Injectable 40 milliGRAM(s) IV Push daily  piperacillin/tazobactam IVPB.. 4.5 Gram(s) IV Intermittent every 6 hours  polyethylene glycol 3350 17 Gram(s) Oral every 12 hours  propofol Infusion 10 MICROgram(s)/kG/Min (7.2 mL/Hr) IV Continuous <Continuous> @ 25 mcg  QUEtiapine 75 milliGRAM(s) Oral every 24 hours  QUEtiapine 100 milliGRAM(s) Oral every 24 hours  senna 2 Tablet(s) Oral at bedtime    MEDICATIONS  (PRN):  acetaminophen    Suspension .. 650 milliGRAM(s) Oral every 6 hours PRN Temp greater or equal to 38C (100.4F)  sodium chloride 0.9% lock flush 10 milliLiter(s) IV Push every 1 hour PRN Pre/post blood products, medications, blood draw, and to maintain line patency    Physical Exam  GENERAL: obese, intubated and sedated, pt. seen moving upper extremtities intermittently to no command  SKIN/HAIR/NAILS: Skin cool and clammy. Nails without clubbing or cyanosis. CR<2 secs. No lesions, rash, petechiae, erythema or ecchymoses. Anicteric.   HEAD AND NECK: The skull is NC/AT. B/L Sclera white. 2 mm PERRL. Nasal mucous membrane dry with no erythema; yellow bile-like drainage noted from nostrils. NGT in place. Trachea midline, neck supple.   THORAX/LUNGS: Thorax is symmetric with poor expansion 2/2 tachypnea, assisted by mechanical ventilation. Lung sounds with rhonchi throughout R>L.   CARDIOVASCULAR: No JVD. Carotid upstrokes are brisk, without bruits. +S1 and S2, RRR; no extra heart sounds or M/R/G.  ABDOMEN/: Abdomen is distended with hypoactive BS in all 4 quadrants; hypertympanic; it is soft, no palpable masses but patient grimaces to palpation on upper qudrants; last BM 4/15 very large as per nursing report. Alas catheter in place.   PERIPHERAL VASCULAR: Extremities are cool, radial and dorsalis pedis pulses +1 and symmetric. +1 generalized and dependent edema noted. No clubbing, no cyanosis.  MUSCULOSKELETAL: active ROM moderately impaired on upper extremities 2/2 sedation. No evidence of swelling or deformity.  NEUROLOGICAL: Patient not able to follow simple commands, wrist restraints in place to prevent patient harm; moderately sedated and intubated.  LINES (DATES): R IJ TLC , R  radial art line , IVL    Assessment and Plan:   Patient is a 57 yo M with PMHx of MU and gout who presented to ED with c/o progressively worsening SOB admitted to ICU for management of AHRF in the setting of COVID.     NEURO  #Sedated   -propofol and precedex infusing at this time, goal RASS @ -2 to -3  -Patient not synchronous with vent, OK to increase propofol gtt   -Versed will be added to sedation regimen   -Seroquel regimen on board  -Neuro checks per ICU protocol, pain/sedation meds assessed and addressed  -Continue with wrist restraints to prevent patient harm    RESPIRATORY  #Acute hypoxic hypercapnic respiratory failure in the setting of COVID PNA  -Patient initially presented with O2 saturation of 14% and cyanotic requiring immediate intubation  -CXr with diffuse bilateral opacities with + COVID19 PCR  -Elevated inflammatory markers  -On AC/VC mechanical Ventilation:  Mode: AC/ CMV; RR: 24; TV: 450; FiO2: 80; PEEP: 5    #COVID  -CXr with diffuse bilateral opacities with + COVID19 PCR  -Start Date  Remdesivir and Decadron 6 mg (x 10 day course)  -Decadron d/c'd after 413 AM dose due to current clinical presentation  -Remdesevir course given 4/5- per chart review (was briefly d/c'd due to renal fxn worsening but was REORDERED and completed)    #MU  -As per patient's wife, patient has MU on CPAP  -Patient sleeps with 2-3 pillows at night, attributed to MU    CARDIOVASCULAR  #RV dilation  -Bedside POCUS demonstrating dilated RV and mildly enlarged pulm artery possibly 2/2 MU vs PE (unlikely)  -Echo: Severe hypokinesis of the entire anteroseptum/inferoseptum. Akinesis of the mid to apical anterolateral region, apical anterior, apical inferior and true apex. Mild hypokinesis of the basal anterolateral, basal anterior and basal inferior regions. The estimated left ventricular systolic function is 15%. (repeat  55%). The right ventricle is mildly dilated. Right ventricular systolic function is mildly reduced. No pericardial effusion is seen.  -Based on these findings, cardiology was consulted  -As per spouse, no hx of cardiac disease and no change in ADLs or activity tolerance prior to current presentation  -Holding off on diuretics as per primary team    #LV Dysfunction  As per Cardiology:  -Echo reviewed. Images were poor due to body habitus but suggest a stress cardiomyopathy pattern (takotsubo cardiomyopathy) rather than an ischemic event   -EKG reviewed showing NSR, with non specific ST changes and poor R wave progression in the precordial leads likely due to body habitus  -Clinically patient is warm and well perfused and making urine.  -Patient's LV dysfunction appears to be multifactorial COVID, Morbid Obesity, Sleep Apnea. Myocarditis is also a possibility; now exacerbated in setting of COVID PNA  -Unfortunately patient is unable to undergo further ischemic or confirmatory imaging/workup  -Hold off BB or ACE/ARB therapy while on pressors with Renal impairment  -EKG with RBBB, without ischemic changes  -Please keep K>4 and Mg>2  -Repeat ECHO : There is mild concentric left ventricular hypertrophy. The left ventricle is normal in size and systolic function with a calculated ejection fraction of 55%.  Right Ventricle: The right ventricle is normal in size. Right ventricular systolic function is normal. Pericardium: No pericardial effusion is seen.    GI  #Abdominal Distention  -Patient received methylnatrexone yesterday and had very large BM as per nursing report  -Patient with hypoactive BS and abdominal tenderness  -Tube feedings on hold  -Lavina sump for decompression    RENAL//F&E  #Acute Kidney Injury- RESOLVING  -SCr peaked in course to 3.16 likely 2/2 alas obstruction  -SCr today 2.1 up from 1.69 yesterday  -Will obtain repeat labs at 1400 as patient is febrile,  likely has lots of insensible losses  -1Liter LR bolus  -Alas catheter changed   -Baseline creatinine unknown, no reported kidney disease  -Electrolytes stable  -Strict I&O monitoring  -Avoid nephrotoxic medications    ID  #Aspiration pneumonia vs HAP  -Found  while trying to extubate w/ delirium, and numerous thick secretions in oral cavity requiring suctioning. 4/15 AM found w/ 102.3F oral temp and WBC increase to 18s, suspect possible aspiration pneumonia, pt. is still on zosyn, with previous negative MRSA swab.  bcx NGTD,  4/15, blood cultures sent, UA sent, continue zosyn  -CXr  from today worsening especially on right upper lobe  -Patient with episodes of emesis overnight  -Patient remains febrile to 102.3 during my exam, tachypneic and hypotensive requiring high dose Levo overnight  -Zosyn started  and d/c'd   -Decision made to broaden antibiotics to meropenem 1 gram and vancomycin 1250 mg  -Repeat sputum cx ordered   -Repeat MRSA swab ordered    #COVID PNA   -Plan as above  -Off steroid course, remdesivir course completed    #?Fungal Infxn  -Seen by ID, -ID ok'd Vori (loading doses x 2 given, now on 400 mg q12hr), stopped  given improvement in pt. status before medication was really started   -As per ID recs ====> s/p BAL --cultures have so far only yielded Keila dubliniensis which is probably a respiratory tract colonizer (and not the etiology of the patient's fevers). Voriconazole is not indicated for respiratory tract colonization with Candida. Suspect recent high fevers/leukocytosis due to aspiration event; Doubt COVID-19 associated pulmonary aspergillosis given BAL cultures without growth of mold, absence of cavitation on CXR, and time course relatively early for development of this rare entity. Moreover, would not attribute new fevers to discontinuation of voriconazole.  -Daily EKG for QTc monitoring  -BAL sputum samples taken from bronch , showing yeast   -Peripheral blood cultures from 4/15 no growth at 12 hrs  -Central line changed   -Maintain MAP > 65 mmHg    #Leukocytosis  -See ID above, continue to monitor    ENDOCRINE  #Hyperglycemia  -q6hr correctional scale ordered, will adjust as needed as patient is NPO  -Lantus 12 units, regular insulin 2 units as per order  -Blood glucose trend   -Decadron started 4/5 AM, d/c'd   -A1c 6.2    HEME  #Anemia  -Hgb 11 on admission; unknown baseline. No evidence of bleed.  -Hgb today 9.4  -Trend CBC  -Maintain active T+S  -Transfuse for Hb <7    -DVT ppx with Lovenox BID  -Changed to heparin SC due to rising SCr.    DISPO/GOALS OF CARE:  Patient requires continuous monitoring with bedside rhythm monitoring, arterial line, pulse oximetry, ventilator monitoring and intermittent blood gas analysis. Care plan discussed with ICU care team. Patient remains critical at this time. Will update Wife.

## 2021-04-16 NOTE — CHART NOTE - NSCHARTNOTEFT_GEN_A_CORE
Admitting Diagnosis:   Patient is a 56y old  Male who presents with a chief complaint of SOB (16 Apr 2021 14:51)      PAST MEDICAL & SURGICAL HISTORY:  Sleep apnea          Current Nutrition Order:   Was made NPO 2/2 vomiting (concern for ileus)    PO Intake: Good (%) [   ]  Fair (50-75%) [   ] Poor (<25%) [   ] - N/A    GI Issues:   +BM 4/16  Ordered for miralax, senna, protonix  Had an episode of emesis now concerned for ileus; reglan added on    Pain:  KLYEE 2/2 intubated/sedated    Skin Integrity:  Jagdeep 11  R cheek PU  Intact      Labs:   04-16    143  |  108  |  52<H>  ----------------------------<  170<H>  4.0   |  17<L>  |  2.12<H>    Ca    8.1<L>      16 Apr 2021 06:37  Phos  5.3     04-16  Mg     1.8     04-16    TPro  6.3  /  Alb  2.2<L>  /  TBili  0.7  /  DBili  x   /  AST  43<H>  /  ALT  55<H>  /  AlkPhos  91  04-16    CAPILLARY BLOOD GLUCOSE      POCT Blood Glucose.: 194 mg/dL (16 Apr 2021 12:01)  POCT Blood Glucose.: 151 mg/dL (16 Apr 2021 06:20)  POCT Blood Glucose.: 156 mg/dL (15 Apr 2021 23:59)  POCT Blood Glucose.: 181 mg/dL (15 Apr 2021 16:30)      Medications:  MEDICATIONS  (STANDING):  albuterol/ipratropium for Nebulization 3 milliLiter(s) Nebulizer every 4 hours  chlorhexidine 0.12% Liquid 15 milliLiter(s) Oral Mucosa every 12 hours  chlorhexidine 2% Cloths 1 Application(s) Topical daily  dexMEDEtomidine Infusion 0.4 MICROgram(s)/kG/Hr (12 mL/Hr) IV Continuous <Continuous>  dextrose 40% Gel 15 Gram(s) Oral once  dextrose 5%. 1000 milliLiter(s) (50 mL/Hr) IV Continuous <Continuous>  dextrose 5%. 1000 milliLiter(s) (100 mL/Hr) IV Continuous <Continuous>  dextrose 50% Injectable 25 Gram(s) IV Push once  dextrose 50% Injectable 12.5 Gram(s) IV Push once  dextrose 50% Injectable 25 Gram(s) IV Push once  glucagon  Injectable 1 milliGRAM(s) IntraMuscular once  heparin   Injectable 7500 Unit(s) SubCutaneous every 8 hours  insulin glargine Injectable (LANTUS) 12 Unit(s) SubCutaneous at bedtime  insulin regular  human corrective regimen sliding scale   SubCutaneous every 6 hours  lactulose Syrup 10 Gram(s) Oral every 4 hours  meropenem  IVPB 1000 milliGRAM(s) IV Intermittent every 12 hours  midazolam Infusion 0.02 mG/kG/Hr (2.4 mL/Hr) IV Continuous <Continuous>  norepinephrine Infusion 0.05 MICROgram(s)/kG/Min (11.3 mL/Hr) IV Continuous <Continuous>  pantoprazole  Injectable 40 milliGRAM(s) IV Push daily  polyethylene glycol 3350 17 Gram(s) Oral every 12 hours  propofol Infusion 10 MICROgram(s)/kG/Min (7.2 mL/Hr) IV Continuous <Continuous>  QUEtiapine 75 milliGRAM(s) Oral every 24 hours  QUEtiapine 100 milliGRAM(s) Oral every 24 hours  senna 2 Tablet(s) Oral at bedtime    MEDICATIONS  (PRN):  acetaminophen    Suspension .. 650 milliGRAM(s) Oral every 6 hours PRN Temp greater or equal to 38C (100.4F)  sodium chloride 0.9% lock flush 10 milliLiter(s) IV Push every 1 hour PRN Pre/post blood products, medications, blood draw, and to maintain line patency        Admitted Anthropometrics:  Weight: 5'10"; ABW 120kg; IBW: 75.4kg; %IBW: 166%; BMI: 37    Weight Change:   KYLEE 2/2 intubated/sedated    Estimated energy needs:   IBW (75.4kg) used for calculations as pt is >100% of IBW and critically ill  Nutrient needs based on [facility] standards of care for maintenance in older adults.   Needs adjusted for age and hypermetabolic/inflammatory state 2/2 COVID+ and oxygen demands  Energy: 1885-2262kcal (25-30cal/kg)  Protein: 106-121g pro (1.4-1.6g/kg pro)  Fluids per team    Subjective:   Patient is a 55 yo M with PMHx of MU and gout who presented to ED with c/o progressively worsening SOB now admitted to ICU for management of AHRF in the setting of COVID    Unable to conduct a face to face interview or nutrition-focused physical exam due to limited contact restrictions related to the pt's medical condition and isolation precautions. Pt discussed with MICU team on rounds. Currently intubated on AC/CMV mode. Off paralytics. Sedated on precedex with propofol infusing @18ml/hr x24hrs (providing 475kcal from lipids/day). Fentanyl d/c'd out of concern for ileus. MAP 89- requiring levo for BP support. Tmax 104.5. Unable to obtain diet/weight history at this time 2/2 intubated/sedated. Notable labs: Phos 5.3, POCT , 156, 181, 196mg/dL, BUN 46, Cr 2.06, .. See EN recs below for current propofol rate. RD to follow.     Nutrition Diagnosis:  Increased nutrient needs RT increased demand for kcal/pro 2/2 hypermetabolic state AEB oxygen demands and viral infection COVID+  [  ] No active nutrition diagnosis at this time  [  ] Current medical condition precludes nutrition intervention    Goal: Pt to meet >/=75% EER consistently with good tolerance via most medically appropriate route.     Recommendations:  1. Once deemed medically appropriate please start trickle feeds of Vital 1.5    Will adjust rate pending propofol titration and any changes in renal function.   3. Additional free H2O per team. Monitor for s/s intolerance; maintain aspiration precautions at all times.     4.  Monitor lytes and replete prn.   5.  Pain and bowel regimens per team  6. Please obtain updated TG daily.   *d/w team.     Education: n/a    Risk Level: High [   x] Moderate [   ] Low [   ]. Admitting Diagnosis:   Patient is a 56y old  Male who presents with a chief complaint of SOB (16 Apr 2021 14:51)      PAST MEDICAL & SURGICAL HISTORY:  Sleep apnea          Current Nutrition Order:   Was made NPO 2/2 vomiting (concern for ileus)    PO Intake: Good (%) [   ]  Fair (50-75%) [   ] Poor (<25%) [   ] - N/A    GI Issues:   +BM 4/16  Ordered for miralax, senna, protonix  Had mutliple episodes emesis o/n now concerned for ileus; reglan added on  Salum sump placed for decompression this am.     Pain:  KYLEE 2/2 intubated/sedated    Skin Integrity:  Jagdeep 11  R cheek PU  Intact      Labs:   04-16    143  |  108  |  52<H>  ----------------------------<  170<H>  4.0   |  17<L>  |  2.12<H>    Ca    8.1<L>      16 Apr 2021 06:37  Phos  5.3     04-16  Mg     1.8     04-16    TPro  6.3  /  Alb  2.2<L>  /  TBili  0.7  /  DBili  x   /  AST  43<H>  /  ALT  55<H>  /  AlkPhos  91  04-16    CAPILLARY BLOOD GLUCOSE      POCT Blood Glucose.: 194 mg/dL (16 Apr 2021 12:01)  POCT Blood Glucose.: 151 mg/dL (16 Apr 2021 06:20)  POCT Blood Glucose.: 156 mg/dL (15 Apr 2021 23:59)  POCT Blood Glucose.: 181 mg/dL (15 Apr 2021 16:30)      Medications:  MEDICATIONS  (STANDING):  albuterol/ipratropium for Nebulization 3 milliLiter(s) Nebulizer every 4 hours  chlorhexidine 0.12% Liquid 15 milliLiter(s) Oral Mucosa every 12 hours  chlorhexidine 2% Cloths 1 Application(s) Topical daily  dexMEDEtomidine Infusion 0.4 MICROgram(s)/kG/Hr (12 mL/Hr) IV Continuous <Continuous>  dextrose 40% Gel 15 Gram(s) Oral once  dextrose 5%. 1000 milliLiter(s) (50 mL/Hr) IV Continuous <Continuous>  dextrose 5%. 1000 milliLiter(s) (100 mL/Hr) IV Continuous <Continuous>  dextrose 50% Injectable 25 Gram(s) IV Push once  dextrose 50% Injectable 12.5 Gram(s) IV Push once  dextrose 50% Injectable 25 Gram(s) IV Push once  glucagon  Injectable 1 milliGRAM(s) IntraMuscular once  heparin   Injectable 7500 Unit(s) SubCutaneous every 8 hours  insulin glargine Injectable (LANTUS) 12 Unit(s) SubCutaneous at bedtime  insulin regular  human corrective regimen sliding scale   SubCutaneous every 6 hours  lactulose Syrup 10 Gram(s) Oral every 4 hours  meropenem  IVPB 1000 milliGRAM(s) IV Intermittent every 12 hours  midazolam Infusion 0.02 mG/kG/Hr (2.4 mL/Hr) IV Continuous <Continuous>  norepinephrine Infusion 0.05 MICROgram(s)/kG/Min (11.3 mL/Hr) IV Continuous <Continuous>  pantoprazole  Injectable 40 milliGRAM(s) IV Push daily  polyethylene glycol 3350 17 Gram(s) Oral every 12 hours  propofol Infusion 10 MICROgram(s)/kG/Min (7.2 mL/Hr) IV Continuous <Continuous>  QUEtiapine 75 milliGRAM(s) Oral every 24 hours  QUEtiapine 100 milliGRAM(s) Oral every 24 hours  senna 2 Tablet(s) Oral at bedtime    MEDICATIONS  (PRN):  acetaminophen    Suspension .. 650 milliGRAM(s) Oral every 6 hours PRN Temp greater or equal to 38C (100.4F)  sodium chloride 0.9% lock flush 10 milliLiter(s) IV Push every 1 hour PRN Pre/post blood products, medications, blood draw, and to maintain line patency        Admitted Anthropometrics:  Weight: 5'10"; ABW 120kg; IBW: 75.4kg; %IBW: 166%; BMI: 37    Weight Change:   KYLEE 2/2 intubated/sedated    Estimated energy needs:   IBW (75.4kg) used for calculations as pt is >100% of IBW and critically ill  Nutrient needs based on [facility] standards of care for maintenance in older adults.   Needs adjusted for age and hypermetabolic/inflammatory state 2/2 COVID+ and oxygen demands  Energy: 1885-2262kcal (25-30cal/kg)  Protein: 106-121g pro (1.4-1.6g/kg pro)  Fluids per team    Subjective:   Patient is a 57 yo M with PMHx of MU and gout who presented to ED with c/o progressively worsening SOB now admitted to ICU for management of AHRF in the setting of COVID    Unable to conduct a face to face interview or nutrition-focused physical exam due to limited contact restrictions related to the pt's medical condition and isolation precautions. Pt discussed with MICU team on rounds. Currently intubated on AC/CMV mode. Off paralytics. Sedated on precedex with propofol infusing @18ml/hr x24hrs (providing 475kcal from lipids/day). Fentanyl d/c'd out of concern for ileus. MAP 89- requiring levo for BP support. Tmax 104.5. Unable to obtain diet/weight history at this time 2/2 intubated/sedated. Notable labs: Phos 5.3, POCT , 156, 181, 196mg/dL, BUN 46, Cr 2.06, .. See EN recs below for current propofol rate. RD to follow.     Nutrition Diagnosis:  Increased nutrient needs RT increased demand for kcal/pro 2/2 hypermetabolic state AEB oxygen demands and viral infection COVID+  [  ] No active nutrition diagnosis at this time  [  ] Current medical condition precludes nutrition intervention    Goal: Pt to meet >/=75% EER consistently with good tolerance via most medically appropriate route.     Recommendations:  1. Once deemed medically appropriate pending output from sump, please start trickle feeds of Vital 1.5    Will adjust rate pending propofol titration and any changes in renal function.   3. Additional free H2O per team. Monitor for s/s intolerance; maintain aspiration precautions at all times.     4.  Monitor lytes and replete prn.   5.  Pain and bowel regimens per team  6. Please obtain updated TG daily.   *d/w team.     Education: n/a    Risk Level: High [   x] Moderate [   ] Low [   ].

## 2021-04-16 NOTE — PROGRESS NOTE ADULT - ASSESSMENT
57 yo male with severe COVID-19 PNA; course c/b fever; s/p BAL 4/12--cultures have so far only yielded Keila dubliniensis which is probably a respiratory tract colonizer (and not the etiology of the patient's fevers). Current septic shock likely 2/2 aspiration PNA (event occurred night of 4/14). Repeat sputum culture and f/u bcx 4/15; MRSA nares PCR was negative 4/5; reasonable to broaden from Zosyn to meropenem in the context of presumed worsening sepsis. Agree with discontinuation of voriconazole--no evidence of IFI. Patient presently too unstable to go for CT of chest or abdomen/pelvis.    Dr. Wang to cover this evening through Sunday; I return Monday 4/19    ID Team 2

## 2021-04-17 LAB
-  5-FLUCYTOSINE: SIGNIFICANT CHANGE UP
-  AMPHOTERICIN B: SIGNIFICANT CHANGE UP
-  ANIDULAFUNGIN: SIGNIFICANT CHANGE UP
-  CASPOFUNGIN: SIGNIFICANT CHANGE UP
-  FLUCONAZOLE: SIGNIFICANT CHANGE UP
-  INTERPRETATION: SIGNIFICANT CHANGE UP
-  ITRACONAZOLE: SIGNIFICANT CHANGE UP
-  MICAFUNGIN: SIGNIFICANT CHANGE UP
-  POSACONAZOLE: SIGNIFICANT CHANGE UP
-  VORICONAZOLE: SIGNIFICANT CHANGE UP
ALBUMIN SERPL ELPH-MCNC: 1.9 G/DL — LOW (ref 3.3–5)
ALBUMIN SERPL ELPH-MCNC: 2 G/DL — LOW (ref 3.3–5)
ALP SERPL-CCNC: 76 U/L — SIGNIFICANT CHANGE UP (ref 40–120)
ALP SERPL-CCNC: 79 U/L — SIGNIFICANT CHANGE UP (ref 40–120)
ALT FLD-CCNC: 29 U/L — SIGNIFICANT CHANGE UP (ref 10–45)
ALT FLD-CCNC: 34 U/L — SIGNIFICANT CHANGE UP (ref 10–45)
ANION GAP SERPL CALC-SCNC: 11 MMOL/L — SIGNIFICANT CHANGE UP (ref 5–17)
ANION GAP SERPL CALC-SCNC: 13 MMOL/L — SIGNIFICANT CHANGE UP (ref 5–17)
ANION GAP SERPL CALC-SCNC: 13 MMOL/L — SIGNIFICANT CHANGE UP (ref 5–17)
AST SERPL-CCNC: 20 U/L — SIGNIFICANT CHANGE UP (ref 10–40)
AST SERPL-CCNC: 23 U/L — SIGNIFICANT CHANGE UP (ref 10–40)
BASE EXCESS BLDA CALC-SCNC: -3.4 MMOL/L — LOW (ref -2–3)
BASOPHILS # BLD AUTO: 0.01 K/UL — SIGNIFICANT CHANGE UP (ref 0–0.2)
BASOPHILS NFR BLD AUTO: 0.1 % — SIGNIFICANT CHANGE UP (ref 0–2)
BILIRUB DIRECT SERPL-MCNC: 0.2 MG/DL — SIGNIFICANT CHANGE UP (ref 0–0.2)
BILIRUB INDIRECT FLD-MCNC: 0.2 MG/DL — SIGNIFICANT CHANGE UP (ref 0.2–1)
BILIRUB SERPL-MCNC: 0.4 MG/DL — SIGNIFICANT CHANGE UP (ref 0.2–1.2)
BILIRUB SERPL-MCNC: 0.4 MG/DL — SIGNIFICANT CHANGE UP (ref 0.2–1.2)
BLD GP AB SCN SERPL QL: NEGATIVE — SIGNIFICANT CHANGE UP
BUN SERPL-MCNC: 42 MG/DL — HIGH (ref 7–23)
BUN SERPL-MCNC: 43 MG/DL — HIGH (ref 7–23)
BUN SERPL-MCNC: 45 MG/DL — HIGH (ref 7–23)
CALCIUM SERPL-MCNC: 7.6 MG/DL — LOW (ref 8.4–10.5)
CALCIUM SERPL-MCNC: 7.6 MG/DL — LOW (ref 8.4–10.5)
CALCIUM SERPL-MCNC: 7.8 MG/DL — LOW (ref 8.4–10.5)
CHLORIDE SERPL-SCNC: 109 MMOL/L — HIGH (ref 96–108)
CHLORIDE SERPL-SCNC: 110 MMOL/L — HIGH (ref 96–108)
CHLORIDE SERPL-SCNC: 113 MMOL/L — HIGH (ref 96–108)
CO2 SERPL-SCNC: 20 MMOL/L — LOW (ref 22–31)
CO2 SERPL-SCNC: 20 MMOL/L — LOW (ref 22–31)
CO2 SERPL-SCNC: 21 MMOL/L — LOW (ref 22–31)
CREAT ?TM UR-MCNC: 84 MG/DL — SIGNIFICANT CHANGE UP
CREAT SERPL-MCNC: 2.07 MG/DL — HIGH (ref 0.5–1.3)
CREAT SERPL-MCNC: 2.19 MG/DL — HIGH (ref 0.5–1.3)
CREAT SERPL-MCNC: 2.4 MG/DL — HIGH (ref 0.5–1.3)
CRP SERPL-MCNC: 411.2 MG/L — HIGH (ref 0–4)
CRP SERPL-MCNC: 427.2 MG/L — HIGH (ref 0–4)
CULTURE RESULTS: SIGNIFICANT CHANGE UP
CULTURE RESULTS: SIGNIFICANT CHANGE UP
D DIMER BLD IA.RAPID-MCNC: 969 NG/ML DDU — HIGH
EOSINOPHIL # BLD AUTO: 0.34 K/UL — SIGNIFICANT CHANGE UP (ref 0–0.5)
EOSINOPHIL NFR BLD AUTO: 3.3 % — SIGNIFICANT CHANGE UP (ref 0–6)
FERRITIN SERPL-MCNC: 339 NG/ML — SIGNIFICANT CHANGE UP (ref 30–400)
FUNGITELL: 220 PG/ML — HIGH
GAS PNL BLDA: SIGNIFICANT CHANGE UP
GLUCOSE BLDC GLUCOMTR-MCNC: 106 MG/DL — HIGH (ref 70–99)
GLUCOSE BLDC GLUCOMTR-MCNC: 121 MG/DL — HIGH (ref 70–99)
GLUCOSE BLDC GLUCOMTR-MCNC: 122 MG/DL — HIGH (ref 70–99)
GLUCOSE BLDC GLUCOMTR-MCNC: 152 MG/DL — HIGH (ref 70–99)
GLUCOSE BLDC GLUCOMTR-MCNC: 88 MG/DL — SIGNIFICANT CHANGE UP (ref 70–99)
GLUCOSE SERPL-MCNC: 124 MG/DL — HIGH (ref 70–99)
GLUCOSE SERPL-MCNC: 124 MG/DL — HIGH (ref 70–99)
GLUCOSE SERPL-MCNC: 126 MG/DL — HIGH (ref 70–99)
HCO3 BLDA-SCNC: 22 MMOL/L — SIGNIFICANT CHANGE UP (ref 21–28)
HCT VFR BLD CALC: 28.1 % — LOW (ref 39–50)
HGB BLD-MCNC: 8.4 G/DL — LOW (ref 13–17)
IMM GRANULOCYTES NFR BLD AUTO: 0.8 % — SIGNIFICANT CHANGE UP (ref 0–1.5)
LACTATE SERPL-SCNC: 1 MMOL/L — SIGNIFICANT CHANGE UP (ref 0.5–2)
LYMPHOCYTES # BLD AUTO: 1.23 K/UL — SIGNIFICANT CHANGE UP (ref 1–3.3)
LYMPHOCYTES # BLD AUTO: 11.8 % — LOW (ref 13–44)
MAGNESIUM SERPL-MCNC: 2.3 MG/DL — SIGNIFICANT CHANGE UP (ref 1.6–2.6)
MAGNESIUM SERPL-MCNC: 2.4 MG/DL — SIGNIFICANT CHANGE UP (ref 1.6–2.6)
MCHC RBC-ENTMCNC: 23.5 PG — LOW (ref 27–34)
MCHC RBC-ENTMCNC: 29.9 GM/DL — LOW (ref 32–36)
MCV RBC AUTO: 78.5 FL — LOW (ref 80–100)
METHOD TYPE: SIGNIFICANT CHANGE UP
MONOCYTES # BLD AUTO: 0.53 K/UL — SIGNIFICANT CHANGE UP (ref 0–0.9)
MONOCYTES NFR BLD AUTO: 5.1 % — SIGNIFICANT CHANGE UP (ref 2–14)
NEUTROPHILS # BLD AUTO: 8.26 K/UL — HIGH (ref 1.8–7.4)
NEUTROPHILS NFR BLD AUTO: 78.9 % — HIGH (ref 43–77)
NRBC # BLD: 0 /100 WBCS — SIGNIFICANT CHANGE UP (ref 0–0)
ORGANISM # SPEC MICROSCOPIC CNT: SIGNIFICANT CHANGE UP
ORGANISM # SPEC MICROSCOPIC CNT: SIGNIFICANT CHANGE UP
PCO2 BLDA: 38 MMHG — SIGNIFICANT CHANGE UP (ref 35–48)
PH BLDA: 7.37 — SIGNIFICANT CHANGE UP (ref 7.35–7.45)
PHOSPHATE SERPL-MCNC: 3.4 MG/DL — SIGNIFICANT CHANGE UP (ref 2.5–4.5)
PHOSPHATE SERPL-MCNC: 4 MG/DL — SIGNIFICANT CHANGE UP (ref 2.5–4.5)
PLATELET # BLD AUTO: 280 K/UL — SIGNIFICANT CHANGE UP (ref 150–400)
PO2 BLDA: 120 MMHG — HIGH (ref 83–108)
POTASSIUM SERPL-MCNC: 3.7 MMOL/L — SIGNIFICANT CHANGE UP (ref 3.5–5.3)
POTASSIUM SERPL-MCNC: 3.7 MMOL/L — SIGNIFICANT CHANGE UP (ref 3.5–5.3)
POTASSIUM SERPL-MCNC: 3.8 MMOL/L — SIGNIFICANT CHANGE UP (ref 3.5–5.3)
POTASSIUM SERPL-SCNC: 3.7 MMOL/L — SIGNIFICANT CHANGE UP (ref 3.5–5.3)
POTASSIUM SERPL-SCNC: 3.7 MMOL/L — SIGNIFICANT CHANGE UP (ref 3.5–5.3)
POTASSIUM SERPL-SCNC: 3.8 MMOL/L — SIGNIFICANT CHANGE UP (ref 3.5–5.3)
PROT SERPL-MCNC: 5.6 G/DL — LOW (ref 6–8.3)
PROT SERPL-MCNC: 5.9 G/DL — LOW (ref 6–8.3)
RBC # BLD: 3.58 M/UL — LOW (ref 4.2–5.8)
RBC # FLD: 19.2 % — HIGH (ref 10.3–14.5)
RH IG SCN BLD-IMP: POSITIVE — SIGNIFICANT CHANGE UP
SAO2 % BLDA: 98 % — SIGNIFICANT CHANGE UP (ref 95–100)
SODIUM SERPL-SCNC: 142 MMOL/L — SIGNIFICANT CHANGE UP (ref 135–145)
SODIUM SERPL-SCNC: 142 MMOL/L — SIGNIFICANT CHANGE UP (ref 135–145)
SODIUM SERPL-SCNC: 146 MMOL/L — HIGH (ref 135–145)
SODIUM UR-SCNC: 44 MMOL/L — SIGNIFICANT CHANGE UP
SPECIMEN SOURCE: SIGNIFICANT CHANGE UP
SPECIMEN SOURCE: SIGNIFICANT CHANGE UP
WBC # BLD: 10.45 K/UL — SIGNIFICANT CHANGE UP (ref 3.8–10.5)
WBC # FLD AUTO: 10.45 K/UL — SIGNIFICANT CHANGE UP (ref 3.8–10.5)

## 2021-04-17 PROCEDURE — 99291 CRITICAL CARE FIRST HOUR: CPT

## 2021-04-17 PROCEDURE — 71045 X-RAY EXAM CHEST 1 VIEW: CPT | Mod: 26

## 2021-04-17 PROCEDURE — 74018 RADEX ABDOMEN 1 VIEW: CPT | Mod: 26

## 2021-04-17 RX ORDER — SODIUM CHLORIDE 9 MG/ML
1000 INJECTION, SOLUTION INTRAVENOUS
Refills: 0 | Status: DISCONTINUED | OUTPATIENT
Start: 2021-04-17 | End: 2021-04-18

## 2021-04-17 RX ORDER — KETAMINE HYDROCHLORIDE 100 MG/ML
0.25 INJECTION INTRAMUSCULAR; INTRAVENOUS
Qty: 1000 | Refills: 0 | Status: DISCONTINUED | OUTPATIENT
Start: 2021-04-17 | End: 2021-04-17

## 2021-04-17 RX ORDER — KETAMINE HYDROCHLORIDE 100 MG/ML
0.25 INJECTION INTRAMUSCULAR; INTRAVENOUS
Qty: 1000 | Refills: 0 | Status: DISCONTINUED | OUTPATIENT
Start: 2021-04-17 | End: 2021-04-19

## 2021-04-17 RX ORDER — SODIUM CHLORIDE 9 MG/ML
1000 INJECTION, SOLUTION INTRAVENOUS
Refills: 0 | Status: DISCONTINUED | OUTPATIENT
Start: 2021-04-17 | End: 2021-04-17

## 2021-04-17 RX ORDER — ACETAMINOPHEN 500 MG
1000 TABLET ORAL ONCE
Refills: 0 | Status: COMPLETED | OUTPATIENT
Start: 2021-04-17 | End: 2021-04-17

## 2021-04-17 RX ORDER — METOCLOPRAMIDE HCL 10 MG
5 TABLET ORAL EVERY 8 HOURS
Refills: 0 | Status: COMPLETED | OUTPATIENT
Start: 2021-04-17 | End: 2021-04-19

## 2021-04-17 RX ADMIN — Medication 5 MILLIGRAM(S): at 18:26

## 2021-04-17 RX ADMIN — POLYETHYLENE GLYCOL 3350 17 GRAM(S): 17 POWDER, FOR SOLUTION ORAL at 18:25

## 2021-04-17 RX ADMIN — Medication 400 MILLIGRAM(S): at 06:22

## 2021-04-17 RX ADMIN — Medication 3 MILLILITER(S): at 00:31

## 2021-04-17 RX ADMIN — PROPOFOL 7.2 MICROGRAM(S)/KG/MIN: 10 INJECTION, EMULSION INTRAVENOUS at 02:34

## 2021-04-17 RX ADMIN — CHLORHEXIDINE GLUCONATE 15 MILLILITER(S): 213 SOLUTION TOPICAL at 18:25

## 2021-04-17 RX ADMIN — PROPOFOL 7.2 MICROGRAM(S)/KG/MIN: 10 INJECTION, EMULSION INTRAVENOUS at 19:21

## 2021-04-17 RX ADMIN — Medication 11.3 MICROGRAM(S)/KG/MIN: at 12:51

## 2021-04-17 RX ADMIN — PROPOFOL 7.2 MICROGRAM(S)/KG/MIN: 10 INJECTION, EMULSION INTRAVENOUS at 06:44

## 2021-04-17 RX ADMIN — SODIUM CHLORIDE 150 MILLILITER(S): 9 INJECTION, SOLUTION INTRAVENOUS at 18:25

## 2021-04-17 RX ADMIN — Medication 1000 MILLIGRAM(S): at 06:56

## 2021-04-17 RX ADMIN — Medication 11.3 MICROGRAM(S)/KG/MIN: at 02:19

## 2021-04-17 RX ADMIN — Medication 3 MILLILITER(S): at 04:58

## 2021-04-17 RX ADMIN — MEROPENEM 100 MILLIGRAM(S): 1 INJECTION INTRAVENOUS at 11:33

## 2021-04-17 RX ADMIN — DEXMEDETOMIDINE HYDROCHLORIDE IN 0.9% SODIUM CHLORIDE 12 MICROGRAM(S)/KG/HR: 4 INJECTION INTRAVENOUS at 06:44

## 2021-04-17 RX ADMIN — HEPARIN SODIUM 7500 UNIT(S): 5000 INJECTION INTRAVENOUS; SUBCUTANEOUS at 21:14

## 2021-04-17 RX ADMIN — MEROPENEM 100 MILLIGRAM(S): 1 INJECTION INTRAVENOUS at 22:16

## 2021-04-17 RX ADMIN — QUETIAPINE FUMARATE 100 MILLIGRAM(S): 200 TABLET, FILM COATED ORAL at 21:16

## 2021-04-17 RX ADMIN — SENNA PLUS 2 TABLET(S): 8.6 TABLET ORAL at 21:16

## 2021-04-17 RX ADMIN — KETAMINE HYDROCHLORIDE 3 MG/KG/HR: 100 INJECTION INTRAMUSCULAR; INTRAVENOUS at 21:13

## 2021-04-17 RX ADMIN — LACTULOSE 10 GRAM(S): 10 SOLUTION ORAL at 21:15

## 2021-04-17 RX ADMIN — Medication 3 MILLILITER(S): at 20:07

## 2021-04-17 RX ADMIN — CHLORHEXIDINE GLUCONATE 1 APPLICATION(S): 213 SOLUTION TOPICAL at 11:42

## 2021-04-17 RX ADMIN — Medication 3 MILLILITER(S): at 08:10

## 2021-04-17 RX ADMIN — INSULIN HUMAN 2: 100 INJECTION, SOLUTION SUBCUTANEOUS at 23:09

## 2021-04-17 RX ADMIN — LACTULOSE 10 GRAM(S): 10 SOLUTION ORAL at 18:25

## 2021-04-17 RX ADMIN — PROPOFOL 7.2 MICROGRAM(S)/KG/MIN: 10 INJECTION, EMULSION INTRAVENOUS at 12:59

## 2021-04-17 RX ADMIN — Medication 5 MILLIGRAM(S): at 11:34

## 2021-04-17 RX ADMIN — HEPARIN SODIUM 7500 UNIT(S): 5000 INJECTION INTRAVENOUS; SUBCUTANEOUS at 11:44

## 2021-04-17 RX ADMIN — PANTOPRAZOLE SODIUM 40 MILLIGRAM(S): 20 TABLET, DELAYED RELEASE ORAL at 11:43

## 2021-04-17 RX ADMIN — Medication 3 MILLILITER(S): at 17:51

## 2021-04-17 RX ADMIN — LACTULOSE 10 GRAM(S): 10 SOLUTION ORAL at 13:00

## 2021-04-17 RX ADMIN — CHLORHEXIDINE GLUCONATE 15 MILLILITER(S): 213 SOLUTION TOPICAL at 05:07

## 2021-04-17 RX ADMIN — HEPARIN SODIUM 7500 UNIT(S): 5000 INJECTION INTRAVENOUS; SUBCUTANEOUS at 05:08

## 2021-04-17 NOTE — PROGRESS NOTE ADULT - ASSESSMENT
55 yo M with PMHx of MU and gout who presented to ED with c/o progressively worsening SOB admitted to ICU for management of AHRF in the setting of COVID.     NEURO  #Sedated   -cont propofol, versed  -d/c precedex in the setting of constipation  -Seroquel regimen on board  -Neuro checks per ICU protocol, pain/sedation meds assessed and addressed  -Continue with wrist restraints to prevent patient harm    RESPIRATORY  #Acute hypoxic hypercapnic respiratory failure in the setting of COVID PNA  -Patient initially presented with O2 saturation of 14% and cyanotic requiring immediate intubation  -CXr with diffuse bilateral opacities with + COVID19 PCR  -Elevated inflammatory markers  -On AC/VC mechanical Ventilation:  Mode: AC/ CMV; RR: 24; TV: 450; FiO2: 80; PEEP: 5    #COVID  -CXr with diffuse bilateral opacities with + COVID19 PCR  -Start Date 4/5 Remdesivir and Decadron 6 mg (x 10 day course)  -Decadron d/c'd after 4/13 AM dose due to current clinical presentation  -Remdesevir course given 4/5-4/9 per chart review (was briefly d/c'd due to renal fxn worsening but was REORDERED and completed)    #MU  -As per patient's wife, patient has MU on CPAP  -Patient sleeps with 2-3 pillows at night, attributed to MU    CARDIOVASCULAR  #RV dilation  -Bedside POCUS demonstrating dilated RV and mildly enlarged pulm artery possibly 2/2 MU vs PE (unlikely)  -Echo: Severe hypokinesis of the entire anteroseptum/inferoseptum. Akinesis of the mid to apical anterolateral region, apical anterior, apical inferior and true apex. Mild hypokinesis of the basal anterolateral, basal anterior and basal inferior regions. The estimated left ventricular systolic function is 15%. (repeat 4/13 55%). The right ventricle is mildly dilated. Right ventricular systolic function is mildly reduced. No pericardial effusion is seen.  -Based on these findings, cardiology was consulted  -As per spouse, no hx of cardiac disease and no change in ADLs or activity tolerance prior to current presentation  -Holding off on diuretics as per primary team    #LV Dysfunction  As per Cardiology:  -Echo reviewed. Images were poor due to body habitus but suggest a stress cardiomyopathy pattern (takotsubo cardiomyopathy) rather than an ischemic event   -EKG reviewed showing NSR, with non specific ST changes and poor R wave progression in the precordial leads likely due to body habitus  -Clinically patient is warm and well perfused and making urine.  -Patient's LV dysfunction appears to be multifactorial COVID, Morbid Obesity, Sleep Apnea. Myocarditis is also a possibility; now exacerbated in setting of COVID PNA  -Unfortunately patient is unable to undergo further ischemic or confirmatory imaging/workup  -Hold off BB or ACE/ARB therapy while on pressors with Renal impairment  -EKG with RBBB, without ischemic changes  -Please keep K>4 and Mg>2  -Repeat ECHO 4/13: There is mild concentric left ventricular hypertrophy. The left ventricle is normal in size and systolic function with a calculated ejection fraction of 55%.  Right Ventricle: The right ventricle is normal in size. Right ventricular systolic function is normal. Pericardium: No pericardial effusion is seen.    GI  #Abdominal Distention  -Patient received methylnatrexone yesterday and had very large BM as per nursing report  -Patient with hypoactive BS and abdominal tenderness  -Tube feedings on hold  -Lavon sump for decompression    RENAL//F&E  #Acute Kidney Injury- RESOLVING  -SCr peaked in course to 3.16 likely 2/2 alas obstruction  -SCr today 2.1 up from 1.69 yesterday  -Will obtain repeat labs at 1400 as patient is febrile,  likely has lots of insensible losses  -1Liter LR bolus  -Alas catheter changed 4/12  -Baseline creatinine unknown, no reported kidney disease  -Electrolytes stable  -Strict I&O monitoring  -Avoid nephrotoxic medications    ID  #Aspiration pneumonia vs HAP  -Found 4/14 while trying to extubate w/ delirium, and numerous thick secretions in oral cavity requiring suctioning. 4/15 AM found w/ 102.3F oral temp and WBC increase to 18s, suspect possible aspiration pneumonia, pt. is still on zosyn, with previous negative MRSA swab. 4/14 bcx NGTD,  4/15, blood cultures sent, UA sent, continue zosyn  -CXr 4/16 from today worsening especially on right upper lobe  -Patient with episodes of emesis overnight  -Patient remains febrile to 102.3 during my exam, tachypneic and hypotensive requiring high dose Levo overnight  -Zosyn started 4/9 and d/c'd 4/16  -Decision made to broaden antibiotics to meropenem 1 gram and vancomycin 1250 mg  -Repeat sputum cx ordered   -Repeat MRSA swab ordered    #COVID PNA   -Plan as above  -Off steroid course, remdesivir course completed    #?Fungal Infxn  -Seen by ID, -ID ok'd Vori (loading doses x 2 given, now on 400 mg q12hr), stopped 4/14 given improvement in pt. status before medication was really started   -As per ID recs ====> s/p BAL 4/12--cultures have so far only yielded Keila dubliniensis which is probably a respiratory tract colonizer (and not the etiology of the patient's fevers). Voriconazole is not indicated for respiratory tract colonization with Candida. Suspect recent high fevers/leukocytosis due to aspiration event; Doubt COVID-19 associated pulmonary aspergillosis given BAL cultures without growth of mold, absence of cavitation on CXR, and time course relatively early for development of this rare entity. Moreover, would not attribute new fevers to discontinuation of voriconazole.  -Daily EKG for QTc monitoring  -BAL sputum samples taken from bronch 4/12, showing yeast   -Peripheral blood cultures from 4/15 no growth at 12 hrs  -Central line changed 4/12  -Maintain MAP > 65 mmHg    #Leukocytosis  -See ID above, continue to monitor    ENDOCRINE  #Hyperglycemia  -q6hr correctional scale ordered, will adjust as needed as patient is NPO  -Lantus 12 units, regular insulin 2 units as per order  -Blood glucose trend   -Decadron started 4/5 AM, d/c'd 4/13  -A1c 6.2    HEME  #Anemia  -Hgb 11 on admission; unknown baseline. No evidence of bleed.  -Hgb today 9.4  -Trend CBC  -Maintain active T+S  -Transfuse for Hb <7    -DVT ppx with Lovenox BID  -Changed to heparin SC due to rising SCr.    DISPO/GOALS OF CARE:  Patient requires continuous monitoring with bedside rhythm monitoring, arterial line, pulse oximetry, ventilator monitoring and intermittent blood gas analysis. Care plan discussed with ICU care team. Patient remains critical at this time. Will update Wife. 55 yo M with PMHx of MU and gout who presented to ED with c/o progressively worsening SOB admitted to ICU for management of AHRF in the setting of COVID.     NEURO  #Sedated   -cont propofol, versed  -d/c precedex in the setting of constipation  -Seroquel regimen on board  -Neuro checks per ICU protocol, pain/sedation meds assessed and addressed  -Continue with wrist restraints to prevent patient harm    RESPIRATORY  #Acute hypoxic hypercapnic respiratory failure in the setting of COVID PNA  -Patient initially presented with O2 saturation of 14% and cyanotic requiring immediate intubation  -CXr with diffuse bilateral opacities with + COVID19 PCR  -Elevated inflammatory markers  -On AC/VC mechanical Ventilation:  Mode: AC/ CMV; RR: 24; TV: 450; FiO2: 40; PEEP: 5    #COVID  -CXr with diffuse bilateral opacities with + COVID19 PCR  -Start Date 4/5 Remdesivir and Decadron 6 mg (x 10 day course)  -Decadron d/c'd after 4/13 AM dose due to current clinical presentation  -Remdesevir course given 4/5-4/9 per chart review (was briefly d/c'd due to renal fxn worsening but was REORDERED and completed)    #MU  -As per patient's wife, patient has MU on CPAP  -Patient sleeps with 2-3 pillows at night, attributed to MU    CARDIOVASCULAR  #RV dilation  -Bedside POCUS demonstrating dilated RV and mildly enlarged pulm artery possibly 2/2 MU vs PE (unlikely)  -Echo: Severe hypokinesis of the entire anteroseptum/inferoseptum. Akinesis of the mid to apical anterolateral region, apical anterior, apical inferior and true apex. Mild hypokinesis of the basal anterolateral, basal anterior and basal inferior regions. The estimated left ventricular systolic function is 15%. (repeat 4/13 55%). The right ventricle is mildly dilated. Right ventricular systolic function is mildly reduced. No pericardial effusion is seen.  -Based on these findings, cardiology was consulted  -As per spouse, no hx of cardiac disease and no change in ADLs or activity tolerance prior to current presentation  -Holding off on diuretics as per primary team    #LV Dysfunction  As per Cardiology:  -Echo reviewed. Images were poor due to body habitus but suggest a stress cardiomyopathy pattern (takotsubo cardiomyopathy) rather than an ischemic event   -EKG reviewed showing NSR, with non specific ST changes and poor R wave progression in the precordial leads likely due to body habitus  -Clinically patient is warm and well perfused and making urine.  -Patient's LV dysfunction appears to be multifactorial COVID, Morbid Obesity, Sleep Apnea. Myocarditis is also a possibility; now exacerbated in setting of COVID PNA  -Unfortunately patient is unable to undergo further ischemic or confirmatory imaging/workup  -Hold off BB or ACE/ARB therapy while on pressors with Renal impairment  -EKG with RBBB, without ischemic changes  -Please keep K>4 and Mg>2  -Repeat ECHO 4/13: There is mild concentric left ventricular hypertrophy. The left ventricle is normal in size and systolic function with a calculated ejection fraction of 55%.  Right Ventricle: The right ventricle is normal in size. Right ventricular systolic function is normal. Pericardium: No pericardial effusion is seen.    GI  #Abdominal Distention  -Patient received methylnatrexone and had very large BM as per nursing report  -Tube feedings on hold  -[place] sump for decompression  - cont reglan 5mg q8hr    RENAL//F&E  #Acute Kidney Injury- RESOLVING  -Lechuga catheter changed 4/12  -Baseline creatinine unknown, no reported kidney disease  -Electrolytes stable  -Strict I&O monitoring  -Avoid nephrotoxic medications  -f/u urine lytes as pt has tendency to have large losses through insensible losses    ID  #Aspiration pneumonia vs HAP  -Found 4/14 while trying to extubate w/ delirium, and numerous thick secretions in oral cavity requiring suctioning. 4/15 AM found w/ 102.3F oral temp and WBC increase to 18s, suspect possible aspiration pneumonia, pt. is still on zosyn, with previous negative MRSA swab. 4/14 bcx NGTD,  4/15, blood cultures sent, UA sent, continue zosyn  -CXr worsening especially on right upper lobe  -Patient with episodes of emesis throughout stay  -Zosyn started 4/9 and d/c'd 4/16  -Decision made to broaden antibiotics to meropenem 1 gram and vancomycin 1250 mg  -MRSA swab negative  Plan:  -cont meropenem  -f/u sputum cx    #COVID PNA   -Plan as above  -Off steroid course, remdesivir course completed    #?Fungal Infxn  -Seen by ID, -ID ok'd Vori (loading doses x 2 given, now on 400 mg q12hr), stopped 4/14 given improvement in pt. status before medication was really started   -As per ID recs ====> s/p BAL 4/12--cultures have so far only yielded Keila dubliniensis which is probably a respiratory tract colonizer (and not the etiology of the patient's fevers). Voriconazole is not indicated for respiratory tract colonization with Candida. Suspect recent high fevers/leukocytosis due to aspiration event; Doubt COVID-19 associated pulmonary aspergillosis given BAL cultures without growth of mold, absence of cavitation on CXR, and time course relatively early for development of this rare entity. Moreover, would not attribute new fevers to discontinuation of voriconazole.  -Daily EKG for QTc monitoring  -BAL sputum samples taken from bronch 4/12, showing yeast   -Peripheral blood cultures from 4/15 no growth at 12 hrs  -Central line changed 4/12  -Maintain MAP > 65 mmHg    #Leukocytosis  -See ID above, continue to monitor    ENDOCRINE  #Hyperglycemia  -q6hr correctional scale ordered, will adjust as needed as patient is NPO  -Lantus 12 units, regular insulin 2 units as per order  -Blood glucose trend   -Decadron started 4/5 AM, d/c'd 4/13  -A1c 6.2    HEME  #Anemia  -Hgb 11 on admission; unknown baseline. No evidence of bleed.  -Trend CBC  -Maintain active T+S  -Transfuse for Hb <7    -DVT ppx with Lovenox BID  -Changed to heparin SC due to rising SCr.    DISPO/GOALS OF CARE:  Patient requires continuous monitoring with bedside rhythm monitoring, arterial line, pulse oximetry, ventilator monitoring and intermittent blood gas analysis. Care plan discussed with ICU care team. Patient remains critical at this time. Will update Wife.

## 2021-04-17 NOTE — PROGRESS NOTE ADULT - ATTENDING COMMENTS
Pt appears improved with decrease levo and FiO2. Sputum with Gram neg rods and awaiting Cx. NGT with 900 ml out and Reglan added. Continue suction. Avoid narcotics with ileus.

## 2021-04-17 NOTE — PROGRESS NOTE ADULT - SUBJECTIVE AND OBJECTIVE BOX
CC: Patient is a 56y old  Male who presents with a chief complaint of SOB (2021 14:51)      INTERVAL EVENTS: Tm: 100.8 overnight.    SUBJECTIVE / INTERVAL HPI: Patient seen and examined at bedside. Pt on a vent and unable to participate in ROS.    ROS: negative unless otherwise stated above.    VITAL SIGNS:  Vital Signs Last 24 Hrs  T(C): 38.2 (2021 07:00), Max: 38.2 (2021 05:45)  T(F): 100.7 (2021 07:00), Max: 100.8 (2021 05:45)  HR: 71 (2021 10:00) (61 - 78)  BP: 111/55 (2021 13:00) (99/51 - 111/55)  BP(mean): 77 (2021 13:00) (72 - 77)  RR: 18 (2021 10:00) (18 - 42)  SpO2: 97% (2021 10:00) (91% - 99%)      21 @ 07:  -  21 @ 07:00  --------------------------------------------------------  IN: 3142.8 mL / OUT: 3285 mL / NET: -142.2 mL    21 @ 07:01  -  21 @ 10:47  --------------------------------------------------------  IN: 135.8 mL / OUT: 255 mL / NET: -119.2 mL        PHYSICAL EXAM:    General: sedated on a ventilator  Cardiovascular: +S1/S2; RRR  Respiratory: CTA B/L; no W/R/R  Gastrointestinal: distended and tympanitic abdomen, but soft  Extremities: WWP; no edema, clubbing or cyanosis  Vascular: 2+ radial, DP/PT pulses B/L  Neurological: sedated on a ventilator    MEDICATIONS:  MEDICATIONS  (STANDING):  albuterol/ipratropium for Nebulization 3 milliLiter(s) Nebulizer every 4 hours  chlorhexidine 0.12% Liquid 15 milliLiter(s) Oral Mucosa every 12 hours  chlorhexidine 2% Cloths 1 Application(s) Topical daily  dextrose 40% Gel 15 Gram(s) Oral once  dextrose 5%. 1000 milliLiter(s) (50 mL/Hr) IV Continuous <Continuous>  dextrose 5%. 1000 milliLiter(s) (100 mL/Hr) IV Continuous <Continuous>  dextrose 50% Injectable 25 Gram(s) IV Push once  dextrose 50% Injectable 12.5 Gram(s) IV Push once  dextrose 50% Injectable 25 Gram(s) IV Push once  glucagon  Injectable 1 milliGRAM(s) IntraMuscular once  heparin   Injectable 7500 Unit(s) SubCutaneous every 8 hours  insulin glargine Injectable (LANTUS) 12 Unit(s) SubCutaneous at bedtime  insulin regular  human corrective regimen sliding scale   SubCutaneous every 6 hours  lactulose Syrup 10 Gram(s) Oral every 4 hours  meropenem  IVPB 1000 milliGRAM(s) IV Intermittent every 12 hours  metoclopramide Injectable 5 milliGRAM(s) IV Push every 8 hours  midazolam Infusion 0.02 mG/kG/Hr (2.4 mL/Hr) IV Continuous <Continuous>  norepinephrine Infusion 0.05 MICROgram(s)/kG/Min (11.3 mL/Hr) IV Continuous <Continuous>  pantoprazole  Injectable 40 milliGRAM(s) IV Push daily  polyethylene glycol 3350 17 Gram(s) Oral every 12 hours  propofol Infusion 10 MICROgram(s)/kG/Min (7.2 mL/Hr) IV Continuous <Continuous>  QUEtiapine 75 milliGRAM(s) Oral every 24 hours  QUEtiapine 100 milliGRAM(s) Oral every 24 hours  senna 2 Tablet(s) Oral at bedtime    MEDICATIONS  (PRN):  acetaminophen    Suspension .. 650 milliGRAM(s) Oral every 6 hours PRN Temp greater or equal to 38C (100.4F)  sodium chloride 0.9% lock flush 10 milliLiter(s) IV Push every 1 hour PRN Pre/post blood products, medications, blood draw, and to maintain line patency      ALLERGIES:  Allergies    No Known Allergies    Intolerances        LABS:                        8.4    10.45 )-----------( 280      ( 2021 06:24 )             28.1     -    142  |  109<H>  |  42<H>  ----------------------------<  124<H>  3.7   |  20<L>  |  2.19<H>    Ca    7.8<L>      2021 06:24  Phos  3.4     -  Mg     2.3         TPro  5.6<L>  /  Alb  2.0<L>  /  TBili  0.4  /  DBili  x   /  AST  20  /  ALT  34  /  AlkPhos  79        Urinalysis Basic - ( 15 Apr 2021 14:10 )    Color: Yellow / Appearance: SL Cloudy / S.025 / pH: x  Gluc: x / Ketone: NEGATIVE  / Bili: Negative / Urobili: 1.0 E.U./dL   Blood: x / Protein: 100 mg/dL / Nitrite: NEGATIVE   Leuk Esterase: NEGATIVE / RBC: Many /HPF / WBC < 5 /HPF   Sq Epi: x / Non Sq Epi: 5-10 /HPF / Bacteria: Present /HPF      CAPILLARY BLOOD GLUCOSE      POCT Blood Glucose.: 122 mg/dL (2021 05:45)      RADIOLOGY & ADDITIONAL TESTS: Reviewed. no

## 2021-04-18 LAB
ALBUMIN SERPL ELPH-MCNC: 1.7 G/DL — LOW (ref 3.3–5)
ALP SERPL-CCNC: 81 U/L — SIGNIFICANT CHANGE UP (ref 40–120)
ALT FLD-CCNC: 24 U/L — SIGNIFICANT CHANGE UP (ref 10–45)
ANION GAP SERPL CALC-SCNC: 11 MMOL/L — SIGNIFICANT CHANGE UP (ref 5–17)
ANION GAP SERPL CALC-SCNC: 12 MMOL/L — SIGNIFICANT CHANGE UP (ref 5–17)
AST SERPL-CCNC: 16 U/L — SIGNIFICANT CHANGE UP (ref 10–40)
BASE EXCESS BLDA CALC-SCNC: -2.9 MMOL/L — LOW (ref -2–3)
BASOPHILS # BLD AUTO: 0.01 K/UL — SIGNIFICANT CHANGE UP (ref 0–0.2)
BASOPHILS NFR BLD AUTO: 0.1 % — SIGNIFICANT CHANGE UP (ref 0–2)
BILIRUB SERPL-MCNC: 0.3 MG/DL — SIGNIFICANT CHANGE UP (ref 0.2–1.2)
BUN SERPL-MCNC: 36 MG/DL — HIGH (ref 7–23)
BUN SERPL-MCNC: 38 MG/DL — HIGH (ref 7–23)
CALCIUM SERPL-MCNC: 7.4 MG/DL — LOW (ref 8.4–10.5)
CALCIUM SERPL-MCNC: 7.8 MG/DL — LOW (ref 8.4–10.5)
CHLORIDE SERPL-SCNC: 114 MMOL/L — HIGH (ref 96–108)
CHLORIDE SERPL-SCNC: 115 MMOL/L — HIGH (ref 96–108)
CO2 SERPL-SCNC: 20 MMOL/L — LOW (ref 22–31)
CO2 SERPL-SCNC: 21 MMOL/L — LOW (ref 22–31)
CREAT SERPL-MCNC: 1.93 MG/DL — HIGH (ref 0.5–1.3)
CREAT SERPL-MCNC: 1.95 MG/DL — HIGH (ref 0.5–1.3)
CULTURE RESULTS: SIGNIFICANT CHANGE UP
CULTURE RESULTS: SIGNIFICANT CHANGE UP
EOSINOPHIL # BLD AUTO: 0.26 K/UL — SIGNIFICANT CHANGE UP (ref 0–0.5)
EOSINOPHIL NFR BLD AUTO: 3.5 % — SIGNIFICANT CHANGE UP (ref 0–6)
GAS PNL BLDA: SIGNIFICANT CHANGE UP
GLUCOSE BLDC GLUCOMTR-MCNC: 132 MG/DL — HIGH (ref 70–99)
GLUCOSE BLDC GLUCOMTR-MCNC: 149 MG/DL — HIGH (ref 70–99)
GLUCOSE BLDC GLUCOMTR-MCNC: 84 MG/DL — SIGNIFICANT CHANGE UP (ref 70–99)
GLUCOSE BLDC GLUCOMTR-MCNC: 96 MG/DL — SIGNIFICANT CHANGE UP (ref 70–99)
GLUCOSE SERPL-MCNC: 132 MG/DL — HIGH (ref 70–99)
GLUCOSE SERPL-MCNC: 159 MG/DL — HIGH (ref 70–99)
HCO3 BLDA-SCNC: 21 MMOL/L — SIGNIFICANT CHANGE UP (ref 21–28)
HCT VFR BLD CALC: 25 % — LOW (ref 39–50)
HCT VFR BLD CALC: 25.3 % — LOW (ref 39–50)
HGB BLD-MCNC: 7.3 G/DL — LOW (ref 13–17)
HGB BLD-MCNC: 7.3 G/DL — LOW (ref 13–17)
IMM GRANULOCYTES NFR BLD AUTO: 0.7 % — SIGNIFICANT CHANGE UP (ref 0–1.5)
LYMPHOCYTES # BLD AUTO: 0.82 K/UL — LOW (ref 1–3.3)
LYMPHOCYTES # BLD AUTO: 11 % — LOW (ref 13–44)
MAGNESIUM SERPL-MCNC: 2.4 MG/DL — SIGNIFICANT CHANGE UP (ref 1.6–2.6)
MCHC RBC-ENTMCNC: 22.5 PG — LOW (ref 27–34)
MCHC RBC-ENTMCNC: 22.9 PG — LOW (ref 27–34)
MCHC RBC-ENTMCNC: 28.9 GM/DL — LOW (ref 32–36)
MCHC RBC-ENTMCNC: 29.2 GM/DL — LOW (ref 32–36)
MCV RBC AUTO: 78.1 FL — LOW (ref 80–100)
MCV RBC AUTO: 78.4 FL — LOW (ref 80–100)
MONOCYTES # BLD AUTO: 0.32 K/UL — SIGNIFICANT CHANGE UP (ref 0–0.9)
MONOCYTES NFR BLD AUTO: 4.3 % — SIGNIFICANT CHANGE UP (ref 2–14)
NEUTROPHILS # BLD AUTO: 5.97 K/UL — SIGNIFICANT CHANGE UP (ref 1.8–7.4)
NEUTROPHILS NFR BLD AUTO: 80.4 % — HIGH (ref 43–77)
NON-GYNECOLOGICAL CYTOLOGY STUDY: SIGNIFICANT CHANGE UP
NRBC # BLD: 0 /100 WBCS — SIGNIFICANT CHANGE UP (ref 0–0)
NRBC # BLD: 0 /100 WBCS — SIGNIFICANT CHANGE UP (ref 0–0)
ORGANISM # SPEC MICROSCOPIC CNT: SIGNIFICANT CHANGE UP
PCO2 BLDA: 36 MMHG — SIGNIFICANT CHANGE UP (ref 35–48)
PH BLDA: 7.39 — SIGNIFICANT CHANGE UP (ref 7.35–7.45)
PHOSPHATE SERPL-MCNC: 3.2 MG/DL — SIGNIFICANT CHANGE UP (ref 2.5–4.5)
PLATELET # BLD AUTO: 263 K/UL — SIGNIFICANT CHANGE UP (ref 150–400)
PLATELET # BLD AUTO: 274 K/UL — SIGNIFICANT CHANGE UP (ref 150–400)
PO2 BLDA: 133 MMHG — HIGH (ref 83–108)
POTASSIUM SERPL-MCNC: 3.2 MMOL/L — LOW (ref 3.5–5.3)
POTASSIUM SERPL-MCNC: 3.5 MMOL/L — SIGNIFICANT CHANGE UP (ref 3.5–5.3)
POTASSIUM SERPL-SCNC: 3.2 MMOL/L — LOW (ref 3.5–5.3)
POTASSIUM SERPL-SCNC: 3.5 MMOL/L — SIGNIFICANT CHANGE UP (ref 3.5–5.3)
PROT SERPL-MCNC: 5.4 G/DL — LOW (ref 6–8.3)
RBC # BLD: 3.19 M/UL — LOW (ref 4.2–5.8)
RBC # BLD: 3.24 M/UL — LOW (ref 4.2–5.8)
RBC # FLD: 19.5 % — HIGH (ref 10.3–14.5)
RBC # FLD: 19.9 % — HIGH (ref 10.3–14.5)
SAO2 % BLDA: 98 % — SIGNIFICANT CHANGE UP (ref 95–100)
SODIUM SERPL-SCNC: 146 MMOL/L — HIGH (ref 135–145)
SODIUM SERPL-SCNC: 147 MMOL/L — HIGH (ref 135–145)
SPECIMEN SOURCE: SIGNIFICANT CHANGE UP
SPECIMEN SOURCE: SIGNIFICANT CHANGE UP
WBC # BLD: 7.43 K/UL — SIGNIFICANT CHANGE UP (ref 3.8–10.5)
WBC # BLD: 7.86 K/UL — SIGNIFICANT CHANGE UP (ref 3.8–10.5)
WBC # FLD AUTO: 7.43 K/UL — SIGNIFICANT CHANGE UP (ref 3.8–10.5)
WBC # FLD AUTO: 7.86 K/UL — SIGNIFICANT CHANGE UP (ref 3.8–10.5)

## 2021-04-18 PROCEDURE — 99232 SBSQ HOSP IP/OBS MODERATE 35: CPT

## 2021-04-18 PROCEDURE — 74018 RADEX ABDOMEN 1 VIEW: CPT | Mod: 26

## 2021-04-18 PROCEDURE — 71045 X-RAY EXAM CHEST 1 VIEW: CPT | Mod: 26,76

## 2021-04-18 PROCEDURE — 99291 CRITICAL CARE FIRST HOUR: CPT

## 2021-04-18 RX ORDER — POTASSIUM CHLORIDE 20 MEQ
20 PACKET (EA) ORAL ONCE
Refills: 0 | Status: COMPLETED | OUTPATIENT
Start: 2021-04-18 | End: 2021-04-18

## 2021-04-18 RX ORDER — SODIUM CHLORIDE 9 MG/ML
1000 INJECTION, SOLUTION INTRAVENOUS
Refills: 0 | Status: DISCONTINUED | OUTPATIENT
Start: 2021-04-18 | End: 2021-04-19

## 2021-04-18 RX ORDER — QUETIAPINE FUMARATE 200 MG/1
50 TABLET, FILM COATED ORAL EVERY 12 HOURS
Refills: 0 | Status: DISCONTINUED | OUTPATIENT
Start: 2021-04-18 | End: 2021-04-22

## 2021-04-18 RX ORDER — NOREPINEPHRINE BITARTRATE/D5W 8 MG/250ML
0.05 PLASTIC BAG, INJECTION (ML) INTRAVENOUS
Qty: 8 | Refills: 0 | Status: DISCONTINUED | OUTPATIENT
Start: 2021-04-18 | End: 2021-04-19

## 2021-04-18 RX ADMIN — Medication 100 MILLIEQUIVALENT(S): at 09:22

## 2021-04-18 RX ADMIN — Medication 5 MILLIGRAM(S): at 09:21

## 2021-04-18 RX ADMIN — KETAMINE HYDROCHLORIDE 3 MG/KG/HR: 100 INJECTION INTRAMUSCULAR; INTRAVENOUS at 21:08

## 2021-04-18 RX ADMIN — MEROPENEM 100 MILLIGRAM(S): 1 INJECTION INTRAVENOUS at 11:50

## 2021-04-18 RX ADMIN — CHLORHEXIDINE GLUCONATE 15 MILLILITER(S): 213 SOLUTION TOPICAL at 06:11

## 2021-04-18 RX ADMIN — Medication 3 MILLILITER(S): at 00:32

## 2021-04-18 RX ADMIN — POLYETHYLENE GLYCOL 3350 17 GRAM(S): 17 POWDER, FOR SOLUTION ORAL at 17:28

## 2021-04-18 RX ADMIN — KETAMINE HYDROCHLORIDE 3 MG/KG/HR: 100 INJECTION INTRAMUSCULAR; INTRAVENOUS at 15:47

## 2021-04-18 RX ADMIN — PANTOPRAZOLE SODIUM 40 MILLIGRAM(S): 20 TABLET, DELAYED RELEASE ORAL at 11:51

## 2021-04-18 RX ADMIN — Medication 3 MILLILITER(S): at 09:18

## 2021-04-18 RX ADMIN — QUETIAPINE FUMARATE 50 MILLIGRAM(S): 200 TABLET, FILM COATED ORAL at 21:07

## 2021-04-18 RX ADMIN — CHLORHEXIDINE GLUCONATE 15 MILLILITER(S): 213 SOLUTION TOPICAL at 17:28

## 2021-04-18 RX ADMIN — MEROPENEM 100 MILLIGRAM(S): 1 INJECTION INTRAVENOUS at 23:33

## 2021-04-18 RX ADMIN — PROPOFOL 7.2 MICROGRAM(S)/KG/MIN: 10 INJECTION, EMULSION INTRAVENOUS at 06:10

## 2021-04-18 RX ADMIN — CHLORHEXIDINE GLUCONATE 1 APPLICATION(S): 213 SOLUTION TOPICAL at 11:47

## 2021-04-18 RX ADMIN — Medication 5 MILLIGRAM(S): at 17:28

## 2021-04-18 RX ADMIN — Medication 100 MILLIEQUIVALENT(S): at 15:45

## 2021-04-18 RX ADMIN — Medication 100 MILLIEQUIVALENT(S): at 06:10

## 2021-04-18 RX ADMIN — QUETIAPINE FUMARATE 75 MILLIGRAM(S): 200 TABLET, FILM COATED ORAL at 06:11

## 2021-04-18 RX ADMIN — SODIUM CHLORIDE 50 MILLILITER(S): 9 INJECTION, SOLUTION INTRAVENOUS at 15:47

## 2021-04-18 RX ADMIN — HEPARIN SODIUM 7500 UNIT(S): 5000 INJECTION INTRAVENOUS; SUBCUTANEOUS at 21:07

## 2021-04-18 RX ADMIN — Medication 3 MILLILITER(S): at 20:40

## 2021-04-18 RX ADMIN — HEPARIN SODIUM 7500 UNIT(S): 5000 INJECTION INTRAVENOUS; SUBCUTANEOUS at 06:11

## 2021-04-18 RX ADMIN — PROPOFOL 7.2 MICROGRAM(S)/KG/MIN: 10 INJECTION, EMULSION INTRAVENOUS at 11:52

## 2021-04-18 RX ADMIN — LACTULOSE 10 GRAM(S): 10 SOLUTION ORAL at 02:12

## 2021-04-18 RX ADMIN — Medication 5 MILLIGRAM(S): at 02:12

## 2021-04-18 RX ADMIN — Medication 3 MILLILITER(S): at 04:04

## 2021-04-18 RX ADMIN — Medication 3 MILLILITER(S): at 16:23

## 2021-04-18 RX ADMIN — POLYETHYLENE GLYCOL 3350 17 GRAM(S): 17 POWDER, FOR SOLUTION ORAL at 06:12

## 2021-04-18 RX ADMIN — SENNA PLUS 2 TABLET(S): 8.6 TABLET ORAL at 21:07

## 2021-04-18 RX ADMIN — HEPARIN SODIUM 7500 UNIT(S): 5000 INJECTION INTRAVENOUS; SUBCUTANEOUS at 12:27

## 2021-04-18 RX ADMIN — Medication 3 MILLILITER(S): at 11:28

## 2021-04-18 RX ADMIN — LACTULOSE 10 GRAM(S): 10 SOLUTION ORAL at 06:12

## 2021-04-18 NOTE — PROGRESS NOTE ADULT - SUBJECTIVE AND OBJECTIVE BOX
INTERVAL HPI/OVERNIGHT EVENTS:  Coverage for Dr. Fu.  Events noted - went into Afib with RVR last night.  Remains on pressors    ROS:  Unobtainable      ANTIBIOTICS/RELEVANT:          Vital Signs Last 24 Hrs  T(C): 37.5 (18 Apr 2021 19:00), Max: 37.5 (18 Apr 2021 19:00)  T(F): 99.5 (18 Apr 2021 19:00), Max: 99.5 (18 Apr 2021 19:00)  HR: 87 (18 Apr 2021 19:42) (73 - 125)  BP: --  BP(mean): --  RR: 30 (18 Apr 2021 19:42) (13 - 39)  SpO2: 99% (18 Apr 2021 19:42) (94% - 100%)        LABS:                        7.3    7.86  )-----------( 274      ( 18 Apr 2021 14:31 )             25.0         04-18    147<H>  |  114<H>  |  38<H>  ----------------------------<  132<H>  3.5   |  21<L>  |  1.95<H>    Ca    7.8<L>      18 Apr 2021 14:31  Phos  3.2     04-18  Mg     2.4     04-18    TPro  5.4<L>  /  Alb  1.7<L>  /  TBili  0.3  /  DBili  x   /  AST  16  /  ALT  24  /  AlkPhos  81  04-18          MICROBIOLOGY:        RADIOLOGY & ADDITIONAL STUDIES:

## 2021-04-18 NOTE — PROGRESS NOTE ADULT - SUBJECTIVE AND OBJECTIVE BOX
SUBJECTIVE    24 hour events:     REVIEW OF SYSTEMS:   See HPI (as per chart review), unable to obtain 2/2 ETT and sedation                                    OBJECTIVE    Vital Signs Last 24 Hrs  T(C): 36.9 (18 Apr 2021 04:00), Max: 37.1 (17 Apr 2021 13:00)  T(F): 98.5 (18 Apr 2021 04:00), Max: 98.7 (17 Apr 2021 13:00)  HR: 75 (18 Apr 2021 10:00) (65 - 125)  BP: --  BP(mean): --  RR: 25 (18 Apr 2021 10:00) (6 - 33)  SpO2: 99% (18 Apr 2021 10:00) (96% - 100%)    *insert advanced hemodynamics    Vent settings   Mode: AC/ CMV (Assist Control/ Continuous Mandatory Ventilation), RR (machine): 24, TV (machine): 450, FiO2: 40, PEEP: 5, ITime: 1, MAP: 7.9, PIP: 18  *insert ventilator settings    Mode: RR: TV: PEEP: FiO2    *insert I&Os monitoring  I&O's Detail    17 Apr 2021 07:01  -  18 Apr 2021 07:00  --------------------------------------------------------  IN:    Dexmedetomidine: 15 mL    dextrose 5% + sodium chloride 0.9%: 1500 mL    IV PiggyBack: 100 mL    IV PiggyBack: 100 mL    Ketamine: 39 mL    Midazolam: 43.7 mL    Norepinephrine: 293.7 mL    Propofol: 316.8 mL  Total IN: 2408.2 mL    OUT:    Indwelling Catheter - Urethral (mL): 2370 mL    Nasogastric/Oral tube (mL): 500 mL  Total OUT: 2870 mL    Total NET: -461.8 mL      18 Apr 2021 07:01  -  18 Apr 2021 11:36  --------------------------------------------------------  IN:    Ketamine: 12 mL    Norepinephrine: 12 mL    Norepinephrine: 22 mL    Propofol: 57.6 mL  Total IN: 103.6 mL    OUT:    Indwelling Catheter - Urethral (mL): 375 mL    Nasogastric/Oral tube (mL): 300 mL    Rectal Tube (mL): 200 mL  Total OUT: 875 mL    Total NET: -771.4 mL            04-17-21 @ 07:01  -  04-18-21 @ 07:00  --------------------------------------------------------  IN: 2408.2 mL / OUT: 2870 mL / NET: -461.8 mL    04-18-21 @ 07:01  -  04-18-21 @ 11:36  --------------------------------------------------------  IN: 103.6 mL / OUT: 875 mL / NET: -771.4 mL            LABS:      venous and ABG gases      *microbiology    Culture - Sputum (collected 16 Apr 2021 14:11)  Source: .Sputum Sputum  Gram Stain (16 Apr 2021 20:16):    No epithelial cells seen    Numerous white blood cells    Moderate Gram Negative Rods  Final Report (18 Apr 2021 09:30):    Normal Respiratory Marleni present    Culture - Blood (collected 15 Apr 2021 15:34)  Source: .Blood Blood  Preliminary Report (17 Apr 2021 16:01):    No growth at 2 days.    Culture - Blood (collected 15 Apr 2021 15:34)  Source: .Blood Blood  Preliminary Report (17 Apr 2021 16:01):    No growth at 2 days.        *UA      RADIOLOGY    *CXr    *Others    MEDICATIONS  (STANDING):  albuterol/ipratropium for Nebulization 3 milliLiter(s) Nebulizer every 4 hours  chlorhexidine 0.12% Liquid 15 milliLiter(s) Oral Mucosa every 12 hours  chlorhexidine 2% Cloths 1 Application(s) Topical daily  dextrose 40% Gel 15 Gram(s) Oral once  dextrose 5%. 1000 milliLiter(s) (50 mL/Hr) IV Continuous <Continuous>  dextrose 5%. 1000 milliLiter(s) (100 mL/Hr) IV Continuous <Continuous>  dextrose 50% Injectable 25 Gram(s) IV Push once  dextrose 50% Injectable 12.5 Gram(s) IV Push once  dextrose 50% Injectable 25 Gram(s) IV Push once  glucagon  Injectable 1 milliGRAM(s) IntraMuscular once  heparin   Injectable 7500 Unit(s) SubCutaneous every 8 hours  insulin regular  human corrective regimen sliding scale   SubCutaneous every 6 hours  ketamine Infusion. 0.25 mG/kG/Hr (3 mL/Hr) IV Continuous <Continuous>  meropenem  IVPB 1000 milliGRAM(s) IV Intermittent every 12 hours  metoclopramide Injectable 5 milliGRAM(s) IV Push every 8 hours  norepinephrine Infusion 0.05 MICROgram(s)/kG/Min (11.3 mL/Hr) IV Continuous <Continuous>  pantoprazole  Injectable 40 milliGRAM(s) IV Push daily  polyethylene glycol 3350 17 Gram(s) Oral every 12 hours  propofol Infusion 10 MICROgram(s)/kG/Min (7.2 mL/Hr) IV Continuous <Continuous>  QUEtiapine 50 milliGRAM(s) Oral every 12 hours  senna 2 Tablet(s) Oral at bedtime    MEDICATIONS  (PRN):  acetaminophen    Suspension .. 650 milliGRAM(s) Oral every 6 hours PRN Temp greater or equal to 38C (100.4F)  sodium chloride 0.9% lock flush 10 milliLiter(s) IV Push every 1 hour PRN Pre/post blood products, medications, blood draw, and to maintain line patency   SUBJECTIVE  Overnight events: patient went into afib with RVR last night ~1830, currently in NSR; versed gtt was weaned off, propofol decreased and ketamine was started; currently still draining bilious secretions from NGT    REVIEW OF SYSTEMS:   See HPI (as per chart review), unable to obtain 2/2 ETT and sedation                                    OBJECTIVE  Vital Signs Last 24 Hrs  T(C): 36.9 (18 Apr 2021 04:00), Max: 37.1 (17 Apr 2021 13:00)  T(F): 98.5 (18 Apr 2021 04:00), Max: 98.7 (17 Apr 2021 13:00)  HR: 75 (18 Apr 2021 10:00) (65 - 125)  SBP on arterial line 100-110's  BP(mean): on arterial line 70's  RR: 25 (18 Apr 2021 10:00) (6 - 33)  SpO2: 99% (18 Apr 2021 10:00) (96% - 100%)    Vent settings   Mode: AC/ CMV (Assist Control/ Continuous Mandatory Ventilation), RR (machine): 24, TV (machine): 450, FiO2: 40, PEEP: 5, ITime: 1, MAP: 7.9, PIP: 18    I&O's Detail    17 Apr 2021 07:01  -  18 Apr 2021 07:00  --------------------------------------------------------  IN:    Dexmedetomidine: 15 mL    dextrose 5% + sodium chloride 0.9%: 1500 mL    IV PiggyBack: 100 mL    IV PiggyBack: 100 mL    Ketamine: 39 mL    Midazolam: 43.7 mL    Norepinephrine: 293.7 mL    Propofol: 316.8 mL  Total IN: 2408.2 mL    OUT:    Indwelling Catheter - Urethral (mL): 2370 mL    Nasogastric/Oral tube (mL): 500 mL  Total OUT: 2870 mL    Total NET: -461.8 mL    LABS:                        7.3    7.43  )-----------( 263      ( 18 Apr 2021 04:58 )             25.3     04-18    146<H>  |  115<H>  |  36<H>  ----------------------------<  159<H>  3.2<L>   |  20<L>  |  1.93<H>    Ca    7.4<L>      18 Apr 2021 04:58  Phos  3.2     04-18  Mg     2.4     04-18    TPro  5.4<L>  /  Alb  1.7<L>  /  TBili  0.3  /  DBili  x   /  AST  16  /  ALT  24  /  AlkPhos  81  04-18      ABG - ( 18 Apr 2021 04:54 )  pH, Arterial: 7.39  pH, Blood: x     /  pCO2: 36    /  pO2: 133   / HCO3: 21    / Base Excess: -2.9  /  SaO2: 98        *Microbiology  Culture - Sputum (collected 16 Apr 2021 14:11)  Source: .Sputum Sputum  Gram Stain (16 Apr 2021 20:16):    No epithelial cells seen    Numerous white blood cells    Moderate Gram Negative Rods  Final Report (18 Apr 2021 09:30):    Normal Respiratory Marleni present    Culture - Blood (collected 15 Apr 2021 15:34)  Source: .Blood Blood  Preliminary Report (17 Apr 2021 16:01):    No growth at 2 days.    Culture - Blood (collected 15 Apr 2021 15:34)  Source: .Blood Blood  Preliminary Report (17 Apr 2021 16:01):    No growth at 2 days.    *CXr and abd film reviewed with fellow during bedside rounds    MEDICATIONS  (STANDING):  albuterol/ipratropium for Nebulization 3 milliLiter(s) Nebulizer every 4 hours  chlorhexidine 0.12% Liquid 15 milliLiter(s) Oral Mucosa every 12 hours  chlorhexidine 2% Cloths 1 Application(s) Topical daily  dextrose 40% Gel 15 Gram(s) Oral once  dextrose 5%. 1000 milliLiter(s) (50 mL/Hr) IV Continuous <Continuous>  dextrose 5%. 1000 milliLiter(s) (100 mL/Hr) IV Continuous <Continuous>  dextrose 50% Injectable 25 Gram(s) IV Push once  dextrose 50% Injectable 12.5 Gram(s) IV Push once  dextrose 50% Injectable 25 Gram(s) IV Push once  glucagon  Injectable 1 milliGRAM(s) IntraMuscular once  heparin   Injectable 7500 Unit(s) SubCutaneous every 8 hours  insulin regular  human corrective regimen sliding scale   SubCutaneous every 6 hours  ketamine Infusion. 0.25 mG/kG/Hr (3 mL/Hr) IV Continuous <Continuous> @ 0.5 mg  meropenem  IVPB 1000 milliGRAM(s) IV Intermittent every 12 hours  metoclopramide Injectable 5 milliGRAM(s) IV Push every 8 hours  norepinephrine Infusion 0.05 MICROgram(s)/kG/Min (11.3 mL/Hr) IV Continuous <Continuous> @ 0.05 mcg  pantoprazole  Injectable 40 milliGRAM(s) IV Push daily  polyethylene glycol 3350 17 Gram(s) Oral every 12 hours  propofol Infusion 10 MICROgram(s)/kG/Min (7.2 mL/Hr) IV Continuous <Continuous> @ 20 mcg  QUEtiapine 50 milliGRAM(s) Oral every 12 hours  senna 2 Tablet(s) Oral at bedtime    MEDICATIONS  (PRN):  acetaminophen    Suspension .. 650 milliGRAM(s) Oral every 6 hours PRN Temp greater or equal to 38C (100.4F)  sodium chloride 0.9% lock flush 10 milliLiter(s) IV Push every 1 hour PRN Pre/post blood products, medications, blood draw, and to maintain line patency    Physical Exam:  GENERAL: obese, intubated and sedated, no spontaneous movements seen at this time  SKIN/HAIR/NAILS: Nails without clubbing or cyanosis. CR<2 secs. No lesions, rash, petechiae, erythema or ecchymoses. Anicteric.   HEAD AND NECK: The skull is NC/AT. B/L Sclera white. R pupil 2 mm, L 3 mm PERRL (hx of cataract sx). Nasal mucous membrane dry; NGT in place. Trachea midline, neck supple.   THORAX/LUNGS: Thorax is symmetric with satisfactory expansion, assisted by mechanical ventilation. Lung sounds with rhonchi throughout, diminished at the bases.  CARDIOVASCULAR: No JVD. Carotid upstrokes are brisk, without bruits. +S1 and S2, RRR; no extra heart sounds or M/R/G. NSR at the time of my exam.  ABDOMEN/: Abdomen is distended with hypoactive BS in all 4 quadrants; tympanitic; it is soft, no palpable masses; no grimacing to palpation noted; last BM 4/18 as patient has rectal tube with liquid brown stool. Alas catheter in place.   PERIPHERAL VASCULAR: Extremities are warm (upper) and cool (lower), radial and dorsalis pedis pulses +1 and symmetric. +2 generalized and dependent edema noted. No clubbing, no cyanosis.  MUSCULOSKELETAL: no active ROM 2/2 sedation. No evidence of swelling or deformity.  NEUROLOGICAL: Patient not able to follow commands, wrist restraints in place to prevent patient harm; sedated on propofol and ketamine at this time.   LINES (DATES): R IJ TLC 4/12, L axillary art line 4/16, IVL    Assessment and Plan:   57 yo M with PMHx of MU and gout who presented to ED with c/o progressively worsening SOB admitted to ICU for management of AHRF in the setting of COVID.     NEURO  #Sedated   -Ketamine added to decrease propofol requirements  -Goal rass -2 to -3  -Seroquel dose will be adjusted based on QTc  -Neuro checks per ICU protocol, pain/sedation meds assessed and addressed  -Continue with wrist restraints to prevent patient harm    RESPIRATORY  #Acute hypoxic hypercapnic respiratory failure in the setting of COVID PNA  -Patient initially presented with O2 saturation of 14% and cyanotic requiring immediate intubation  -CXr with diffuse bilateral opacities with + COVID19 PCR  -Elevated inflammatory markers  -On AC/VC mechanical Ventilation:  Mode: AC/ CMV; RR: 24; TV: 450; FiO2: 40; PEEP: 5 (PEEP was decreased this AM from 8)    #COVID  -CXr with diffuse bilateral opacities with + COVID19 PCR  -Start Date 4/5 Remdesivir and Decadron 6 mg (x 10 day course)  -Decadron d/c'd after 4/13 AM dose due to current clinical presentation  -Remdesevir course given 4/5-4/9 per chart review (was briefly d/c'd due to renal fxn worsening but was REORDERED and completed)    #MU  -As per patient's wife, patient has MU on CPAP  -Patient sleeps with 2-3 pillows at night, attributed to MU    CARDIOVASCULAR  #Atrial Fibrillation  -Patient went into afib with RVR 4/17 @ about 1830  -No new medications started as rhythm is paroxismal  -If sustained, would consider lopressor IVP if off Levo gtt  -If patient remains on Levo, can consider amiodarone cautiously (patient does have RBBB on this admission and sinus bradycardia)  -Anticoagulation for afib not started, patient now in NSR and [id number] Score 0 as patient has no CV hx (however, on admission patient was found to have low EF- repeat ECHO with EF 55%)    #RV dilation  -Bedside POCUS demonstrating dilated RV and mildly enlarged pulm artery possibly 2/2 MU vs PE (unlikely)  -Echo: Severe hypokinesis of the entire anteroseptum/inferoseptum. Akinesis of the mid to apical anterolateral region, apical anterior, apical inferior and true apex. Mild hypokinesis of the basal anterolateral, basal anterior and basal inferior regions. The estimated left ventricular systolic function is 15%. (repeat 4/13 55%). The right ventricle is mildly dilated. Right ventricular systolic function is mildly reduced. No pericardial effusion is seen.  -Based on these findings, cardiology was consulted  -As per spouse, no hx of cardiac disease and no change in ADLs or activity tolerance prior to current presentation  -Holding off on diuretics as per primary team    #LV Dysfunction  As per Cardiology:  -Echo reviewed. Images were poor due to body habitus but suggest a stress cardiomyopathy pattern (takotsubo cardiomyopathy) rather than an ischemic event   -EKG reviewed showing NSR, with non specific ST changes and poor R wave progression in the precordial leads likely due to body habitus  -Clinically patient is warm and well perfused and making urine.  -Patient's LV dysfunction appears to be multifactorial COVID, Morbid Obesity, Sleep Apnea. Myocarditis is also a possibility; now exacerbated in setting of COVID PNA  -Unfortunately patient is unable to undergo further ischemic or confirmatory imaging/workup  -Hold off BB or ACE/ARB therapy while on pressors with Renal impairment  -EKG with RBBB, without ischemic changes  -Please keep K>4 and Mg>2  -Repeat ECHO 4/13: There is mild concentric left ventricular hypertrophy. The left ventricle is normal in size and systolic function with a calculated ejection fraction of 55%.  Right Ventricle: The right ventricle is normal in size. Right ventricular systolic function is normal. Pericardium: No pericardial effusion is seen.    GI  #Abdominal Distention  -Tube feedings on hold  -[place] sump for decompression  -Bilious-like drainage from NGT  -Continue Reglan 5mg q8hr IVP    #Diarrhea  -Rectal tube placed, 300 mL output as of 0700  -Lactulose d/c'd    RENAL//F&E  #Acute Kidney Injury  -Alas catheter changed 4/12  -SCr peaked in course to 3.16 likely 2/2 alas obstruction  -Today SCr 1.9, trending down  -Albumin was given overnight for low urine output   -Urine lytes 4/17 showing pre-renal  -Will repeat BMP at 1400 to f/u K and SCr  -Baseline creatinine unknown, no reported kidney disease  -Electrolytes stable, K repleted  -Strict I&O monitoring  -Avoid nephrotoxic medications    #Hypernatremia  -Na today is 146, yesterday 142  -Fluids were added on last night (D5W+0.9%NS @ 150 mL) total of 1500 mL given  -Will check Na with BMP @ 1400  -If remains hypernatremic, will start D5W after calculating FWD    ID  #Aspiration pneumonia vs HAP  -Found 4/14 while trying to extubate w/ delirium, and numerous thick secretions in oral cavity requiring suctioning. 4/15 AM found w/ 102.3F oral temp and WBC increase to 18s, suspect possible aspiration pneumonia, pt. is still on zosyn, with previous negative MRSA swab. 4/14 bcx NGTD, 4/15 bcx NGTD  -Zosyn started 4/9 and d/c'd 4/16  -Decision made to broaden antibiotics to meropenem 1 gram and vancomycin 1250 mg  -MRSA swab negative, no addt'l vanco dosed  -Continue meropenem   -Sputum cx from 4/16 with GND, awaiting species    #COVID PNA   -Plan as above  -Off steroid course, remdesivir course completed    #?Fungal Infxn  -Seen by ID, -ID ok'd Vori (loading doses x 2 given, now on 400 mg q12hr), stopped 4/14 given improvement in pt. status before medication was really started   -As per ID recs ====> s/p BAL 4/12--cultures have so far only yielded Keila dubliniensis which is probably a respiratory tract colonizer (and not the etiology of the patient's fevers). Voriconazole is not indicated for respiratory tract colonization with Candida. Suspect recent high fevers/leukocytosis due to aspiration event; Doubt COVID-19 associated pulmonary aspergillosis given BAL cultures without growth of mold, absence of cavitation on CXR, and time course relatively early for development of this rare entity. Moreover, would not attribute new fevers to discontinuation of voriconazole.  -Daily EKG for QTc monitoring  -BAL sputum samples taken from bronch 4/12, showing yeast   -Sputum cx from 4/16, no evidence of yeast  -Peripheral blood cultures from 4/15 NGTD  -Central line changed 4/12  -Maintain MAP > 70 mmHg    ENDOCRINE  #Hyperglycemia  -q6hr correctional scale  -Lantus and regular insulin d/c'd as patient NPO  -Blood glucose trend   -Decadron started 4/5 AM, d/c'd 4/13  -A1c 6.2    HEME  #Anemia  -Hgb 11 on admission; unknown baseline. No evidence of bleed.  -Hgb today 7.3, possibly hemodiluted as patient did get fluids and albumin overnight  -Will repeat CBC at 1400  -Maintain active T+S  -Transfuse for repeat Hb of <7.5 as per MD Tong    -DVT ppx with heparin SubQ    DISPO/GOALS OF CARE:  Patient requires continuous monitoring with bedside rhythm monitoring, arterial line, pulse oximetry, ventilator monitoring and intermittent blood gas analysis. Care plan discussed with ICU care team. Patient remains critical at this time.

## 2021-04-18 NOTE — PROGRESS NOTE ADULT - ATTENDING COMMENTS
Improving with decrease vasopressor needs and improved oxygenation. will transfuse one unit PRBC. Continue Reglan and follow QT. NGT output decreased. hope to retry feeds tomorrow and continue to decrease narcotic use. F/U Gram neg rudy identification in sputum.

## 2021-04-18 NOTE — PROGRESS NOTE ADULT - ASSESSMENT
55 yo male with severe COVID-19 PNA; course c/b fever; s/p BAL 4/12--cultures have so far only yielded Keila dubliniensis which is probably a respiratory tract colonizer (and not the etiology of the patient's fevers). Current septic shock likely 2/2 aspiration PNA (event occurred night of 4/14). Repeat sputum culture and f/u bcx 4/15; MRSA nares PCR was negative 4/5; reasonable to broaden from Zosyn to meropenem in the context of presumed worsening sepsis. Sputum culture from 4/16 with NRF.  His temp curve has improved though he still requires pressors.  Can continue meropenem for now.  Will follow with you - Dr. Fu will resume care tomorrow.

## 2021-04-19 LAB
ALBUMIN SERPL ELPH-MCNC: 2.2 G/DL — LOW (ref 3.3–5)
ALP SERPL-CCNC: 111 U/L — SIGNIFICANT CHANGE UP (ref 40–120)
ALT FLD-CCNC: 22 U/L — SIGNIFICANT CHANGE UP (ref 10–45)
ANION GAP SERPL CALC-SCNC: 10 MMOL/L — SIGNIFICANT CHANGE UP (ref 5–17)
ANION GAP SERPL CALC-SCNC: 9 MMOL/L — SIGNIFICANT CHANGE UP (ref 5–17)
APPEARANCE UR: ABNORMAL
AST SERPL-CCNC: 24 U/L — SIGNIFICANT CHANGE UP (ref 10–40)
BACTERIA # UR AUTO: PRESENT /HPF
BASE EXCESS BLDA CALC-SCNC: -1.4 MMOL/L — SIGNIFICANT CHANGE UP (ref -2–3)
BASOPHILS # BLD AUTO: 0.01 K/UL — SIGNIFICANT CHANGE UP (ref 0–0.2)
BASOPHILS NFR BLD AUTO: 0.1 % — SIGNIFICANT CHANGE UP (ref 0–2)
BILIRUB SERPL-MCNC: 0.4 MG/DL — SIGNIFICANT CHANGE UP (ref 0.2–1.2)
BILIRUB UR-MCNC: NEGATIVE — SIGNIFICANT CHANGE UP
BUN SERPL-MCNC: 25 MG/DL — HIGH (ref 7–23)
BUN SERPL-MCNC: 29 MG/DL — HIGH (ref 7–23)
CALCIUM SERPL-MCNC: 7.8 MG/DL — LOW (ref 8.4–10.5)
CALCIUM SERPL-MCNC: 8.2 MG/DL — LOW (ref 8.4–10.5)
CHLORIDE SERPL-SCNC: 116 MMOL/L — HIGH (ref 96–108)
CHLORIDE SERPL-SCNC: 116 MMOL/L — HIGH (ref 96–108)
CO2 SERPL-SCNC: 22 MMOL/L — SIGNIFICANT CHANGE UP (ref 22–31)
CO2 SERPL-SCNC: 22 MMOL/L — SIGNIFICANT CHANGE UP (ref 22–31)
COLOR SPEC: YELLOW — SIGNIFICANT CHANGE UP
CREAT SERPL-MCNC: 1.71 MG/DL — HIGH (ref 0.5–1.3)
CREAT SERPL-MCNC: 1.84 MG/DL — HIGH (ref 0.5–1.3)
CULTURE RESULTS: SIGNIFICANT CHANGE UP
DIFF PNL FLD: ABNORMAL
EOSINOPHIL # BLD AUTO: 0.3 K/UL — SIGNIFICANT CHANGE UP (ref 0–0.5)
EOSINOPHIL NFR BLD AUTO: 3.9 % — SIGNIFICANT CHANGE UP (ref 0–6)
EPI CELLS # UR: SIGNIFICANT CHANGE UP /HPF (ref 0–5)
FERRITIN SERPL-MCNC: 342 NG/ML — SIGNIFICANT CHANGE UP (ref 30–400)
FOLATE SERPL-MCNC: 2 NG/ML — LOW
GLUCOSE BLDC GLUCOMTR-MCNC: 100 MG/DL — HIGH (ref 70–99)
GLUCOSE BLDC GLUCOMTR-MCNC: 100 MG/DL — HIGH (ref 70–99)
GLUCOSE BLDC GLUCOMTR-MCNC: 102 MG/DL — HIGH (ref 70–99)
GLUCOSE BLDC GLUCOMTR-MCNC: 95 MG/DL — SIGNIFICANT CHANGE UP (ref 70–99)
GLUCOSE SERPL-MCNC: 95 MG/DL — SIGNIFICANT CHANGE UP (ref 70–99)
GLUCOSE SERPL-MCNC: 98 MG/DL — SIGNIFICANT CHANGE UP (ref 70–99)
GLUCOSE UR QL: NEGATIVE — SIGNIFICANT CHANGE UP
GRAN CASTS # UR COMP ASSIST: ABNORMAL /LPF
HCO3 BLDA-SCNC: 22 MMOL/L — SIGNIFICANT CHANGE UP (ref 21–28)
HCT VFR BLD CALC: 27 % — LOW (ref 39–50)
HGB BLD-MCNC: 8.1 G/DL — LOW (ref 13–17)
IMM GRANULOCYTES NFR BLD AUTO: 0.5 % — SIGNIFICANT CHANGE UP (ref 0–1.5)
IRON SATN MFR SERPL: 33 % — SIGNIFICANT CHANGE UP (ref 16–55)
IRON SATN MFR SERPL: 51 UG/DL — SIGNIFICANT CHANGE UP (ref 45–165)
KETONES UR-MCNC: NEGATIVE — SIGNIFICANT CHANGE UP
LEUKOCYTE ESTERASE UR-ACNC: NEGATIVE — SIGNIFICANT CHANGE UP
LYMPHOCYTES # BLD AUTO: 0.78 K/UL — LOW (ref 1–3.3)
LYMPHOCYTES # BLD AUTO: 10.2 % — LOW (ref 13–44)
MAGNESIUM SERPL-MCNC: 2.4 MG/DL — SIGNIFICANT CHANGE UP (ref 1.6–2.6)
MCHC RBC-ENTMCNC: 24 PG — LOW (ref 27–34)
MCHC RBC-ENTMCNC: 30 GM/DL — LOW (ref 32–36)
MCV RBC AUTO: 80.1 FL — SIGNIFICANT CHANGE UP (ref 80–100)
MONOCYTES # BLD AUTO: 0.34 K/UL — SIGNIFICANT CHANGE UP (ref 0–0.9)
MONOCYTES NFR BLD AUTO: 4.5 % — SIGNIFICANT CHANGE UP (ref 2–14)
NEUTROPHILS # BLD AUTO: 6.16 K/UL — SIGNIFICANT CHANGE UP (ref 1.8–7.4)
NEUTROPHILS NFR BLD AUTO: 80.8 % — HIGH (ref 43–77)
NITRITE UR-MCNC: NEGATIVE — SIGNIFICANT CHANGE UP
NRBC # BLD: 0 /100 WBCS — SIGNIFICANT CHANGE UP (ref 0–0)
PCO2 BLDA: 34 MMHG — LOW (ref 35–48)
PH BLDA: 7.44 — SIGNIFICANT CHANGE UP (ref 7.35–7.45)
PH UR: 6 — SIGNIFICANT CHANGE UP (ref 5–8)
PHOSPHATE SERPL-MCNC: 1.9 MG/DL — LOW (ref 2.5–4.5)
PLATELET # BLD AUTO: 283 K/UL — SIGNIFICANT CHANGE UP (ref 150–400)
PO2 BLDA: 106 MMHG — SIGNIFICANT CHANGE UP (ref 83–108)
POTASSIUM SERPL-MCNC: 3.6 MMOL/L — SIGNIFICANT CHANGE UP (ref 3.5–5.3)
POTASSIUM SERPL-MCNC: 4 MMOL/L — SIGNIFICANT CHANGE UP (ref 3.5–5.3)
POTASSIUM SERPL-SCNC: 3.6 MMOL/L — SIGNIFICANT CHANGE UP (ref 3.5–5.3)
POTASSIUM SERPL-SCNC: 4 MMOL/L — SIGNIFICANT CHANGE UP (ref 3.5–5.3)
PROT SERPL-MCNC: 5.9 G/DL — LOW (ref 6–8.3)
PROT UR-MCNC: 30 MG/DL
RBC # BLD: 3.37 M/UL — LOW (ref 4.2–5.8)
RBC # FLD: 19.4 % — HIGH (ref 10.3–14.5)
RBC CASTS # UR COMP ASSIST: < 5 /HPF — SIGNIFICANT CHANGE UP
SAO2 % BLDA: 98 % — SIGNIFICANT CHANGE UP (ref 95–100)
SODIUM SERPL-SCNC: 147 MMOL/L — HIGH (ref 135–145)
SODIUM SERPL-SCNC: 148 MMOL/L — HIGH (ref 135–145)
SP GR SPEC: 1.02 — SIGNIFICANT CHANGE UP (ref 1–1.03)
SPECIMEN SOURCE: SIGNIFICANT CHANGE UP
TIBC SERPL-MCNC: 155 UG/DL — LOW (ref 220–430)
TRIGL SERPL-MCNC: 252 MG/DL — HIGH
UIBC SERPL-MCNC: 104 UG/DL — LOW (ref 110–370)
URATE CRY FLD QL MICRO: ABNORMAL /HPF
UROBILINOGEN FLD QL: 0.2 E.U./DL — SIGNIFICANT CHANGE UP
VIT B12 SERPL-MCNC: 1102 PG/ML — SIGNIFICANT CHANGE UP (ref 232–1245)
WBC # BLD: 7.63 K/UL — SIGNIFICANT CHANGE UP (ref 3.8–10.5)
WBC # FLD AUTO: 7.63 K/UL — SIGNIFICANT CHANGE UP (ref 3.8–10.5)
WBC UR QL: < 5 /HPF — SIGNIFICANT CHANGE UP

## 2021-04-19 PROCEDURE — 99232 SBSQ HOSP IP/OBS MODERATE 35: CPT

## 2021-04-19 PROCEDURE — 71045 X-RAY EXAM CHEST 1 VIEW: CPT | Mod: 26,77

## 2021-04-19 PROCEDURE — 99253 IP/OBS CNSLTJ NEW/EST LOW 45: CPT | Mod: GC

## 2021-04-19 PROCEDURE — 43752 NASAL/OROGASTRIC W/TUBE PLMT: CPT

## 2021-04-19 PROCEDURE — 74018 RADEX ABDOMEN 1 VIEW: CPT | Mod: 26

## 2021-04-19 PROCEDURE — 99233 SBSQ HOSP IP/OBS HIGH 50: CPT | Mod: GC

## 2021-04-19 PROCEDURE — 71045 X-RAY EXAM CHEST 1 VIEW: CPT | Mod: 26,76

## 2021-04-19 RX ORDER — POTASSIUM CHLORIDE 20 MEQ
10 PACKET (EA) ORAL
Refills: 0 | Status: COMPLETED | OUTPATIENT
Start: 2021-04-19 | End: 2021-04-19

## 2021-04-19 RX ORDER — SODIUM CHLORIDE 9 MG/ML
1000 INJECTION, SOLUTION INTRAVENOUS
Refills: 0 | Status: DISCONTINUED | OUTPATIENT
Start: 2021-04-19 | End: 2021-04-20

## 2021-04-19 RX ORDER — KETAMINE HYDROCHLORIDE 100 MG/ML
0.25 INJECTION INTRAMUSCULAR; INTRAVENOUS
Qty: 1000 | Refills: 0 | Status: DISCONTINUED | OUTPATIENT
Start: 2021-04-19 | End: 2021-04-20

## 2021-04-19 RX ORDER — MIDAZOLAM HYDROCHLORIDE 1 MG/ML
4 INJECTION, SOLUTION INTRAMUSCULAR; INTRAVENOUS ONCE
Refills: 0 | Status: DISCONTINUED | OUTPATIENT
Start: 2021-04-19 | End: 2021-04-19

## 2021-04-19 RX ORDER — SODIUM CHLORIDE 9 MG/ML
1000 INJECTION, SOLUTION INTRAVENOUS
Refills: 0 | Status: DISCONTINUED | OUTPATIENT
Start: 2021-04-19 | End: 2021-04-19

## 2021-04-19 RX ORDER — POTASSIUM PHOSPHATE, MONOBASIC POTASSIUM PHOSPHATE, DIBASIC 236; 224 MG/ML; MG/ML
30 INJECTION, SOLUTION INTRAVENOUS ONCE
Refills: 0 | Status: COMPLETED | OUTPATIENT
Start: 2021-04-19 | End: 2021-04-19

## 2021-04-19 RX ADMIN — HEPARIN SODIUM 7500 UNIT(S): 5000 INJECTION INTRAVENOUS; SUBCUTANEOUS at 12:02

## 2021-04-19 RX ADMIN — Medication 3 MILLILITER(S): at 01:20

## 2021-04-19 RX ADMIN — SENNA PLUS 2 TABLET(S): 8.6 TABLET ORAL at 21:06

## 2021-04-19 RX ADMIN — Medication 5 MILLIGRAM(S): at 02:00

## 2021-04-19 RX ADMIN — Medication 3 MILLILITER(S): at 19:32

## 2021-04-19 RX ADMIN — Medication 100 MILLIEQUIVALENT(S): at 10:01

## 2021-04-19 RX ADMIN — CHLORHEXIDINE GLUCONATE 15 MILLILITER(S): 213 SOLUTION TOPICAL at 17:31

## 2021-04-19 RX ADMIN — Medication 3 MILLILITER(S): at 08:26

## 2021-04-19 RX ADMIN — Medication 100 MILLIEQUIVALENT(S): at 09:17

## 2021-04-19 RX ADMIN — MEROPENEM 100 MILLIGRAM(S): 1 INJECTION INTRAVENOUS at 10:51

## 2021-04-19 RX ADMIN — PROPOFOL 7.2 MICROGRAM(S)/KG/MIN: 10 INJECTION, EMULSION INTRAVENOUS at 13:29

## 2021-04-19 RX ADMIN — QUETIAPINE FUMARATE 50 MILLIGRAM(S): 200 TABLET, FILM COATED ORAL at 21:07

## 2021-04-19 RX ADMIN — POTASSIUM PHOSPHATE, MONOBASIC POTASSIUM PHOSPHATE, DIBASIC 83.33 MILLIMOLE(S): 236; 224 INJECTION, SOLUTION INTRAVENOUS at 07:43

## 2021-04-19 RX ADMIN — Medication 100 MILLIEQUIVALENT(S): at 07:43

## 2021-04-19 RX ADMIN — PANTOPRAZOLE SODIUM 40 MILLIGRAM(S): 20 TABLET, DELAYED RELEASE ORAL at 11:58

## 2021-04-19 RX ADMIN — MIDAZOLAM HYDROCHLORIDE 4 MILLIGRAM(S): 1 INJECTION, SOLUTION INTRAMUSCULAR; INTRAVENOUS at 22:55

## 2021-04-19 RX ADMIN — SODIUM CHLORIDE 150 MILLILITER(S): 9 INJECTION, SOLUTION INTRAVENOUS at 17:02

## 2021-04-19 RX ADMIN — CHLORHEXIDINE GLUCONATE 1 APPLICATION(S): 213 SOLUTION TOPICAL at 11:58

## 2021-04-19 RX ADMIN — KETAMINE HYDROCHLORIDE 3 MG/KG/HR: 100 INJECTION INTRAMUSCULAR; INTRAVENOUS at 14:04

## 2021-04-19 RX ADMIN — MEROPENEM 100 MILLIGRAM(S): 1 INJECTION INTRAVENOUS at 22:02

## 2021-04-19 RX ADMIN — POLYETHYLENE GLYCOL 3350 17 GRAM(S): 17 POWDER, FOR SOLUTION ORAL at 05:09

## 2021-04-19 RX ADMIN — PROPOFOL 7.2 MICROGRAM(S)/KG/MIN: 10 INJECTION, EMULSION INTRAVENOUS at 17:30

## 2021-04-19 RX ADMIN — HEPARIN SODIUM 7500 UNIT(S): 5000 INJECTION INTRAVENOUS; SUBCUTANEOUS at 21:06

## 2021-04-19 RX ADMIN — Medication 3 MILLILITER(S): at 13:05

## 2021-04-19 RX ADMIN — CHLORHEXIDINE GLUCONATE 15 MILLILITER(S): 213 SOLUTION TOPICAL at 05:09

## 2021-04-19 RX ADMIN — MIDAZOLAM HYDROCHLORIDE 4 MILLIGRAM(S): 1 INJECTION, SOLUTION INTRAMUSCULAR; INTRAVENOUS at 08:38

## 2021-04-19 RX ADMIN — MIDAZOLAM HYDROCHLORIDE 4 MILLIGRAM(S): 1 INJECTION, SOLUTION INTRAMUSCULAR; INTRAVENOUS at 20:45

## 2021-04-19 RX ADMIN — Medication 650 MILLIGRAM(S): at 09:32

## 2021-04-19 RX ADMIN — HEPARIN SODIUM 7500 UNIT(S): 5000 INJECTION INTRAVENOUS; SUBCUTANEOUS at 05:09

## 2021-04-19 RX ADMIN — POLYETHYLENE GLYCOL 3350 17 GRAM(S): 17 POWDER, FOR SOLUTION ORAL at 17:32

## 2021-04-19 RX ADMIN — QUETIAPINE FUMARATE 50 MILLIGRAM(S): 200 TABLET, FILM COATED ORAL at 09:22

## 2021-04-19 RX ADMIN — Medication 650 MILLIGRAM(S): at 10:01

## 2021-04-19 NOTE — CONSULT NOTE ADULT - ATTENDING COMMENTS
Mr. Bosch is a 56M w/ PMH of MU and gout.  He has acute hypoxic respiratory failure due to COVID PNA.  He has been intubated since 4/05/21.  Hospital course complicated by septic shock on admission, possible aspiration PNA, anemia, GRACE, high Na (resolved) and persistent fevers.  Difficulty weaning off vent due to delirium.     Echo on admission with EF 15% but repeat echo on 4/14 showed calculated EF 55%.  Cardiology following pt.  Currently on FiO2 40% and off pressors.  He would benefit from tracheostomy to aid in pulmonary toilet, weaning, prevention airway stenosis and improve pt comfort.    Trach planned for 4/21, likely in ICU.  Consent to be obtained.  New coags and COVID sputum pending.  (positive PCR will NOT prevent tracheotomy from being done on 4/21)

## 2021-04-19 NOTE — PROGRESS NOTE ADULT - ASSESSMENT
57 yo male with severe COVID-19 PNA; course c/b fever; s/p BAL 4/12--cultures have so far only yielded Keila dubliniensis which is probably a respiratory tract colonizer (and not the etiology of the patient's fevers). Recent aspiration pneumonia c/b septic shock (now resolved); sputum culture yielded growth of normal respiratory lexie. Fever despite meropenem ?2/2 periodic aspiration vs. COVID-19 vs. new nosocomial infectious process. Ongoing GOC discussion re: trach/PEG. Can continue empiric meropenem to complete 7d course through 4/22.    Please reconsult with ?    ID Team 2

## 2021-04-19 NOTE — PROCEDURE NOTE - NSPROCDETAILS_GEN_ALL_CORE
nasogastric/audible air bolus/placement confirmed by auscultation/gastric secretions aspirated, placement confirmed/bowel sounds present to 4 quadrants/connected to suction

## 2021-04-19 NOTE — PROGRESS NOTE ADULT - SUBJECTIVE AND OBJECTIVE BOX
INTERVAL HPI/OVERNIGHT EVENTS: New fevers despite meropenem.     ROS: UTO      ANTIBIOTICS/RELEVANT:    MEDICATIONS  (STANDING):  albuterol/ipratropium for Nebulization 3 milliLiter(s) Nebulizer every 4 hours  chlorhexidine 0.12% Liquid 15 milliLiter(s) Oral Mucosa every 12 hours  chlorhexidine 2% Cloths 1 Application(s) Topical daily  dextrose 40% Gel 15 Gram(s) Oral once  dextrose 5%. 1000 milliLiter(s) (50 mL/Hr) IV Continuous <Continuous>  dextrose 5%. 1000 milliLiter(s) (100 mL/Hr) IV Continuous <Continuous>  dextrose 5%. 1000 milliLiter(s) (100 mL/Hr) IV Continuous <Continuous>  dextrose 50% Injectable 25 Gram(s) IV Push once  dextrose 50% Injectable 12.5 Gram(s) IV Push once  dextrose 50% Injectable 25 Gram(s) IV Push once  glucagon  Injectable 1 milliGRAM(s) IntraMuscular once  heparin   Injectable 7500 Unit(s) SubCutaneous every 8 hours  insulin regular  human corrective regimen sliding scale   SubCutaneous every 6 hours  ketamine Infusion. 0.25 mG/kG/Hr (3 mL/Hr) IV Continuous <Continuous>  meropenem  IVPB 1000 milliGRAM(s) IV Intermittent every 12 hours  norepinephrine Infusion 0.05 MICROgram(s)/kG/Min (11.3 mL/Hr) IV Continuous <Continuous>  pantoprazole  Injectable 40 milliGRAM(s) IV Push daily  polyethylene glycol 3350 17 Gram(s) Oral every 12 hours  propofol Infusion 10 MICROgram(s)/kG/Min (7.2 mL/Hr) IV Continuous <Continuous>  QUEtiapine 50 milliGRAM(s) Oral every 12 hours  senna 2 Tablet(s) Oral at bedtime    MEDICATIONS  (PRN):  acetaminophen    Suspension .. 650 milliGRAM(s) Oral every 6 hours PRN Temp greater or equal to 38C (100.4F)  sodium chloride 0.9% lock flush 10 milliLiter(s) IV Push every 1 hour PRN Pre/post blood products, medications, blood draw, and to maintain line patency        Vital Signs Last 24 Hrs  T(C): 37.5 (2021 14:00), Max: 38.7 (2021 06:00)  T(F): 99.5 (2021 14:00), Max: 101.6 (2021 06:00)  HR: 90 (2021 14:00) (84 - 101)  BP: --  BP(mean): --  RR: 30 (2021 14:00) (26 - 44)  SpO2: 100% (2021 14:00) (93% - 100%)    PHYSICAL EXAM:  Constitutional:Well-developed, well nourished  Eyes:JANE, EOMI  Ear/Nose/Throat: no oral lesion, no sinus tenderness on percussion	  Neck:no JVD, no lymphadenopathy, supple  Respiratory: CTA adams  Cardiovascular: S1S2 RRR, no murmurs  Gastrointestinal:soft, (+) BS, no HSM  Extremities:no e/e/c  Vascular: DP Pulse:	right normal; left normal      LABS:                        8.1    7.63  )-----------( 283      ( 2021 05:57 )             27.0     04-19    148<H>  |  116<H>  |  25<H>  ----------------------------<  95  4.0   |  22  |  1.71<H>    Ca    7.8<L>      2021 16:04  Phos  1.9     04-19  Mg     2.4     04-19    TPro  5.9<L>  /  Alb  2.2<L>  /  TBili  0.4  /  DBili  x   /  AST  24  /  ALT  22  /  AlkPhos  111  04-19      Urinalysis Basic - ( 2021 09:46 )    Color: Yellow / Appearance: Cloudy / S.020 / pH: x  Gluc: x / Ketone: NEGATIVE  / Bili: Negative / Urobili: 0.2 E.U./dL   Blood: x / Protein: 30 mg/dL / Nitrite: NEGATIVE   Leuk Esterase: NEGATIVE / RBC: < 5 /HPF / WBC < 5 /HPF   Sq Epi: x / Non Sq Epi: 0-5 /HPF / Bacteria: Present /HPF        MICROBIOLOGY: reviewed    RADIOLOGY & ADDITIONAL STUDIES: reviewed

## 2021-04-19 NOTE — CONSULT NOTE ADULT - CONSULT REASON
trach consult
Troponemia, HFrEF, Hypoxic Resp Failure
Acute hypoxic respiratory failure
possible aspergillosis

## 2021-04-19 NOTE — PROGRESS NOTE ADULT - ASSESSMENT
IN PROGRESS, INCLUDING ASSESSMENT AND PLAN  55 yo M with PMHx of MU and gout who presented to ED with c/o progressively worsening SOB admitted to ICU for management of AHRF in the setting of COVID.     NEURO  #Sedated   -Ketamine added to decrease propofol requirements  -Goal rass -2 to -3  -Seroquel dose will be adjusted based on QTc  -Neuro checks per ICU protocol, pain/sedation meds assessed and addressed  -Continue with wrist restraints to prevent patient harm    RESPIRATORY  #Acute hypoxic hypercapnic respiratory failure in the setting of COVID PNA  -Patient initially presented with O2 saturation of 14% and cyanotic requiring immediate intubation  -CXr with diffuse bilateral opacities with + COVID19 PCR  -Elevated inflammatory markers  -On AC/VC mechanical Ventilation:  Mode: AC/ CMV; RR: 24; TV: 450; FiO2: 40; PEEP: 5 (PEEP was decreased this AM from 8)    #COVID  -CXr with diffuse bilateral opacities with + COVID19 PCR  -Start Date 4/5 Remdesivir and Decadron 6 mg (x 10 day course)  -Decadron d/c'd after 4/13 AM dose due to current clinical presentation  -Remdesevir course given 4/5-4/9 per chart review (was briefly d/c'd due to renal fxn worsening but was REORDERED and completed)    #MU  -As per patient's wife, patient has MU on CPAP  -Patient sleeps with 2-3 pillows at night, attributed to MU    CARDIOVASCULAR  #Atrial Fibrillation  -Patient went into afib with RVR 4/17 @ about 1830  -No new medications started as rhythm is paroxismal  -If sustained, would consider lopressor IVP if off Levo gtt  -If patient remains on Levo, can consider amiodarone cautiously (patient does have RBBB on this admission and sinus bradycardia)  -Anticoagulation for afib not started, patient now in NSR and [id number] Score 0 as patient has no CV hx (however, on admission patient was found to have low EF- repeat ECHO with EF 55%)    #RV dilation  -Bedside POCUS demonstrating dilated RV and mildly enlarged pulm artery possibly 2/2 MU vs PE (unlikely)  -Echo: Severe hypokinesis of the entire anteroseptum/inferoseptum. Akinesis of the mid to apical anterolateral region, apical anterior, apical inferior and true apex. Mild hypokinesis of the basal anterolateral, basal anterior and basal inferior regions. The estimated left ventricular systolic function is 15%. (repeat 4/13 55%). The right ventricle is mildly dilated. Right ventricular systolic function is mildly reduced. No pericardial effusion is seen.  -Based on these findings, cardiology was consulted  -As per spouse, no hx of cardiac disease and no change in ADLs or activity tolerance prior to current presentation  -Holding off on diuretics as per primary team    #LV Dysfunction  As per Cardiology:  -Echo reviewed. Images were poor due to body habitus but suggest a stress cardiomyopathy pattern (takotsubo cardiomyopathy) rather than an ischemic event   -EKG reviewed showing NSR, with non specific ST changes and poor R wave progression in the precordial leads likely due to body habitus  -Clinically patient is warm and well perfused and making urine.  -Patient's LV dysfunction appears to be multifactorial COVID, Morbid Obesity, Sleep Apnea. Myocarditis is also a possibility; now exacerbated in setting of COVID PNA  -Unfortunately patient is unable to undergo further ischemic or confirmatory imaging/workup  -Hold off BB or ACE/ARB therapy while on pressors with Renal impairment  -EKG with RBBB, without ischemic changes  -Please keep K>4 and Mg>2  -Repeat ECHO 4/13: There is mild concentric left ventricular hypertrophy. The left ventricle is normal in size and systolic function with a calculated ejection fraction of 55%.  Right Ventricle: The right ventricle is normal in size. Right ventricular systolic function is normal. Pericardium: No pericardial effusion is seen.    GI  #Abdominal Distention  -Tube feedings on hold  -[place] sump for decompression  -Bilious-like drainage from NGT  -Continue Reglan 5mg q8hr IVP    #Diarrhea  -Rectal tube placed, 300 mL output as of 0700  -Lactulose d/c'd    RENAL//F&E  #Acute Kidney Injury  -Alas catheter changed 4/12  -SCr peaked in course to 3.16 likely 2/2 alas obstruction  -Today SCr 1.9, trending down  -Albumin was given overnight for low urine output   -Urine lytes 4/17 showing pre-renal  -Will repeat BMP at 1400 to f/u K and SCr  -Baseline creatinine unknown, no reported kidney disease  -Electrolytes stable, K repleted  -Strict I&O monitoring  -Avoid nephrotoxic medications    #Hypernatremia  -Na today is 146, yesterday 142  -Fluids were added on last night (D5W+0.9%NS @ 150 mL) total of 1500 mL given  -Will check Na with BMP @ 1400  -If remains hypernatremic, will start D5W after calculating FWD    ID  #Aspiration pneumonia vs HAP  -Found 4/14 while trying to extubate w/ delirium, and numerous thick secretions in oral cavity requiring suctioning. 4/15 AM found w/ 102.3F oral temp and WBC increase to 18s, suspect possible aspiration pneumonia, pt. is still on zosyn, with previous negative MRSA swab. 4/14 bcx NGTD, 4/15 bcx NGTD  -Zosyn started 4/9 and d/c'd 4/16  -Decision made to broaden antibiotics to meropenem 1 gram and vancomycin 1250 mg  -MRSA swab negative, no addt'l vanco dosed  -Continue meropenem   -Sputum cx from 4/16 with GND, awaiting species    #COVID PNA   -Plan as above  -Off steroid course, remdesivir course completed    #?Fungal Infxn  -Seen by ID, -ID ok'd Vori (loading doses x 2 given, now on 400 mg q12hr), stopped 4/14 given improvement in pt. status before medication was really started   -As per ID recs ====> s/p BAL 4/12--cultures have so far only yielded Keila dubliniensis which is probably a respiratory tract colonizer (and not the etiology of the patient's fevers). Voriconazole is not indicated for respiratory tract colonization with Candida. Suspect recent high fevers/leukocytosis due to aspiration event; Doubt COVID-19 associated pulmonary aspergillosis given BAL cultures without growth of mold, absence of cavitation on CXR, and time course relatively early for development of this rare entity. Moreover, would not attribute new fevers to discontinuation of voriconazole.  -Daily EKG for QTc monitoring  -BAL sputum samples taken from bronch 4/12, showing yeast   -Sputum cx from 4/16, no evidence of yeast  -Peripheral blood cultures from 4/15 NGTD  -Central line changed 4/12  -Maintain MAP > 70 mmHg    ENDOCRINE  #Hyperglycemia  -q6hr correctional scale  -Lantus and regular insulin d/c'd as patient NPO  -Blood glucose trend   -Decadron started 4/5 AM, d/c'd 4/13  -A1c 6.2    HEME  #Anemia  -Hgb 11 on admission; unknown baseline. No evidence of bleed.  -Hgb today 7.3, possibly hemodiluted as patient did get fluids and albumin overnight  -Will repeat CBC at 1400  -Maintain active T+S  -Transfuse for repeat Hb of <7.5 as per MD Tong    -DVT ppx with heparin SubQ    DISPO/GOALS OF CARE:  Patient requires continuous monitoring with bedside rhythm monitoring, arterial line, pulse oximetry, ventilator monitoring and intermittent blood gas analysis. Care plan discussed with ICU care team. Patient remains critical at this time. 57 yo M with PMHx of MU and gout who presented to ED with c/o progressively worsening SOB admitted to ICU for management of AHRF in the setting of COVID.     NEURO  #Sedated   -Ketamine added to decrease propofol requirements  -Seroquel 50 mg Q12 hr  -Neuro checks per ICU protocol, pain/sedation meds assessed and addressed  -Continue with wrist restraints to prevent patient harm    RESPIRATORY  #Acute hypoxic hypercapnic respiratory failure in the setting of COVID PNA  -Patient initially presented with O2 saturation of 14% and cyanotic requiring immediate intubation  -CXr with diffuse bilateral opacities with + COVID19 PCR  -Elevated inflammatory markers  -On AC/VC mechanical Ventilation:  Mode: AC/ CMV; RR: 20; TV: 450; FiO2: 40; PEEP: 5   -Has been on vent for 2 weeks; will need trach & PEG    #COVID  -CXr with diffuse bilateral opacities with + COVID19 PCR  -Start Date 4/5 Remdesivir and Decadron 6 mg (x 10 day course)  -Decadron d/c'd after 4/13 AM dose due to current clinical presentation  -Remdesevir course given 4/5-4/9 per chart review (was briefly d/c'd due to renal fxn worsening but was REORDERED and completed)    #MU  -As per patient's wife, patient has MU on CPAP  -Patient sleeps with 2-3 pillows at night, attributed to MU    CARDIOVASCULAR  #Atrial Fibrillation- NOT ACTIVE  -Patient went into afib with RVR 4/17 @ about 1830  -No new medications started as rhythm is paroxismal  -If sustained, would consider lopressor IVP if off Levo gtt; review of telemetry does not show pt in any afib  -If patient remains on Levo, can consider amiodarone cautiously (patient does have RBBB on this admission and sinus bradycardia)  -Anticoagulation for afib not started, patient now in NSR and [id number] Score 0 as patient has no CV hx (however, on admission patient was found to have low EF- repeat ECHO with EF 55%)    #RV dilation  -Bedside POCUS demonstrating dilated RV and mildly enlarged pulm artery possibly 2/2 MU vs PE (unlikely)  -Echo: Severe hypokinesis of the entire anteroseptum/inferoseptum. Akinesis of the mid to apical anterolateral region, apical anterior, apical inferior and true apex. Mild hypokinesis of the basal anterolateral, basal anterior and basal inferior regions. The estimated left ventricular systolic function is 15%. (repeat 4/13 55%). The right ventricle is mildly dilated. Right ventricular systolic function is mildly reduced. No pericardial effusion is seen.  -Based on these findings, cardiology was consulted  -As per spouse, no hx of cardiac disease and no change in ADLs or activity tolerance prior to current presentation  -Holding off on diuretics as per primary team    #LV Dysfunction  As per Cardiology:  -Echo reviewed. Images were poor due to body habitus but suggest a stress cardiomyopathy pattern (takotsubo cardiomyopathy) rather than an ischemic event   -EKG reviewed showing NSR, with non specific ST changes and poor R wave progression in the precordial leads likely due to body habitus  -Clinically patient is warm and well perfused and making urine.  -Patient's LV dysfunction appears to be multifactorial COVID, Morbid Obesity, Sleep Apnea. Myocarditis is also a possibility; now exacerbated in setting of COVID PNA  -Unfortunately patient is unable to undergo further ischemic or confirmatory imaging/workup  -Hold off BB or ACE/ARB therapy while on pressors with Renal impairment  -EKG with RBBB, without ischemic changes  -Please keep K>4 and Mg>2  -Repeat ECHO 4/13: There is mild concentric left ventricular hypertrophy. The left ventricle is normal in size and systolic function with a calculated ejection fraction of 55%.  Right Ventricle: The right ventricle is normal in size. Right ventricular systolic function is normal. Pericardium: No pericardial effusion is seen.    GI  #Abdominal Distention  -Tube feedings on hold  -[place] sump for decompression  -Bilious-like drainage from NGT  -Continue Reglan 5mg q8hr IVP    #Diarrhea  -Rectal tube placed, 400ml in last 24 hr  -Lactulose d/c'd    RENAL//F&E  #Acute Kidney Injury  -Alas catheter changed 4/12  -SCr peaked in course to 3.16 likely 2/2 alas obstruction  -Cr improved from fluids given, will continue fluids (see below)  -Urine lytes 4/17 showing pre-renal  -Baseline creatinine unknown, no reported kidney disease  -Electrolytes stable, K repleted  -Strict I&O monitoring  -Avoid nephrotoxic medications    #Hypernatremia  -Na today is 147 today from 147 yesterday  -Will continue D5 at 100cc/hr and f/u 4 pm BMP    ID  #Aspiration pneumonia vs HAP  -Found 4/14 while trying to extubate w/ delirium, and numerous thick secretions in oral cavity requiring suctioning. 4/15 AM found w/ 102.3F oral temp and WBC increase to 18s, suspect possible aspiration pneumonia, pt. is still on zosyn, with previous negative MRSA swab. 4/14 bcx NGTD, 4/15 bcx NGTD  -Zosyn started 4/9 and d/c'd 4/16  -Decision made to broaden antibiotics to meropenem 1 gram and vancomycin 1250 mg  -MRSA swab negative, no addt'l vanco dosed  -Continue meropenem   -Sputum cx from 4/16 with GND, awaiting species    #COVID PNA   -Plan as above  -Off steroid course, remdesivir course completed    #?Fungal Infxn  -Seen by ID, -ID ok'd Vori (loading doses x 2 given, now on 400 mg q12hr), stopped 4/14 given improvement in pt. status before medication was really started   -As per ID recs ====> s/p BAL 4/12--cultures have so far only yielded Keila dubliniensis which is probably a respiratory tract colonizer (and not the etiology of the patient's fevers). Voriconazole is not indicated for respiratory tract colonization with Candida. Suspect recent high fevers/leukocytosis due to aspiration event; Doubt COVID-19 associated pulmonary aspergillosis given BAL cultures without growth of mold, absence of cavitation on CXR, and time course relatively early for development of this rare entity. Moreover, would not attribute new fevers to discontinuation of voriconazole.  -Daily EKG for QTc monitoring  -BAL sputum samples taken from bronch 4/12, showing yeast   -Sputum cx from 4/16, no evidence of yeast  -Peripheral blood cultures from 4/15 NGTD  -Central line changed 4/12  -Maintain MAP > 70 mmHg    ENDOCRINE  #Hyperglycemia  -q6hr correctional scale  -Lantus and regular insulin d/c'd as patient NPO  -Blood glucose trend   -Decadron started 4/5 AM, d/c'd 4/13  -A1c 6.2    HEME  #Anemia  -Hgb 11 on admission; unknown baseline. No evidence of bleed.  -Hgb 4/18 7.3, possibly hemodiluted as patient did get fluids and albumin overnight, s/p transfusion 1U PRBC 4/18; 4/19 8.1   -Maintain active T+S    -DVT ppx with heparin SubQ    DISPO/GOALS OF CARE:  Patient requires continuous monitoring with bedside rhythm monitoring, arterial line, pulse oximetry, ventilator monitoring and intermittent blood gas analysis. Care plan discussed with ICU care team. Patient remains critical at this time.

## 2021-04-19 NOTE — CONSULT NOTE ADULT - ASSESSMENT
A/P: 56M w/ PMH of MU and gout in acute hypoxic respiratory failure 2/2 COVID c/b septic shock due to aspiration and difficulty weaning off vent due to delirium. ENT consulted for tracheostomy due to prolonged intubation (intubated 4/5/21).    -Plan for possible tracheostomy on 4/21  -Remainder of recs pending d/w Dr. Calhoun  -Needs active Coags, T&S and COVID PCR on sputum prior to procedure  -Will obtain consent from family A/P: 56M w/ PMH of MU and gout in acute hypoxic respiratory failure 2/2 COVID c/b septic shock due to aspiration and difficulty weaning off vent due to delirium.   ENT consulted for tracheostomy due to prolonged intubation (intubated 4/05/21).  Currently on FiO2 40% and off pressors.    -Plan for possible tracheostomy on 4/21  -Remainder of recs pending d/w Dr. Calhoun  -Needs active Coags, T&S and COVID PCR on sputum prior to procedure  (Positive COVID result will NOT stop the trach)  -Will obtain consent from family

## 2021-04-19 NOTE — CONSULT NOTE ADULT - SUBJECTIVE AND OBJECTIVE BOX
ENT CONSULT NOTE    CC: trach for prolonged intubation    HPI: 56M w/ PMH of MU and gout in acute hypoxic respiratory failure 2/2 COVID c/b septic shock due to aspiration (now resolved), continued fevers and difficulty weaning off vent due to delirium.     SUBJECTIVE: Patient was seen and examined at bedside.  Pt is A&Ox0 and unable to respond to questions.  Per primary team, there are no contraindications to extension of the neck and patient has a patent airway with no difficulty in intubation from above.  Pt currently on ventilator VC-AC: FiO2 40%, PEEP 5, RR 20, V 450  No history of prior trach procedures  Pt is on heparin 7500 q8, but no other anticoagulants or antiplatelet agents.      PAST MEDICAL & SURGICAL HISTORY:  Sleep apnea      No Known Allergies    MEDICATIONS  (STANDING):  albuterol/ipratropium for Nebulization 3 milliLiter(s) Nebulizer every 4 hours  chlorhexidine 0.12% Liquid 15 milliLiter(s) Oral Mucosa every 12 hours  chlorhexidine 2% Cloths 1 Application(s) Topical daily  dextrose 40% Gel 15 Gram(s) Oral once  dextrose 5%. 1000 milliLiter(s) (150 mL/Hr) IV Continuous <Continuous>  dextrose 5%. 1000 milliLiter(s) (50 mL/Hr) IV Continuous <Continuous>  dextrose 5%. 1000 milliLiter(s) (100 mL/Hr) IV Continuous <Continuous>  dextrose 50% Injectable 25 Gram(s) IV Push once  dextrose 50% Injectable 12.5 Gram(s) IV Push once  dextrose 50% Injectable 25 Gram(s) IV Push once  glucagon  Injectable 1 milliGRAM(s) IntraMuscular once  heparin   Injectable 7500 Unit(s) SubCutaneous every 8 hours  insulin regular  human corrective regimen sliding scale   SubCutaneous every 6 hours  ketamine Infusion. 0.25 mG/kG/Hr (3 mL/Hr) IV Continuous <Continuous>  meropenem  IVPB 1000 milliGRAM(s) IV Intermittent every 12 hours  pantoprazole  Injectable 40 milliGRAM(s) IV Push daily  polyethylene glycol 3350 17 Gram(s) Oral every 12 hours  propofol Infusion 10 MICROgram(s)/kG/Min (7.2 mL/Hr) IV Continuous <Continuous>  QUEtiapine 50 milliGRAM(s) Oral every 12 hours  senna 2 Tablet(s) Oral at bedtime    MEDICATIONS  (PRN):  acetaminophen    Suspension .. 650 milliGRAM(s) Oral every 6 hours PRN Temp greater or equal to 38C (100.4F)  sodium chloride 0.9% lock flush 10 milliLiter(s) IV Push every 1 hour PRN Pre/post blood products, medications, blood draw, and to maintain line patency      Objective    ICU Vital Signs Last 24 Hrs  T(C): 37.6 (19 Apr 2021 17:27), Max: 38.7 (19 Apr 2021 06:00)  T(F): 99.7 (19 Apr 2021 17:27), Max: 101.6 (19 Apr 2021 06:00)  HR: 96 (19 Apr 2021 19:00) (85 - 101)  BP: --  BP(mean): --  ABP: 150/81 (19 Apr 2021 19:00) (101/53 - 150/81)  ABP(mean): 105 (19 Apr 2021 19:00) (69 - 105)  RR: 45 (19 Apr 2021 19:00) (26 - 45)  SpO2: 99% (19 Apr 2021 19:00) (93% - 100%)    PHYSICAL EXAM:    CONSTITUTIONAL: Sedated and intubated  HEAD: normocephalic, atraumatic.  EARS: The right/left pinna was normal.   NOSE: Normal external nose.  ORAL CAVITY/OROPHARYNX: ET tube in place, unable to visualize posterior oropharynx.  NECK: Thick, obese neck. Able to palpate landmarks with neck extension. No anterior neck scars. Unable to palpate high riding innominate.  RESPIRATORY: On ventilator - respirations unlabored, no increased work of breathing with use of accessory muscles and retractions. No stridor.  CARDIAC: Warm extremities, no cyanosis.                           8.1    7.63  )-----------( 283      ( 19 Apr 2021 05:57 )             27.0     04-19    148<H>  |  116<H>  |  25<H>  ----------------------------<  95  4.0   |  22  |  1.71<H>    Ca    7.8<L>      19 Apr 2021 16:04  Phos  1.9     04-19  Mg     2.4     04-19    TPro  5.9<L>  /  Alb  2.2<L>  /  TBili  0.4  /  DBili  x   /  AST  24  /  ALT  22  /  AlkPhos  111  04-19      I&O's Summary    18 Apr 2021 07:01  -  19 Apr 2021 07:00  --------------------------------------------------------  IN: 1338.8 mL / OUT: 5015 mL / NET: -3676.2 mL    19 Apr 2021 07:01  -  19 Apr 2021 19:34  --------------------------------------------------------  IN: 1546.4 mL / OUT: 2200 mL / NET: -653.6 mL         ENT CONSULT NOTE    CC: trach for prolonged intubation    HPI: 56M, w/ PMH MU (home CPAP) and gout,  presented on 4/05/21 to [facility] ED for SOB.  Had fatigue, malaise, myalgias x 4 days & worsening SOB x 2 days.  S/p first dose of Moderna vaccine on 3/25/2021.  In ED, had tachypnea and cyanosis with O2sat 14%; intubated after O2sat only 51% on NRB.  CXR w/ diffuse bilateral infiltrates, and COVID NP PCR positive.  Initial echo (4/05) w/ EF LV 15% and mild RV dilation.  Repeat echo (4/13) w/ EF 55%, mild concentric LVH; normal RV function; no pericardial effusion.  Started on vanco and zosyn for possible superimposed bacterial PNA since septic.  Now on merepenem (4/15-) for possible aspiration PNA after vomiting and fevers to 102F.  S/p Decadron and remdesivir for COVID.  Treated for GRACE (partly prerenal); hypernatremia (high 147, now WNL), anemia (hgb 11 on admission; no bleeding source; likely dilutional).  In acute hypoxic respiratory failure 2/2 COVID c/b septic shock due to aspiration (now resolved), continued fevers and difficulty weaning off vent due to delirium.   No history of prior trach procedures  Pt is on heparin 7500 q8, but no other anticoagulants or antiplatelet agents.      PAST MEDICAL & SURGICAL HISTORY:  Sleep apnea, on home CPAP  Gout  Obesity    SOCIAL HISTORY  Lives with wife  Never a smoker  Occasional alcohol use    FAMILY HISTORY  N/C    ROS:  Unable to obtain    ALLERGIES  No Known Allergies    MEDICATIONS  (STANDING):  albuterol/ipratropium for Nebulization 3 milliLiter(s) Nebulizer every 4 hours  chlorhexidine 0.12% Liquid 15 milliLiter(s) Oral Mucosa every 12 hours  chlorhexidine 2% Cloths 1 Application(s) Topical daily  dextrose 40% Gel 15 Gram(s) Oral once  dextrose 5%. 1000 milliLiter(s) (150 mL/Hr) IV Continuous <Continuous>  dextrose 5%. 1000 milliLiter(s) (50 mL/Hr) IV Continuous <Continuous>  dextrose 5%. 1000 milliLiter(s) (100 mL/Hr) IV Continuous <Continuous>  dextrose 50% Injectable 25 Gram(s) IV Push once  dextrose 50% Injectable 12.5 Gram(s) IV Push once  dextrose 50% Injectable 25 Gram(s) IV Push once  glucagon  Injectable 1 milliGRAM(s) IntraMuscular once  heparin   Injectable 7500 Unit(s) SubCutaneous every 8 hours  insulin regular  human corrective regimen sliding scale   SubCutaneous every 6 hours  ketamine Infusion. 0.25 mG/kG/Hr (3 mL/Hr) IV Continuous <Continuous>  meropenem  IVPB 1000 milliGRAM(s) IV Intermittent every 12 hours  pantoprazole  Injectable 40 milliGRAM(s) IV Push daily  polyethylene glycol 3350 17 Gram(s) Oral every 12 hours  propofol Infusion 10 MICROgram(s)/kG/Min (7.2 mL/Hr) IV Continuous <Continuous>  QUEtiapine 50 milliGRAM(s) Oral every 12 hours  senna 2 Tablet(s) Oral at bedtime    MEDICATIONS  (PRN):  acetaminophen    Suspension .. 650 milliGRAM(s) Oral every 6 hours PRN Temp greater or equal to 38C (100.4F)  sodium chloride 0.9% lock flush 10 milliLiter(s) IV Push every 1 hour PRN Pre/post blood products, medications, blood draw, and to maintain line patency        ICU Vital Signs Last 24 Hrs  T(C): 37.6 (19 Apr 2021 17:27), Max: 38.7 (19 Apr 2021 06:00)  T(F): 99.7 (19 Apr 2021 17:27), Max: 101.6 (19 Apr 2021 06:00)  HR: 96 (19 Apr 2021 19:00) (85 - 101)  ABP: 150/81 (19 Apr 2021 19:00) (101/53 - 150/81)  ABP(mean): 105 (19 Apr 2021 19:00) (69 - 105)  RR: 45 (19 Apr 2021 19:00) (26 - 45)  SpO2: 99% (19 Apr 2021 19:00) (93% - 100%)    VENT:  VC-AC:  , RR 20, PEEP 5, FiO2 40%    CONSTITUTIONAL: Sedated and intubated  NEURO:  Sedated.  Unable to respond to questions.  HEAD: normocephalic, atraumatic.  EARS: The right/left pinna were normal.   NOSE: Normal external nose.  ORAL CAVITY/OROPHARYNX: ET tube in place, unable to visualize posterior oropharynx.  NECK: Thick neck. Able to palpate thyroid and cricoid cartilages easily with neck extension. No anterior neck scars, no goiter. Unable to palpate high riding innominate.  RESPIRATORY: On ventilator - respirations unlabored, no increased work of breathing with use of accessory muscles and retractions.  CARDIAC: Warm extremities, no cyanosis.        LABS                 8.1    7.63  )-----------( 283      ( 19 Apr 2021 05:57 )             27.0     04-19    148<H>  |  116<H>  |  25<H>  ----------------------------<  95  4.0   |  22  |  1.71<H>    Ca    7.8<L>      19 Apr 2021 16:04  Phos  1.9     04-19  Mg     2.4     04-19    TPro  5.9<L>  /  Alb  2.2<L>  /  TBili  0.4  /  DBili  x   /  AST  24  /  ALT  22  /  AlkPhos  111  04-19    PT 15.2, PTT 21.5, INR 1.28   (15 Apr 2021)    COVID PCR testing:  (4/05) NP Positive

## 2021-04-19 NOTE — PROGRESS NOTE ADULT - ATTENDING COMMENTS
Patient seen and examined with house-staff during bedside rounds.  Resident note read, including vitals, physical findings, laboratory data, and radiological reports.   Revisions included below.  Direct personal management at bed side and extensive interpretation of the data.  Plan was outlined and discussed in details with the housestaff.  Decision making of high complexity  Action taken for acute disease activity to reflect the level of care provided:  - medication reconciliation  - review laboratory data  Over the weekend was noticed.  The patient aspirated and developed septic shock.  Patient is on mechanical ventilation with a peak pressure of 21 PEEP is 5 and FiO2 is 40%.  Patient on meropenem for pause septic shock.  Patient was weaned off the Levophed.  Patient on this propofol and was put on ketamine.  The patient is more than 2 weeks on the ventilator.  We will proceed with a PEG and trach.  Cultures are negative today.  Creatinine plateaued at 1.8.

## 2021-04-20 LAB
ALBUMIN SERPL ELPH-MCNC: 2 G/DL — LOW (ref 3.3–5)
ALP SERPL-CCNC: 83 U/L — SIGNIFICANT CHANGE UP (ref 40–120)
ALT FLD-CCNC: 22 U/L — SIGNIFICANT CHANGE UP (ref 10–45)
ANION GAP SERPL CALC-SCNC: 12 MMOL/L — SIGNIFICANT CHANGE UP (ref 5–17)
ANION GAP SERPL CALC-SCNC: 7 MMOL/L — SIGNIFICANT CHANGE UP (ref 5–17)
AST SERPL-CCNC: 25 U/L — SIGNIFICANT CHANGE UP (ref 10–40)
BASE EXCESS BLDA CALC-SCNC: -2.7 MMOL/L — LOW (ref -2–3)
BASOPHILS # BLD AUTO: 0.01 K/UL — SIGNIFICANT CHANGE UP (ref 0–0.2)
BASOPHILS NFR BLD AUTO: 0.1 % — SIGNIFICANT CHANGE UP (ref 0–2)
BILIRUB SERPL-MCNC: 0.4 MG/DL — SIGNIFICANT CHANGE UP (ref 0.2–1.2)
BLD GP AB SCN SERPL QL: NEGATIVE — SIGNIFICANT CHANGE UP
BUN SERPL-MCNC: 17 MG/DL — SIGNIFICANT CHANGE UP (ref 7–23)
BUN SERPL-MCNC: 20 MG/DL — SIGNIFICANT CHANGE UP (ref 7–23)
CALCIUM SERPL-MCNC: 8 MG/DL — LOW (ref 8.4–10.5)
CALCIUM SERPL-MCNC: 8.1 MG/DL — LOW (ref 8.4–10.5)
CHLORIDE SERPL-SCNC: 107 MMOL/L — SIGNIFICANT CHANGE UP (ref 96–108)
CHLORIDE SERPL-SCNC: 111 MMOL/L — HIGH (ref 96–108)
CO2 SERPL-SCNC: 22 MMOL/L — SIGNIFICANT CHANGE UP (ref 22–31)
CO2 SERPL-SCNC: 25 MMOL/L — SIGNIFICANT CHANGE UP (ref 22–31)
CREAT SERPL-MCNC: 1.44 MG/DL — HIGH (ref 0.5–1.3)
CREAT SERPL-MCNC: 1.49 MG/DL — HIGH (ref 0.5–1.3)
CULTURE RESULTS: SIGNIFICANT CHANGE UP
CULTURE RESULTS: SIGNIFICANT CHANGE UP
EOSINOPHIL # BLD AUTO: 0.31 K/UL — SIGNIFICANT CHANGE UP (ref 0–0.5)
EOSINOPHIL NFR BLD AUTO: 3.1 % — SIGNIFICANT CHANGE UP (ref 0–6)
GLUCOSE BLDC GLUCOMTR-MCNC: 130 MG/DL — HIGH (ref 70–99)
GLUCOSE BLDC GLUCOMTR-MCNC: 134 MG/DL — HIGH (ref 70–99)
GLUCOSE SERPL-MCNC: 118 MG/DL — HIGH (ref 70–99)
GLUCOSE SERPL-MCNC: 139 MG/DL — HIGH (ref 70–99)
HCO3 BLDA-SCNC: 21 MMOL/L — SIGNIFICANT CHANGE UP (ref 21–28)
HCT VFR BLD CALC: 26 % — LOW (ref 39–50)
HGB BLD-MCNC: 7.7 G/DL — LOW (ref 13–17)
IMM GRANULOCYTES NFR BLD AUTO: 0.6 % — SIGNIFICANT CHANGE UP (ref 0–1.5)
LYMPHOCYTES # BLD AUTO: 1.01 K/UL — SIGNIFICANT CHANGE UP (ref 1–3.3)
LYMPHOCYTES # BLD AUTO: 10.2 % — LOW (ref 13–44)
MAGNESIUM SERPL-MCNC: 2 MG/DL — SIGNIFICANT CHANGE UP (ref 1.6–2.6)
MCHC RBC-ENTMCNC: 23.5 PG — LOW (ref 27–34)
MCHC RBC-ENTMCNC: 29.6 GM/DL — LOW (ref 32–36)
MCV RBC AUTO: 79.3 FL — LOW (ref 80–100)
MONOCYTES # BLD AUTO: 0.55 K/UL — SIGNIFICANT CHANGE UP (ref 0–0.9)
MONOCYTES NFR BLD AUTO: 5.6 % — SIGNIFICANT CHANGE UP (ref 2–14)
NEUTROPHILS # BLD AUTO: 7.95 K/UL — HIGH (ref 1.8–7.4)
NEUTROPHILS NFR BLD AUTO: 80.4 % — HIGH (ref 43–77)
NRBC # BLD: 0 /100 WBCS — SIGNIFICANT CHANGE UP (ref 0–0)
PCO2 BLDA: 33 MMHG — LOW (ref 35–48)
PH BLDA: 7.42 — SIGNIFICANT CHANGE UP (ref 7.35–7.45)
PHOSPHATE SERPL-MCNC: 2.9 MG/DL — SIGNIFICANT CHANGE UP (ref 2.5–4.5)
PLATELET # BLD AUTO: 258 K/UL — SIGNIFICANT CHANGE UP (ref 150–400)
PO2 BLDA: 108 MMHG — SIGNIFICANT CHANGE UP (ref 83–108)
POTASSIUM SERPL-MCNC: 3.8 MMOL/L — SIGNIFICANT CHANGE UP (ref 3.5–5.3)
POTASSIUM SERPL-MCNC: 4.1 MMOL/L — SIGNIFICANT CHANGE UP (ref 3.5–5.3)
POTASSIUM SERPL-SCNC: 3.8 MMOL/L — SIGNIFICANT CHANGE UP (ref 3.5–5.3)
POTASSIUM SERPL-SCNC: 4.1 MMOL/L — SIGNIFICANT CHANGE UP (ref 3.5–5.3)
PROT SERPL-MCNC: 6.1 G/DL — SIGNIFICANT CHANGE UP (ref 6–8.3)
RBC # BLD: 3.28 M/UL — LOW (ref 4.2–5.8)
RBC # FLD: 19.9 % — HIGH (ref 10.3–14.5)
RH IG SCN BLD-IMP: POSITIVE — SIGNIFICANT CHANGE UP
SAO2 % BLDA: 98 % — SIGNIFICANT CHANGE UP (ref 95–100)
SARS-COV-2 RNA SPEC QL NAA+PROBE: DETECTED
SODIUM SERPL-SCNC: 139 MMOL/L — SIGNIFICANT CHANGE UP (ref 135–145)
SODIUM SERPL-SCNC: 145 MMOL/L — SIGNIFICANT CHANGE UP (ref 135–145)
SPECIMEN SOURCE: SIGNIFICANT CHANGE UP
SPECIMEN SOURCE: SIGNIFICANT CHANGE UP
WBC # BLD: 9.89 K/UL — SIGNIFICANT CHANGE UP (ref 3.8–10.5)
WBC # FLD AUTO: 9.89 K/UL — SIGNIFICANT CHANGE UP (ref 3.8–10.5)

## 2021-04-20 PROCEDURE — 99233 SBSQ HOSP IP/OBS HIGH 50: CPT | Mod: GC

## 2021-04-20 PROCEDURE — 71045 X-RAY EXAM CHEST 1 VIEW: CPT | Mod: 26

## 2021-04-20 RX ORDER — PANTOPRAZOLE SODIUM 20 MG/1
40 TABLET, DELAYED RELEASE ORAL
Refills: 0 | Status: DISCONTINUED | OUTPATIENT
Start: 2021-04-20 | End: 2021-04-20

## 2021-04-20 RX ORDER — DEXMEDETOMIDINE HYDROCHLORIDE IN 0.9% SODIUM CHLORIDE 4 UG/ML
0.2 INJECTION INTRAVENOUS
Qty: 200 | Refills: 0 | Status: DISCONTINUED | OUTPATIENT
Start: 2021-04-20 | End: 2021-04-20

## 2021-04-20 RX ORDER — PROPOFOL 10 MG/ML
10 INJECTION, EMULSION INTRAVENOUS
Qty: 1000 | Refills: 0 | Status: DISCONTINUED | OUTPATIENT
Start: 2021-04-20 | End: 2021-04-23

## 2021-04-20 RX ORDER — MIDAZOLAM HYDROCHLORIDE 1 MG/ML
0.02 INJECTION, SOLUTION INTRAMUSCULAR; INTRAVENOUS
Qty: 100 | Refills: 0 | Status: DISCONTINUED | OUTPATIENT
Start: 2021-04-20 | End: 2021-04-20

## 2021-04-20 RX ORDER — PANTOPRAZOLE SODIUM 20 MG/1
40 TABLET, DELAYED RELEASE ORAL DAILY
Refills: 0 | Status: DISCONTINUED | OUTPATIENT
Start: 2021-04-21 | End: 2021-04-27

## 2021-04-20 RX ORDER — MEROPENEM 1 G/30ML
1000 INJECTION INTRAVENOUS EVERY 8 HOURS
Refills: 0 | Status: COMPLETED | OUTPATIENT
Start: 2021-04-20 | End: 2021-04-22

## 2021-04-20 RX ORDER — POTASSIUM CHLORIDE 20 MEQ
20 PACKET (EA) ORAL ONCE
Refills: 0 | Status: COMPLETED | OUTPATIENT
Start: 2021-04-20 | End: 2021-04-20

## 2021-04-20 RX ORDER — SODIUM CHLORIDE 9 MG/ML
1000 INJECTION, SOLUTION INTRAVENOUS
Refills: 0 | Status: DISCONTINUED | OUTPATIENT
Start: 2021-04-20 | End: 2021-04-20

## 2021-04-20 RX ORDER — POLYETHYLENE GLYCOL 3350 17 G/17G
17 POWDER, FOR SOLUTION ORAL EVERY 24 HOURS
Refills: 0 | Status: DISCONTINUED | OUTPATIENT
Start: 2021-04-21 | End: 2021-04-27

## 2021-04-20 RX ORDER — DEXMEDETOMIDINE HYDROCHLORIDE IN 0.9% SODIUM CHLORIDE 4 UG/ML
0.2 INJECTION INTRAVENOUS
Qty: 400 | Refills: 0 | Status: DISCONTINUED | OUTPATIENT
Start: 2021-04-20 | End: 2021-04-21

## 2021-04-20 RX ORDER — HEPARIN SODIUM 5000 [USP'U]/ML
7500 INJECTION INTRAVENOUS; SUBCUTANEOUS ONCE
Refills: 0 | Status: COMPLETED | OUTPATIENT
Start: 2021-04-20 | End: 2021-04-20

## 2021-04-20 RX ORDER — MIDAZOLAM HYDROCHLORIDE 1 MG/ML
4 INJECTION, SOLUTION INTRAMUSCULAR; INTRAVENOUS ONCE
Refills: 0 | Status: DISCONTINUED | OUTPATIENT
Start: 2021-04-20 | End: 2021-04-20

## 2021-04-20 RX ADMIN — KETAMINE HYDROCHLORIDE 3 MG/KG/HR: 100 INJECTION INTRAMUSCULAR; INTRAVENOUS at 04:22

## 2021-04-20 RX ADMIN — Medication 3 MILLILITER(S): at 17:02

## 2021-04-20 RX ADMIN — CHLORHEXIDINE GLUCONATE 1 APPLICATION(S): 213 SOLUTION TOPICAL at 11:49

## 2021-04-20 RX ADMIN — CHLORHEXIDINE GLUCONATE 15 MILLILITER(S): 213 SOLUTION TOPICAL at 17:34

## 2021-04-20 RX ADMIN — MEROPENEM 100 MILLIGRAM(S): 1 INJECTION INTRAVENOUS at 10:02

## 2021-04-20 RX ADMIN — Medication 650 MILLIGRAM(S): at 05:21

## 2021-04-20 RX ADMIN — PANTOPRAZOLE SODIUM 40 MILLIGRAM(S): 20 TABLET, DELAYED RELEASE ORAL at 11:49

## 2021-04-20 RX ADMIN — CHLORHEXIDINE GLUCONATE 15 MILLILITER(S): 213 SOLUTION TOPICAL at 05:21

## 2021-04-20 RX ADMIN — QUETIAPINE FUMARATE 50 MILLIGRAM(S): 200 TABLET, FILM COATED ORAL at 09:22

## 2021-04-20 RX ADMIN — MIDAZOLAM HYDROCHLORIDE 4 MILLIGRAM(S): 1 INJECTION, SOLUTION INTRAMUSCULAR; INTRAVENOUS at 05:15

## 2021-04-20 RX ADMIN — QUETIAPINE FUMARATE 50 MILLIGRAM(S): 200 TABLET, FILM COATED ORAL at 21:00

## 2021-04-20 RX ADMIN — HEPARIN SODIUM 7500 UNIT(S): 5000 INJECTION INTRAVENOUS; SUBCUTANEOUS at 12:02

## 2021-04-20 RX ADMIN — SODIUM CHLORIDE 75 MILLILITER(S): 9 INJECTION, SOLUTION INTRAVENOUS at 15:55

## 2021-04-20 RX ADMIN — MEROPENEM 100 MILLIGRAM(S): 1 INJECTION INTRAVENOUS at 17:34

## 2021-04-20 RX ADMIN — Medication 650 MILLIGRAM(S): at 06:53

## 2021-04-20 RX ADMIN — HEPARIN SODIUM 7500 UNIT(S): 5000 INJECTION INTRAVENOUS; SUBCUTANEOUS at 05:21

## 2021-04-20 RX ADMIN — HEPARIN SODIUM 7500 UNIT(S): 5000 INJECTION INTRAVENOUS; SUBCUTANEOUS at 20:55

## 2021-04-20 RX ADMIN — Medication 3 MILLILITER(S): at 01:16

## 2021-04-20 RX ADMIN — Medication 3 MILLILITER(S): at 12:30

## 2021-04-20 RX ADMIN — Medication 3 MILLILITER(S): at 20:28

## 2021-04-20 RX ADMIN — Medication 20 MILLIEQUIVALENT(S): at 06:57

## 2021-04-20 RX ADMIN — PROPOFOL 7.2 MICROGRAM(S)/KG/MIN: 10 INJECTION, EMULSION INTRAVENOUS at 13:45

## 2021-04-20 RX ADMIN — Medication 3 MILLILITER(S): at 04:04

## 2021-04-20 RX ADMIN — POLYETHYLENE GLYCOL 3350 17 GRAM(S): 17 POWDER, FOR SOLUTION ORAL at 06:57

## 2021-04-20 RX ADMIN — DEXMEDETOMIDINE HYDROCHLORIDE IN 0.9% SODIUM CHLORIDE 6 MICROGRAM(S)/KG/HR: 4 INJECTION INTRAVENOUS at 21:26

## 2021-04-20 RX ADMIN — SODIUM CHLORIDE 150 MILLILITER(S): 9 INJECTION, SOLUTION INTRAVENOUS at 08:25

## 2021-04-20 RX ADMIN — DEXMEDETOMIDINE HYDROCHLORIDE IN 0.9% SODIUM CHLORIDE 6 MICROGRAM(S)/KG/HR: 4 INJECTION INTRAVENOUS at 15:11

## 2021-04-20 RX ADMIN — Medication 3 MILLILITER(S): at 08:10

## 2021-04-20 NOTE — PROGRESS NOTE ADULT - SUBJECTIVE AND OBJECTIVE BOX
SUBJECTIVE  24 hour events:     REVIEW OF SYSTEMS:   See HPI (as per chart review), unable to obtain 2/2 ETT and sedation                                    OBJECTIVE    Vital Signs Last 24 Hrs  T(C): 38.1 (2021 06:00), Max: 38.1 (2021 10:01)  T(F): 100.5 (2021 06:00), Max: 100.5 (2021 10:01)  HR: 91 (2021 07:00) (85 - 100)  BP: --  BP(mean): --  RR: 28 (2021 07:00) (28 - 45)  SpO2: 98% (2021 07:00) (94% - 100%)      Vent settings   Mode: AC/ CMV (Assist Control/ Continuous Mandatory Ventilation), RR (machine): 20, TV (machine): 450, FiO2: 40, PEEP: 5, ITime: 1, MAP: 6, PIP: 9    I&O's Detail    2021 07:01  -  2021 07:00  --------------------------------------------------------  IN:    dextrose 5%: 100 mL    dextrose 5%: 2250 mL    Free Water: 50 mL    IV PiggyBack: 850 mL    IV PiggyBack: 50 mL    Ketamine: 120 mL    Ketamine: 78 mL    Propofol: 428.4 mL  Total IN: 3926.4 mL    OUT:    Indwelling Catheter - Urethral (mL): 2710 mL    Nasogastric/Oral tube (mL): 500 mL    Rectal Tube (mL): 400 mL  Total OUT: 3610 mL    Total NET: 316.4 mL    Labs                        7.7    9.89  )-----------( 258      ( 2021 05:52 )             26.0     04-20    145  |  111<H>  |  20  ----------------------------<  118<H>  3.8   |  22  |  1.49<H>    Ca    8.0<L>      2021 05:52  Phos  2.9     04-20  Mg     2.0     04-20    TPro  6.1  /  Alb  2.0<L>  /  TBili  0.4  /  DBili  x   /  AST  25  /  ALT  22  /  AlkPhos  83  -      ABG - ( 2021 05:20 )  pH, Arterial: 7.42  pH, Blood: x     /  pCO2: 33    /  pO2: 108   / HCO3: 21    / Base Excess: -2.7  /  SaO2: 98                    *Microbiology  Culture - Blood (collected 2021 10:03)  Source: .Blood Blood  Preliminary Report (2021 23:00):    No growth at 12 hours    Culture - Blood (collected 2021 10:03)  Source: .Blood Blood  Preliminary Report (2021 23:00):    No growth at 12 hours        *UA  Urinalysis Basic - ( 2021 09:46 )    Color: Yellow / Appearance: Cloudy / S.020 / pH: x  Gluc: x / Ketone: NEGATIVE  / Bili: Negative / Urobili: 0.2 E.U./dL   Blood: x / Protein: 30 mg/dL / Nitrite: NEGATIVE   Leuk Esterase: NEGATIVE / RBC: < 5 /HPF / WBC < 5 /HPF   Sq Epi: x / Non Sq Epi: 0-5 /HPF / Bacteria: Present /HPF        RADIOLOGY    *CXr    MEDICATIONS  (STANDING):  albuterol/ipratropium for Nebulization 3 milliLiter(s) Nebulizer every 4 hours  chlorhexidine 0.12% Liquid 15 milliLiter(s) Oral Mucosa every 12 hours  chlorhexidine 2% Cloths 1 Application(s) Topical daily  dextrose 40% Gel 15 Gram(s) Oral once  dextrose 5%. 1000 milliLiter(s) (150 mL/Hr) IV Continuous <Continuous>  dextrose 5%. 1000 milliLiter(s) (50 mL/Hr) IV Continuous <Continuous>  dextrose 5%. 1000 milliLiter(s) (100 mL/Hr) IV Continuous <Continuous>  dextrose 50% Injectable 25 Gram(s) IV Push once  dextrose 50% Injectable 12.5 Gram(s) IV Push once  dextrose 50% Injectable 25 Gram(s) IV Push once  glucagon  Injectable 1 milliGRAM(s) IntraMuscular once  heparin   Injectable 7500 Unit(s) SubCutaneous every 8 hours  insulin regular  human corrective regimen sliding scale   SubCutaneous every 6 hours  ketamine Infusion. 0.25 mG/kG/Hr (3 mL/Hr) IV Continuous <Continuous>  meropenem  IVPB 1000 milliGRAM(s) IV Intermittent every 12 hours  pantoprazole  Injectable 40 milliGRAM(s) IV Push daily  polyethylene glycol 3350 17 Gram(s) Oral every 12 hours  propofol Infusion 10 MICROgram(s)/kG/Min (7.2 mL/Hr) IV Continuous <Continuous>  QUEtiapine 50 milliGRAM(s) Oral every 12 hours  senna 2 Tablet(s) Oral at bedtime    MEDICATIONS  (PRN):  acetaminophen    Suspension .. 650 milliGRAM(s) Oral every 6 hours PRN Temp greater or equal to 38C (100.4F)  sodium chloride 0.9% lock flush 10 milliLiter(s) IV Push every 1 hour PRN Pre/post blood products, medications, blood draw, and to maintain line patency        PHYSICAL EXAM:      LINES (DATES):      ASSESSMENT AND PLAN:  ** SUBJECTIVE  Overnight events: no acute events; requiring versed IVP intermittently for vent dys-synchrony; no pressors required    REVIEW OF SYSTEMS:   See HPI (as per chart review), unable to obtain 2/2 ETT and sedation                                    OBJECTIVE  Vital Signs Last 24 Hrs  T(C): 38.1 (2021 06:00), Max: 38.1 (2021 10:01)  T(F): 100.5 (2021 06:00), Max: 100.5 (2021 10:01)  HR: 91 (2021 07:00) (85 - 100)  BP: -140's  BP(mean): MAP 80-90's  RR: 28 (2021 07:00) (28 - 45)  SpO2: 98% (2021 07:00) (94% - 100%)    Vent settings   Mode: AC/ CMV (Assist Control/ Continuous Mandatory Ventilation), RR (machine): 20, TV (machine): 450, FiO2: 40, PEEP: 5, ITime: 1, MAP: 6, PIP: 9    I&O's Detail  2021 07:01  -  2021 07:00  --------------------------------------------------------  IN:    dextrose 5%: 100 mL    dextrose 5%: 2250 mL    Free Water: 50 mL    IV PiggyBack: 850 mL    IV PiggyBack: 50 mL    Ketamine: 120 mL    Ketamine: 78 mL    Propofol: 428.4 mL  Total IN: 3926.4 mL    OUT:    Indwelling Catheter - Urethral (mL): 2710 mL    Nasogastric/Oral tube (mL): 500 mL    Rectal Tube (mL): 400 mL  Total OUT: 3610 mL    Total NET: 316.4 mL    Labs                        7.7    9.89  )-----------( 258      ( 2021 05:52 )             26.0     04-20    145  |  111<H>  |  20  ----------------------------<  118<H>  3.8   |  22  |  1.49<H>    Ca    8.0<L>      2021 05:52  Phos  2.9     04-  Mg     2.0     -20    TPro  6.1  /  Alb  2.0<L>  /  TBili  0.4  /  DBili  x   /  AST  25  /  ALT  22  /  AlkPhos  83  -20    ABG - ( 2021 05:20 )  pH, Arterial: 7.42  pH, Blood: x     /  pCO2: 33    /  pO2: 108   / HCO3: 21    / Base Excess: -2.7  /  SaO2: 98        *Microbiology  Culture - Blood (collected 2021 10:03)  Source: .Blood Blood  Preliminary Report (2021 23:00):    No growth at 12 hours    Culture - Blood (collected 2021 10:03)  Source: .Blood Blood  Preliminary Report (2021 23:00):    No growth at 12 hours    Culture - Sputum . (21 @ 14:11)   Gram Stain:   No epithelial cells seen   Numerous white blood cells   Moderate Gram Negative Rods   Specimen Source: .Sputum Sputum   Culture Results:   Normal Respiratory Lexie present     Urinalysis Basic - ( 2021 09:46 )  Color: Yellow / Appearance: Cloudy / S.020 / pH: x  Gluc: x / Ketone: NEGATIVE  / Bili: Negative / Urobili: 0.2 E.U./dL   Blood: x / Protein: 30 mg/dL / Nitrite: NEGATIVE   Leuk Esterase: NEGATIVE / RBC: < 5 /HPF / WBC < 5 /HPF   Sq Epi: x / Non Sq Epi: 0-5 /HPF / Bacteria: Present /HPF  Iron with Total Binding Capacity in AM (21 @ 05:57)   % Saturation, Iron: 33 %   Iron - Total Binding Capacity.: 155 ug/dL   Iron Total, Serum: 51 ug/dL   Unsaturated Iron Binding Capacity: 104 ug/dL Folate, Serum in AM (21 @ 11:52)   Folate, Serum: 2.0: Test Repeated ng/mL    Ferritin 342  MCV 79.3    RADIOLOGY  *CXr reviewed with fellow and attending during AM bedside rounds    MEDICATIONS  (STANDING):  albuterol/ipratropium for Nebulization 3 milliLiter(s) Nebulizer every 4 hours  chlorhexidine 0.12% Liquid 15 milliLiter(s) Oral Mucosa every 12 hours  chlorhexidine 2% Cloths 1 Application(s) Topical daily  dextrose 40% Gel 15 Gram(s) Oral once  dextrose 5%. 1000 milliLiter(s) (150 mL/Hr) IV Continuous <Continuous>  dextrose 5%. 1000 milliLiter(s) (50 mL/Hr) IV Continuous <Continuous>  dextrose 5%. 1000 milliLiter(s) (100 mL/Hr) IV Continuous <Continuous>  dextrose 50% Injectable 25 Gram(s) IV Push once  dextrose 50% Injectable 12.5 Gram(s) IV Push once  dextrose 50% Injectable 25 Gram(s) IV Push once  glucagon  Injectable 1 milliGRAM(s) IntraMuscular once  heparin   Injectable 7500 Unit(s) SubCutaneous every 8 hours  insulin regular  human corrective regimen sliding scale   SubCutaneous every 6 hours  ketamine Infusion. 0.25 mG/kG/Hr (3 mL/Hr) IV Continuous <Continuous> @ 0.5  meropenem  IVPB 1000 milliGRAM(s) IV Intermittent every 12 hours  pantoprazole  Injectable 40 milliGRAM(s) IV Push daily  polyethylene glycol 3350 17 Gram(s) Oral every 12 hours  propofol Infusion 10 MICROgram(s)/kG/Min (7.2 mL/Hr) IV Continuous <Continuous> @ 25 mcg  QUEtiapine 50 milliGRAM(s) Oral every 12 hours  senna 2 Tablet(s) Oral at bedtime    MEDICATIONS  (PRN):  acetaminophen    Suspension .. 650 milliGRAM(s) Oral every 6 hours PRN Temp greater or equal to 38C (100.4F)  sodium chloride 0.9% lock flush 10 milliLiter(s) IV Push every 1 hour PRN Pre/post blood products, medications, blood draw, and to maintain line patency    PHYSICAL EXAM:  GENERAL: obese, intubated and sedated  SKIN/HAIR/NAILS: Nails without clubbing or cyanosis. CR<2 secs. No lesions, rash, petechiae, erythema or ecchymoses. Anicteric.   HEAD AND NECK: The skull is NC/AT. B/L Sclera white. 4 mm PERRL (hx of cataract sx). Nasal mucous membrane dry; NGT in place. Trachea midline, neck supple.   THORAX/LUNGS: Thorax is symmetric with poor expansion 2/2 tachypnea, assisted by mechanical ventilation. Lung sounds with rhonchi throughout, diminished at the bases.  CARDIOVASCULAR: No JVD. Carotid upstrokes are brisk, without bruits. +S1 and S2, RRR; no extra heart sounds or M/R/G. NSR at the time of my exam.  ABDOMEN/: Abdomen is rounded with hypoactive BS in all 4 quadrants; it is soft, no palpable masses; no grimacing to palpation noted; last BM  as patient has rectal tube with liquid brown stool. Alas catheter in place.   PERIPHERAL VASCULAR: Extremities are warm (upper) and cool (lower), radial and dorsalis pedis pulses +1 and symmetric. +2 generalized non-pitting edema noted. No clubbing, no cyanosis.  MUSCULOSKELETAL: no active ROM 2/2 sedation. No evidence of swelling or deformity.  NEUROLOGICAL: Patient attempts to open eyes with physical stimulation; however, not able to follow commands, wrist restraints in place to prevent patient harm; sedated on propofol and ketamine at this time.   LINES (DATES): R IJ TLC , L axillary art line 16, IVL    ASSESSMENT AND PLAN:  57 yo M with PMHx of MU and gout who presented to ED with c/o progressively worsening SOB admitted to ICU for management of AHRF in the setting of COVID.     NEURO  #Sedated   -Ketamine added to decrease propofol requirements  -Seroquel 50 mg Q12 hr, QTc monitoring daily  -Neuro checks per ICU protocol, pain/sedation meds assessed and addressed  -Continue with wrist restraints to prevent patient harm    RESPIRATORY  #Acute hypoxic hypercapnic respiratory failure in the setting of COVID PNA  -Patient initially presented with O2 saturation of 14% and cyanotic requiring immediate intubation  -CXr with diffuse bilateral opacities with + COVID19 PCR  -Elevated inflammatory markers  -On AC/VC mechanical Ventilation:  Mode: AC/ CMV; RR: 20; TV: 450; FiO2: 40; PEEP: 5   -Has been on vent for 2 weeks; will need trach & PEG, possibly  as per ENT (will send COViD swab today)    #COVID  -CXr with diffuse bilateral opacities with + COVID19 PCR  -Start Date  Remdesivir and Decadron 6 mg (x 10 day course)  -Decadron d/c'd after 4/13 AM dose due to current clinical presentation  -Remdesevir course given - per chart review (was briefly d/c'd due to renal fxn worsening but was REORDERED and completed)    #MU  -As per patient's wife, patient has MU on CPAP  -Patient sleeps with 2-3 pillows at night, attributed to MU    CARDIOVASCULAR  #Atrial Fibrillation- NOT ACTIVE  -Patient went into afib with RVR  @ about 1830  -No new medications started as rhythm is paroxysmal  -If sustained, would consider lopressor IVP if off Levo gtt; review of telemetry does not show pt in any afib  -If patient remains on Levo, can consider amiodarone cautiously (patient does have RBBB on this admission and sinus bradycardia)  -Anticoagulation for afib not started, patient now in NSR and [id number] Score 0 as patient has no CV hx (however, on admission patient was found to have low EF- repeat ECHO with EF 55%)    #RV dilation  -Bedside POCUS demonstrating dilated RV and mildly enlarged pulm artery possibly 2/2 MU vs PE (unlikely)  -Echo: Severe hypokinesis of the entire anteroseptum/inferoseptum. Akinesis of the mid to apical anterolateral region, apical anterior, apical inferior and true apex. Mild hypokinesis of the basal anterolateral, basal anterior and basal inferior regions. The estimated left ventricular systolic function is 15%. (repeat  55%). The right ventricle is mildly dilated. Right ventricular systolic function is mildly reduced. No pericardial effusion is seen.  -Based on these findings, cardiology was consulted  -As per spouse, no hx of cardiac disease and no change in ADLs or activity tolerance prior to current presentation  -Holding off on diuretics as per primary team    #LV Dysfunction  As per Cardiology:  -Echo reviewed. Images were poor due to body habitus but suggest a stress cardiomyopathy pattern (takotsubo cardiomyopathy) rather than an ischemic event   -EKG reviewed showing NSR, with non specific ST changes and poor R wave progression in the precordial leads likely due to body habitus  -Clinically patient is warm and well perfused and making urine.  -Patient's LV dysfunction appears to be multifactorial COVID, Morbid Obesity, Sleep Apnea. Myocarditis is also a possibility; now exacerbated in setting of COVID PNA  -Unfortunately patient is unable to undergo further ischemic or confirmatory imaging/workup  -Hold off BB or ACE/ARB therapy while on pressors with Renal impairment  -EKG with RBBB, without ischemic changes  -Please keep K>4 and Mg>2  -Repeat ECHO : There is mild concentric left ventricular hypertrophy. The left ventricle is normal in size and systolic function with a calculated ejection fraction of 55%.  Right Ventricle: The right ventricle is normal in size. Right ventricular systolic function is normal. Pericardium: No pericardial effusion is seen.    GI  #Abdominal Distention  -Tube feedings on hold  -Distention improving as compared to previous exams  -[place] sump output 500 mL on  @1800  -[place] sump switched to small bore feeding NGT    #Diarrhea  -Rectal tube in plce  -Lactulose d/c'd    RENAL//F&E  #Acute Kidney Injury  -Alas catheter changed   -SCr peaked in course to 3.16 likely 2/2 alas obstruction  -Cr improved from fluids given, will continue fluids (see below)  -Urine lytes  showing pre-renal  -Baseline creatinine unknown, no reported kidney disease  -Electrolytes stable, K repleted  -Strict I&O monitoring  -Avoid nephrotoxic medications    #Hypernatremia  -Na today is 145 today from 148 yesterday  -Will continue D5W at 150mL/hr    ID  #Persistent Fever  -Recent aspiration pneumonia c/b septic shock (now resolved); sputum culture yielded growth of normal respiratory lexie. Fever despite meropenem ?2/ periodic aspiration vs. COVID-19 vs. new nosocomial infectious process. Ongoing [facility] discussion re: trach/PEG. Can continue empiric meropenem to complete 7d course through .  -Will discuss if central line needed, date is     #Aspiration pneumonia vs HAP  -Found  while trying to extubate w/ delirium, and numerous thick secretions in oral cavity requiring suctioning. 4/15 AM found w/ 102.3F oral temp and WBC increase to 18s, suspect possible aspiration pneumonia, pt. is still on zosyn, with previous negative MRSA swab.  bcx NGTD, 4/15 bcx NGTD  -Zosyn started  and d/c'd   -Decision made to broaden antibiotics to meropenem 1 gram and vancomycin 1250 mg  -MRSA swab negative, no addt'l vanco dosed  -Continue meropenem x 7 day course (ending )    #COVID PNA   -Plan as above  -Off steroid course, remdesivir course completed    #?Fungal Infxn  -Seen by ID, -ID ok'd Gita (loading doses x 2 given, now on 400 mg q12hr), stopped  given improvement in pt. status before medication was really started   -As per ID recs ====> s/p BAL --cultures have so far only yielded Keila dubliniensis which is probably a respiratory tract colonizer (and not the etiology of the patient's fevers). Voriconazole is not indicated for respiratory tract colonization with Candida. Suspect recent high fevers/leukocytosis due to aspiration event; Doubt COVID-19 associated pulmonary aspergillosis given BAL cultures without growth of mold, absence of cavitation on CXR, and time course relatively early for development of this rare entity. Moreover, would not attribute new fevers to discontinuation of voriconazole.  -Daily EKG for QTc monitoring  -BAL sputum samples taken from bronch , showing yeast   -Sputum cx from , no evidence of yeast  -Peripheral blood cultures from  NG @ 12 hrs  -Central line from   -Maintain MAP > 70 mmHg    ENDOCRINE  #Hyperglycemia  -q6hr correctional scale  -Lantus and regular insulin d/c'd as patient NPO  -Blood glucose trend   -Decadron started 45 AM, d/c'd   -A1c 6.2    HEME  #Anemia  -Hgb 11 on admission; unknown baseline. No evidence of bleed.  -Hgb  7.3, possibly hemodiluted as patient did get fluids and albumin overnight, s/p transfusion 1U PRBC ;  8.1;  7.7  -Iron studies sent, results as above   -Will send coags for possible procedure tomorrow  -Maintain active T+S    -DVT ppx with heparin SubQ    DISPO/GOALS OF CARE:  Patient requires continuous monitoring with bedside rhythm monitoring, arterial line, pulse oximetry, ventilator monitoring and intermittent blood gas analysis. Care plan discussed with ICU care team. Patient remains critical at this time.   SUBJECTIVE  Overnight events: no acute events; requiring versed IVP intermittently for vent dys-synchrony; no pressors required    REVIEW OF SYSTEMS:   See HPI (as per chart review), unable to obtain 2/2 ETT and sedation                                    OBJECTIVE  Vital Signs Last 24 Hrs  T(C): 38.1 (2021 06:00), Max: 38.1 (2021 10:01)  T(F): 100.5 (2021 06:00), Max: 100.5 (2021 10:01)  HR: 91 (2021 07:00) (85 - 100)  BP: -140's  BP(mean): MAP 80-90's  RR: 28 (2021 07:00) (28 - 45)  SpO2: 98% (2021 07:00) (94% - 100%)    Vent settings   Mode: AC/ CMV (Assist Control/ Continuous Mandatory Ventilation), RR (machine): 20, TV (machine): 450, FiO2: 40, PEEP: 5, ITime: 1, MAP: 6, PIP: 9    I&O's Detail  2021 07:01  -  2021 07:00  --------------------------------------------------------  IN:    dextrose 5%: 100 mL    dextrose 5%: 2250 mL    Free Water: 50 mL    IV PiggyBack: 850 mL    IV PiggyBack: 50 mL    Ketamine: 120 mL    Ketamine: 78 mL    Propofol: 428.4 mL  Total IN: 3926.4 mL    OUT:    Indwelling Catheter - Urethral (mL): 2710 mL    Nasogastric/Oral tube (mL): 500 mL    Rectal Tube (mL): 400 mL  Total OUT: 3610 mL    Total NET: 316.4 mL    Labs                        7.7    9.89  )-----------( 258      ( 2021 05:52 )             26.0     04-20    145  |  111<H>  |  20  ----------------------------<  118<H>  3.8   |  22  |  1.49<H>    Ca    8.0<L>      2021 05:52  Phos  2.9     04-  Mg     2.0     -20    TPro  6.1  /  Alb  2.0<L>  /  TBili  0.4  /  DBili  x   /  AST  25  /  ALT  22  /  AlkPhos  83  -20    ABG - ( 2021 05:20 )  pH, Arterial: 7.42  pH, Blood: x     /  pCO2: 33    /  pO2: 108   / HCO3: 21    / Base Excess: -2.7  /  SaO2: 98        *Microbiology  Culture - Blood (collected 2021 10:03)  Source: .Blood Blood  Preliminary Report (2021 23:00):    No growth at 12 hours    Culture - Blood (collected 2021 10:03)  Source: .Blood Blood  Preliminary Report (2021 23:00):    No growth at 12 hours    Culture - Sputum . (21 @ 14:11)   Gram Stain:   No epithelial cells seen   Numerous white blood cells   Moderate Gram Negative Rods   Specimen Source: .Sputum Sputum   Culture Results:   Normal Respiratory Lexie present     Urinalysis Basic - ( 2021 09:46 )  Color: Yellow / Appearance: Cloudy / S.020 / pH: x  Gluc: x / Ketone: NEGATIVE  / Bili: Negative / Urobili: 0.2 E.U./dL   Blood: x / Protein: 30 mg/dL / Nitrite: NEGATIVE   Leuk Esterase: NEGATIVE / RBC: < 5 /HPF / WBC < 5 /HPF   Sq Epi: x / Non Sq Epi: 0-5 /HPF / Bacteria: Present /HPF  Iron with Total Binding Capacity in AM (21 @ 05:57)   % Saturation, Iron: 33 %   Iron - Total Binding Capacity.: 155 ug/dL   Iron Total, Serum: 51 ug/dL   Unsaturated Iron Binding Capacity: 104 ug/dL Folate, Serum in AM (21 @ 11:52)   Folate, Serum: 2.0: Test Repeated ng/mL    Ferritin 342  MCV 79.3    RADIOLOGY  *CXr reviewed with fellow and attending during AM bedside rounds    MEDICATIONS  (STANDING):  albuterol/ipratropium for Nebulization 3 milliLiter(s) Nebulizer every 4 hours  chlorhexidine 0.12% Liquid 15 milliLiter(s) Oral Mucosa every 12 hours  chlorhexidine 2% Cloths 1 Application(s) Topical daily  dextrose 40% Gel 15 Gram(s) Oral once  dextrose 5%. 1000 milliLiter(s) (150 mL/Hr) IV Continuous <Continuous>  dextrose 5%. 1000 milliLiter(s) (50 mL/Hr) IV Continuous <Continuous>  dextrose 5%. 1000 milliLiter(s) (100 mL/Hr) IV Continuous <Continuous>  dextrose 50% Injectable 25 Gram(s) IV Push once  dextrose 50% Injectable 12.5 Gram(s) IV Push once  dextrose 50% Injectable 25 Gram(s) IV Push once  glucagon  Injectable 1 milliGRAM(s) IntraMuscular once  heparin   Injectable 7500 Unit(s) SubCutaneous every 8 hours  insulin regular  human corrective regimen sliding scale   SubCutaneous every 6 hours  ketamine Infusion. 0.25 mG/kG/Hr (3 mL/Hr) IV Continuous <Continuous> @ 0.5  meropenem  IVPB 1000 milliGRAM(s) IV Intermittent every 12 hours  pantoprazole  Injectable 40 milliGRAM(s) IV Push daily  polyethylene glycol 3350 17 Gram(s) Oral every 12 hours  propofol Infusion 10 MICROgram(s)/kG/Min (7.2 mL/Hr) IV Continuous <Continuous> @ 25 mcg  QUEtiapine 50 milliGRAM(s) Oral every 12 hours  senna 2 Tablet(s) Oral at bedtime    MEDICATIONS  (PRN):  acetaminophen    Suspension .. 650 milliGRAM(s) Oral every 6 hours PRN Temp greater or equal to 38C (100.4F)  sodium chloride 0.9% lock flush 10 milliLiter(s) IV Push every 1 hour PRN Pre/post blood products, medications, blood draw, and to maintain line patency    PHYSICAL EXAM:  GENERAL: obese, intubated and sedated  SKIN/HAIR/NAILS: Nails without clubbing or cyanosis. CR<2 secs. No lesions, rash, petechiae, erythema or ecchymoses. Anicteric.   HEAD AND NECK: The skull is NC/AT. B/L Sclera white. 4 mm PERRL (hx of cataract sx). Nasal mucous membrane dry; NGT in place. Trachea midline, neck supple.   THORAX/LUNGS: Thorax is symmetric with poor expansion 2/2 tachypnea, assisted by mechanical ventilation. Lung sounds with rhonchi throughout, diminished at the bases.  CARDIOVASCULAR: No JVD. Carotid upstrokes are brisk, without bruits. +S1 and S2, RRR; no extra heart sounds or M/R/G. NSR at the time of my exam.  ABDOMEN/: Abdomen is rounded with hypoactive BS in all 4 quadrants; it is soft, no palpable masses; no grimacing to palpation noted; last BM  as patient has rectal tube with liquid brown stool. Alas catheter in place.   PERIPHERAL VASCULAR: Extremities are warm (upper) and cool (lower), radial and dorsalis pedis pulses +1 and symmetric. +2 generalized non-pitting edema noted. No clubbing, no cyanosis.  MUSCULOSKELETAL: no active ROM 2/2 sedation. No evidence of swelling or deformity.  NEUROLOGICAL: Patient attempts to open eyes with physical stimulation; however, not able to follow commands, wrist restraints in place to prevent patient harm; sedated on propofol and ketamine at this time.   LINES (DATES): R IJ TLC , L axillary art line 16, IVL    ASSESSMENT AND PLAN:  57 yo M with PMHx of MU and gout who presented to ED with c/o progressively worsening SOB admitted to ICU for management of AHRF in the setting of COVID.     NEURO  #Sedated   -Ketamine added to decrease propofol requirements  -Seroquel 50 mg Q12 hr, QTc monitoring daily  -Neuro checks per ICU protocol, pain/sedation meds assessed and addressed  -Continue with wrist restraints to prevent patient harm    RESPIRATORY  #Acute hypoxic hypercapnic respiratory failure in the setting of COVID PNA  -Patient initially presented with O2 saturation of 14% and cyanotic requiring immediate intubation  -CXr with diffuse bilateral opacities with + COVID19 PCR  -Elevated inflammatory markers  -On AC/VC mechanical Ventilation:  Mode: AC/ CMV; RR: 20; TV: 450; FiO2: 40; PEEP: 5   -Has been on vent for 2 weeks; will need trach & PEG, possibly  as per ENT (will send COViD swab today)    #COVID  -CXr with diffuse bilateral opacities with + COVID19 PCR  -Start Date  Remdesivir and Decadron 6 mg (x 10 day course)  -Decadron d/c'd after 4/13 AM dose due to current clinical presentation  -Remdesevir course given - per chart review (was briefly d/c'd due to renal fxn worsening but was REORDERED and completed)    #MU  -As per patient's wife, patient has MU on CPAP  -Patient sleeps with 2-3 pillows at night, attributed to MU    CARDIOVASCULAR  #Atrial Fibrillation- NOT ACTIVE  -Patient went into afib with RVR  @ about 1830  -No new medications started as rhythm is paroxysmal  -If sustained, would consider lopressor IVP if off Levo gtt; review of telemetry does not show pt in any afib  -If patient remains on Levo, can consider amiodarone cautiously (patient does have RBBB on this admission and sinus bradycardia)  -Anticoagulation for afib not started, patient now in NSR and [id number] Score 0 as patient has no CV hx (however, on admission patient was found to have low EF- repeat ECHO with EF 55%)    #RV dilation  -Bedside POCUS demonstrating dilated RV and mildly enlarged pulm artery possibly 2/2 MU vs PE (unlikely)  -Echo: Severe hypokinesis of the entire anteroseptum/inferoseptum. Akinesis of the mid to apical anterolateral region, apical anterior, apical inferior and true apex. Mild hypokinesis of the basal anterolateral, basal anterior and basal inferior regions. The estimated left ventricular systolic function is 15%. (repeat  55%). The right ventricle is mildly dilated. Right ventricular systolic function is mildly reduced. No pericardial effusion is seen.  -Based on these findings, cardiology was consulted  -As per spouse, no hx of cardiac disease and no change in ADLs or activity tolerance prior to current presentation  -Holding off on diuretics as per primary team    #LV Dysfunction  As per Cardiology:  -Echo reviewed. Images were poor due to body habitus but suggest a stress cardiomyopathy pattern (takotsubo cardiomyopathy) rather than an ischemic event   -EKG reviewed showing NSR, with non specific ST changes and poor R wave progression in the precordial leads likely due to body habitus  -Clinically patient is warm and well perfused and making urine.  -Patient's LV dysfunction appears to be multifactorial COVID, Morbid Obesity, Sleep Apnea. Myocarditis is also a possibility; now exacerbated in setting of COVID PNA  -Unfortunately patient is unable to undergo further ischemic or confirmatory imaging/workup  -Hold off BB or ACE/ARB therapy while on pressors with Renal impairment  -EKG with RBBB, without ischemic changes  -Please keep K>4 and Mg>2  -Repeat ECHO : There is mild concentric left ventricular hypertrophy. The left ventricle is normal in size and systolic function with a calculated ejection fraction of 55%.  Right Ventricle: The right ventricle is normal in size. Right ventricular systolic function is normal. Pericardium: No pericardial effusion is seen.    GI  #Abdominal Distention  -Tube feedings on hold  -Distention improving as compared to previous exams  -Nance sump output 500 mL on  @1800  -Nance sump switched to small bore feeding NGT, will consider trickle feeds today    #Diarrhea  -Rectal tube in place  -Lactulose d/c'd    RENAL//F&E  #Acute Kidney Injury  -Alas catheter changed   -SCr peaked in course to 3.16 likely 2/2 alas obstruction  -Cr improved from fluids given, will continue fluids (see below)  -Urine lytes  showing pre-renal  -Baseline creatinine unknown, no reported kidney disease  -Electrolytes stable, K repleted  -Strict I&O monitoring  -Avoid nephrotoxic medications    #Hypernatremia  -Na today is 145 today from 148 yesterday  -Will continue D5W at 150mL/hr  -Will recheck BMP in afternoon and re-evaluate fluids    ID  #Persistent Fever  -Recent aspiration pneumonia c/b septic shock (now resolved); sputum culture yielded growth of normal respiratory lexie. Fever despite meropenem ?2/2 periodic aspiration vs. COVID-19 vs. new nosocomial infectious process. Ongoing [facility] discussion re: trach/PEG. Can continue empiric meropenem to complete 7d course through .  -Will discuss if central line needed, date is     #Aspiration pneumonia vs HAP  -Found  while trying to extubate w/ delirium, and numerous thick secretions in oral cavity requiring suctioning. 4/15 AM found w/ 102.3F oral temp and WBC increase to 18s, suspect possible aspiration pneumonia, pt. is still on zosyn, with previous negative MRSA swab.  bcx NGTD, 4/15 bcx NGTD  -Zosyn started  and d/c'd   -Decision made to broaden antibiotics to meropenem 1 gram and vancomycin 1250 mg  -MRSA swab negative, no addt'l vanco dosed  -Continue meropenem x 7 day course (ending )    #COVID PNA   -Plan as above  -Off steroid course, remdesivir course completed    #?Fungal Infxn  -Seen by ID, -ID ok'd Vori (loading doses x 2 given, now on 400 mg q12hr), stopped  given improvement in pt. status before medication was really started   -As per ID recs ====> s/p BAL --cultures have so far only yielded Keila dubliniensis which is probably a respiratory tract colonizer (and not the etiology of the patient's fevers). Voriconazole is not indicated for respiratory tract colonization with Candida. Suspect recent high fevers/leukocytosis due to aspiration event; Doubt COVID-19 associated pulmonary aspergillosis given BAL cultures without growth of mold, absence of cavitation on CXR, and time course relatively early for development of this rare entity. Moreover, would not attribute new fevers to discontinuation of voriconazole.  -Daily EKG for QTc monitoring  -BAL sputum samples taken from bronch , showing yeast   -Sputum cx from , no evidence of yeast  -Peripheral blood cultures from  NG @ 12 hrs  -Central line from   -Maintain MAP > 70 mmHg    ENDOCRINE  #Hyperglycemia  -q6hr correctional scale  -Lantus and regular insulin d/c'd as patient NPO  -Blood glucose trend   -Decadron started 45 AM, d/c'd   -A1c 6.2    HEME  #Anemia  -Hgb 11 on admission; unknown baseline. No evidence of bleed.  -Hgb  7.3, possibly hemodiluted as patient did get fluids and albumin overnight, s/p transfusion 1U PRBC ;  8.1;  7.7  -Iron studies sent, results as above   -Will send coags for possible procedure tomorrow  -Maintain active T+S    -DVT ppx with heparin SubQ    DISPO/GOALS OF CARE:  Patient requires continuous monitoring with bedside rhythm monitoring, arterial line, pulse oximetry, ventilator monitoring and intermittent blood gas analysis. Care plan discussed with ICU care team. Patient remains critical at this time.

## 2021-04-20 NOTE — PROGRESS NOTE ADULT - ATTENDING COMMENTS
agree with assessment and plan as documented above  acute hypoxic respiratory failure 2/2 covid-19 pneumonia, now unable to extubate given delirium  transition to propofol and precdex, dc ketamine  trach/peg tomorrow

## 2021-04-21 LAB
ANION GAP SERPL CALC-SCNC: 9 MMOL/L — SIGNIFICANT CHANGE UP (ref 5–17)
APTT BLD: 31.9 SEC — SIGNIFICANT CHANGE UP (ref 27.5–35.5)
BASE EXCESS BLDA CALC-SCNC: -1.7 MMOL/L — SIGNIFICANT CHANGE UP (ref -2–3)
BASOPHILS # BLD AUTO: 0.01 K/UL — SIGNIFICANT CHANGE UP (ref 0–0.2)
BASOPHILS NFR BLD AUTO: 0.1 % — SIGNIFICANT CHANGE UP (ref 0–2)
BLD GP AB SCN SERPL QL: NEGATIVE — SIGNIFICANT CHANGE UP
BUN SERPL-MCNC: 17 MG/DL — SIGNIFICANT CHANGE UP (ref 7–23)
CALCIUM SERPL-MCNC: 8.1 MG/DL — LOW (ref 8.4–10.5)
CHLORIDE SERPL-SCNC: 105 MMOL/L — SIGNIFICANT CHANGE UP (ref 96–108)
CO2 SERPL-SCNC: 23 MMOL/L — SIGNIFICANT CHANGE UP (ref 22–31)
CREAT SERPL-MCNC: 1.33 MG/DL — HIGH (ref 0.5–1.3)
EOSINOPHIL # BLD AUTO: 0.41 K/UL — SIGNIFICANT CHANGE UP (ref 0–0.5)
EOSINOPHIL NFR BLD AUTO: 4.5 % — SIGNIFICANT CHANGE UP (ref 0–6)
GLUCOSE BLDC GLUCOMTR-MCNC: 120 MG/DL — HIGH (ref 70–99)
GLUCOSE BLDC GLUCOMTR-MCNC: 81 MG/DL — SIGNIFICANT CHANGE UP (ref 70–99)
GLUCOSE BLDC GLUCOMTR-MCNC: 87 MG/DL — SIGNIFICANT CHANGE UP (ref 70–99)
GLUCOSE BLDC GLUCOMTR-MCNC: 87 MG/DL — SIGNIFICANT CHANGE UP (ref 70–99)
GLUCOSE BLDC GLUCOMTR-MCNC: 95 MG/DL — SIGNIFICANT CHANGE UP (ref 70–99)
GLUCOSE SERPL-MCNC: 96 MG/DL — SIGNIFICANT CHANGE UP (ref 70–99)
HCO3 BLDA-SCNC: 23 MMOL/L — SIGNIFICANT CHANGE UP (ref 21–28)
HCT VFR BLD CALC: 26 % — LOW (ref 39–50)
HGB BLD-MCNC: 7.9 G/DL — LOW (ref 13–17)
IMM GRANULOCYTES NFR BLD AUTO: 0.5 % — SIGNIFICANT CHANGE UP (ref 0–1.5)
INR BLD: 1.11 — SIGNIFICANT CHANGE UP (ref 0.88–1.16)
LYMPHOCYTES # BLD AUTO: 0.72 K/UL — LOW (ref 1–3.3)
LYMPHOCYTES # BLD AUTO: 7.8 % — LOW (ref 13–44)
MAGNESIUM SERPL-MCNC: 1.8 MG/DL — SIGNIFICANT CHANGE UP (ref 1.6–2.6)
MCHC RBC-ENTMCNC: 23.9 PG — LOW (ref 27–34)
MCHC RBC-ENTMCNC: 30.4 GM/DL — LOW (ref 32–36)
MCV RBC AUTO: 78.8 FL — LOW (ref 80–100)
MONOCYTES # BLD AUTO: 0.54 K/UL — SIGNIFICANT CHANGE UP (ref 0–0.9)
MONOCYTES NFR BLD AUTO: 5.9 % — SIGNIFICANT CHANGE UP (ref 2–14)
NEUTROPHILS # BLD AUTO: 7.45 K/UL — HIGH (ref 1.8–7.4)
NEUTROPHILS NFR BLD AUTO: 81.2 % — HIGH (ref 43–77)
NRBC # BLD: 0 /100 WBCS — SIGNIFICANT CHANGE UP (ref 0–0)
PCO2 BLDA: 37 MMHG — SIGNIFICANT CHANGE UP (ref 35–48)
PH BLDA: 7.41 — SIGNIFICANT CHANGE UP (ref 7.35–7.45)
PHOSPHATE SERPL-MCNC: 4.1 MG/DL — SIGNIFICANT CHANGE UP (ref 2.5–4.5)
PLATELET # BLD AUTO: 250 K/UL — SIGNIFICANT CHANGE UP (ref 150–400)
PO2 BLDA: 114 MMHG — HIGH (ref 83–108)
POTASSIUM SERPL-MCNC: 4.3 MMOL/L — SIGNIFICANT CHANGE UP (ref 3.5–5.3)
POTASSIUM SERPL-SCNC: 4.3 MMOL/L — SIGNIFICANT CHANGE UP (ref 3.5–5.3)
PROTHROM AB SERPL-ACNC: 13.3 SEC — SIGNIFICANT CHANGE UP (ref 10.6–13.6)
RBC # BLD: 3.3 M/UL — LOW (ref 4.2–5.8)
RBC # FLD: 19.7 % — HIGH (ref 10.3–14.5)
RH IG SCN BLD-IMP: POSITIVE — SIGNIFICANT CHANGE UP
SAO2 % BLDA: 98 % — SIGNIFICANT CHANGE UP (ref 95–100)
SODIUM SERPL-SCNC: 137 MMOL/L — SIGNIFICANT CHANGE UP (ref 135–145)
WBC # BLD: 9.18 K/UL — SIGNIFICANT CHANGE UP (ref 3.8–10.5)
WBC # FLD AUTO: 9.18 K/UL — SIGNIFICANT CHANGE UP (ref 3.8–10.5)

## 2021-04-21 PROCEDURE — 99233 SBSQ HOSP IP/OBS HIGH 50: CPT | Mod: GC

## 2021-04-21 PROCEDURE — 71045 X-RAY EXAM CHEST 1 VIEW: CPT | Mod: 26

## 2021-04-21 RX ORDER — DEXMEDETOMIDINE HYDROCHLORIDE IN 0.9% SODIUM CHLORIDE 4 UG/ML
0.2 INJECTION INTRAVENOUS
Qty: 200 | Refills: 0 | Status: DISCONTINUED | OUTPATIENT
Start: 2021-04-21 | End: 2021-04-24

## 2021-04-21 RX ORDER — FENTANYL CITRATE 50 UG/ML
100 INJECTION INTRAVENOUS ONCE
Refills: 0 | Status: DISCONTINUED | OUTPATIENT
Start: 2021-04-21 | End: 2021-04-21

## 2021-04-21 RX ORDER — MIDAZOLAM HYDROCHLORIDE 1 MG/ML
4 INJECTION, SOLUTION INTRAMUSCULAR; INTRAVENOUS ONCE
Refills: 0 | Status: DISCONTINUED | OUTPATIENT
Start: 2021-04-21 | End: 2021-04-21

## 2021-04-21 RX ORDER — HYDROMORPHONE HYDROCHLORIDE 2 MG/ML
1 INJECTION INTRAMUSCULAR; INTRAVENOUS; SUBCUTANEOUS EVERY 4 HOURS
Refills: 0 | Status: DISCONTINUED | OUTPATIENT
Start: 2021-04-21 | End: 2021-04-23

## 2021-04-21 RX ORDER — FENTANYL CITRATE 50 UG/ML
50 INJECTION INTRAVENOUS ONCE
Refills: 0 | Status: DISCONTINUED | OUTPATIENT
Start: 2021-04-21 | End: 2021-04-21

## 2021-04-21 RX ORDER — MAGNESIUM SULFATE 500 MG/ML
1 VIAL (ML) INJECTION ONCE
Refills: 0 | Status: COMPLETED | OUTPATIENT
Start: 2021-04-21 | End: 2021-04-21

## 2021-04-21 RX ADMIN — Medication 4 MILLIGRAM(S): at 08:27

## 2021-04-21 RX ADMIN — Medication 3 MILLILITER(S): at 09:00

## 2021-04-21 RX ADMIN — FENTANYL CITRATE 100 MICROGRAM(S): 50 INJECTION INTRAVENOUS at 09:04

## 2021-04-21 RX ADMIN — Medication 3 MILLILITER(S): at 16:37

## 2021-04-21 RX ADMIN — PROPOFOL 7.2 MICROGRAM(S)/KG/MIN: 10 INJECTION, EMULSION INTRAVENOUS at 01:42

## 2021-04-21 RX ADMIN — FENTANYL CITRATE 50 MICROGRAM(S): 50 INJECTION INTRAVENOUS at 09:04

## 2021-04-21 RX ADMIN — MEROPENEM 100 MILLIGRAM(S): 1 INJECTION INTRAVENOUS at 01:42

## 2021-04-21 RX ADMIN — FENTANYL CITRATE 100 MICROGRAM(S): 50 INJECTION INTRAVENOUS at 05:57

## 2021-04-21 RX ADMIN — SENNA PLUS 2 TABLET(S): 8.6 TABLET ORAL at 22:59

## 2021-04-21 RX ADMIN — MEROPENEM 100 MILLIGRAM(S): 1 INJECTION INTRAVENOUS at 18:22

## 2021-04-21 RX ADMIN — Medication 3 MILLILITER(S): at 12:30

## 2021-04-21 RX ADMIN — DEXMEDETOMIDINE HYDROCHLORIDE IN 0.9% SODIUM CHLORIDE 6 MICROGRAM(S)/KG/HR: 4 INJECTION INTRAVENOUS at 08:00

## 2021-04-21 RX ADMIN — CHLORHEXIDINE GLUCONATE 1 APPLICATION(S): 213 SOLUTION TOPICAL at 12:01

## 2021-04-21 RX ADMIN — Medication 650 MILLIGRAM(S): at 08:41

## 2021-04-21 RX ADMIN — Medication 4 MILLIGRAM(S): at 23:01

## 2021-04-21 RX ADMIN — HYDROMORPHONE HYDROCHLORIDE 1 MILLIGRAM(S): 2 INJECTION INTRAMUSCULAR; INTRAVENOUS; SUBCUTANEOUS at 18:49

## 2021-04-21 RX ADMIN — MIDAZOLAM HYDROCHLORIDE 4 MILLIGRAM(S): 1 INJECTION, SOLUTION INTRAMUSCULAR; INTRAVENOUS at 04:45

## 2021-04-21 RX ADMIN — DEXMEDETOMIDINE HYDROCHLORIDE IN 0.9% SODIUM CHLORIDE 6 MICROGRAM(S)/KG/HR: 4 INJECTION INTRAVENOUS at 22:58

## 2021-04-21 RX ADMIN — HYDROMORPHONE HYDROCHLORIDE 1 MILLIGRAM(S): 2 INJECTION INTRAMUSCULAR; INTRAVENOUS; SUBCUTANEOUS at 08:46

## 2021-04-21 RX ADMIN — MIDAZOLAM HYDROCHLORIDE 4 MILLIGRAM(S): 1 INJECTION, SOLUTION INTRAMUSCULAR; INTRAVENOUS at 01:27

## 2021-04-21 RX ADMIN — PROPOFOL 7.2 MICROGRAM(S)/KG/MIN: 10 INJECTION, EMULSION INTRAVENOUS at 07:06

## 2021-04-21 RX ADMIN — DEXMEDETOMIDINE HYDROCHLORIDE IN 0.9% SODIUM CHLORIDE 6 MICROGRAM(S)/KG/HR: 4 INJECTION INTRAVENOUS at 00:48

## 2021-04-21 RX ADMIN — PANTOPRAZOLE SODIUM 40 MILLIGRAM(S): 20 TABLET, DELAYED RELEASE ORAL at 11:33

## 2021-04-21 RX ADMIN — PROPOFOL 7.2 MICROGRAM(S)/KG/MIN: 10 INJECTION, EMULSION INTRAVENOUS at 18:50

## 2021-04-21 RX ADMIN — PROPOFOL 7.2 MICROGRAM(S)/KG/MIN: 10 INJECTION, EMULSION INTRAVENOUS at 22:58

## 2021-04-21 RX ADMIN — Medication 3 MILLILITER(S): at 04:32

## 2021-04-21 RX ADMIN — PROPOFOL 7.2 MICROGRAM(S)/KG/MIN: 10 INJECTION, EMULSION INTRAVENOUS at 08:46

## 2021-04-21 RX ADMIN — Medication 4 MILLIGRAM(S): at 18:22

## 2021-04-21 RX ADMIN — Medication 4 MILLIGRAM(S): at 12:01

## 2021-04-21 RX ADMIN — PROPOFOL 7.2 MICROGRAM(S)/KG/MIN: 10 INJECTION, EMULSION INTRAVENOUS at 13:07

## 2021-04-21 RX ADMIN — CHLORHEXIDINE GLUCONATE 15 MILLILITER(S): 213 SOLUTION TOPICAL at 06:18

## 2021-04-21 RX ADMIN — MEROPENEM 100 MILLIGRAM(S): 1 INJECTION INTRAVENOUS at 11:33

## 2021-04-21 RX ADMIN — CHLORHEXIDINE GLUCONATE 15 MILLILITER(S): 213 SOLUTION TOPICAL at 18:22

## 2021-04-21 RX ADMIN — Medication 650 MILLIGRAM(S): at 09:41

## 2021-04-21 RX ADMIN — INSULIN HUMAN 0: 100 INJECTION, SOLUTION SUBCUTANEOUS at 00:36

## 2021-04-21 RX ADMIN — INSULIN HUMAN 0: 100 INJECTION, SOLUTION SUBCUTANEOUS at 06:18

## 2021-04-21 RX ADMIN — QUETIAPINE FUMARATE 50 MILLIGRAM(S): 200 TABLET, FILM COATED ORAL at 22:59

## 2021-04-21 RX ADMIN — PROPOFOL 7.2 MICROGRAM(S)/KG/MIN: 10 INJECTION, EMULSION INTRAVENOUS at 15:59

## 2021-04-21 RX ADMIN — PROPOFOL 7.2 MICROGRAM(S)/KG/MIN: 10 INJECTION, EMULSION INTRAVENOUS at 05:07

## 2021-04-21 RX ADMIN — DEXMEDETOMIDINE HYDROCHLORIDE IN 0.9% SODIUM CHLORIDE 6 MICROGRAM(S)/KG/HR: 4 INJECTION INTRAVENOUS at 13:08

## 2021-04-21 RX ADMIN — HYDROMORPHONE HYDROCHLORIDE 1 MILLIGRAM(S): 2 INJECTION INTRAMUSCULAR; INTRAVENOUS; SUBCUTANEOUS at 09:16

## 2021-04-21 RX ADMIN — FENTANYL CITRATE 50 MICROGRAM(S): 50 INJECTION INTRAVENOUS at 04:07

## 2021-04-21 RX ADMIN — QUETIAPINE FUMARATE 50 MILLIGRAM(S): 200 TABLET, FILM COATED ORAL at 11:33

## 2021-04-21 RX ADMIN — Medication 3 MILLILITER(S): at 00:51

## 2021-04-21 RX ADMIN — Medication 100 GRAM(S): at 11:33

## 2021-04-21 RX ADMIN — HYDROMORPHONE HYDROCHLORIDE 1 MILLIGRAM(S): 2 INJECTION INTRAMUSCULAR; INTRAVENOUS; SUBCUTANEOUS at 19:04

## 2021-04-21 NOTE — CHART NOTE - NSCHARTNOTEFT_GEN_A_CORE
Admitting Diagnosis:   Patient is a 56y old  Male who presents with a chief complaint of SOB (21 Apr 2021 06:32)    PAST MEDICAL & SURGICAL HISTORY:  Sleep apnea    Current Nutrition Order: NPO diet  EN regime: Vital 1.5 @ 10 ml/hr x24hrs 2/2 recent intolerance (vomiting)- please see recs below.      PO Intake: Good (%) [   ]  Fair (50-75%) [   ] Poor (<25%) [   ]- N/A    GI Issues:   WDL, Last BM 4/21   Rectal tube (750 ml/24hrs)  No reported n/v/c. Having liquid stools  **Ordered for Pro and bowel regime    Pain:  KYLEE 2/2 intubated/sedated    Skin Integrity:  Jagdeep score 13  Stg I pressure ulcer sacral spine  +1 generalize pitting edema    Labs:   04-21    137  |  105  |  17  ----------------------------<  96  4.3   |  23  |  1.33<H>    Ca    8.1<L>      21 Apr 2021 05:57  Phos  4.1     04-21  Mg     1.8     04-21    TPro  6.1  /  Alb  2.0<L>  /  TBili  0.4  /  DBili  x   /  AST  25  /  ALT  22  /  AlkPhos  83  04-20    CAPILLARY BLOOD GLUCOSE    POCT Blood Glucose.: 95 mg/dL (21 Apr 2021 11:39)  POCT Blood Glucose.: 87 mg/dL (21 Apr 2021 05:21)  POCT Blood Glucose.: 120 mg/dL (21 Apr 2021 00:07)  POCT Blood Glucose.: 134 mg/dL (20 Apr 2021 17:30)    Medications:  MEDICATIONS  (STANDING):  albuterol/ipratropium for Nebulization 3 milliLiter(s) Nebulizer every 4 hours  chlorhexidine 0.12% Liquid 15 milliLiter(s) Oral Mucosa every 12 hours  chlorhexidine 2% Cloths 1 Application(s) Topical daily  dexMEDEtomidine Infusion 0.2 MICROgram(s)/kG/Hr (6 mL/Hr) IV Continuous <Continuous>  dextrose 40% Gel 15 Gram(s) Oral once  dextrose 5%. 1000 milliLiter(s) (50 mL/Hr) IV Continuous <Continuous>  dextrose 5%. 1000 milliLiter(s) (100 mL/Hr) IV Continuous <Continuous>  dextrose 50% Injectable 25 Gram(s) IV Push once  dextrose 50% Injectable 12.5 Gram(s) IV Push once  dextrose 50% Injectable 25 Gram(s) IV Push once  glucagon  Injectable 1 milliGRAM(s) IntraMuscular once  insulin regular  human corrective regimen sliding scale   SubCutaneous every 6 hours  LORazepam     Tablet 4 milliGRAM(s) Oral every 6 hours  meropenem  IVPB 1000 milliGRAM(s) IV Intermittent every 8 hours  pantoprazole   Suspension 40 milliGRAM(s) Enteral Tube daily  polyethylene glycol 3350 17 Gram(s) Oral every 24 hours  propofol Infusion 10 MICROgram(s)/kG/Min (7.2 mL/Hr) IV Continuous <Continuous>  QUEtiapine 50 milliGRAM(s) Oral every 12 hours  senna 2 Tablet(s) Oral at bedtime    MEDICATIONS  (PRN):  acetaminophen    Suspension .. 650 milliGRAM(s) Oral every 6 hours PRN Temp greater or equal to 38C (100.4F)  HYDROmorphone  Injectable 1 milliGRAM(s) IV Push every 4 hours PRN Moderate Pain (4 - 6)  sodium chloride 0.9% lock flush 10 milliLiter(s) IV Push every 1 hour PRN Pre/post blood products, medications, blood draw, and to maintain line patency    Admitted Anthropometrics:  Weight: 5'10"; ABW 120kg; IBW: 75.4kg; %IBW: 166%; BMI: 37    Weight Change:   KYLEE 2/2 intubated/sedated    Estimated energy needs:   IBW (75.4kg) used for calculations as pt is >100% of IBW and critically ill  Nutrient needs based on [facility] standards of care for maintenance in older adults.   Needs adjusted for age and hypermetabolic/inflammatory state 2/2 COVID+ and oxygen demands  Energy: 1885-2262kcal (25-30cal/kg)  Protein: 106-121g pro (1.4-1.6g/kg pro)  Fluids per team    Subjective:   56M with PMHx of MU and gout who presented to ED with c/o progressively worsening SOB now admitted to ICU for management of AHRF in the setting of COVID. Pt now s/p PEG placement with 4/21 with plan to place trach 4/21 as well.     On assessment, pt was resting in bed unable to participate in interview- Pt discussed with team. Currently intubated on AC/CMV mode. Plan to d/c precedex with propofol cont to infuse @25ml/hr x24hrs (providing 660kcal from lipids/day). Fentanyl push x 2 given for agitation. MAP 92- now weened off pressure support medication. Pt currently NPO with EN regime via NGT. EN was held today for placement of PEG tube- please see nutr recs below to best optimize nutrition. Unable to obtain diet/weight history at this time 2/2 intubated/sedated. Notable labs: POCT 95 WDL, Glu 96 WDL, Cre 1.33H, Ca 8.1L. See EN recs below for current propofol rate. RD to follow.     Nutrition Diagnosis:  Increased nutrient needs RT increased demand for kcal/pro 2/2 hypermetabolic state AEB oxygen demands and viral infection COVID+  [  ] No active nutrition diagnosis at this time  [  ] Current medical condition precludes nutrition intervention    Goal: Pt to meet >/=75% EER consistently with good tolerance via most medically appropriate route.     Recommendations:  1. NPO  >> Recommend diet advancement per team  2. Recommend restart trickle feeds of Vital 1.5 when medically feasible  >> Once team appears ready to advance rate, would recommend increasing to Vital 1.5 @ 30 ml/hr with x4 liquid protein supplement (LPS) daily (providing 1080 kcal EN, 660 kcal propofol, 400 kcal LPS (2140 total kcal), 106g total pro, 720 TV, 547 ml free water)  >> Recommend start at 10 ml/hr and increase 10 ml q8hrs until goal rate of 30 ml/hr is reached.   3. Additional free H2O per team. Monitor for s/s intolerance; maintain aspiration precautions at all times.     4.  Monitor lytes and replete prn.   5.  Pain and bowel regimens per team  6. Please obtain updated TG daily.   *d/w team.     Education: n/a    Risk Level: High [   x] Moderate [   ] Low [   ].

## 2021-04-21 NOTE — PROGRESS NOTE ADULT - ATTENDING COMMENTS
agree with assessment and plan as documented above  delirium   acute hypoxic respiratory failure  GRACE - improving  trach, peg today. Titrate sedatives/anxiolytics/narcotics as documented above

## 2021-04-21 NOTE — PROCEDURE NOTE - NSPOSTPRCRAD_GEN_A_CORE
central line located in the superior vena cava/no pneumothorax/post-procedure radiography performed
post-procedure radiography performed
post procedure radiography not performed

## 2021-04-21 NOTE — PROGRESS NOTE ADULT - ASSESSMENT
55 yo M with PMHx of MU and gout who presented to ED with c/o progressively worsening SOB admitted to ICU for management of AHRF in the setting of COVID.     NEURO  #Sedated   -Ketamine added to decrease propofol requirements  -Seroquel 50 mg Q12 hr, QTc monitoring daily  -Neuro checks per ICU protocol, pain/sedation meds assessed and addressed  -Continue with wrist restraints to prevent patient harm    RESPIRATORY  #Acute hypoxic hypercapnic respiratory failure in the setting of COVID PNA  -Patient initially presented with O2 saturation of 14% and cyanotic requiring immediate intubation  -CXr with diffuse bilateral opacities with + COVID19 PCR  -Elevated inflammatory markers  -On AC/VC mechanical Ventilation:  Mode: AC/ CMV; RR: 20; TV: 450; FiO2: 40; PEEP: 5   -Has been on vent for 2 weeks; will need trach & PEG, possibly 4/21 as per ENT (will send COViD swab today)    #COVID  -CXr with diffuse bilateral opacities with + COVID19 PCR  -Start Date 4/5 Remdesivir and Decadron 6 mg (x 10 day course)  -Decadron d/c'd after 4/13 AM dose due to current clinical presentation  -Remdesevir course given 4/5-4/9 per chart review (was briefly d/c'd due to renal fxn worsening but was REORDERED and completed)    #MU  -As per patient's wife, patient has MU on CPAP  -Patient sleeps with 2-3 pillows at night, attributed to MU    CARDIOVASCULAR  #Atrial Fibrillation- NOT ACTIVE  -Patient went into afib with RVR 4/17 @ about 1830  -No new medications started as rhythm is paroxysmal  -If sustained, would consider lopressor IVP if off Levo gtt; review of telemetry does not show pt in any afib  -If patient remains on Levo, can consider amiodarone cautiously (patient does have RBBB on this admission and sinus bradycardia)  -Anticoagulation for afib not started, patient now in NSR and [id number] Score 0 as patient has no CV hx (however, on admission patient was found to have low EF- repeat ECHO with EF 55%)    #RV dilation  -Bedside POCUS demonstrating dilated RV and mildly enlarged pulm artery possibly 2/2 MU vs PE (unlikely)  -Echo: Severe hypokinesis of the entire anteroseptum/inferoseptum. Akinesis of the mid to apical anterolateral region, apical anterior, apical inferior and true apex. Mild hypokinesis of the basal anterolateral, basal anterior and basal inferior regions. The estimated left ventricular systolic function is 15%. (repeat 4/13 55%). The right ventricle is mildly dilated. Right ventricular systolic function is mildly reduced. No pericardial effusion is seen.  -Based on these findings, cardiology was consulted  -As per spouse, no hx of cardiac disease and no change in ADLs or activity tolerance prior to current presentation  -Holding off on diuretics as per primary team    #LV Dysfunction  As per Cardiology:  -Echo reviewed. Images were poor due to body habitus but suggest a stress cardiomyopathy pattern (takotsubo cardiomyopathy) rather than an ischemic event   -EKG reviewed showing NSR, with non specific ST changes and poor R wave progression in the precordial leads likely due to body habitus  -Clinically patient is warm and well perfused and making urine.  -Patient's LV dysfunction appears to be multifactorial COVID, Morbid Obesity, Sleep Apnea. Myocarditis is also a possibility; now exacerbated in setting of COVID PNA  -Unfortunately patient is unable to undergo further ischemic or confirmatory imaging/workup  -Hold off BB or ACE/ARB therapy while on pressors with Renal impairment  -EKG with RBBB, without ischemic changes  -Please keep K>4 and Mg>2  -Repeat ECHO 4/13: There is mild concentric left ventricular hypertrophy. The left ventricle is normal in size and systolic function with a calculated ejection fraction of 55%.  Right Ventricle: The right ventricle is normal in size. Right ventricular systolic function is normal. Pericardium: No pericardial effusion is seen.    GI  #Abdominal Distention  -Tube feedings on hold  -Distention improving as compared to previous exams  -Stark sump output 500 mL on 4/19 @1800  -Stark sump switched to small bore feeding NGT, will consider trickle feeds today    #Diarrhea  -Rectal tube in place  -Lactulose d/c'd    RENAL//F&E  #Acute Kidney Injury  -Alas catheter changed 4/12  -SCr peaked in course to 3.16 likely 2/2 alas obstruction  -Cr improved from fluids given, will continue fluids (see below)  -Urine lytes 4/17 showing pre-renal  -Baseline creatinine unknown, no reported kidney disease  -Electrolytes stable, K repleted  -Strict I&O monitoring  -Avoid nephrotoxic medications    #Hypernatremia  -Na today is 145 today from 148 yesterday  -Will continue D5W at 150mL/hr  -Will recheck BMP in afternoon and re-evaluate fluids    ID  #Persistent Fever  -Recent aspiration pneumonia c/b septic shock (now resolved); sputum culture yielded growth of normal respiratory lexie. Fever despite meropenem ?2/2 periodic aspiration vs. COVID-19 vs. new nosocomial infectious process. Ongoing GOC discussion re: trach/PEG. Can continue empiric meropenem to complete 7d course through 4/22.  -Will discuss if central line needed, date is 4/12    #Aspiration pneumonia vs HAP  -Found 4/14 while trying to extubate w/ delirium, and numerous thick secretions in oral cavity requiring suctioning. 4/15 AM found w/ 102.3F oral temp and WBC increase to 18s, suspect possible aspiration pneumonia, pt. is still on zosyn, with previous negative MRSA swab. 4/14 bcx NGTD, 4/15 bcx NGTD  -Zosyn started 4/9 and d/c'd 4/16  -Decision made to broaden antibiotics to meropenem 1 gram and vancomycin 1250 mg  -MRSA swab negative, no addt'l vanco dosed  -Continue meropenem x 7 day course (ending 4/22)    #COVID PNA   -Plan as above  -Off steroid course, remdesivir course completed    #?Fungal Infxn  -Seen by ID, -ID ok'd Vori (loading doses x 2 given, now on 400 mg q12hr), stopped 4/14 given improvement in pt. status before medication was really started   -As per ID recs ====> s/p BAL 4/12--cultures have so far only yielded Keila dubliniensis which is probably a respiratory tract colonizer (and not the etiology of the patient's fevers). Voriconazole is not indicated for respiratory tract colonization with Candida. Suspect recent high fevers/leukocytosis due to aspiration event; Doubt COVID-19 associated pulmonary aspergillosis given BAL cultures without growth of mold, absence of cavitation on CXR, and time course relatively early for development of this rare entity. Moreover, would not attribute new fevers to discontinuation of voriconazole.  -Daily EKG for QTc monitoring  -BAL sputum samples taken from bronch 4/12, showing yeast   -Sputum cx from 4/16, no evidence of yeast  -Peripheral blood cultures from 4/19 NG @ 12 hrs  -Central line from 4/12  -Maintain MAP > 70 mmHg    ENDOCRINE  #Hyperglycemia  -q6hr correctional scale  -Lantus and regular insulin d/c'd as patient NPO  -Blood glucose trend   -Decadron started 4/5 AM, d/c'd 4/13  -A1c 6.2    HEME  #Anemia  -Hgb 11 on admission; unknown baseline. No evidence of bleed.  -Hgb 4/18 7.3, possibly hemodiluted as patient did get fluids and albumin overnight, s/p transfusion 1U PRBC 4/18; 4/19 8.1; 4/20 7.7  -Iron studies sent, results as above   -Will send coags for possible procedure tomorrow  -Maintain active T+S    -DVT ppx with heparin SubQ    DISPO/GOALS OF CARE:  Patient requires continuous monitoring with bedside rhythm monitoring, arterial line, pulse oximetry, ventilator monitoring and intermittent blood gas analysis. Care plan discussed with ICU care team. Patient remains critical at this time. 57 yo M with PMHx of MU and gout who presented to ED with c/o progressively worsening SOB admitted to ICU for management of AHRF in the setting of COVID.     NEURO  #Sedated   -Found agitated O/N while on propofol, requiring pushes of 4mg x2 versed and 2 pushes of fentanyl. Today, continue propofol and precedex, added PO ativan 4mg Q6 and Dilaudid 1 mg Q4 PRN  -Seroquel 50 mg Q12 hr, QTc monitoring daily  -Neuro checks per ICU protocol, pain/sedation meds assessed and addressed  -Continue with wrist restraints to prevent patient harm    RESPIRATORY  #Acute hypoxic hypercapnic respiratory failure in the setting of COVID PNA  -Patient initially presented with O2 saturation of 14% and cyanotic requiring immediate intubation  -CXr with diffuse bilateral opacities with + COVID19 PCR  -Elevated inflammatory markers  -On AC/VC mechanical Ventilation:  Mode: AC/ CMV; RR: 20; TV: 450; FiO2: 40; PEEP: 5   -Has been on vent for 2 weeks; will need trach & PEG, 4/21 planned for trach     #COVID  -CXr with diffuse bilateral opacities with + COVID19 PCR  -Start Date 4/5 Remdesivir and Decadron 6 mg (x 10 day course)  -Decadron d/c'd after 4/13 AM dose due to current clinical presentation  -Remdesevir course given 4/5-4/9 per chart review (was briefly d/c'd due to renal fxn worsening but was REORDERED and completed)    #MU  -As per patient's wife, patient has MU on CPAP  -Patient sleeps with 2-3 pillows at night, attributed to MU    CARDIOVASCULAR  #Atrial Fibrillation- NOT ACTIVE  -Patient went into afib with RVR 4/17 @ about 1830  -No new medications started as rhythm is paroxysmal  -If sustained, would consider lopressor IVP if off Levo gtt; review of telemetry does not show pt in any afib  -If patient remains on Levo, can consider amiodarone cautiously (patient does have RBBB on this admission and sinus bradycardia)  -Anticoagulation for afib not started, patient now in NSR and [id number] Score 0 as patient has no CV hx (however, on admission patient was found to have low EF- repeat ECHO with EF 55%)    #RV dilation  -Bedside POCUS demonstrating dilated RV and mildly enlarged pulm artery possibly 2/2 MU vs PE (unlikely)  -Echo: Severe hypokinesis of the entire anteroseptum/inferoseptum. Akinesis of the mid to apical anterolateral region, apical anterior, apical inferior and true apex. Mild hypokinesis of the basal anterolateral, basal anterior and basal inferior regions. The estimated left ventricular systolic function is 15%. (repeat 4/13 55%). The right ventricle is mildly dilated. Right ventricular systolic function is mildly reduced. No pericardial effusion is seen.  -Based on these findings, cardiology was consulted  -As per spouse, no hx of cardiac disease and no change in ADLs or activity tolerance prior to current presentation  -Holding off on diuretics as per primary team    #LV Dysfunction  As per Cardiology:  -Echo reviewed. Images were poor due to body habitus but suggest a stress cardiomyopathy pattern (takotsubo cardiomyopathy) rather than an ischemic event   -EKG reviewed showing NSR, with non specific ST changes and poor R wave progression in the precordial leads likely due to body habitus  -Clinically patient is warm and well perfused and making urine.  -Patient's LV dysfunction appears to be multifactorial COVID, Morbid Obesity, Sleep Apnea. Myocarditis is also a possibility; now exacerbated in setting of COVID PNA  -Unfortunately patient is unable to undergo further ischemic or confirmatory imaging/workup  -Hold off BB or ACE/ARB therapy while on pressors with Renal impairment  -EKG with RBBB, without ischemic changes  -Please keep K>4 and Mg>2  -Repeat ECHO 4/13: There is mild concentric left ventricular hypertrophy. The left ventricle is normal in size and systolic function with a calculated ejection fraction of 55%.  Right Ventricle: The right ventricle is normal in size. Right ventricular systolic function is normal. Pericardium: No pericardial effusion is seen.    GI  #Abdominal Distention  -Tube feedings on hold  -[place] sump output 500 mL on 4/19 @1800  -[place] sump switched to small bore feeding NGT, will consider trickle feeds today  -Pending PEG today    #Diarrhea  -Rectal tube in place    RENAL//F&E  #Acute Kidney Injury  -Alas catheter changed 4/12  -SCr peaked in course to 3.16 likely 2/2 alas obstruction  -Cr improved from fluids given, will continue fluids (see below). Cr 4/21 1.33 from 1.44 yesterday  -Urine lytes 4/17 showing pre-renal  -Baseline creatinine unknown, no reported kidney disease  -Electrolytes stable, K repleted  -Strict I&O monitoring  -Avoid nephrotoxic medications    #Hypernatremia- RESOLVED  -Na today is 137 today s/p fluids from yesterday.     ID  #Persistent Fever  -Recent aspiration pneumonia c/b septic shock (now resolved); sputum culture yielded growth of normal respiratory lexie. Fever despite meropenem ?2/2 periodic aspiration vs. COVID-19 vs. new nosocomial infectious process. Ongoing GOC discussion re: trach/PEG. Can continue empiric meropenem to complete 7d course through 4/22. 4/21 rectal temp 100.8F, obtained set of Bcx, gave tylenol; pt otherwise not tachy, BPs stable  -Will discuss if central line needed, date is 4/12    #Aspiration pneumonia vs HAP  -Found 4/14 while trying to extubate w/ delirium, and numerous thick secretions in oral cavity requiring suctioning. 4/15 AM found w/ 102.3F oral temp and WBC increase to 18s, suspect possible aspiration pneumonia, pt. is still on zosyn, with previous negative MRSA swab. 4/14 bcx NGTD, 4/15 bcx NGTD  -Zosyn started 4/9 and d/c'd 4/16  -Decision made to broaden antibiotics to meropenem 1 gram and vancomycin 1250 mg  -MRSA swab negative, no addt'l vanco dosed  -Continue meropenem x 7 day course (ending 4/22)    #COVID PNA   -Plan as above  -Off steroid course, remdesivir course completed    #?Fungal Infxn  -Seen by ID, -ID ok'd Vori (loading doses x 2 given, now on 400 mg q12hr), stopped 4/14 given improvement in pt. status before medication was really started   -As per ID recs ====> s/p BAL 4/12--cultures have so far only yielded Keila dubliniensis which is probably a respiratory tract colonizer (and not the etiology of the patient's fevers). Voriconazole is not indicated for respiratory tract colonization with Candida. Suspect recent high fevers/leukocytosis due to aspiration event; Doubt COVID-19 associated pulmonary aspergillosis given BAL cultures without growth of mold, absence of cavitation on CXR, and time course relatively early for development of this rare entity. Moreover, would not attribute new fevers to discontinuation of voriconazole.  -Daily EKG for QTc monitoring  -BAL sputum samples taken from bronch 4/12, showing yeast   -Sputum cx from 4/16, no evidence of yeast  -Peripheral blood cultures from 4/19 NG @ 12 hrs  -Central line from 4/12  -Maintain MAP > 70 mmHg    ENDOCRINE  #Hyperglycemia  -q6hr correctional scale  -Lantus and regular insulin d/c'd as patient NPO  -Blood glucose trend   -Decadron started 4/5 AM, d/c'd 4/13  -A1c 6.2    HEME  #Anemia  -Hgb 11 on admission; unknown baseline. No evidence of bleed.  -Hgb 4/18 7.3, possibly hemodiluted as patient did get fluids and albumin overnight, s/p transfusion 1U PRBC 4/18; 4/19 8.1; 4/20 7.7. 4/21 7.9  -Iron studies sent, results as above   -Maintain active T+S    -DVT ppx with heparin SubQ    DISPO/GOALS OF CARE:  Patient requires continuous monitoring with bedside rhythm monitoring, arterial line, pulse oximetry, ventilator monitoring and intermittent blood gas analysis. Care plan discussed with ICU care team.  55 yo M with PMHx of MU and gout who presented to ED with c/o progressively worsening SOB admitted to ICU for management of AHRF in the setting of COVID.     NEURO  #Sedated   -Found agitated O/N while on propofol, requiring pushes of 4mg x2 versed and 2 pushes of fentanyl. Today, continue propofol, added PO ativan 4mg Q6 and Dilaudid 1 mg Q4 PRN with goal to come off the propofol and precedex  -Seroquel 50 mg Q12 hr, QTc monitoring daily. if stable, increase seroquel   -Neuro checks per ICU protocol, pain/sedation meds assessed and addressed  -Continue with wrist restraints to prevent patient harm    RESPIRATORY  #Acute hypoxic hypercapnic respiratory failure in the setting of COVID PNA  -Patient initially presented with O2 saturation of 14% and cyanotic requiring immediate intubation  -CXr with diffuse bilateral opacities with + COVID19 PCR  -Elevated inflammatory markers  -On AC/VC mechanical Ventilation:  Mode: AC/ CMV; RR: 20; TV: 450; FiO2: 40; PEEP: 5   -Has been on vent for 2 weeks; will need trach & PEG, 4/21 planned for trach     #COVID  -CXr with diffuse bilateral opacities with + COVID19 PCR  -Start Date 4/5 Remdesivir and Decadron 6 mg (x 10 day course)  -Decadron d/c'd after 4/13 AM dose due to current clinical presentation  -Remdesevir course given 4/5-4/9 per chart review (was briefly d/c'd due to renal fxn worsening but was REORDERED and completed)    #MU  -As per patient's wife, patient has MU on CPAP  -Patient sleeps with 2-3 pillows at night, attributed to MU    CARDIOVASCULAR  #Atrial Fibrillation- NOT ACTIVE  -Patient went into afib with RVR 4/17 @ about 1830  -No new medications started as rhythm is paroxysmal  -If sustained, would consider lopressor IVP if off Levo gtt; review of telemetry does not show pt in any afib  -If patient remains on Levo, can consider amiodarone cautiously (patient does have RBBB on this admission and sinus bradycardia)  -Anticoagulation for afib not started, patient now in NSR and [id number] Score 0 as patient has no CV hx (however, on admission patient was found to have low EF- repeat ECHO with EF 55%)    #RV dilation  -Bedside POCUS demonstrating dilated RV and mildly enlarged pulm artery possibly 2/2 MU vs PE (unlikely)  -Echo: Severe hypokinesis of the entire anteroseptum/inferoseptum. Akinesis of the mid to apical anterolateral region, apical anterior, apical inferior and true apex. Mild hypokinesis of the basal anterolateral, basal anterior and basal inferior regions. The estimated left ventricular systolic function is 15%. (repeat 4/13 55%). The right ventricle is mildly dilated. Right ventricular systolic function is mildly reduced. No pericardial effusion is seen.  -Based on these findings, cardiology was consulted  -As per spouse, no hx of cardiac disease and no change in ADLs or activity tolerance prior to current presentation  -Holding off on diuretics as per primary team    #LV Dysfunction  As per Cardiology:  -Echo reviewed. Images were poor due to body habitus but suggest a stress cardiomyopathy pattern (takotsubo cardiomyopathy) rather than an ischemic event   -EKG reviewed showing NSR, with non specific ST changes and poor R wave progression in the precordial leads likely due to body habitus  -Clinically patient is warm and well perfused and making urine.  -Patient's LV dysfunction appears to be multifactorial COVID, Morbid Obesity, Sleep Apnea. Myocarditis is also a possibility; now exacerbated in setting of COVID PNA  -Unfortunately patient is unable to undergo further ischemic or confirmatory imaging/workup  -Hold off BB or ACE/ARB therapy while on pressors with Renal impairment  -EKG with RBBB, without ischemic changes  -Please keep K>4 and Mg>2  -Repeat ECHO 4/13: There is mild concentric left ventricular hypertrophy. The left ventricle is normal in size and systolic function with a calculated ejection fraction of 55%.  Right Ventricle: The right ventricle is normal in size. Right ventricular systolic function is normal. Pericardium: No pericardial effusion is seen.    GI  #Abdominal Distention  -Tube feedings on hold  -[place] sump output 500 mL on 4/19 @1800  -[place] sump switched to small bore feeding NGT, will consider trickle feeds today  -Pending PEG today    #Diarrhea  -Rectal tube in place    RENAL//F&E  #Acute Kidney Injury  -Alas catheter changed 4/12  -SCr peaked in course to 3.16 likely 2/2 alas obstruction  -Cr improved from fluids given, will continue fluids (see below). Cr 4/21 1.33 from 1.44 yesterday  -Urine lytes 4/17 showing pre-renal  -Baseline creatinine unknown, no reported kidney disease  -Electrolytes stable, K repleted  -Strict I&O monitoring  -Avoid nephrotoxic medications    #Hypernatremia- RESOLVED  -Na today is 137 today s/p fluids from yesterday.     ID  #Persistent Fever  -Recent aspiration pneumonia c/b septic shock (now resolved); sputum culture yielded growth of normal respiratory lexie. Fever despite meropenem ?2/2 periodic aspiration vs. COVID-19 vs. new nosocomial infectious process. Ongoing C discussion re: trach/PEG. Can continue empiric meropenem to complete 7d course through 4/22. 4/21 rectal temp 100.8F, obtained set of Bcx, gave tylenol; pt otherwise not tachy, BPs stable  -Will discuss if central line needed, date is 4/12    #Aspiration pneumonia vs HAP  -Found 4/14 while trying to extubate w/ delirium, and numerous thick secretions in oral cavity requiring suctioning. 4/15 AM found w/ 102.3F oral temp and WBC increase to 18s, suspect possible aspiration pneumonia, pt. is still on zosyn, with previous negative MRSA swab. 4/14 bcx NGTD, 4/15 bcx NGTD  -Zosyn started 4/9 and d/c'd 4/16  -Decision made to broaden antibiotics to meropenem 1 gram and vancomycin 1250 mg  -MRSA swab negative, no addt'l vanco dosed  -Continue meropenem x 7 day course (ending 4/22)    #COVID PNA   -Plan as above  -Off steroid course, remdesivir course completed    #?Fungal Infxn  -Seen by ID, -ID ok'd Vori (loading doses x 2 given, now on 400 mg q12hr), stopped 4/14 given improvement in pt. status before medication was really started   -As per ID recs ====> s/p BAL 4/12--cultures have so far only yielded Keila dubliniensis which is probably a respiratory tract colonizer (and not the etiology of the patient's fevers). Voriconazole is not indicated for respiratory tract colonization with Candida. Suspect recent high fevers/leukocytosis due to aspiration event; Doubt COVID-19 associated pulmonary aspergillosis given BAL cultures without growth of mold, absence of cavitation on CXR, and time course relatively early for development of this rare entity. Moreover, would not attribute new fevers to discontinuation of voriconazole.  -Daily EKG for QTc monitoring  -BAL sputum samples taken from bronch 4/12, showing yeast   -Sputum cx from 4/16, no evidence of yeast  -Peripheral blood cultures from 4/19 NG @ 12 hrs  -Central line from 4/12  -Maintain MAP > 70 mmHg    ENDOCRINE  #Hyperglycemia  -q6hr correctional scale  -Lantus and regular insulin d/c'd as patient NPO  -Blood glucose trend   -Decadron started 4/5 AM, d/c'd 4/13  -A1c 6.2    HEME  #Anemia  -Hgb 11 on admission; unknown baseline. No evidence of bleed.  -Hgb 4/18 7.3, possibly hemodiluted as patient did get fluids and albumin overnight, s/p transfusion 1U PRBC 4/18; 4/19 8.1; 4/20 7.7. 4/21 7.9  -Iron studies sent, results as above   -Maintain active T+S    -DVT ppx with heparin SubQ    DISPO/GOALS OF CARE:  Patient requires continuous monitoring with bedside rhythm monitoring, arterial line, pulse oximetry, ventilator monitoring and intermittent blood gas analysis. Care plan discussed with ICU care team.

## 2021-04-21 NOTE — PROGRESS NOTE ADULT - SUBJECTIVE AND OBJECTIVE BOX
IN PROGRESS     IN PROGRESS, INCLUDING ASSESSMENT AND PLAN    Overnight Events: Per report, Precedex DC. EKG showed T wave inversions present. Versed 4mg x2 for agitation. Propofol increased. Fentanyl push x 2 given for agitation.     Subjective: Patient seen and examined at bedside. Pt still intubated/sedated, unable to participate in ROS.     [OBJECTIVE]:  Vital Signs:  T(F): , Max: 100.5 (21 @ 14:39)  HR:  (54 - 95)  BP: --  BP(mean): --  RR:  (22 - 48)  SpO2:  (98% - 100%)  Wt(kg): --  CVP(cm H2O): --  Mode: AC/ CMV (Assist Control/ Continuous Mandatory Ventilation), RR (machine): 20, RR (patient): 26, TV (machine): 450, FiO2: 40, PEEP: 5, ITime: 1, MAP: 5.3, PIP: 11    0419 @ 07:01  -   @ 07:00  --------------------------------------------------------  IN: 3926.4 mL / OUT: 3870 mL / NET: 56.4 mL     @ 07:01  -  04 @ 06:58  --------------------------------------------------------  IN: 2040.4 mL / OUT: 2960 mL / NET: -919.6 mL    CAPILLARY BLOOD GLUCOSE    POCT Blood Glucose.: 87 mg/dL (2021 05:21)    Physical Exam:  T(F): 98.7 (21 @ 04:00)  HR: 86 (21 @ 04:10)  BP: --  RR: 29 (21 @ 04:10)  SpO2: 99% (21 @ 04:10)  Wt(kg): --    GENERAL: obese, intubated and sedated  SKIN/HAIR/NAILS: Nails without clubbing or cyanosis. CR<2 secs. No lesions, rash, petechiae, erythema or ecchymoses. Anicteric.   HEAD AND NECK: The skull is NC/AT. B/L Sclera white. 4 mm PERRL (hx of cataract sx). Nasal mucous membrane dry; NGT in place. Trachea midline, neck supple.   THORAX/LUNGS: Thorax is symmetric with poor expansion 2/2 tachypnea, assisted by mechanical ventilation. Lung sounds with rhonchi throughout, diminished at the bases.  CARDIOVASCULAR: No JVD. Carotid upstrokes are brisk, without bruits. +S1 and S2, RRR; no extra heart sounds or M/R/G. NSR at the time of my exam.  ABDOMEN/: Abdomen is rounded with hypoactive BS in all 4 quadrants; it is soft, no palpable masses; no grimacing to palpation noted; last BM  as patient has rectal tube with liquid brown stool. Lechuga catheter in place.   PERIPHERAL VASCULAR: Extremities are warm (upper) and cool (lower), radial and dorsalis pedis pulses +1 and symmetric. +2 generalized non-pitting edema noted. No clubbing, no cyanosis.  MUSCULOSKELETAL: no active ROM 2/2 sedation. No evidence of swelling or deformity.  NEUROLOGICAL: Patient attempts to open eyes with physical stimulation; however, not able to follow commands, wrist restraints in place to prevent patient harm; sedated on propofol and ketamine at this time.   LINES (DATES): R IJ TLC 4/12, L axillary art line 4/16, IVL    Medications:  MEDICATIONS  (STANDING):  albuterol/ipratropium for Nebulization 3 milliLiter(s) Nebulizer every 4 hours  chlorhexidine 0.12% Liquid 15 milliLiter(s) Oral Mucosa every 12 hours  chlorhexidine 2% Cloths 1 Application(s) Topical daily  dextrose 40% Gel 15 Gram(s) Oral once  dextrose 5%. 1000 milliLiter(s) (50 mL/Hr) IV Continuous <Continuous>  dextrose 5%. 1000 milliLiter(s) (100 mL/Hr) IV Continuous <Continuous>  dextrose 50% Injectable 25 Gram(s) IV Push once  dextrose 50% Injectable 12.5 Gram(s) IV Push once  dextrose 50% Injectable 25 Gram(s) IV Push once  glucagon  Injectable 1 milliGRAM(s) IntraMuscular once  insulin regular  human corrective regimen sliding scale   SubCutaneous every 6 hours  meropenem  IVPB 1000 milliGRAM(s) IV Intermittent every 8 hours  pantoprazole   Suspension 40 milliGRAM(s) Enteral Tube daily  polyethylene glycol 3350 17 Gram(s) Oral every 24 hours  propofol Infusion 10 MICROgram(s)/kG/Min (7.2 mL/Hr) IV Continuous <Continuous>  QUEtiapine 50 milliGRAM(s) Oral every 12 hours  senna 2 Tablet(s) Oral at bedtime    MEDICATIONS  (PRN):  acetaminophen    Suspension .. 650 milliGRAM(s) Oral every 6 hours PRN Temp greater or equal to 38C (100.4F)  sodium chloride 0.9% lock flush 10 milliLiter(s) IV Push every 1 hour PRN Pre/post blood products, medications, blood draw, and to maintain line patency    Allergies:  Allergies    No Known Allergies    Intolerances    Labs:                        7.9    9.18  )-----------( 250      ( 2021 05:57 )             26.0     04-21    137  |  105  |  17  ----------------------------<  96  4.3   |  23  |  1.33<H>    Ca    8.1<L>      2021 05:57  Phos  4.1     04-21  Mg     1.8     -21    TPro  6.1  /  Alb  2.0<L>  /  TBili  0.4  /  DBili  x   /  AST  25  /  ALT  22  /  AlkPhos  83  04-20    PT/INR - ( 2021 05:57 )   PT: 13.3 sec;   INR: 1.11       PTT - ( 2021 05:57 )  PTT:31.9 sec  Urinalysis Basic - ( 2021 09:46 )    Color: Yellow / Appearance: Cloudy / S.020 / pH: x  Gluc: x / Ketone: NEGATIVE  / Bili: Negative / Urobili: 0.2 E.U./dL   Blood: x / Protein: 30 mg/dL / Nitrite: NEGATIVE   Leuk Esterase: NEGATIVE / RBC: < 5 /HPF / WBC < 5 /HPF   Sq Epi: x / Non Sq Epi: 0-5 /HPF / Bacteria: Present /HPF    Radiology and other tests:   Overnight Events: Per report, Precedex DC. EKG showed T wave inversions present. Versed 4mg x2 for agitation. Propofol increased. Fentanyl push x 2 given for agitation.     Subjective: Patient seen and examined at bedside. Pt still intubated/sedated, unable to participate in ROS.     [OBJECTIVE]:  Vital Signs:  T(F): , Max: 100.5 (21 @ 14:39)  HR:  (54 - 95)  BP: --  BP(mean): --  RR:  (22 - 48)  SpO2:  (98% - 100%)  Wt(kg): --  CVP(cm H2O): --  Mode: AC/ CMV (Assist Control/ Continuous Mandatory Ventilation), RR (machine): 20, RR (patient): 26, TV (machine): 450, FiO2: 40, PEEP: 5, ITime: 1, MAP: 5.3, PIP: 11     @ 07:01  -   @ 07:00  --------------------------------------------------------  IN: 3926.4 mL / OUT: 3870 mL / NET: 56.4 mL     @ 07:01  -   @ 06:58  --------------------------------------------------------  IN: 2040.4 mL / OUT: 2960 mL / NET: -919.6 mL    CAPILLARY BLOOD GLUCOSE    POCT Blood Glucose.: 87 mg/dL (2021 05:21)    Physical Exam:  T(F): 98.7 (21 @ 04:00)  HR: 86 (21 @ 04:10)  BP: --  RR: 29 (21 @ 04:10)  SpO2: 99% (21 @ 04:10)  Wt(kg): --    GENERAL: obese, intubated and partially sedated, appearing a little agitated this AM,  SKIN/HAIR/NAILS: Nails without clubbing or cyanosis. CR<2 secs. No lesions, rash, petechiae, erythema or ecchymoses. Anicteric.   HEAD AND NECK: The skull is NC/AT. B/L Sclera white. 4 mm PERRL (hx of cataract sx). Nasal mucous membrane dry; NGT in place. Trachea midline, neck supple.   THORAX/LUNGS: Thorax is symmetric with poor expansion 2/2 tachypnea, assisted by mechanical ventilation. Lung sounds with rhonchi throughout, diminished at the bases.  CARDIOVASCULAR: No JVD. Carotid upstrokes are brisk, without bruits. +S1 and S2, RRR; no extra heart sounds or M/R/G. NSR at the time of my exam.  ABDOMEN/: Abdomen is rounded with hypoactive BS in all 4 quadrants; it is soft, no palpable masses; no grimacing to palpation noted; last BM  as patient has rectal tube with liquid brown stool. Lechuga catheter in place.   PERIPHERAL VASCULAR: Extremities are warm (upper) and cool (lower), radial and dorsalis pedis pulses +1 and symmetric. +2 generalized non-pitting edema noted. No clubbing, no cyanosis.  MUSCULOSKELETAL: no active ROM 2/2 sedation. No evidence of swelling or deformity.  NEUROLOGICAL: Patient attempts to open eyes with physical stimulation; however, not able to follow commands, wrist restraints in place to prevent patient harm; sedated on propofol and precedex   LINES (DATES): R IJ TLC 4/12, L axillary art line 4/16, IVL    Medications:  MEDICATIONS  (STANDING):  albuterol/ipratropium for Nebulization 3 milliLiter(s) Nebulizer every 4 hours  chlorhexidine 0.12% Liquid 15 milliLiter(s) Oral Mucosa every 12 hours  chlorhexidine 2% Cloths 1 Application(s) Topical daily  dextrose 40% Gel 15 Gram(s) Oral once  dextrose 5%. 1000 milliLiter(s) (50 mL/Hr) IV Continuous <Continuous>  dextrose 5%. 1000 milliLiter(s) (100 mL/Hr) IV Continuous <Continuous>  dextrose 50% Injectable 25 Gram(s) IV Push once  dextrose 50% Injectable 12.5 Gram(s) IV Push once  dextrose 50% Injectable 25 Gram(s) IV Push once  glucagon  Injectable 1 milliGRAM(s) IntraMuscular once  insulin regular  human corrective regimen sliding scale   SubCutaneous every 6 hours  meropenem  IVPB 1000 milliGRAM(s) IV Intermittent every 8 hours  pantoprazole   Suspension 40 milliGRAM(s) Enteral Tube daily  polyethylene glycol 3350 17 Gram(s) Oral every 24 hours  propofol Infusion 10 MICROgram(s)/kG/Min (7.2 mL/Hr) IV Continuous <Continuous>  QUEtiapine 50 milliGRAM(s) Oral every 12 hours  senna 2 Tablet(s) Oral at bedtime    MEDICATIONS  (PRN):  acetaminophen    Suspension .. 650 milliGRAM(s) Oral every 6 hours PRN Temp greater or equal to 38C (100.4F)  sodium chloride 0.9% lock flush 10 milliLiter(s) IV Push every 1 hour PRN Pre/post blood products, medications, blood draw, and to maintain line patency    Allergies:  Allergies    No Known Allergies    Intolerances    Labs:                        7.9    9.18  )-----------( 250      ( 2021 05:57 )             26.0     04-21    137  |  105  |  17  ----------------------------<  96  4.3   |  23  |  1.33<H>    Ca    8.1<L>      2021 05:57  Phos  4.1     04-21  Mg     1.8     -21    TPro  6.1  /  Alb  2.0<L>  /  TBili  0.4  /  DBili  x   /  AST  25  /  ALT  22  /  AlkPhos  83  04-20    PT/INR - ( 2021 05:57 )   PT: 13.3 sec;   INR: 1.11       PTT - ( 2021 05:57 )  PTT:31.9 sec  Urinalysis Basic - ( 2021 09:46 )    Color: Yellow / Appearance: Cloudy / S.020 / pH: x  Gluc: x / Ketone: NEGATIVE  / Bili: Negative / Urobili: 0.2 E.U./dL   Blood: x / Protein: 30 mg/dL / Nitrite: NEGATIVE   Leuk Esterase: NEGATIVE / RBC: < 5 /HPF / WBC < 5 /HPF   Sq Epi: x / Non Sq Epi: 0-5 /HPF / Bacteria: Present /HPF    Radiology and other tests:   Patient expressed no known problems or needs

## 2021-04-22 LAB
ANION GAP SERPL CALC-SCNC: 10 MMOL/L — SIGNIFICANT CHANGE UP (ref 5–17)
BASE EXCESS BLDA CALC-SCNC: -2.2 MMOL/L — LOW (ref -2–3)
BUN SERPL-MCNC: 16 MG/DL — SIGNIFICANT CHANGE UP (ref 7–23)
CALCIUM SERPL-MCNC: 8 MG/DL — LOW (ref 8.4–10.5)
CHLORIDE SERPL-SCNC: 106 MMOL/L — SIGNIFICANT CHANGE UP (ref 96–108)
CO2 SERPL-SCNC: 22 MMOL/L — SIGNIFICANT CHANGE UP (ref 22–31)
CREAT SERPL-MCNC: 1.15 MG/DL — SIGNIFICANT CHANGE UP (ref 0.5–1.3)
GLUCOSE BLDC GLUCOMTR-MCNC: 100 MG/DL — HIGH (ref 70–99)
GLUCOSE BLDC GLUCOMTR-MCNC: 100 MG/DL — HIGH (ref 70–99)
GLUCOSE BLDC GLUCOMTR-MCNC: 101 MG/DL — HIGH (ref 70–99)
GLUCOSE BLDC GLUCOMTR-MCNC: 83 MG/DL — SIGNIFICANT CHANGE UP (ref 70–99)
GLUCOSE SERPL-MCNC: 89 MG/DL — SIGNIFICANT CHANGE UP (ref 70–99)
HCO3 BLDA-SCNC: 22 MMOL/L — SIGNIFICANT CHANGE UP (ref 21–28)
HCT VFR BLD CALC: 24.5 % — LOW (ref 39–50)
HGB BLD-MCNC: 7.2 G/DL — LOW (ref 13–17)
MAGNESIUM SERPL-MCNC: 1.8 MG/DL — SIGNIFICANT CHANGE UP (ref 1.6–2.6)
MCHC RBC-ENTMCNC: 23.6 PG — LOW (ref 27–34)
MCHC RBC-ENTMCNC: 29.4 GM/DL — LOW (ref 32–36)
MCV RBC AUTO: 80.3 FL — SIGNIFICANT CHANGE UP (ref 80–100)
NRBC # BLD: 0 /100 WBCS — SIGNIFICANT CHANGE UP (ref 0–0)
PCO2 BLDA: 36 MMHG — SIGNIFICANT CHANGE UP (ref 35–48)
PH BLDA: 7.41 — SIGNIFICANT CHANGE UP (ref 7.35–7.45)
PHOSPHATE SERPL-MCNC: 3.5 MG/DL — SIGNIFICANT CHANGE UP (ref 2.5–4.5)
PLATELET # BLD AUTO: 245 K/UL — SIGNIFICANT CHANGE UP (ref 150–400)
PO2 BLDA: 124 MMHG — HIGH (ref 83–108)
POTASSIUM SERPL-MCNC: 4.3 MMOL/L — SIGNIFICANT CHANGE UP (ref 3.5–5.3)
POTASSIUM SERPL-SCNC: 4.3 MMOL/L — SIGNIFICANT CHANGE UP (ref 3.5–5.3)
RBC # BLD: 3.05 M/UL — LOW (ref 4.2–5.8)
RBC # FLD: 19.6 % — HIGH (ref 10.3–14.5)
SAO2 % BLDA: 98 % — SIGNIFICANT CHANGE UP (ref 95–100)
SODIUM SERPL-SCNC: 138 MMOL/L — SIGNIFICANT CHANGE UP (ref 135–145)
WBC # BLD: 7.02 K/UL — SIGNIFICANT CHANGE UP (ref 3.8–10.5)
WBC # FLD AUTO: 7.02 K/UL — SIGNIFICANT CHANGE UP (ref 3.8–10.5)

## 2021-04-22 PROCEDURE — 31600 PLANNED TRACHEOSTOMY: CPT

## 2021-04-22 PROCEDURE — 99231 SBSQ HOSP IP/OBS SF/LOW 25: CPT

## 2021-04-22 PROCEDURE — 76937 US GUIDE VASCULAR ACCESS: CPT | Mod: 26

## 2021-04-22 PROCEDURE — 99233 SBSQ HOSP IP/OBS HIGH 50: CPT | Mod: GC

## 2021-04-22 PROCEDURE — 36000 PLACE NEEDLE IN VEIN: CPT

## 2021-04-22 PROCEDURE — 71045 X-RAY EXAM CHEST 1 VIEW: CPT | Mod: 26

## 2021-04-22 PROCEDURE — 99212 OFFICE O/P EST SF 10 MIN: CPT

## 2021-04-22 RX ORDER — NOREPINEPHRINE BITARTRATE/D5W 8 MG/250ML
0.05 PLASTIC BAG, INJECTION (ML) INTRAVENOUS
Qty: 8 | Refills: 0 | Status: DISCONTINUED | OUTPATIENT
Start: 2021-04-22 | End: 2021-04-23

## 2021-04-22 RX ORDER — ENOXAPARIN SODIUM 100 MG/ML
40 INJECTION SUBCUTANEOUS EVERY 12 HOURS
Refills: 0 | Status: DISCONTINUED | OUTPATIENT
Start: 2021-04-23 | End: 2021-04-30

## 2021-04-22 RX ORDER — QUETIAPINE FUMARATE 200 MG/1
50 TABLET, FILM COATED ORAL EVERY 8 HOURS
Refills: 0 | Status: DISCONTINUED | OUTPATIENT
Start: 2021-04-22 | End: 2021-04-24

## 2021-04-22 RX ORDER — MAGNESIUM SULFATE 500 MG/ML
1 VIAL (ML) INJECTION ONCE
Refills: 0 | Status: COMPLETED | OUTPATIENT
Start: 2021-04-22 | End: 2021-04-22

## 2021-04-22 RX ADMIN — HYDROMORPHONE HYDROCHLORIDE 1 MILLIGRAM(S): 2 INJECTION INTRAMUSCULAR; INTRAVENOUS; SUBCUTANEOUS at 09:11

## 2021-04-22 RX ADMIN — MEROPENEM 100 MILLIGRAM(S): 1 INJECTION INTRAVENOUS at 18:49

## 2021-04-22 RX ADMIN — PROPOFOL 7.2 MICROGRAM(S)/KG/MIN: 10 INJECTION, EMULSION INTRAVENOUS at 23:54

## 2021-04-22 RX ADMIN — QUETIAPINE FUMARATE 50 MILLIGRAM(S): 200 TABLET, FILM COATED ORAL at 18:49

## 2021-04-22 RX ADMIN — Medication 3 MILLILITER(S): at 20:01

## 2021-04-22 RX ADMIN — PROPOFOL 7.2 MICROGRAM(S)/KG/MIN: 10 INJECTION, EMULSION INTRAVENOUS at 10:05

## 2021-04-22 RX ADMIN — CHLORHEXIDINE GLUCONATE 15 MILLILITER(S): 213 SOLUTION TOPICAL at 18:49

## 2021-04-22 RX ADMIN — Medication 4 MILLIGRAM(S): at 05:16

## 2021-04-22 RX ADMIN — DEXMEDETOMIDINE HYDROCHLORIDE IN 0.9% SODIUM CHLORIDE 6 MICROGRAM(S)/KG/HR: 4 INJECTION INTRAVENOUS at 12:34

## 2021-04-22 RX ADMIN — DEXMEDETOMIDINE HYDROCHLORIDE IN 0.9% SODIUM CHLORIDE 6 MICROGRAM(S)/KG/HR: 4 INJECTION INTRAVENOUS at 16:31

## 2021-04-22 RX ADMIN — Medication 3 MILLILITER(S): at 00:14

## 2021-04-22 RX ADMIN — CHLORHEXIDINE GLUCONATE 1 APPLICATION(S): 213 SOLUTION TOPICAL at 11:33

## 2021-04-22 RX ADMIN — Medication 650 MILLIGRAM(S): at 23:54

## 2021-04-22 RX ADMIN — PROPOFOL 7.2 MICROGRAM(S)/KG/MIN: 10 INJECTION, EMULSION INTRAVENOUS at 16:32

## 2021-04-22 RX ADMIN — POLYETHYLENE GLYCOL 3350 17 GRAM(S): 17 POWDER, FOR SOLUTION ORAL at 09:24

## 2021-04-22 RX ADMIN — DEXMEDETOMIDINE HYDROCHLORIDE IN 0.9% SODIUM CHLORIDE 6 MICROGRAM(S)/KG/HR: 4 INJECTION INTRAVENOUS at 23:54

## 2021-04-22 RX ADMIN — DEXMEDETOMIDINE HYDROCHLORIDE IN 0.9% SODIUM CHLORIDE 6 MICROGRAM(S)/KG/HR: 4 INJECTION INTRAVENOUS at 18:16

## 2021-04-22 RX ADMIN — Medication 650 MILLIGRAM(S): at 01:26

## 2021-04-22 RX ADMIN — Medication 4 MILLIGRAM(S): at 18:49

## 2021-04-22 RX ADMIN — QUETIAPINE FUMARATE 50 MILLIGRAM(S): 200 TABLET, FILM COATED ORAL at 09:39

## 2021-04-22 RX ADMIN — Medication 3 MILLILITER(S): at 06:22

## 2021-04-22 RX ADMIN — CHLORHEXIDINE GLUCONATE 15 MILLILITER(S): 213 SOLUTION TOPICAL at 05:34

## 2021-04-22 RX ADMIN — Medication 3 MILLILITER(S): at 09:30

## 2021-04-22 RX ADMIN — HYDROMORPHONE HYDROCHLORIDE 1 MILLIGRAM(S): 2 INJECTION INTRAMUSCULAR; INTRAVENOUS; SUBCUTANEOUS at 03:59

## 2021-04-22 RX ADMIN — Medication 4 MILLIGRAM(S): at 23:54

## 2021-04-22 RX ADMIN — Medication 4 MILLIGRAM(S): at 11:31

## 2021-04-22 RX ADMIN — DEXMEDETOMIDINE HYDROCHLORIDE IN 0.9% SODIUM CHLORIDE 6 MICROGRAM(S)/KG/HR: 4 INJECTION INTRAVENOUS at 09:10

## 2021-04-22 RX ADMIN — Medication 3 MILLILITER(S): at 13:20

## 2021-04-22 RX ADMIN — MEROPENEM 100 MILLIGRAM(S): 1 INJECTION INTRAVENOUS at 09:24

## 2021-04-22 RX ADMIN — Medication 100 GRAM(S): at 05:17

## 2021-04-22 RX ADMIN — PANTOPRAZOLE SODIUM 40 MILLIGRAM(S): 20 TABLET, DELAYED RELEASE ORAL at 11:31

## 2021-04-22 RX ADMIN — MEROPENEM 100 MILLIGRAM(S): 1 INJECTION INTRAVENOUS at 01:26

## 2021-04-22 RX ADMIN — SENNA PLUS 2 TABLET(S): 8.6 TABLET ORAL at 23:55

## 2021-04-22 RX ADMIN — PROPOFOL 7.2 MICROGRAM(S)/KG/MIN: 10 INJECTION, EMULSION INTRAVENOUS at 05:17

## 2021-04-22 NOTE — PROGRESS NOTE ADULT - SUBJECTIVE AND OBJECTIVE BOX
CC: Patient is a 56y old  Male who presents with a chief complaint of SOB due to COVID (22 Apr 2021 07:02)      INTERVAL EVENTS: Febrile to 100.4 overnight.    SUBJECTIVE / INTERVAL HPI: Patient seen and examined at bedside. Pt on a vent and unable to participate in ROS.    ROS: negative unless otherwise stated above.    VITAL SIGNS:  Vital Signs Last 24 Hrs  T(C): 37.3 (22 Apr 2021 09:19), Max: 38 (22 Apr 2021 00:00)  T(F): 99.2 (22 Apr 2021 09:19), Max: 100.4 (22 Apr 2021 00:00)  HR: 76 (22 Apr 2021 08:00) (76 - 97)  BP: 109/65 (22 Apr 2021 08:00) (94/59 - 139/77)  BP(mean): 82 (22 Apr 2021 08:00) (68 - 95)  RR: 22 (22 Apr 2021 08:00) (16 - 44)  SpO2: 100% (22 Apr 2021 08:00) (95% - 100%)      04-21-21 @ 07:01  -  04-22-21 @ 07:00  --------------------------------------------------------  IN: 975.4 mL / OUT: 1650 mL / NET: -674.6 mL    04-22-21 @ 07:01  -  04-22-21 @ 09:57  --------------------------------------------------------  IN: 0 mL / OUT: 100 mL / NET: -100 mL        PHYSICAL EXAM:    General: sedated on a ventilator  Cardiovascular: +S1/S2; RRR  Respiratory: CTA B/L; no W/R/R  Gastrointestinal: soft, non-distended; +BSx4  Extremities: LLE cooler than R; 1+ edema of b/l hands; no edema of b/l LEs  Vascular: 2+ radial, DP/PT pulses dopplerable  Neurological: sedated on a ventilator    MEDICATIONS:  MEDICATIONS  (STANDING):  albuterol/ipratropium for Nebulization 3 milliLiter(s) Nebulizer every 4 hours  chlorhexidine 0.12% Liquid 15 milliLiter(s) Oral Mucosa every 12 hours  chlorhexidine 2% Cloths 1 Application(s) Topical daily  dexMEDEtomidine Infusion 0.2 MICROgram(s)/kG/Hr (6 mL/Hr) IV Continuous <Continuous>  dextrose 40% Gel 15 Gram(s) Oral once  dextrose 5%. 1000 milliLiter(s) (50 mL/Hr) IV Continuous <Continuous>  dextrose 5%. 1000 milliLiter(s) (100 mL/Hr) IV Continuous <Continuous>  dextrose 50% Injectable 25 Gram(s) IV Push once  dextrose 50% Injectable 12.5 Gram(s) IV Push once  dextrose 50% Injectable 25 Gram(s) IV Push once  glucagon  Injectable 1 milliGRAM(s) IntraMuscular once  insulin regular  human corrective regimen sliding scale   SubCutaneous every 6 hours  LORazepam     Tablet 4 milliGRAM(s) Oral every 6 hours  meropenem  IVPB 1000 milliGRAM(s) IV Intermittent every 8 hours  pantoprazole   Suspension 40 milliGRAM(s) Enteral Tube daily  polyethylene glycol 3350 17 Gram(s) Oral every 24 hours  propofol Infusion 10 MICROgram(s)/kG/Min (7.2 mL/Hr) IV Continuous <Continuous>  QUEtiapine 50 milliGRAM(s) Oral every 8 hours  senna 2 Tablet(s) Oral at bedtime    MEDICATIONS  (PRN):  acetaminophen    Suspension .. 650 milliGRAM(s) Oral every 6 hours PRN Temp greater or equal to 38C (100.4F)  HYDROmorphone  Injectable 1 milliGRAM(s) IV Push every 4 hours PRN Moderate Pain (4 - 6)  sodium chloride 0.9% lock flush 10 milliLiter(s) IV Push every 1 hour PRN Pre/post blood products, medications, blood draw, and to maintain line patency      ALLERGIES:  Allergies    No Known Allergies    Intolerances        LABS:                        7.2    7.02  )-----------( 245      ( 22 Apr 2021 02:53 )             24.5     04-22    138  |  106  |  16  ----------------------------<  89  4.3   |  22  |  1.15    Ca    8.0<L>      22 Apr 2021 02:53  Phos  3.5     04-22  Mg     1.8     04-22      PT/INR - ( 21 Apr 2021 05:57 )   PT: 13.3 sec;   INR: 1.11          PTT - ( 21 Apr 2021 05:57 )  PTT:31.9 sec    CAPILLARY BLOOD GLUCOSE      POCT Blood Glucose.: 83 mg/dL (22 Apr 2021 06:03)      RADIOLOGY & ADDITIONAL TESTS: Reviewed.

## 2021-04-22 NOTE — PROGRESS NOTE ADULT - ASSESSMENT
57 yo M with PMHx of MU and gout who presented to ED with c/o progressively worsening SOB admitted to ICU for management of AHRF in the setting of COVID.     NEURO  #Sedated   - sedated on ventilator  - currently on propofol, precedex, ativan 4mg q6, IV dilaudid 1mg q4 prn, seroquel 50mg q12  - increase seroquel to 50mg q8 hr  - wean propofol after tracheostomy   -Continue with wrist restraints to prevent patient harm    RESPIRATORY  #Acute hypoxic hypercapnic respiratory failure in the setting of COVID PNA  -Patient initially presented with O2 saturation of 14% and cyanotic requiring immediate intubation  -CXR with diffuse bilateral opacities with + COVID19 PCR  -Elevated inflammatory markers  -s/p intubation  -Planned trach 4/22    #COVID  -CXR with diffuse bilateral opacities with + COVID19 PCR  -Decadron (4/5-4/13) d/c'ed due to clinical presentation  -Remdesevir (4/5-4/9)    #MU  -As per patient's wife, patient has MU on CPAP  -Patient sleeps with 2-3 pillows at night, attributed to MU    CARDIOVASCULAR  #Atrial Fibrillation- RESOLVED  -Patient went into afib with RVR 4/17 @ about 1830  -No new medications started as resolved  -Anticoagulation for afib not started, patient now in NSR and [id number] Score 0 as patient has no CV hx (however, on admission patient was found to have low EF- repeat ECHO with EF 55%)    GI  -s/p PEG 4/21    #Ileus 2/2 opioids - RESOLVED and having BMs  - s/p sump for decompression  - s/p methylnaltrexone with BM afterwards    RENAL//F&E  #Acute Kidney Injury  -Baseline creatinine unknown, no reported kidney disease  -Urine lytes 4/17 showing pre-renal  -now resolving  -Strict I&O monitoring  -Avoid nephrotoxic medications    ID  #Persistent Fever  -Recent aspiration pneumonia c/b septic shock (now resolved); sputum culture yielded growth of normal respiratory lexie. Fever despite meropenem ?2/2 periodic aspiration vs. COVID-19 vs. new nosocomial infectious process  - cont meropenem x7 days (4/16-4/22)    #Aspiration pneumonia vs HAP  -Found 4/14 while trying to extubate w/ delirium, and numerous thick secretions in oral cavity requiring suctioning. 4/15 AM found w/ 102.3F oral temp and WBC increase to 18s, suspect possible aspiration pneumonia, with previous negative MRSA swab. 4/14 bcx NGTD, 4/15 bcx NGTD  -Zosyn started 4/9 and d/c'd 4/16 in the setting of continued septic shock  -Decision made to broaden antibiotics to meropenem 1 gram and vancomycin 1250 mg  -MRSA swab negative, no addt'l vanco dosed  - cont meropenem x7 days (4/16-4/22)    #COVID PNA   -Plan as above  -Off steroid course, remdesivir course completed    #?Fungal Infxn  -Seen by ID, -ID ok'd Vori (loading doses x 2 given, then on 400 mg q12hr), stopped 4/14 given improvement in pt. status before medication was really started   -As per ID recs ====> s/p BAL 4/12--cultures have so far only yielded Keila dubliniensis which is probably a respiratory tract colonizer (and not the etiology of the patient's fevers). Voriconazole is not indicated for respiratory tract colonization with Candida. Suspect recent high fevers/leukocytosis due to aspiration event; Doubt COVID-19 associated pulmonary aspergillosis given BAL cultures without growth of mold, absence of cavitation on CXR, and time course relatively early for development of this rare entity. Moreover, would not attribute new fevers to discontinuation of voriconazole.  -BAL sputum samples taken from bronch 4/12, showing yeast   -Sputum cx from 4/16, no evidence of yeast  -Peripheral blood cultures from 4/19 NG @ 12 hrs  -Maintain MAP > 70 mmHg    ENDOCRINE  #Hyperglycemia  -q6hr correctional scale  -Lantus and regular insulin d/c'd as patient NPO  -Blood glucose trend   -Decadron started 4/5 AM, d/c'd 4/13  -A1c 6.2    HEME  #Anemia  -Hgb 11 on admission; unknown baseline. No evidence of bleed.  -Hgb 4/18 7.3, possibly hemodiluted as patient did get fluids and albumin overnight, s/p transfusion 1U PRBC  -Maintain active T+S    -DVT ppx with heparin SubQ    DISPO/GOALS OF CARE:  Patient requires continuous monitoring with bedside rhythm monitoring, arterial line, pulse oximetry, ventilator monitoring and intermittent blood gas analysis. Care plan discussed with ICU care team.

## 2021-04-22 NOTE — PROGRESS NOTE ADULT - ATTENDING COMMENTS
agree with assessment and plan as documented above.   Acute hypoxic respiratory failure  delirium    trach to be done today. titrate sedatives/anxiolytics/analgesics for delirium

## 2021-04-22 NOTE — PROGRESS NOTE ADULT - SUBJECTIVE AND OBJECTIVE BOX
ENT [facility] DAILY PROGRESS NOTE    4/19/21 HPI:   56M, w/ PMH MU (home CPAP) and gout,  presented on 4/05/21 to [facility] ED for SOB.  Had fatigue, malaise, myalgias x 4 days & worsening SOB x 2 days.  S/p first dose of Moderna vaccine on 3/25/2021.  In ED, had tachypnea and cyanosis with O2sat 14%; intubated after O2sat only 51% on NRB.  CXR w/ diffuse bilateral infiltrates, and COVID NP PCR positive.  Initial echo (4/05) w/ EF LV 15% and mild RV dilation.  Repeat echo (4/13) w/ EF 55%, mild concentric LVH; normal RV function; no pericardial effusion.  Started on vanco and zosyn for possible superimposed bacterial PNA since septic.  Now on merepenem (4/15-) for possible aspiration PNA after vomiting and fevers to 102F.  S/p Decadron and remdesivir for COVID.  Treated for GRACE (partly prerenal); hypernatremia (high 147, now WNL), anemia (hgb 11 on admission; no bleeding source; likely dilutional).  In acute hypoxic respiratory failure 2/2 COVID c/b septic shock due to aspiration (now resolved), continued fevers and difficulty weaning off vent due to delirium.   No history of prior trach procedures  Pt was on heparin 7500 q8, but no other anticoagulants or antiplatelet agents.      Overnight events/Interval HPI:   4/21:  Tracheotomy was cancelled because procedure bumped by OR for emergency case.     PEG placed.  4/22:  Still on FiO2 40% on vent. No pressors.  Dr. Mixon or Dr. Calhoun will do trach today in afternoon.        ALLERGIES  No Known Allergies/ Intolerances      MEDICATIONS:  Antiinfectives:   meropenem  IVPB 1000 milliGRAM(s) IV Intermittent every 8 hours    IV fluids:  dextrose 5%. 1000 milliLiter(s) IV Continuous <Continuous>  dextrose 5%. 1000 milliLiter(s) IV Continuous <Continuous>    Pain medications/Neuro:  acetaminophen    Suspension .. 650 milliGRAM(s) Oral every 6 hours PRN  dexMEDEtomidine Infusion 0.2 MICROgram(s)/kG/Hr IV Continuous <Continuous>  HYDROmorphone  Injectable 1 milliGRAM(s) IV Push every 4 hours PRN  LORazepam     Tablet 4 milliGRAM(s) Oral every 6 hours  propofol Infusion 10 MICROgram(s)/kG/Min IV Continuous <Continuous>  QUEtiapine 50 milliGRAM(s) Oral every 12 hours    Endocrine Medications:   dextrose 40% Gel 15 Gram(s) Oral once  dextrose 50% Injectable 25 Gram(s) IV Push once  dextrose 50% Injectable 12.5 Gram(s) IV Push once  dextrose 50% Injectable 25 Gram(s) IV Push once  glucagon  Injectable 1 milliGRAM(s) IntraMuscular once  insulin regular  human corrective regimen sliding scale   SubCutaneous every 6 hours    All other standing medications:   albuterol/ipratropium for Nebulization 3 milliLiter(s) Nebulizer every 4 hours  chlorhexidine 0.12% Liquid 15 milliLiter(s) Oral Mucosa every 12 hours  chlorhexidine 2% Cloths 1 Application(s) Topical daily  pantoprazole   Suspension 40 milliGRAM(s) Enteral Tube daily  polyethylene glycol 3350 17 Gram(s) Oral every 24 hours  senna 2 Tablet(s) Oral at bedtime      Vital Signs Last 24 Hrs  T(C): 37.8 (22 Apr 2021 06:00), Max: 38.3 (21 Apr 2021 09:39)  T(F): 100 (22 Apr 2021 06:00), Max: 100.9 (21 Apr 2021 09:39)  HR: 97 (22 Apr 2021 06:00) (78 - 97)  BP: 120/75 (22 Apr 2021 06:00) (94/59 - 139/77)  BP(mean): 93 (22 Apr 2021 06:00) (68 - 95)  RR: 44 (22 Apr 2021 06:00) (16 - 44)  SpO2: 98% (22 Apr 2021 06:00) (95% - 100%)      PHYSICAL EXAM:  Sedated, on vent  Neck:  Trachea midline.  Thyroid not enlarged.  Laryngotracheal landmarks palpable.  Lymph:  No cervical adenopathy.      LABS:  CBC-                        7.2    7.02  )-----------( 245      ( 22 Apr 2021 02:53 )             24.5     BMP/CMP-  22 Apr 2021 02:53    138    |  106    |  16     ----------------------------<  89     4.3     |  22     |  1.15     Ca    8.0        22 Apr 2021 02:53  Phos  3.5       22 Apr 2021 02:53  Mg     1.8       22 Apr 2021 02:53      Coagulation Studies-  PT/INR - ( 21 Apr 2021 05:57 )   PT: 13.3 sec;   INR: 1.11     PTT - ( 21 Apr 2021 05:57 )  PTT:31.9 sec    COVID PCR testing:  (4/05) NP Positive; (4/20) Sputum positive      Assessment/Plan:  56y Male, with MU, obesity and gout, has been intubated since 4/05/21 due to COVID PNA; also treated with antibiotics for suspected aspiration event last week.  Has been stable on FiO2 40% but unable to be weaned due to delirium.  Tracheotomy was to be done on 4/21 but bumped by OR for an emergency.  Tracheotomy to be done this afternoon, as discussed with ICU team yesterda

## 2021-04-22 NOTE — PROCEDURE NOTE - NSICDXPROCEDURE_GEN_ALL_CORE_FT
PROCEDURES:  Insertion, catheter, intravenous 22-Apr-2021 12:31:54  Hernando Benson  Ultrasound guided venous access 22-Apr-2021 12:32:27  Hernando Benson  
PROCEDURES:  Insertion, catheter, intravenous 22-Apr-2021 12:26:38  Hernando Benson  Ultrasound guided venous access 22-Apr-2021 12:27:05  Hernando Benson  
PROCEDURES:  Insertion of central venous catheter with ultrasound guidance 12-Apr-2021 18:49:53  Hernando Benson  Insertion, needle, vein 12-Apr-2021 18:50:05  Hernando Benson

## 2021-04-23 LAB
ANION GAP SERPL CALC-SCNC: 11 MMOL/L — SIGNIFICANT CHANGE UP (ref 5–17)
BASE EXCESS BLDA CALC-SCNC: 0.1 MMOL/L — SIGNIFICANT CHANGE UP (ref -2–3)
BASOPHILS # BLD AUTO: 0.02 K/UL — SIGNIFICANT CHANGE UP (ref 0–0.2)
BASOPHILS NFR BLD AUTO: 0.4 % — SIGNIFICANT CHANGE UP (ref 0–2)
BUN SERPL-MCNC: 17 MG/DL — SIGNIFICANT CHANGE UP (ref 7–23)
CALCIUM SERPL-MCNC: 8.2 MG/DL — LOW (ref 8.4–10.5)
CHLORIDE SERPL-SCNC: 104 MMOL/L — SIGNIFICANT CHANGE UP (ref 96–108)
CO2 SERPL-SCNC: 22 MMOL/L — SIGNIFICANT CHANGE UP (ref 22–31)
CREAT SERPL-MCNC: 1.15 MG/DL — SIGNIFICANT CHANGE UP (ref 0.5–1.3)
EOSINOPHIL # BLD AUTO: 0.33 K/UL — SIGNIFICANT CHANGE UP (ref 0–0.5)
EOSINOPHIL NFR BLD AUTO: 5.9 % — SIGNIFICANT CHANGE UP (ref 0–6)
GLUCOSE BLDC GLUCOMTR-MCNC: 109 MG/DL — HIGH (ref 70–99)
GLUCOSE BLDC GLUCOMTR-MCNC: 130 MG/DL — HIGH (ref 70–99)
GLUCOSE BLDC GLUCOMTR-MCNC: 138 MG/DL — HIGH (ref 70–99)
GLUCOSE BLDC GLUCOMTR-MCNC: 141 MG/DL — HIGH (ref 70–99)
GLUCOSE SERPL-MCNC: 115 MG/DL — HIGH (ref 70–99)
HCO3 BLDA-SCNC: 22 MMOL/L — SIGNIFICANT CHANGE UP (ref 21–28)
HCT VFR BLD CALC: 26.1 % — LOW (ref 39–50)
HGB BLD-MCNC: 7.7 G/DL — LOW (ref 13–17)
IMM GRANULOCYTES NFR BLD AUTO: 0.4 % — SIGNIFICANT CHANGE UP (ref 0–1.5)
LYMPHOCYTES # BLD AUTO: 0.7 K/UL — LOW (ref 1–3.3)
LYMPHOCYTES # BLD AUTO: 12.5 % — LOW (ref 13–44)
MAGNESIUM SERPL-MCNC: 1.8 MG/DL — SIGNIFICANT CHANGE UP (ref 1.6–2.6)
MCHC RBC-ENTMCNC: 23.5 PG — LOW (ref 27–34)
MCHC RBC-ENTMCNC: 29.5 GM/DL — LOW (ref 32–36)
MCV RBC AUTO: 79.8 FL — LOW (ref 80–100)
MONOCYTES # BLD AUTO: 0.43 K/UL — SIGNIFICANT CHANGE UP (ref 0–0.9)
MONOCYTES NFR BLD AUTO: 7.7 % — SIGNIFICANT CHANGE UP (ref 2–14)
NEUTROPHILS # BLD AUTO: 4.11 K/UL — SIGNIFICANT CHANGE UP (ref 1.8–7.4)
NEUTROPHILS NFR BLD AUTO: 73.1 % — SIGNIFICANT CHANGE UP (ref 43–77)
NRBC # BLD: 0 /100 WBCS — SIGNIFICANT CHANGE UP (ref 0–0)
PCO2 BLDA: 27 MMHG — LOW (ref 35–48)
PH BLDA: 7.54 — HIGH (ref 7.35–7.45)
PHOSPHATE SERPL-MCNC: 3 MG/DL — SIGNIFICANT CHANGE UP (ref 2.5–4.5)
PLATELET # BLD AUTO: 257 K/UL — SIGNIFICANT CHANGE UP (ref 150–400)
PO2 BLDA: 128 MMHG — HIGH (ref 83–108)
POTASSIUM SERPL-MCNC: 4.1 MMOL/L — SIGNIFICANT CHANGE UP (ref 3.5–5.3)
POTASSIUM SERPL-SCNC: 4.1 MMOL/L — SIGNIFICANT CHANGE UP (ref 3.5–5.3)
RBC # BLD: 3.27 M/UL — LOW (ref 4.2–5.8)
RBC # FLD: 19.5 % — HIGH (ref 10.3–14.5)
SAO2 % BLDA: 99 % — SIGNIFICANT CHANGE UP (ref 95–100)
SODIUM SERPL-SCNC: 137 MMOL/L — SIGNIFICANT CHANGE UP (ref 135–145)
WBC # BLD: 5.61 K/UL — SIGNIFICANT CHANGE UP (ref 3.8–10.5)
WBC # FLD AUTO: 5.61 K/UL — SIGNIFICANT CHANGE UP (ref 3.8–10.5)

## 2021-04-23 PROCEDURE — 93010 ELECTROCARDIOGRAM REPORT: CPT

## 2021-04-23 PROCEDURE — 99233 SBSQ HOSP IP/OBS HIGH 50: CPT | Mod: GC

## 2021-04-23 RX ORDER — MAGNESIUM SULFATE 500 MG/ML
1 VIAL (ML) INJECTION ONCE
Refills: 0 | Status: COMPLETED | OUTPATIENT
Start: 2021-04-23 | End: 2021-04-23

## 2021-04-23 RX ORDER — HYDROMORPHONE HYDROCHLORIDE 2 MG/ML
4 INJECTION INTRAMUSCULAR; INTRAVENOUS; SUBCUTANEOUS EVERY 4 HOURS
Refills: 0 | Status: DISCONTINUED | OUTPATIENT
Start: 2021-04-23 | End: 2021-04-26

## 2021-04-23 RX ADMIN — Medication 650 MILLIGRAM(S): at 05:39

## 2021-04-23 RX ADMIN — HYDROMORPHONE HYDROCHLORIDE 4 MILLIGRAM(S): 2 INJECTION INTRAMUSCULAR; INTRAVENOUS; SUBCUTANEOUS at 18:26

## 2021-04-23 RX ADMIN — Medication 650 MILLIGRAM(S): at 19:35

## 2021-04-23 RX ADMIN — HYDROMORPHONE HYDROCHLORIDE 4 MILLIGRAM(S): 2 INJECTION INTRAMUSCULAR; INTRAVENOUS; SUBCUTANEOUS at 12:50

## 2021-04-23 RX ADMIN — Medication 650 MILLIGRAM(S): at 12:50

## 2021-04-23 RX ADMIN — PROPOFOL 7.2 MICROGRAM(S)/KG/MIN: 10 INJECTION, EMULSION INTRAVENOUS at 05:39

## 2021-04-23 RX ADMIN — Medication 650 MILLIGRAM(S): at 00:00

## 2021-04-23 RX ADMIN — Medication 650 MILLIGRAM(S): at 06:00

## 2021-04-23 RX ADMIN — SENNA PLUS 2 TABLET(S): 8.6 TABLET ORAL at 22:59

## 2021-04-23 RX ADMIN — Medication 4 MILLIGRAM(S): at 05:37

## 2021-04-23 RX ADMIN — QUETIAPINE FUMARATE 50 MILLIGRAM(S): 200 TABLET, FILM COATED ORAL at 03:14

## 2021-04-23 RX ADMIN — HYDROMORPHONE HYDROCHLORIDE 4 MILLIGRAM(S): 2 INJECTION INTRAMUSCULAR; INTRAVENOUS; SUBCUTANEOUS at 23:00

## 2021-04-23 RX ADMIN — HYDROMORPHONE HYDROCHLORIDE 4 MILLIGRAM(S): 2 INJECTION INTRAMUSCULAR; INTRAVENOUS; SUBCUTANEOUS at 12:07

## 2021-04-23 RX ADMIN — Medication 3 MILLILITER(S): at 00:45

## 2021-04-23 RX ADMIN — HYDROMORPHONE HYDROCHLORIDE 4 MILLIGRAM(S): 2 INJECTION INTRAMUSCULAR; INTRAVENOUS; SUBCUTANEOUS at 19:08

## 2021-04-23 RX ADMIN — Medication 4 MILLIGRAM(S): at 23:00

## 2021-04-23 RX ADMIN — HYDROMORPHONE HYDROCHLORIDE 4 MILLIGRAM(S): 2 INJECTION INTRAMUSCULAR; INTRAVENOUS; SUBCUTANEOUS at 16:30

## 2021-04-23 RX ADMIN — PANTOPRAZOLE SODIUM 40 MILLIGRAM(S): 20 TABLET, DELAYED RELEASE ORAL at 12:08

## 2021-04-23 RX ADMIN — POLYETHYLENE GLYCOL 3350 17 GRAM(S): 17 POWDER, FOR SOLUTION ORAL at 08:57

## 2021-04-23 RX ADMIN — DEXMEDETOMIDINE HYDROCHLORIDE IN 0.9% SODIUM CHLORIDE 6 MICROGRAM(S)/KG/HR: 4 INJECTION INTRAVENOUS at 01:43

## 2021-04-23 RX ADMIN — Medication 3 MILLILITER(S): at 08:33

## 2021-04-23 RX ADMIN — Medication 100 GRAM(S): at 07:48

## 2021-04-23 RX ADMIN — DEXMEDETOMIDINE HYDROCHLORIDE IN 0.9% SODIUM CHLORIDE 6 MICROGRAM(S)/KG/HR: 4 INJECTION INTRAVENOUS at 05:39

## 2021-04-23 RX ADMIN — CHLORHEXIDINE GLUCONATE 1 APPLICATION(S): 213 SOLUTION TOPICAL at 12:08

## 2021-04-23 RX ADMIN — DEXMEDETOMIDINE HYDROCHLORIDE IN 0.9% SODIUM CHLORIDE 6 MICROGRAM(S)/KG/HR: 4 INJECTION INTRAVENOUS at 12:50

## 2021-04-23 RX ADMIN — HYDROMORPHONE HYDROCHLORIDE 4 MILLIGRAM(S): 2 INJECTION INTRAMUSCULAR; INTRAVENOUS; SUBCUTANEOUS at 15:43

## 2021-04-23 RX ADMIN — Medication 650 MILLIGRAM(S): at 12:07

## 2021-04-23 RX ADMIN — QUETIAPINE FUMARATE 50 MILLIGRAM(S): 200 TABLET, FILM COATED ORAL at 09:42

## 2021-04-23 RX ADMIN — ENOXAPARIN SODIUM 40 MILLIGRAM(S): 100 INJECTION SUBCUTANEOUS at 18:26

## 2021-04-23 RX ADMIN — Medication 4 MILLIGRAM(S): at 18:26

## 2021-04-23 RX ADMIN — CHLORHEXIDINE GLUCONATE 15 MILLILITER(S): 213 SOLUTION TOPICAL at 18:26

## 2021-04-23 RX ADMIN — Medication 3 MILLILITER(S): at 11:33

## 2021-04-23 RX ADMIN — PROPOFOL 7.2 MICROGRAM(S)/KG/MIN: 10 INJECTION, EMULSION INTRAVENOUS at 01:43

## 2021-04-23 RX ADMIN — Medication 3 MILLILITER(S): at 04:29

## 2021-04-23 RX ADMIN — DEXMEDETOMIDINE HYDROCHLORIDE IN 0.9% SODIUM CHLORIDE 6 MICROGRAM(S)/KG/HR: 4 INJECTION INTRAVENOUS at 03:14

## 2021-04-23 RX ADMIN — ENOXAPARIN SODIUM 40 MILLIGRAM(S): 100 INJECTION SUBCUTANEOUS at 05:38

## 2021-04-23 RX ADMIN — QUETIAPINE FUMARATE 50 MILLIGRAM(S): 200 TABLET, FILM COATED ORAL at 18:26

## 2021-04-23 RX ADMIN — Medication 4 MILLIGRAM(S): at 12:08

## 2021-04-23 RX ADMIN — HYDROMORPHONE HYDROCHLORIDE 4 MILLIGRAM(S): 2 INJECTION INTRAMUSCULAR; INTRAVENOUS; SUBCUTANEOUS at 22:59

## 2021-04-23 RX ADMIN — Medication 3 MILLILITER(S): at 20:09

## 2021-04-23 RX ADMIN — CHLORHEXIDINE GLUCONATE 15 MILLILITER(S): 213 SOLUTION TOPICAL at 06:30

## 2021-04-23 NOTE — DISCHARGE NOTE PROVIDER - NSDCCPCAREPLAN_GEN_ALL_CORE_FT
PRINCIPAL DISCHARGE DIAGNOSIS  Diagnosis: Pneumonia due to COVID-19 virus  Assessment and Plan of Treatment: You had signs and symptoms concerning for COVID-19. You tested postive. You were treated with _________ while you were in the hospital. You have been clinically stable and deemed safe for discharge home to continue your treatment course and quarantine.  While you are at home you should stay in your own room whenever possible and wear a mask as much as possible. If you have to be in the same room as someone else, you should both wear a mask. If you share a restroom, you should wipe it down with bleach wipes between each use.Please continue to practice social distancing and good hand hygiene.  You need to quarantine for 14 days after your positive test result, which means you need to quarantine until _____.  ((You were found to have low oxygen levels in your blood when you came into the hospital. Your oxygen levels dropped when we walked you around your room without oxygen. For this reason, you will be going home with oxygen to have in case.))) You will also have a pulse oximeter device to help you monitor your oxygen levels and heart rate.   MEDICATION Instructions: Please continue the following medications as possible:  We have made you some follow-up appointments.  -((We made you an appointment with a vascular surgeon/cardiologist as we will have you continue a new anticoagulant medication. We give this medication to patients with COVID infection because the infection makes you at higher risk for clots, which these medications can help prevent. (((as you you had clots found in the hospital))). This doctor will monitor you after you leave the hospital and make sure everything is ok while you are on the medication)))  -((We made you an appointment with a lung doctor to monitor your lungs after you leave the hospital.    -We made you a telehealth appointment with your primary [facility] doctor within one week of discharge.   If you do experience worsening shortness of breath at home, start to experience new chest pain or new leg pain, please call your doctor and consider coming back      SECONDARY DISCHARGE DIAGNOSES  Diagnosis: Acute respiratory failure with hypoxia  Assessment and Plan of Treatment:      PRINCIPAL DISCHARGE DIAGNOSIS  Diagnosis: Pneumonia due to COVID-19 virus  Assessment and Plan of Treatment: You had signs and symptoms concerning for COVID-19. You tested postive. You were treated with the full course of antiviral and steroid medications and finished the course while you were ventilated. You later got a tracheostomy, and will continue your recovery at an LTAC (detailed below) following your discharge from the hospital.      SECONDARY DISCHARGE DIAGNOSES  Diagnosis: Acute respiratory failure with hypoxia  Assessment and Plan of Treatment: You required a ventilator to help you breathe while your lungs were incapacitated due to your covid infection. We took care of you on the ventilator and treated your COVID infection while doing so.    Diagnosis: Status post tracheostomy  Assessment and Plan of Treatment: Because we were unable to take you off the ventilator, you had to get a tracheostomy put in by our ear nose throat doctors. This is a small tube going into your trachea which helps you breathe, but makes it so you do not have to continue being on a ventilator in the ICU. You will be taken care of in a long-term acute facility (LTAC) moving forward to monitor your progress.    Diagnosis: Status post insertion of percutaneous endoscopic gastrostomy (PEG) tube  Assessment and Plan of Treatment: Because we were unable to take you off the ventilator, you had to get a tracheostomy put in by our ear nose throat doctors, and then had to get a feeding tube to help give you nutrients while you have the tracheostomy. We connected a tube through your abdominal skin to your stomach which makes it possible for you to get feeds. You will be taken care of in a long-term acute facility (LTAC) moving forward to monitor your progress.

## 2021-04-23 NOTE — DISCHARGE NOTE PROVIDER - HOSPITAL COURSE
PENDING FUTURE DISCHARGE, IN PROGRESS, DATE NOT SPECIFIED     55 yo M with PMHx of MU and gout who presented to ED with c/o progressively worsening SOB admitted to ICU for management of AHRF in the setting of COVID. Course c/b GRACE, ileus, septic shock 2/2 aspiration event, Afib with RVR. Now trached and PEG'd.    NEURO  #Sedated   -Sedated on ventilator  -Currently on propofol, precedex, ativan 4mg q6hr, IV dilaudid 1mg q4hr prn, seroquel 50mg q8hr  -Will Wean propofol gtt today  -Will adjust medication regimen to Wean IV sedation  -Dilaudid 4 mg via PEG q4hrs in addition to Ativan 4 mg via PEG q6hrs  -Continue with wrist restraints to prevent patient harm    RESPIRATORY  #Acute hypoxic hypercapnic respiratory failure in the setting of COVID PNA  -Patient initially presented with O2 saturation of 14% and cyanotic requiring immediate intubation  -CXR with diffuse bilateral opacities with + COVID19 PCR  -Elevated inflammatory markers  -s/p intubation 4/5  -Tracheostomy on 4/22  -Will attempt daily SBTs as tolerated    #COVID  -CXR with diffuse bilateral opacities with + COVID19 PCR  -Decadron (4/5-4/13) d/c'ed due to clinical presentation  -Remdesevir (4/5-4/9)    #MU  -As per patient's wife, patient has MU on CPAP  -Patient sleeps with 2-3 pillows at night, attributed to MU    CARDIOVASCULAR  #Atrial Fibrillation- RESOLVED  -Patient went into afib with RVR 4/17 @ about 1830  -No new medications started as resolved  -Anticoagulation for afib not started, patient now in NSR and [id number] Score 0 as patient has no CV hx (however, on admission patient was found to have low EF- repeat ECHO with EF 55%)    GI  -s/p PEG 4/21  -Tolerating tube feedings at 20 mL/hr, goal is 30 cc's  -GI ppx with pantoprazole via PEG   -Rectal tube in place    #Ileus 2/2 opioids - RESOLVED and having BMs  -s/p sump for decompression  -s/p methylnaltrexone with BM afterwards    RENAL//F&E  #Acute Kidney Injury  -Baseline creatinine unknown, no reported kidney disease  -Urine lytes 4/17 showing pre-renal  -Now resolving, renal fxn 17/1.15  -Strict I&O monitoring  -Avoid nephrotoxic medications    ID  #Persistent Fever  -Recent aspiration pneumonia c/b septic shock (now resolved); sputum culture yielded growth of normal respiratory lexie. Fever despite meropenem ?2/2 periodic aspiration vs. COVID-19 vs. new nosocomial infectious process  -Meropenem x7 days (4/16-4/22)    #Aspiration pneumonia vs HAP  -Found 4/14 while trying to extubate w/ delirium, and numerous thick secretions in oral cavity requiring suctioning. 4/15 AM found w/ 102.3F oral temp and WBC increase to 18s, suspect possible aspiration pneumonia, with previous negative MRSA swab. 4/14 bcx NGTD, 4/15 bcx NGTD  -Zosyn started 4/9 and d/c'd 4/16 in the setting of continued septic shock  -Decision made to broaden antibiotics to meropenem 1 gram and vancomycin 1250 mg  -MRSA swab negative, no addt'l vanco dosed  -s/p meropenem course completion 4/16-4/22    #COVID PNA   -Plan as above  -Off steroid course, remdesivir course completed    #?Fungal Infxn  -Seen by ID, -ID ok'd Vori (loading doses x 2 given, then on 400 mg q12hr), stopped 4/14 given improvement in pt. status before medication was really started   -As per ID recs ====> s/p BAL 4/12--cultures have so far only yielded Keila dubliniensis which is probably a respiratory tract colonizer (and not the etiology of the patient's fevers). Voriconazole is not indicated for respiratory tract colonization with Candida. Suspect recent high fevers/leukocytosis due to aspiration event; Doubt COVID-19 associated pulmonary aspergillosis given BAL cultures without growth of mold, absence of cavitation on CXR, and time course relatively early for development of this rare entity. Moreover, would not attribute new fevers to discontinuation of voriconazole.  -BAL sputum samples taken from bronch 4/12, showing yeast   -Sputum cx from 4/16, no evidence of yeast  -Peripheral blood cultures from 4/22 NG @ 1 day  -Maintain MAP > 70 mmHg    ENDOCRINE  #Hyperglycemia  -q6hr correctional scale  -Lantus and regular insulin d/c'd as patient NPO  -Blood glucose trend   -Decadron started 4/5 AM, d/c'd 4/13  -A1c 6.2    HEME  #Anemia  -Hgb 11 on admission; unknown baseline. No evidence of bleed.  -Hgb 4/18 7.3, possibly hemodiluted as patient did get fluids and albumin overnight, s/p transfusion 1U PRBC  -Today Hgb 7.7  -Maintain active T+S    -DVT ppx with Lovenox SubQ PENDING FUTURE DISCHARGE, IN PROGRESS, DATE NOT SPECIFIED     57 yo M with PMHx of MU and gout who presented to ED with c/o progressively worsening SOB admitted to ICU for management of AHRF in the setting of COVID. Course c/b GRACE, ileus, septic shock 2/2 aspiration event, Afib with RVR. Was unable to be successfully weaned off sedation to extubate (attempts c/b delirium and aspiration events, one leading to septic shock mentioned earlier) s/p trach and PEG. Sedation switched from IV to oral and gradually being weaned. Rest of hospital course details as per below.     NEURO  #Sedated   -Gradually weaned off IV sedation to regimen of seroquel, ativan and dilaudid.....    RESPIRATORY  #Acute hypoxic hypercapnic respiratory failure in the setting of COVID PNA  -Patient initially presented with O2 saturation of 14% and cyanotic requiring immediate intubation. CXR with diffuse bilateral opacities with + COVID19 PCR. s/p intubation 4/5 and tracheostomy on 4/22  -Will attempt daily SBTs as tolerated    #COVID  -CXR with diffuse bilateral opacities with + COVID19 PCR  -Decadron (4/5-4/13) d/c'ed due to clinical presentation; Remdesevir (4/5-4/9)    #MU  -As per patient's wife, patient has MU on CPAP  -Patient sleeps with 2-3 pillows at night, attributed to MU    CARDIOVASCULAR  #Atrial Fibrillation- RESOLVED  -Patient went into afib with RVR 4/17 @ about 1830  -No new medications started as resolved; Anticoagulation for afib not started, patient now in NSR and [id number] Score 0 as patient has no CV hx (however, on admission patient was found to have low EF- repeat ECHO with EF 55%)    GI  -s/p PEG 4/21  -Tolerating tube feedings at 20 mL/hr, goal is 30 cc's; GI ppx with pantoprazole via PEG     #Ileus 2/2 opioids - RESOLVED and having BMs  -Course c/b ileus while on sedating medications, requiring sump for decompression and methylnaltrexone, with BM afterwards and with consistent bowel regimen. Bowel regimen later discontinued as.....    RENAL//F&E  #Acute Kidney Injury- RESOLVED  -Baseline creatinine unknown, no reported kidney disease  -Urine lytes 4/17 showing pre-renal  -Now resolving,  -Strict I&O monitoring  -Avoid nephrotoxic medications    ID  #Persistent Fever  -Recent aspiration pneumonia c/b septic shock (now resolved); sputum culture yielded growth of normal respiratory lexie. Fever despite meropenem ?2/2 periodic aspiration vs. COVID-19 vs. new nosocomial infectious process  -Meropenem x7 days (4/16-4/22).  -Patient later w/ persistent mild fever w/ eosinophilia, thought to be drug fever.....    #Aspiration pneumonia vs HAP- RESOLVED  -Found 4/14 while trying to extubate w/ delirium, and numerous thick secretions in oral cavity requiring suctioning. 4/15 AM found w/ 102.3F oral temp and WBC increase to 18s, suspect possible aspiration pneumonia, with previous negative MRSA swab. 4/14 bcx NGTD, 4/15 bcx NGTD  -Zosyn started 4/9 and d/c'd 4/16 in the setting of continued septic shock  -Decision made to broaden antibiotics to meropenem 1 gram and vancomycin 1250 mg  -MRSA swab negative, no addt'l vanco dosed  -s/p meropenem course completion 4/16-4/22    #COVID PNA   -Plan as above  -Off steroid course, remdesivir course completed    #?Fungal Infxn  -Seen by ID, -ID ok'd Vori (loading doses x 2 given, then on 400 mg q12hr), stopped 4/14 given improvement in pt. status before medication was really started   -As per ID recs ====> s/p BAL 4/12--cultures have so far only yielded Keila dubliniensis which is probably a respiratory tract colonizer (and not the etiology of the patient's fevers). Voriconazole is not indicated for respiratory tract colonization with Candida. Suspect recent high fevers/leukocytosis due to aspiration event; Doubt COVID-19 associated pulmonary aspergillosis given BAL cultures without growth of mold, absence of cavitation on CXR, and time course relatively early for development of this rare entity. Moreover, would not attribute new fevers to discontinuation of voriconazole.  -BAL sputum samples taken from bronch 4/12, showing yeast   -Sputum cx from 4/16, no evidence of yeast  -Peripheral blood cultures from 4/22 NG @ 1 day    ENDOCRINE  #Hyperglycemia  -Managed inpatient with sliding scale; Decadron was started 4/5 AM, d/c'd 4/13. Patient's A1c 6.2    HEME  #Anemia  -Hgb 11 on admission; unknown baseline. No evidence of bleed.  -Hgb 4/18 7.3, possibly hemodiluted as patient did get fluids and albumin overnight, s/p transfusion 1U PRBC  -Hb remained in 7-8 range for remainder of hospitalization.   -Maintain active T+S   DISCHARGE NOTE - DO NOT DELETE     57 y/o M with PMHx of MU and gout who presented to ED with c/o progressively worsening SOB admitted to ICU for management of AHRF in the setting of COVID. Course c/b GRACE, ileus, septic shock 2/2 aspiration event, Afib with RVR. Was unable to be successfully weaned off sedation to extubate (attempts c/b delirium and aspiration events, one leading to septic shock mentioned earlier) s/p trach and PEG. Sedation switched from IV to oral and gradually being weaned. Rest of hospital course details as per below.     NEURO  #Sedated   - Gradually weaned off IV sedation to regimen of Seroquel, Ativan and Dilaudid  - Continue Ativan 4mg q8h  - Continue Dilaudid 2mg q4h   - Continue Seroquel 75mg q8h  - Continue Versed q6h prn for anxiety/pain     RESPIRATORY  #Acute hypoxic hypercapnic respiratory failure in the setting of COVID PNA  - Patient initially presented with O2 saturation of 14% and cyanotic requiring immediate intubation. CXR with diffuse bilateral opacities with + COVID19 PCR.   - s/p intubation 4/5 and tracheostomy on 4/22  - currently switching between trach collar, pressure support, and AC/CMV  - Wean as tolerated     #COVID  - CXR with diffuse bilateral opacities with + COVID19 PCR  - Decadron (4/5-4/13) d/c'ed due to clinical presentation; Remdesevir (4/5-4/9)    CARDIOVASCULAR  #Atrial Fibrillation- RESOLVED  - Patient went into afib with RVR 4/17 @ about 1830  - No new medications started as resolved; Anticoagulation for afib not started, patient now in NSR and [id number] Score 0 as patient has no CV hx (however, on admission patient was found to have low EF- repeat ECHO with EF 55%)    GI  - s/p PEG 4/21  - Tolerating tube feedings    RENAL//F&E  #Acute Kidney Injury- RESOLVED  - Baseline creatinine unknown, no reported kidney disease  - Urine lytes 4/18 showing pre-renal  - Now resolving,  - Strict I&O monitoring  - Avoid nephrotoxic medications    ID  #Persistent Fever  - Recent aspiration pneumonia c/b septic shock (now resolved); sputum culture yielded growth of normal respiratory lexie. Fever despite meropenem ?2/2 periodic aspiration vs. COVID-19 vs. new nosocomial infectious process  - Meropenem x7 days (4/16-4/22)  - Patient later w/ persistent mild fever w/ eosinophilia, thought to be drug fever  - Continue to monitor     #Aspiration pneumonia vs HAP- RESOLVED  -Found 4/14 while trying to extubate w/ delirium, and numerous thick secretions in oral cavity requiring suctioning. 4/15 AM found w/ 102.3F oral temp and WBC increase to 18s, suspect possible aspiration pneumonia, with previous negative MRSA swab. 4/14 bcx NGTD, 4/15 bcx NGTD  -Zosyn started 4/9 and d/c'd 4/16 in the setting of continued septic shock  -Decision made to broaden antibiotics to meropenem 1 gram and vancomycin 1250 mg  -MRSA swab negative, no addt'l vanco dosed  -s/p meropenem course completion 4/16-4/22    #COVID PNA   -Plan as above  -Off steroid course, remdesivir course completed    #?Fungal Infxn  - Seen by ID, -ID ok'd Vori (loading doses x 2 given, then on 400 mg q12hr), stopped 4/14 given improvement in pt. status before medication was really started   - As per ID recs ==> s/p BAL 4/12--cultures have so far only yielded Keila dubliniensis which is probably a respiratory tract colonizer (and not the etiology of the patient's fevers). Voriconazole is not indicated for respiratory tract colonization with Candida. Suspect recent high fevers/leukocytosis due to aspiration event; Doubt COVID-19 associated pulmonary aspergillosis given BAL cultures without growth of mold, absence of cavitation on CXR, and time course relatively early for development of this rare entity. Moreover, would not attribute new fevers to discontinuation of voriconazole.  - BAL sputum samples taken from bronch 4/12, showing yeast   - Sputum cx from 4/16, no evidence of yeast    ENDOCRINE  #Hyperglycemia  -Managed inpatient with sliding scale; Decadron was started 4/5 AM, d/c'd 4/13. Patient's A1c 6.2    HEME  #Anemia  -Hgb 11 on admission; unknown baseline. No evidence of bleed.  -Hgb 4/18 7.3, possibly hemodiluted as patient did get fluids and albumin overnight, s/p transfusion 1U PRBC  -Hb remained in 7-8 range for remainder of hospitalization.   -Maintain active T+S    Hospital Course: 57 y/o M with PMHx of MU and gout who presented to ED with c/o progressively worsening SOB admitted to ICU for management of AHRF in the setting of COVID. Course c/b GRACE, ileus, septic shock 2/2 aspiration event, Afib with RVR. Was unable to be successfully weaned off sedation to extubate (attempts c/b delirium and aspiration events, one leading to septic shock mentioned earlier) s/p trach and PEG. Sedation switched from IV to oral and gradually being weaned. Rest of hospital course details as above.    New Medications: Ativan 4mg q8h, Dilaudid 2mg q4h, Seroquel 75mg q8h, Versed q6h prn for anxiety/pain, Lovenox 40 q12h, Duoneb 3ml q6h, Flomax 0.8 qhs,   Labs to follow up: Routine labs CBC (monitor eosinophilia), BMP   Imaging to follow up: --

## 2021-04-23 NOTE — DISCHARGE NOTE PROVIDER - CARE PROVIDER_API CALL
[facility],   Follow up with Physician at [facility] facility.  Phone: (   )    -  Fax: (   )    -  Follow Up Time:

## 2021-04-23 NOTE — PROGRESS NOTE ADULT - SUBJECTIVE AND OBJECTIVE BOX
SUBJECTIVE  Overnight Events: no acute events/patient was trached and PEG'd yesterday.    REVIEW OF SYSTEMS:   See HPI (as per chart review), unable to obtain 2/2 trach and sedation                                      OBJECTIVE  Vital Signs Last 24 Hrs  T(C): 37.3 (23 Apr 2021 07:00), Max: 37.9 (23 Apr 2021 05:30)  T(F): 99.2 (23 Apr 2021 07:00), Max: 100.2 (23 Apr 2021 05:30)  HR: 81 (23 Apr 2021 07:00) (65 - 87)  BP: 104/56 (23 Apr 2021 07:00) (101/56 - 141/79)  BP(mean): 76 (23 Apr 2021 07:00) (74 - 104)  RR: 29 (23 Apr 2021 07:00) (21 - 38)  SpO2: 100% (23 Apr 2021 07:00) (95% - 100%)    Vent settings   Mode: AC/ CMV (Assist Control/ Continuous Mandatory Ventilation), RR (machine): 20, TV (machine): 450, FiO2: 40, PEEP: 5, ITime: 0.9, MAP: 6, PIP: 8    I&O's Detail    22 Apr 2021 07:01  -  23 Apr 2021 07:00  --------------------------------------------------------  IN:    Dexmedetomidine: 254.4 mL    Free Water: 600 mL    IV PiggyBack: 50 mL    Propofol: 324 mL    Vital1.5: 130 mL  Total IN: 1358.4 mL    OUT:    Indwelling Catheter - Urethral (mL): 1905 mL    Norepinephrine: 0 mL    Rectal Tube (mL): 300 mL  Total OUT: 2205 mL    Total NET: -846.6 mL    LABS:                        7.7    5.61  )-----------( 257      ( 23 Apr 2021 06:32 )             26.1     04-23    137  |  104  |  17  ----------------------------<  115<H>  4.1   |  22  |  1.15    Ca    8.2<L>      23 Apr 2021 06:32  Phos  3.0     04-23  Mg     1.8     04-23    ABG - ( 23 Apr 2021 06:28 )  pH, Arterial: 7.54  pH, Blood: x     /  pCO2: 27    /  pO2: 128   / HCO3: 22    / Base Excess: 0.1   /  SaO2: 99        *Microbiology    Culture - Blood (collected 21 Apr 2021 10:18)  Source: .Blood Blood  Preliminary Report (22 Apr 2021 11:00):    No growth at 1 day.    MEDICATIONS  (STANDING):  albuterol/ipratropium for Nebulization 3 milliLiter(s) Nebulizer every 4 hours  chlorhexidine 0.12% Liquid 15 milliLiter(s) Oral Mucosa every 12 hours  chlorhexidine 2% Cloths 1 Application(s) Topical daily  dexMEDEtomidine Infusion 0.2 MICROgram(s)/kG/Hr (6 mL/Hr) IV Continuous <Continuous> @ 0.3   dextrose 40% Gel 15 Gram(s) Oral once  dextrose 5%. 1000 milliLiter(s) (50 mL/Hr) IV Continuous <Continuous>  dextrose 5%. 1000 milliLiter(s) (100 mL/Hr) IV Continuous <Continuous>  dextrose 50% Injectable 25 Gram(s) IV Push once  dextrose 50% Injectable 12.5 Gram(s) IV Push once  dextrose 50% Injectable 25 Gram(s) IV Push once  enoxaparin Injectable 40 milliGRAM(s) SubCutaneous every 12 hours  glucagon  Injectable 1 milliGRAM(s) IntraMuscular once  insulin regular  human corrective regimen sliding scale   SubCutaneous every 6 hours  LORazepam     Tablet 4 milliGRAM(s) Oral every 6 hours  pantoprazole   Suspension 40 milliGRAM(s) Enteral Tube daily  polyethylene glycol 3350 17 Gram(s) Oral every 24 hours  propofol Infusion 10 MICROgram(s)/kG/Min (7.2 mL/Hr) IV Continuous <Continuous> @ 20 mcg  QUEtiapine 50 milliGRAM(s) Oral every 8 hours  senna 2 Tablet(s) Oral at bedtime    MEDICATIONS  (PRN):  acetaminophen    Suspension .. 650 milliGRAM(s) Oral every 6 hours PRN Temp greater or equal to 38C (100.4F)  HYDROmorphone  Injectable 1 milliGRAM(s) IV Push every 4 hours PRN Moderate Pain (4 - 6)  sodium chloride 0.9% lock flush 10 milliLiter(s) IV Push every 1 hour PRN Pre/post blood products, medications, blood draw, and to maintain line patency    PHYSICAL EXAM:  GENERAL: obese, trached and sedated  SKIN/HAIR/NAILS: Nails without clubbing or cyanosis. CR<2 secs. No lesions, rash, petechiae, erythema or ecchymoses. Anicteric.   HEAD AND NECK: The skull is NC/AT. B/L Sclera white. 4 mm PERRL (hx of cataract sx). Nasal mucous membrane dry; Trachea midline, neck supple.   THORAX/LUNGS: Thorax is symmetric with good expansion, assisted by mechanical ventilation. Lung sounds with rhonchi throughout, diminished at the bases.  CARDIOVASCULAR: No JVD. Carotid upstrokes are brisk, without bruits. +S1 and S2, RRR; no extra heart sounds or M/R/G. NSR at the time of my exam.  ABDOMEN/: Abdomen is rounded with hypoactive BS in all 4 quadrants; it is soft, no palpable masses; no grimacing to palpation noted; last BM 4/23 as patient has rectal tube with liquid brown stool. Lechuga catheter in place.   PERIPHERAL VASCULAR: Extremities are warm (upper) and cool (lower), radial and dorsalis pedis pulses +1 and symmetric. +21generalized non-pitting edema noted. No clubbing, no cyanosis.  MUSCULOSKELETAL: no active ROM 2/2 sedation. No evidence of swelling or deformity.  NEUROLOGICAL: Patient opens eyes with physical stimulation, able to follow "squeeze my hand" command with B/L UE and grimaces to pain on B/L LE; wrist restraints in place to prevent patient harm; sedated on propofol and precedex at this time.   LINES (DATES): R IJ TLC 4/12, L axillary art line 4/16, IVL x 2.    ASSESSMENT AND PlAN:  57 yo M with PMHx of MU and gout who presented to ED with c/o progressively worsening SOB admitted to ICU for management of AHRF in the setting of COVID. Course c/b GRACE, ileus, septic shock 2/2 aspiration event, Afib with RVR. Now trached and PEG'd.    NEURO  #Sedated   -Sedated on ventilator  -Currently on propofol, precedex, ativan 4mg q6hr, IV dilaudid 1mg q4hr prn, seroquel 50mg q8hr  -Will Wean propofol gtt today  -Will adjust medication regimen to Wean IV sedation  -Continue with wrist restraints to prevent patient harm    RESPIRATORY  #Acute hypoxic hypercapnic respiratory failure in the setting of COVID PNA  -Patient initially presented with O2 saturation of 14% and cyanotic requiring immediate intubation  -CXR with diffuse bilateral opacities with + COVID19 PCR  -Elevated inflammatory markers  -s/p intubation 4/5  -Tracheostomy on 4/22    #COVID  -CXR with diffuse bilateral opacities with + COVID19 PCR  -Decadron (4/5-4/13) d/c'ed due to clinical presentation  -Remdesevir (4/5-4/9)    #MU  -As per patient's wife, patient has MU on CPAP  -Patient sleeps with 2-3 pillows at night, attributed to MU    CARDIOVASCULAR  #Atrial Fibrillation- RESOLVED  -Patient went into afib with RVR 4/17 @ about 1830  -No new medications started as resolved  -Anticoagulation for afib not started, patient now in NSR and [id number] Score 0 as patient has no CV hx (however, on admission patient was found to have low EF- repeat ECHO with EF 55%)    GI  -s/p PEG 4/21  -Tolerating tube feedings at 20 mL/hr, goal is 30 cc's  -GI ppx with pantoprazole via PEG   -Rectal tube in place    #Ileus 2/2 opioids - RESOLVED and having BMs  -s/p sump for decompression  -s/p methylnaltrexone with BM afterwards    RENAL//F&E  #Acute Kidney Injury  -Baseline creatinine unknown, no reported kidney disease  -Urine lytes 4/17 showing pre-renal  -Now resolving, renal fxn 17/1.15  -Strict I&O monitoring  -Avoid nephrotoxic medications    ID  #Persistent Fever  -Recent aspiration pneumonia c/b septic shock (now resolved); sputum culture yielded growth of normal respiratory lexie. Fever despite meropenem ?2/2 periodic aspiration vs. COVID-19 vs. new nosocomial infectious process  -Meropenem x7 days (4/16-4/22)    #Aspiration pneumonia vs HAP  -Found 4/14 while trying to extubate w/ delirium, and numerous thick secretions in oral cavity requiring suctioning. 4/15 AM found w/ 102.3F oral temp and WBC increase to 18s, suspect possible aspiration pneumonia, with previous negative MRSA swab. 4/14 bcx NGTD, 4/15 bcx NGTD  -Zosyn started 4/9 and d/c'd 4/16 in the setting of continued septic shock  -Decision made to broaden antibiotics to meropenem 1 gram and vancomycin 1250 mg  -MRSA swab negative, no addt'l vanco dosed  -s/p meropenem course completion 4/16-4/22    #COVID PNA   -Plan as above  -Off steroid course, remdesivir course completed    #?Fungal Infxn  -Seen by ID, -ID ok'd Vori (loading doses x 2 given, then on 400 mg q12hr), stopped 4/14 given improvement in pt. status before medication was really started   -As per ID recs ====> s/p BAL 4/12--cultures have so far only yielded Keila dubliniensis which is probably a respiratory tract colonizer (and not the etiology of the patient's fevers). Voriconazole is not indicated for respiratory tract colonization with Candida. Suspect recent high fevers/leukocytosis due to aspiration event; Doubt COVID-19 associated pulmonary aspergillosis given BAL cultures without growth of mold, absence of cavitation on CXR, and time course relatively early for development of this rare entity. Moreover, would not attribute new fevers to discontinuation of voriconazole.  -BAL sputum samples taken from bronch 4/12, showing yeast   -Sputum cx from 4/16, no evidence of yeast  -Peripheral blood cultures from 4/22 NG @ 1 day  -Maintain MAP > 70 mmHg    ENDOCRINE  #Hyperglycemia  -q6hr correctional scale  -Lantus and regular insulin d/c'd as patient NPO  -Blood glucose trend   -Decadron started 4/5 AM, d/c'd 4/13  -A1c 6.2    HEME  #Anemia  -Hgb 11 on admission; unknown baseline. No evidence of bleed.  -Hgb 4/18 7.3, possibly hemodiluted as patient did get fluids and albumin overnight, s/p transfusion 1U PRBC  -Today Hgb 7.7  -Maintain active T+S    -DVT ppx with Lovenox SubQ    DISPO/GOALS OF CARE:  -Full Code  -Will de-escalate central line and arterial line as patient not requiring pressors and is hemodynamically stable.   SUBJECTIVE  Overnight Events: no acute events/patient was trached and PEG'd yesterday.    REVIEW OF SYSTEMS:   See HPI (as per chart review), unable to obtain 2/2 trach and sedation                                      OBJECTIVE  Vital Signs Last 24 Hrs  T(C): 37.3 (23 Apr 2021 07:00), Max: 37.9 (23 Apr 2021 05:30)  T(F): 99.2 (23 Apr 2021 07:00), Max: 100.2 (23 Apr 2021 05:30)  HR: 81 (23 Apr 2021 07:00) (65 - 87)  BP: 104/56 (23 Apr 2021 07:00) (101/56 - 141/79)  BP(mean): 76 (23 Apr 2021 07:00) (74 - 104)  RR: 29 (23 Apr 2021 07:00) (21 - 38)  SpO2: 100% (23 Apr 2021 07:00) (95% - 100%)    Vent settings   Mode: AC/ CMV (Assist Control/ Continuous Mandatory Ventilation), RR (machine): 20, TV (machine): 450, FiO2: 40, PEEP: 5, ITime: 0.9, MAP: 6, PIP: 8    I&O's Detail    22 Apr 2021 07:01  -  23 Apr 2021 07:00  --------------------------------------------------------  IN:    Dexmedetomidine: 254.4 mL    Free Water: 600 mL    IV PiggyBack: 50 mL    Propofol: 324 mL    Vital1.5: 130 mL  Total IN: 1358.4 mL    OUT:    Indwelling Catheter - Urethral (mL): 1905 mL    Norepinephrine: 0 mL    Rectal Tube (mL): 300 mL  Total OUT: 2205 mL    Total NET: -846.6 mL    LABS:                        7.7    5.61  )-----------( 257      ( 23 Apr 2021 06:32 )             26.1     04-23    137  |  104  |  17  ----------------------------<  115<H>  4.1   |  22  |  1.15    Ca    8.2<L>      23 Apr 2021 06:32  Phos  3.0     04-23  Mg     1.8     04-23    ABG - ( 23 Apr 2021 06:28 )  pH, Arterial: 7.54  pH, Blood: x     /  pCO2: 27    /  pO2: 128   / HCO3: 22    / Base Excess: 0.1   /  SaO2: 99        *Microbiology    Culture - Blood (collected 21 Apr 2021 10:18)  Source: .Blood Blood  Preliminary Report (22 Apr 2021 11:00):    No growth at 1 day.    MEDICATIONS  (STANDING):  albuterol/ipratropium for Nebulization 3 milliLiter(s) Nebulizer every 4 hours  chlorhexidine 0.12% Liquid 15 milliLiter(s) Oral Mucosa every 12 hours  chlorhexidine 2% Cloths 1 Application(s) Topical daily  dexMEDEtomidine Infusion 0.2 MICROgram(s)/kG/Hr (6 mL/Hr) IV Continuous <Continuous> @ 0.3   dextrose 40% Gel 15 Gram(s) Oral once  dextrose 5%. 1000 milliLiter(s) (50 mL/Hr) IV Continuous <Continuous>  dextrose 5%. 1000 milliLiter(s) (100 mL/Hr) IV Continuous <Continuous>  dextrose 50% Injectable 25 Gram(s) IV Push once  dextrose 50% Injectable 12.5 Gram(s) IV Push once  dextrose 50% Injectable 25 Gram(s) IV Push once  enoxaparin Injectable 40 milliGRAM(s) SubCutaneous every 12 hours  glucagon  Injectable 1 milliGRAM(s) IntraMuscular once  insulin regular  human corrective regimen sliding scale   SubCutaneous every 6 hours  LORazepam     Tablet 4 milliGRAM(s) Oral every 6 hours  pantoprazole   Suspension 40 milliGRAM(s) Enteral Tube daily  polyethylene glycol 3350 17 Gram(s) Oral every 24 hours  propofol Infusion 10 MICROgram(s)/kG/Min (7.2 mL/Hr) IV Continuous <Continuous> @ 20 mcg  QUEtiapine 50 milliGRAM(s) Oral every 8 hours  senna 2 Tablet(s) Oral at bedtime    MEDICATIONS  (PRN):  acetaminophen    Suspension .. 650 milliGRAM(s) Oral every 6 hours PRN Temp greater or equal to 38C (100.4F)  HYDROmorphone  Injectable 1 milliGRAM(s) IV Push every 4 hours PRN Moderate Pain (4 - 6)  sodium chloride 0.9% lock flush 10 milliLiter(s) IV Push every 1 hour PRN Pre/post blood products, medications, blood draw, and to maintain line patency    PHYSICAL EXAM:  GENERAL: obese, trached and sedated  SKIN/HAIR/NAILS: Nails without clubbing or cyanosis. CR<2 secs. No lesions, rash, petechiae, erythema or ecchymoses. Anicteric.   HEAD AND NECK: The skull is NC/AT. B/L Sclera white. 4 mm PERRL (hx of cataract sx). Nasal mucous membrane dry; Trachea midline, neck supple.   THORAX/LUNGS: Thorax is symmetric with good expansion, assisted by mechanical ventilation. Lung sounds with rhonchi throughout, diminished at the bases.  CARDIOVASCULAR: No JVD. Carotid upstrokes are brisk, without bruits. +S1 and S2, RRR; no extra heart sounds or M/R/G. NSR at the time of my exam.  ABDOMEN/: Abdomen is rounded with hypoactive BS in all 4 quadrants; it is soft, no palpable masses; no grimacing to palpation noted; last BM 4/23 as patient has rectal tube with liquid brown stool. Lechuga catheter in place.   PERIPHERAL VASCULAR: Extremities are warm (upper) and cool (lower), radial and dorsalis pedis pulses +1 and symmetric. +21generalized non-pitting edema noted. No clubbing, no cyanosis.  MUSCULOSKELETAL: no active ROM 2/2 sedation. No evidence of swelling or deformity.  NEUROLOGICAL: Patient opens eyes with physical stimulation, able to follow "squeeze my hand" command with B/L UE and grimaces to pain on B/L LE; wrist restraints in place to prevent patient harm; sedated on propofol and precedex at this time.   LINES (DATES): R IJ TLC 4/12, L axillary art line 4/16, IVL x 2.    ASSESSMENT AND PlAN:  55 yo M with PMHx of MU and gout who presented to ED with c/o progressively worsening SOB admitted to ICU for management of AHRF in the setting of COVID. Course c/b GRACE, ileus, septic shock 2/2 aspiration event, Afib with RVR. Now trached and PEG'd.    NEURO  #Sedated   -Sedated on ventilator  -Currently on propofol, precedex, ativan 4mg q6hr, IV dilaudid 1mg q4hr prn, seroquel 50mg q8hr  -Will Wean propofol gtt today  -Will adjust medication regimen to Wean IV sedation  -Dilaudid 4 mg via PEG q4hrs in addition to Ativan 4 mg via PEG q6hrs  -Continue with wrist restraints to prevent patient harm    RESPIRATORY  #Acute hypoxic hypercapnic respiratory failure in the setting of COVID PNA  -Patient initially presented with O2 saturation of 14% and cyanotic requiring immediate intubation  -CXR with diffuse bilateral opacities with + COVID19 PCR  -Elevated inflammatory markers  -s/p intubation 4/5  -Tracheostomy on 4/22  -Will attempt daily SBTs as tolerated    #COVID  -CXR with diffuse bilateral opacities with + COVID19 PCR  -Decadron (4/5-4/13) d/c'ed due to clinical presentation  -Remdesevir (4/5-4/9)    #MU  -As per patient's wife, patient has MU on CPAP  -Patient sleeps with 2-3 pillows at night, attributed to MU    CARDIOVASCULAR  #Atrial Fibrillation- RESOLVED  -Patient went into afib with RVR 4/17 @ about 1830  -No new medications started as resolved  -Anticoagulation for afib not started, patient now in NSR and [id number] Score 0 as patient has no CV hx (however, on admission patient was found to have low EF- repeat ECHO with EF 55%)    GI  -s/p PEG 4/21  -Tolerating tube feedings at 20 mL/hr, goal is 30 cc's  -GI ppx with pantoprazole via PEG   -Rectal tube in place    #Ileus 2/2 opioids - RESOLVED and having BMs  -s/p sump for decompression  -s/p methylnaltrexone with BM afterwards    RENAL//F&E  #Acute Kidney Injury  -Baseline creatinine unknown, no reported kidney disease  -Urine lytes 4/17 showing pre-renal  -Now resolving, renal fxn 17/1.15  -Strict I&O monitoring  -Avoid nephrotoxic medications    ID  #Persistent Fever  -Recent aspiration pneumonia c/b septic shock (now resolved); sputum culture yielded growth of normal respiratory lexie. Fever despite meropenem ?2/2 periodic aspiration vs. COVID-19 vs. new nosocomial infectious process  -Meropenem x7 days (4/16-4/22)    #Aspiration pneumonia vs HAP  -Found 4/14 while trying to extubate w/ delirium, and numerous thick secretions in oral cavity requiring suctioning. 4/15 AM found w/ 102.3F oral temp and WBC increase to 18s, suspect possible aspiration pneumonia, with previous negative MRSA swab. 4/14 bcx NGTD, 4/15 bcx NGTD  -Zosyn started 4/9 and d/c'd 4/16 in the setting of continued septic shock  -Decision made to broaden antibiotics to meropenem 1 gram and vancomycin 1250 mg  -MRSA swab negative, no addt'l vanco dosed  -s/p meropenem course completion 4/16-4/22    #COVID PNA   -Plan as above  -Off steroid course, remdesivir course completed    #?Fungal Infxn  -Seen by ID, -ID ok'd Vori (loading doses x 2 given, then on 400 mg q12hr), stopped 4/14 given improvement in pt. status before medication was really started   -As per ID recs ====> s/p BAL 4/12--cultures have so far only yielded Keila dubliniensis which is probably a respiratory tract colonizer (and not the etiology of the patient's fevers). Voriconazole is not indicated for respiratory tract colonization with Candida. Suspect recent high fevers/leukocytosis due to aspiration event; Doubt COVID-19 associated pulmonary aspergillosis given BAL cultures without growth of mold, absence of cavitation on CXR, and time course relatively early for development of this rare entity. Moreover, would not attribute new fevers to discontinuation of voriconazole.  -BAL sputum samples taken from bronch 4/12, showing yeast   -Sputum cx from 4/16, no evidence of yeast  -Peripheral blood cultures from 4/22 NG @ 1 day  -Maintain MAP > 70 mmHg    ENDOCRINE  #Hyperglycemia  -q6hr correctional scale  -Lantus and regular insulin d/c'd as patient NPO  -Blood glucose trend   -Decadron started 4/5 AM, d/c'd 4/13  -A1c 6.2    HEME  #Anemia  -Hgb 11 on admission; unknown baseline. No evidence of bleed.  -Hgb 4/18 7.3, possibly hemodiluted as patient did get fluids and albumin overnight, s/p transfusion 1U PRBC  -Today Hgb 7.7  -Maintain active T+S    -DVT ppx with Lovenox SubQ    DISPO/GOALS OF CARE:  -Full Code  -Will de-escalate central line and arterial line as patient not requiring pressors and is hemodynamically stable.   No

## 2021-04-23 NOTE — DISCHARGE NOTE PROVIDER - NSDCMRMEDTOKEN_GEN_ALL_CORE_FT
allopurinol:   colchicine:    acetaminophen 160 mg/5 mL oral suspension: 20.31 milliliter(s) orally every 6 hours, As needed, Temp greater or equal to 38C (100.4F)  allopurinol 100 mg oral tablet: 1 tab(s) orally once a day  chlorhexidine 0.12% mucous membrane liquid: 15 milliliter(s) mucous membrane every 12 hours  enoxaparin: 40 milligram(s) subcutaneous once a day  HYDROmorphone 2 mg oral tablet: 1 tab(s) orally every 4 hours  insulin regular 100 units/mL human recombinant injectable solution:  injectable   ipratropium-albuterol 0.5 mg-2.5 mg/3 mLinhalation solution: 3 milliliter(s) inhaled every 6 hours  LORazepam 2 mg oral tablet: 2 tab(s) orally every 8 hours  midazolam 2 mg/mL-NaCl 0.9% intravenous solution: 2 milliliter(s) intravenous every 6 hours, As needed, anxiety/agitation  QUEtiapine 25 mg oral tablet: 3 tab(s) orally every 8 hours  tamsulosin 0.4 mg oral capsule: 2 cap(s) orally once a day (at bedtime)

## 2021-04-23 NOTE — DISCHARGE NOTE PROVIDER - PROVIDER TOKENS
FREE:[LAST:[[facility]AC],PHONE:[(   )    -],FAX:[(   )    -],ADDRESS:[Follow up with Physician at [facility].]]

## 2021-04-23 NOTE — PROGRESS NOTE ADULT - ATTENDING COMMENTS
agree with assessment and plan as documented above.   Acute hypoxic respiratory failure s/p trach on 4-22  delirium - improving  - titrate sedatives/anxiolytics/analgesics for delirium  - SBT today

## 2021-04-23 NOTE — CHART NOTE - NSCHARTNOTEFT_GEN_A_CORE
L axillary arterial line d/c'd as discussed during AM rounds with attending and fellow  Patient hemodynamically stable, cuff pressures on R arm correlate to art line readings  Patient not on any pressors  Pressure held at site to achieve hemostasis x 10 min  No swelling or hematoma at   +1 radial pulse noted, preipheral neurovascular assessment WDL  Dressing applied, RN given instructions to monitor site and notify of any changes

## 2021-04-24 LAB
ANION GAP SERPL CALC-SCNC: 10 MMOL/L — SIGNIFICANT CHANGE UP (ref 5–17)
BASOPHILS # BLD AUTO: 0.02 K/UL — SIGNIFICANT CHANGE UP (ref 0–0.2)
BASOPHILS NFR BLD AUTO: 0.4 % — SIGNIFICANT CHANGE UP (ref 0–2)
BUN SERPL-MCNC: 19 MG/DL — SIGNIFICANT CHANGE UP (ref 7–23)
CALCIUM SERPL-MCNC: 8.5 MG/DL — SIGNIFICANT CHANGE UP (ref 8.4–10.5)
CHLORIDE SERPL-SCNC: 104 MMOL/L — SIGNIFICANT CHANGE UP (ref 96–108)
CO2 SERPL-SCNC: 22 MMOL/L — SIGNIFICANT CHANGE UP (ref 22–31)
CREAT SERPL-MCNC: 1.27 MG/DL — SIGNIFICANT CHANGE UP (ref 0.5–1.3)
CULTURE RESULTS: SIGNIFICANT CHANGE UP
CULTURE RESULTS: SIGNIFICANT CHANGE UP
EOSINOPHIL # BLD AUTO: 0.42 K/UL — SIGNIFICANT CHANGE UP (ref 0–0.5)
EOSINOPHIL NFR BLD AUTO: 7.4 % — HIGH (ref 0–6)
GLUCOSE BLDC GLUCOMTR-MCNC: 123 MG/DL — HIGH (ref 70–99)
GLUCOSE BLDC GLUCOMTR-MCNC: 139 MG/DL — HIGH (ref 70–99)
GLUCOSE BLDC GLUCOMTR-MCNC: 140 MG/DL — HIGH (ref 70–99)
GLUCOSE BLDC GLUCOMTR-MCNC: 140 MG/DL — HIGH (ref 70–99)
GLUCOSE SERPL-MCNC: 137 MG/DL — HIGH (ref 70–99)
HCT VFR BLD CALC: 25.1 % — LOW (ref 39–50)
HGB BLD-MCNC: 7.6 G/DL — LOW (ref 13–17)
IMM GRANULOCYTES NFR BLD AUTO: 0.5 % — SIGNIFICANT CHANGE UP (ref 0–1.5)
LYMPHOCYTES # BLD AUTO: 0.82 K/UL — LOW (ref 1–3.3)
LYMPHOCYTES # BLD AUTO: 14.4 % — SIGNIFICANT CHANGE UP (ref 13–44)
MAGNESIUM SERPL-MCNC: 1.9 MG/DL — SIGNIFICANT CHANGE UP (ref 1.6–2.6)
MCHC RBC-ENTMCNC: 23.7 PG — LOW (ref 27–34)
MCHC RBC-ENTMCNC: 30.3 GM/DL — LOW (ref 32–36)
MCV RBC AUTO: 78.2 FL — LOW (ref 80–100)
MONOCYTES # BLD AUTO: 0.44 K/UL — SIGNIFICANT CHANGE UP (ref 0–0.9)
MONOCYTES NFR BLD AUTO: 7.7 % — SIGNIFICANT CHANGE UP (ref 2–14)
NEUTROPHILS # BLD AUTO: 3.98 K/UL — SIGNIFICANT CHANGE UP (ref 1.8–7.4)
NEUTROPHILS NFR BLD AUTO: 69.6 % — SIGNIFICANT CHANGE UP (ref 43–77)
NRBC # BLD: 0 /100 WBCS — SIGNIFICANT CHANGE UP (ref 0–0)
PHOSPHATE SERPL-MCNC: 3.1 MG/DL — SIGNIFICANT CHANGE UP (ref 2.5–4.5)
PLATELET # BLD AUTO: 267 K/UL — SIGNIFICANT CHANGE UP (ref 150–400)
POTASSIUM SERPL-MCNC: 4.1 MMOL/L — SIGNIFICANT CHANGE UP (ref 3.5–5.3)
POTASSIUM SERPL-SCNC: 4.1 MMOL/L — SIGNIFICANT CHANGE UP (ref 3.5–5.3)
RAPID RVP RESULT: DETECTED
RBC # BLD: 3.21 M/UL — LOW (ref 4.2–5.8)
RBC # FLD: 19.2 % — HIGH (ref 10.3–14.5)
SARS-COV-2 RNA SPEC QL NAA+PROBE: DETECTED
SODIUM SERPL-SCNC: 136 MMOL/L — SIGNIFICANT CHANGE UP (ref 135–145)
SPECIMEN SOURCE: SIGNIFICANT CHANGE UP
SPECIMEN SOURCE: SIGNIFICANT CHANGE UP
URATE SERPL-MCNC: 4.7 MG/DL — SIGNIFICANT CHANGE UP (ref 3.4–8.8)
WBC # BLD: 5.71 K/UL — SIGNIFICANT CHANGE UP (ref 3.8–10.5)
WBC # FLD AUTO: 5.71 K/UL — SIGNIFICANT CHANGE UP (ref 3.8–10.5)

## 2021-04-24 PROCEDURE — 99233 SBSQ HOSP IP/OBS HIGH 50: CPT | Mod: GC

## 2021-04-24 RX ORDER — QUETIAPINE FUMARATE 200 MG/1
25 TABLET, FILM COATED ORAL ONCE
Refills: 0 | Status: COMPLETED | OUTPATIENT
Start: 2021-04-24 | End: 2021-04-24

## 2021-04-24 RX ORDER — MIDAZOLAM HYDROCHLORIDE 1 MG/ML
4 INJECTION, SOLUTION INTRAMUSCULAR; INTRAVENOUS EVERY 6 HOURS
Refills: 0 | Status: DISCONTINUED | OUTPATIENT
Start: 2021-04-24 | End: 2021-04-30

## 2021-04-24 RX ORDER — QUETIAPINE FUMARATE 200 MG/1
75 TABLET, FILM COATED ORAL EVERY 8 HOURS
Refills: 0 | Status: DISCONTINUED | OUTPATIENT
Start: 2021-04-24 | End: 2021-04-27

## 2021-04-24 RX ORDER — MAGNESIUM SULFATE 500 MG/ML
1 VIAL (ML) INJECTION ONCE
Refills: 0 | Status: COMPLETED | OUTPATIENT
Start: 2021-04-24 | End: 2021-04-24

## 2021-04-24 RX ORDER — MIDAZOLAM HYDROCHLORIDE 1 MG/ML
4 INJECTION, SOLUTION INTRAMUSCULAR; INTRAVENOUS ONCE
Refills: 0 | Status: DISCONTINUED | OUTPATIENT
Start: 2021-04-24 | End: 2021-04-24

## 2021-04-24 RX ADMIN — PANTOPRAZOLE SODIUM 40 MILLIGRAM(S): 20 TABLET, DELAYED RELEASE ORAL at 12:07

## 2021-04-24 RX ADMIN — HYDROMORPHONE HYDROCHLORIDE 4 MILLIGRAM(S): 2 INJECTION INTRAMUSCULAR; INTRAVENOUS; SUBCUTANEOUS at 22:59

## 2021-04-24 RX ADMIN — HYDROMORPHONE HYDROCHLORIDE 4 MILLIGRAM(S): 2 INJECTION INTRAMUSCULAR; INTRAVENOUS; SUBCUTANEOUS at 16:24

## 2021-04-24 RX ADMIN — Medication 4 MILLIGRAM(S): at 12:08

## 2021-04-24 RX ADMIN — QUETIAPINE FUMARATE 50 MILLIGRAM(S): 200 TABLET, FILM COATED ORAL at 01:13

## 2021-04-24 RX ADMIN — HYDROMORPHONE HYDROCHLORIDE 4 MILLIGRAM(S): 2 INJECTION INTRAMUSCULAR; INTRAVENOUS; SUBCUTANEOUS at 22:09

## 2021-04-24 RX ADMIN — HYDROMORPHONE HYDROCHLORIDE 4 MILLIGRAM(S): 2 INJECTION INTRAMUSCULAR; INTRAVENOUS; SUBCUTANEOUS at 09:24

## 2021-04-24 RX ADMIN — HYDROMORPHONE HYDROCHLORIDE 4 MILLIGRAM(S): 2 INJECTION INTRAMUSCULAR; INTRAVENOUS; SUBCUTANEOUS at 17:32

## 2021-04-24 RX ADMIN — HYDROMORPHONE HYDROCHLORIDE 4 MILLIGRAM(S): 2 INJECTION INTRAMUSCULAR; INTRAVENOUS; SUBCUTANEOUS at 07:07

## 2021-04-24 RX ADMIN — Medication 650 MILLIGRAM(S): at 07:00

## 2021-04-24 RX ADMIN — SENNA PLUS 2 TABLET(S): 8.6 TABLET ORAL at 22:09

## 2021-04-24 RX ADMIN — POLYETHYLENE GLYCOL 3350 17 GRAM(S): 17 POWDER, FOR SOLUTION ORAL at 09:24

## 2021-04-24 RX ADMIN — MIDAZOLAM HYDROCHLORIDE 4 MILLIGRAM(S): 1 INJECTION, SOLUTION INTRAMUSCULAR; INTRAVENOUS at 17:00

## 2021-04-24 RX ADMIN — QUETIAPINE FUMARATE 50 MILLIGRAM(S): 200 TABLET, FILM COATED ORAL at 09:24

## 2021-04-24 RX ADMIN — QUETIAPINE FUMARATE 75 MILLIGRAM(S): 200 TABLET, FILM COATED ORAL at 17:33

## 2021-04-24 RX ADMIN — Medication 650 MILLIGRAM(S): at 23:00

## 2021-04-24 RX ADMIN — HYDROMORPHONE HYDROCHLORIDE 4 MILLIGRAM(S): 2 INJECTION INTRAMUSCULAR; INTRAVENOUS; SUBCUTANEOUS at 07:00

## 2021-04-24 RX ADMIN — Medication 3 MILLILITER(S): at 20:27

## 2021-04-24 RX ADMIN — Medication 3 MILLILITER(S): at 04:23

## 2021-04-24 RX ADMIN — CHLORHEXIDINE GLUCONATE 1 APPLICATION(S): 213 SOLUTION TOPICAL at 12:34

## 2021-04-24 RX ADMIN — CHLORHEXIDINE GLUCONATE 15 MILLILITER(S): 213 SOLUTION TOPICAL at 07:14

## 2021-04-24 RX ADMIN — HYDROMORPHONE HYDROCHLORIDE 4 MILLIGRAM(S): 2 INJECTION INTRAMUSCULAR; INTRAVENOUS; SUBCUTANEOUS at 10:47

## 2021-04-24 RX ADMIN — HYDROMORPHONE HYDROCHLORIDE 4 MILLIGRAM(S): 2 INJECTION INTRAMUSCULAR; INTRAVENOUS; SUBCUTANEOUS at 14:28

## 2021-04-24 RX ADMIN — CHLORHEXIDINE GLUCONATE 15 MILLILITER(S): 213 SOLUTION TOPICAL at 17:32

## 2021-04-24 RX ADMIN — Medication 100 GRAM(S): at 07:07

## 2021-04-24 RX ADMIN — ENOXAPARIN SODIUM 40 MILLIGRAM(S): 100 INJECTION SUBCUTANEOUS at 17:32

## 2021-04-24 RX ADMIN — ENOXAPARIN SODIUM 40 MILLIGRAM(S): 100 INJECTION SUBCUTANEOUS at 07:13

## 2021-04-24 RX ADMIN — QUETIAPINE FUMARATE 25 MILLIGRAM(S): 200 TABLET, FILM COATED ORAL at 12:12

## 2021-04-24 RX ADMIN — Medication 4 MILLIGRAM(S): at 23:05

## 2021-04-24 RX ADMIN — Medication 4 MILLIGRAM(S): at 17:32

## 2021-04-24 RX ADMIN — Medication 650 MILLIGRAM(S): at 22:09

## 2021-04-24 RX ADMIN — MIDAZOLAM HYDROCHLORIDE 4 MILLIGRAM(S): 1 INJECTION, SOLUTION INTRAMUSCULAR; INTRAVENOUS at 23:00

## 2021-04-24 RX ADMIN — Medication 3 MILLILITER(S): at 00:28

## 2021-04-24 RX ADMIN — HYDROMORPHONE HYDROCHLORIDE 4 MILLIGRAM(S): 2 INJECTION INTRAMUSCULAR; INTRAVENOUS; SUBCUTANEOUS at 18:28

## 2021-04-24 RX ADMIN — Medication 4 MILLIGRAM(S): at 07:29

## 2021-04-24 RX ADMIN — Medication 650 MILLIGRAM(S): at 07:07

## 2021-04-24 RX ADMIN — MIDAZOLAM HYDROCHLORIDE 4 MILLIGRAM(S): 1 INJECTION, SOLUTION INTRAMUSCULAR; INTRAVENOUS at 07:56

## 2021-04-24 NOTE — PROCEDURE NOTE - NSAPPROACH_GEN_A_CORE
peripheral
percutaneous
via natural/artificial opening endoscopic
via natural/artificial opening endoscopic
percutaneous
percutaneous

## 2021-04-24 NOTE — PROGRESS NOTE ADULT - ATTENDING COMMENTS
Acute hypoxic respiratory failure s/p trach on 4-22  delirium - improving  - titrate sedatives/anxiolytics/analgesics for delirium  - SBT today .

## 2021-04-24 NOTE — PROGRESS NOTE ADULT - ASSESSMENT
55 yo M with PMHx of UM and gout who presented to ED with c/o progressively worsening SOB admitted to ICU for management of AHRF in the setting of COVID. Course c/b GRACE, ileus, septic shock 2/2 aspiration event, Afib with RVR. Now trached and PEG'd.    NEURO  #Sedated   -Sedated on ventilator  -Currently on ativan 4mg q6hr, IV dilaudid 4mg q4hr prn, seroquel 50mg q8hr, off IV sedation  -Continue with wrist restraints to prevent patient harm  -4/24 AM seen in some distress and overbreathing vent, s/p 4mg Versed IV    RESPIRATORY  #Acute hypoxic hypercapnic respiratory failure in the setting of COVID PNA  -Patient initially presented with O2 saturation of 14% and cyanotic requiring immediate intubation  -CXR with diffuse bilateral opacities with + COVID19 PCR  -Elevated inflammatory markers  -s/p intubation 4/5  -Tracheostomy on 4/22  -Will attempt daily SBTs as tolerated    #COVID  -CXR with diffuse bilateral opacities with + COVID19 PCR  -Decadron (4/5-4/13) d/c'ed due to clinical presentation  -Remdesevir (4/5-4/9)    #MU  -As per patient's wife, patient has MU on CPAP  -Patient sleeps with 2-3 pillows at night, attributed to MU    CARDIOVASCULAR  #Atrial Fibrillation- RESOLVED  -Patient went into afib with RVR 4/17 @ about 1830  -No new medications started as resolved  -Anticoagulation for afib not started, patient now in NSR and [id number] Score 0 as patient has no CV hx (however, on admission patient was found to have low EF- repeat ECHO with EF 55%)    GI  -s/p PEG 4/21  -Tolerating tube feedings at 20 mL/hr, goal is 30 cc's  -GI ppx with pantoprazole via PEG   -Rectal tube in place, with good output    #Ileus 2/2 opioids - RESOLVED and having BMs  -s/p sump for decompression  -s/p methylnaltrexone with BM afterwards    RENAL//F&E  #Acute Kidney Injury  -Baseline creatinine unknown, no reported kidney disease  -Urine lytes 4/17 showing pre-renal  -Now resolving, renal fxn creat 1.27  -Strict I&O monitoring  -Avoid nephrotoxic medications    ID  #Persistent Fever  -Recent aspiration pneumonia c/b septic shock (now resolved); sputum culture yielded growth of normal respiratory lexie. Fever despite meropenem ?2/2 periodic aspiration vs. COVID-19 vs. new nosocomial infectious process  -Meropenem x7 days (4/16-4/22)    #Aspiration pneumonia vs HAP- TREATED  -Found 4/14 while trying to extubate w/ delirium, and numerous thick secretions in oral cavity requiring suctioning. 4/15 AM found w/ 102.3F oral temp and WBC increase to 18s, suspect possible aspiration pneumonia, with previous negative MRSA swab. 4/14 bcx NGTD, 4/15 bcx NGTD  -Zosyn started 4/9 and d/c'd 4/16 in the setting of continued septic shock  -Decision made to broaden antibiotics to meropenem 1 gram and vancomycin 1250 mg  -MRSA swab negative, no addt'l vanco dosed  -s/p meropenem course completion 4/16-4/22    #COVID PNA - TREATED  -Plan as above  -Off steroid course, remdesivir course completed    #?Fungal Infxn- TREATED  -Seen by ID, -ID ok'd Vori (loading doses x 2 given, then on 400 mg q12hr), stopped 4/14 given improvement in pt. status before medication was really started   -As per ID recs ====> s/p BAL 4/12--cultures have so far only yielded Keila dubliniensis which is probably a respiratory tract colonizer (and not the etiology of the patient's fevers). Voriconazole is not indicated for respiratory tract colonization with Candida. Suspect recent high fevers/leukocytosis due to aspiration event; Doubt COVID-19 associated pulmonary aspergillosis given BAL cultures without growth of mold, absence of cavitation on CXR, and time course relatively early for development of this rare entity. Moreover, would not attribute new fevers to discontinuation of voriconazole.  -BAL sputum samples taken from bronch 4/12, showing yeast   -Sputum cx from 4/16, no evidence of yeast  -Peripheral blood cultures from 4/22 NG @ 1 day  -Maintain MAP > 70 mmHg    ENDOCRINE  #Hyperglycemia  -q6hr correctional scale  -Lantus and regular insulin d/c'd as patient NPO  -Blood glucose trend   -Decadron started 4/5 AM, d/c'd 4/13  -A1c 6.2    HEME  #Anemia  -Hgb 11 on admission; unknown baseline. No evidence of bleed.  -Hgb 4/18 7.3, possibly hemodiluted as patient did get fluids and albumin overnight, s/p transfusion 1U PRBC  -Today Hgb 7.6  -Maintain active T+S    -DVT ppx with Lovenox SubQ    DISPO/GOALS OF CARE:  -Full Code  -4/23 de-escalated central line and arterial line as patient not requiring pressors and is hemodynamically stable.   57 yo M with PMHx of MU and gout who presented to ED with c/o progressively worsening SOB admitted to ICU for management of AHRF in the setting of COVID. Course c/b GRACE, ileus, septic shock 2/2 aspiration event, Afib with RVR. Now trached and PEG'd.    NEURO  #Sedated   -Sedated on ventilator  -Currently on ativan 4mg q6hr, IV dilaudid 4mg q4hr prn, seroquel 50mg q8hr, off IV sedation  -Continue with wrist restraints to prevent patient harm  -4/24 AM seen in some distress and overbreathing vent, s/p 4mg Versed IV; increasing seroquel from 50mg Q8 to 75mg Q8hr; QTC 4/24 is 460.    RESPIRATORY  #Acute hypoxic hypercapnic respiratory failure in the setting of COVID PNA  -Patient initially presented with O2 saturation of 14% and cyanotic requiring immediate intubation  -CXR with diffuse bilateral opacities with + COVID19 PCR  -Elevated inflammatory markers  -s/p intubation 4/5  -Tracheostomy on 4/22  -Will continue w/ daily SBTs as tolerated    #COVID  -CXR with diffuse bilateral opacities with + COVID19 PCR  -Decadron (4/5-4/13) d/c'ed due to clinical presentation  -Remdesevir (4/5-4/9)    #MU  -As per patient's wife, patient has MU on CPAP  -Patient sleeps with 2-3 pillows at night, attributed to MU    CARDIOVASCULAR  #Atrial Fibrillation- RESOLVED  -Patient went into afib with RVR 4/17 @ about 1830  -No new medications started as resolved  -Anticoagulation for afib not started, patient now in NSR and [id number] Score 0 as patient has no CV hx (however, on admission patient was found to have low EF- repeat ECHO with EF 55%)    GI  -s/p PEG 4/21  -Tolerating tube feedings at 20 mL/hr, goal is 30 cc's  -GI ppx with pantoprazole via PEG   -Rectal tube in place, with good output    #Ileus 2/2 opioids - RESOLVED and having BMs  -s/p sump for decompression  -s/p methylnaltrexone with BM afterwards  -con't senna, miralax    RENAL//F&E  #Acute Kidney Injury  -Baseline creatinine unknown, no reported kidney disease  -Urine lytes 4/17 showing pre-renal  -Now resolving, renal fxn creat 1.27  -Strict I&O monitoring  -Avoid nephrotoxic medications    ID  #Persistent Fever  -Recent aspiration pneumonia c/b septic shock (now resolved); sputum culture yielded growth of normal respiratory lexie. Fever despite meropenem ?2/2 periodic aspiration vs. COVID-19 vs. new nosocomial infectious process  -Meropenem x7 days (4/16-4/22)    #Aspiration pneumonia vs HAP- TREATED  -Found 4/14 while trying to extubate w/ delirium, and numerous thick secretions in oral cavity requiring suctioning. 4/15 AM found w/ 102.3F oral temp and WBC increase to 18s, suspect possible aspiration pneumonia, with previous negative MRSA swab. 4/14 bcx NGTD, 4/15 bcx NGTD  -Zosyn started 4/9 and d/c'd 4/16 in the setting of continued septic shock  -Decision made to broaden antibiotics to meropenem 1 gram and vancomycin 1250 mg  -MRSA swab negative, no addt'l vanco dosed  -s/p meropenem course completion 4/16-4/22    #COVID PNA - TREATED  -Plan as above  -Off steroid course, remdesivir course completed    #?Fungal Infxn- TREATED  -Seen by ID, -ID ok'd Vori (loading doses x 2 given, then on 400 mg q12hr), stopped 4/14 given improvement in pt. status before medication was really started   -As per ID recs ====> s/p BAL 4/12--cultures have so far only yielded Keila dubliniensis which is probably a respiratory tract colonizer (and not the etiology of the patient's fevers). Voriconazole is not indicated for respiratory tract colonization with Candida. Suspect recent high fevers/leukocytosis due to aspiration event; Doubt COVID-19 associated pulmonary aspergillosis given BAL cultures without growth of mold, absence of cavitation on CXR, and time course relatively early for development of this rare entity. Moreover, would not attribute new fevers to discontinuation of voriconazole.  -BAL sputum samples taken from bronch 4/12, showing yeast   -Sputum cx from 4/16, no evidence of yeast  -Peripheral blood cultures from 4/22 NG @ 1 day  -Maintain MAP > 70 mmHg    ENDOCRINE  #Hyperglycemia  -q6hr correctional scale  -Lantus and regular insulin d/c'd as patient NPO  -Blood glucose trend   -Decadron started 4/5 AM, d/c'd 4/13  -A1c 6.2    HEME  #Anemia  -Hgb 11 on admission; unknown baseline. No evidence of bleed.  -Hgb 4/18 7.3, possibly hemodiluted as patient did get fluids and albumin overnight, s/p transfusion 1U PRBC  -Today Hgb 7.6  -Maintain active T+S    -DVT ppx with Lovenox SubQ    DISPO/GOALS OF CARE:  -Full Code  -4/23 de-escalated central line and arterial line as patient not requiring pressors and is hemodynamically stable.

## 2021-04-24 NOTE — PROCEDURE NOTE - NSINDICATIONS_GEN_A_CORE
feeds
venous access
critical patient/monitoring purposes
critical illness/venous access
other
failure to thrive/feeding difficulties/respiratory distress
other
IV fell out of restless/edematous pt/emergency venous access/other

## 2021-04-24 NOTE — PROCEDURE NOTE - PROCEDURE DATE TIME, MLM
22-Apr-2021 12:00
22-Apr-2021 12:20
24-Apr-2021 18:20
05-Apr-2021 08:39
12-Apr-2021 15:30
16-Apr-2021 14:00
19-Apr-2021 19:18

## 2021-04-24 NOTE — PROGRESS NOTE ADULT - SUBJECTIVE AND OBJECTIVE BOX
IN PROGRESS, INCLUDING ASSESSMENT AND PLAN     IN PROGRESS, INCLUDING ASSESSMENT AND PLAN    Overnight Events: Per report, Tachy to 100s, NAD. Repleted magnesium.     Subjective: Patient seen and examined at bedside. Trached and on oral sedation medications, still unable to participate in ROS.     [OBJECTIVE]:  Vital Signs:  T(F): , Max: 100.3 (04-23-21 @ 10:00)  HR:  (71 - 111)  BP:  (108/55 - 135/70)  BP(mean):  (75 - 96)  RR:  (21 - 34)  SpO2:  (93% - 100%)  Wt(kg): --  CVP(cm H2O): --  Mode: AC/ CMV (Assist Control/ Continuous Mandatory Ventilation), RR (machine): 20, RR (patient): 33, TV (machine): 450, FiO2: 40, PEEP: 5, ITime: 1, MAP: 9, PIP: 16    04-23 @ 07:01  -  04-24 @ 07:00  --------------------------------------------------------  IN: 961.8 mL / OUT: 1945 mL / NET: -983.2 mL    04-24 @ 07:01  -  04-24 @ 09:00  --------------------------------------------------------  IN: 60 mL / OUT: 100 mL / NET: -40 mL    CAPILLARY BLOOD GLUCOSE    POCT Blood Glucose.: 140 mg/dL (24 Apr 2021 05:34)    Physical Exam:  T(F): 100 (04-24-21 @ 08:00)  HR: 108 (04-24-21 @ 08:00)  BP: 117/59 (04-24-21 @ 08:00)  RR: 30 (04-24-21 @ 08:00)  SpO2: 96% (04-24-21 @ 08:00)  Wt(kg): --    GENERAL: obese, trached and partially sedated (oral), appearing agitated this AM, moving extremities in bed spontaneously  SKIN/HAIR/NAILS: Nails without clubbing or cyanosis. CR<2 secs. No lesions, rash, petechiae, erythema or ecchymoses. Anicteric.   HEAD AND NECK: The skull is NC/AT. B/L Sclera white. 4 mm PERRL (hx of cataract sx). Nasal mucous membrane dry; Trachea midline, neck supple.   THORAX/LUNGS: Thorax is symmetric with good expansion, assisted by mechanical ventilation. Lung sounds with rhonchi throughout, diminished at the bases.  CARDIOVASCULAR: No JVD. Carotid upstrokes are brisk, without bruits. +S1 and S2, RRR; no extra heart sounds or M/R/G. NSR at the time of my exam.  ABDOMEN/: Abdomen is rounded with hypoactive BS in all 4 quadrants; it is soft, no palpable masses; no grimacing to palpation noted; last BM 4/24 as patient has rectal tube with liquid brown stool. Lechuga catheter in place.   PERIPHERAL VASCULAR: Extremities are warm (upper) and cool (lower), radial and dorsalis pedis pulses +1 and symmetric. +1 generalized non-pitting edema noted. No clubbing, no cyanosis.  NEUROLOGICAL: Patient's eyes open when I walked into room, able to follow some one-step commands like "lift your hand" and "squeeze my hand" command with B/L UE and grimaces to pain on B/L LE; wrist restraints in place to prevent patient harm; partially sedated on oral sedation (not on gtts)     Medications:  MEDICATIONS  (STANDING):  albuterol/ipratropium for Nebulization 3 milliLiter(s) Nebulizer every 4 hours  chlorhexidine 0.12% Liquid 15 milliLiter(s) Oral Mucosa every 12 hours  chlorhexidine 2% Cloths 1 Application(s) Topical daily  dexMEDEtomidine Infusion 0.2 MICROgram(s)/kG/Hr (6 mL/Hr) IV Continuous <Continuous>  dextrose 40% Gel 15 Gram(s) Oral once  dextrose 5%. 1000 milliLiter(s) (50 mL/Hr) IV Continuous <Continuous>  dextrose 5%. 1000 milliLiter(s) (100 mL/Hr) IV Continuous <Continuous>  dextrose 50% Injectable 25 Gram(s) IV Push once  dextrose 50% Injectable 12.5 Gram(s) IV Push once  dextrose 50% Injectable 25 Gram(s) IV Push once  enoxaparin Injectable 40 milliGRAM(s) SubCutaneous every 12 hours  glucagon  Injectable 1 milliGRAM(s) IntraMuscular once  HYDROmorphone   Tablet 4 milliGRAM(s) Enteral Tube every 4 hours  insulin regular  human corrective regimen sliding scale   SubCutaneous every 6 hours  LORazepam     Tablet 4 milliGRAM(s) Oral every 6 hours  pantoprazole   Suspension 40 milliGRAM(s) Enteral Tube daily  polyethylene glycol 3350 17 Gram(s) Oral every 24 hours  QUEtiapine 50 milliGRAM(s) Oral every 8 hours  senna 2 Tablet(s) Oral at bedtime    MEDICATIONS  (PRN):  acetaminophen    Suspension .. 650 milliGRAM(s) Oral every 6 hours PRN Temp greater or equal to 38C (100.4F)  sodium chloride 0.9% lock flush 10 milliLiter(s) IV Push every 1 hour PRN Pre/post blood products, medications, blood draw, and to maintain line patency    Allergies:  Allergies    No Known Allergies    Intolerances    Labs:                        7.6    5.71  )-----------( 267      ( 24 Apr 2021 03:41 )             25.1     04-24    136  |  104  |  19  ----------------------------<  137<H>  4.1   |  22  |  1.27    Ca    8.5      24 Apr 2021 03:41  Phos  3.1     04-24  Mg     1.9     04-24    Radiology and other tests:   Overnight Events: Per report, Tachy to 100s, NAD. Repleted magnesium.     Subjective: Patient seen and examined at bedside. Trached and on oral sedation medications, still unable to participate in ROS.     [OBJECTIVE]:  Vital Signs:  T(F): , Max: 100.3 (04-23-21 @ 10:00)  HR:  (71 - 111)  BP:  (108/55 - 135/70)  BP(mean):  (75 - 96)  RR:  (21 - 34)  SpO2:  (93% - 100%)  Wt(kg): --  CVP(cm H2O): --  Mode: AC/ CMV (Assist Control/ Continuous Mandatory Ventilation), RR (machine): 20, RR (patient): 33, TV (machine): 450, FiO2: 40, PEEP: 5, ITime: 1, MAP: 9, PIP: 16    04-23 @ 07:01  -  04-24 @ 07:00  --------------------------------------------------------  IN: 961.8 mL / OUT: 1945 mL / NET: -983.2 mL    04-24 @ 07:01  -  04-24 @ 09:00  --------------------------------------------------------  IN: 60 mL / OUT: 100 mL / NET: -40 mL    CAPILLARY BLOOD GLUCOSE    POCT Blood Glucose.: 140 mg/dL (24 Apr 2021 05:34)    Physical Exam:  T(F): 100 (04-24-21 @ 08:00)  HR: 108 (04-24-21 @ 08:00)  BP: 117/59 (04-24-21 @ 08:00)  RR: 30 (04-24-21 @ 08:00)  SpO2: 96% (04-24-21 @ 08:00)  Wt(kg): --    GENERAL: obese, trached and partially sedated (oral), appearing agitated this AM, moving extremities in bed spontaneously  SKIN/HAIR/NAILS: Nails without clubbing or cyanosis. CR<2 secs. No lesions, rash, petechiae, erythema or ecchymoses. Anicteric.   HEAD AND NECK: The skull is NC/AT. B/L Sclera white. 4 mm PERRL (hx of cataract sx). Nasal mucous membrane dry; Trachea midline, neck supple.   THORAX/LUNGS: Thorax is symmetric with good expansion, assisted by mechanical ventilation. Lung sounds with rhonchi throughout, diminished at the bases.  CARDIOVASCULAR: No JVD. Carotid upstrokes are brisk, without bruits. +S1 and S2, RRR; no extra heart sounds or M/R/G. NSR at the time of my exam.  ABDOMEN/: Abdomen is rounded with hypoactive BS in all 4 quadrants; it is soft, no palpable masses; no grimacing to palpation noted; last BM 4/24 as patient has rectal tube with liquid brown stool. Lechuga catheter in place.   PERIPHERAL VASCULAR: Extremities are warm (upper) and cool (lower), radial and dorsalis pedis pulses +1 and symmetric. +1 generalized non-pitting edema noted. No clubbing, no cyanosis.  NEUROLOGICAL: Patient's eyes open when I walked into room, able to follow some one-step commands like "lift your hand" and "squeeze my hand" command with B/L UE and grimaces to pain on B/L LE; wrist restraints in place to prevent patient harm; partially sedated on oral sedation (not on gtts)     Medications:  MEDICATIONS  (STANDING):  albuterol/ipratropium for Nebulization 3 milliLiter(s) Nebulizer every 4 hours  chlorhexidine 0.12% Liquid 15 milliLiter(s) Oral Mucosa every 12 hours  chlorhexidine 2% Cloths 1 Application(s) Topical daily  dexMEDEtomidine Infusion 0.2 MICROgram(s)/kG/Hr (6 mL/Hr) IV Continuous <Continuous>  dextrose 40% Gel 15 Gram(s) Oral once  dextrose 5%. 1000 milliLiter(s) (50 mL/Hr) IV Continuous <Continuous>  dextrose 5%. 1000 milliLiter(s) (100 mL/Hr) IV Continuous <Continuous>  dextrose 50% Injectable 25 Gram(s) IV Push once  dextrose 50% Injectable 12.5 Gram(s) IV Push once  dextrose 50% Injectable 25 Gram(s) IV Push once  enoxaparin Injectable 40 milliGRAM(s) SubCutaneous every 12 hours  glucagon  Injectable 1 milliGRAM(s) IntraMuscular once  HYDROmorphone   Tablet 4 milliGRAM(s) Enteral Tube every 4 hours  insulin regular  human corrective regimen sliding scale   SubCutaneous every 6 hours  LORazepam     Tablet 4 milliGRAM(s) Oral every 6 hours  pantoprazole   Suspension 40 milliGRAM(s) Enteral Tube daily  polyethylene glycol 3350 17 Gram(s) Oral every 24 hours  QUEtiapine 50 milliGRAM(s) Oral every 8 hours  senna 2 Tablet(s) Oral at bedtime    MEDICATIONS  (PRN):  acetaminophen    Suspension .. 650 milliGRAM(s) Oral every 6 hours PRN Temp greater or equal to 38C (100.4F)  sodium chloride 0.9% lock flush 10 milliLiter(s) IV Push every 1 hour PRN Pre/post blood products, medications, blood draw, and to maintain line patency    Allergies:  Allergies    No Known Allergies    Intolerances    Labs:                        7.6    5.71  )-----------( 267      ( 24 Apr 2021 03:41 )             25.1     04-24    136  |  104  |  19  ----------------------------<  137<H>  4.1   |  22  |  1.27    Ca    8.5      24 Apr 2021 03:41  Phos  3.1     04-24  Mg     1.9     04-24    Radiology and other tests:

## 2021-04-24 NOTE — PROCEDURE NOTE - NSPROCNAME_GEN_A_CORE
Central Line Insertion
Arterial Puncture/Cannulation
Gastric Intubation/Gastric Lavage
Peripheral Line Insertion
Peripheral Line Insertion
Feeding Tube Placement
Central Line Insertion
Peripheral Line Insertion

## 2021-04-24 NOTE — PROCEDURE NOTE - NSPOSTCAREGUIDE_GEN_A_CORE
Verbal/written post procedure instructions were given to patient/caregiver/Instructed patient/caregiver regarding signs and symptoms of infection
Verbal/written post procedure instructions were given to patient/caregiver/Instructed patient/caregiver to follow-up with primary care physician/Instructed patient/caregiver regarding signs and symptoms of infection/Keep the cast/splint/dressing clean and dry/Care for catheter as per unit/ICU protocols
Verbal/written post procedure instructions were given to patient/caregiver
Verbal/written post procedure instructions were given to patient/caregiver/Instructed patient/caregiver to follow-up with primary care physician/Instructed patient/caregiver regarding signs and symptoms of infection/Keep the cast/splint/dressing clean and dry
Verbal/written post procedure instructions were given to patient/caregiver/Instructed patient/caregiver regarding signs and symptoms of infection/Keep the cast/splint/dressing clean and dry/Care for catheter as per unit/ICU protocols

## 2021-04-24 NOTE — PROCEDURE NOTE - NSPROCDETAILS_GEN_ALL_CORE
location identified, draped/prepped, sterile technique used/dressing applied/flushes easily/secured in place/sterile technique, catheter placed

## 2021-04-25 LAB
ANION GAP SERPL CALC-SCNC: 9 MMOL/L — SIGNIFICANT CHANGE UP (ref 5–17)
ANISOCYTOSIS BLD QL: SLIGHT — SIGNIFICANT CHANGE UP
BASOPHILS # BLD AUTO: 0.1 K/UL — SIGNIFICANT CHANGE UP (ref 0–0.2)
BASOPHILS NFR BLD AUTO: 1.8 % — SIGNIFICANT CHANGE UP (ref 0–2)
BLD GP AB SCN SERPL QL: NEGATIVE — SIGNIFICANT CHANGE UP
BUN SERPL-MCNC: 20 MG/DL — SIGNIFICANT CHANGE UP (ref 7–23)
CALCIUM SERPL-MCNC: 8.6 MG/DL — SIGNIFICANT CHANGE UP (ref 8.4–10.5)
CHLORIDE SERPL-SCNC: 104 MMOL/L — SIGNIFICANT CHANGE UP (ref 96–108)
CO2 SERPL-SCNC: 24 MMOL/L — SIGNIFICANT CHANGE UP (ref 22–31)
CREAT SERPL-MCNC: 1.27 MG/DL — SIGNIFICANT CHANGE UP (ref 0.5–1.3)
EOSINOPHIL # BLD AUTO: 0.83 K/UL — HIGH (ref 0–0.5)
EOSINOPHIL NFR BLD AUTO: 14.3 % — HIGH (ref 0–6)
GIANT PLATELETS BLD QL SMEAR: PRESENT — SIGNIFICANT CHANGE UP
GLUCOSE BLDC GLUCOMTR-MCNC: 108 MG/DL — HIGH (ref 70–99)
GLUCOSE BLDC GLUCOMTR-MCNC: 116 MG/DL — HIGH (ref 70–99)
GLUCOSE BLDC GLUCOMTR-MCNC: 125 MG/DL — HIGH (ref 70–99)
GLUCOSE BLDC GLUCOMTR-MCNC: 136 MG/DL — HIGH (ref 70–99)
GLUCOSE SERPL-MCNC: 116 MG/DL — HIGH (ref 70–99)
HCT VFR BLD CALC: 23.4 % — LOW (ref 39–50)
HGB BLD-MCNC: 7 G/DL — CRITICAL LOW (ref 13–17)
LYMPHOCYTES # BLD AUTO: 0.88 K/UL — LOW (ref 1–3.3)
LYMPHOCYTES # BLD AUTO: 15.2 % — SIGNIFICANT CHANGE UP (ref 13–44)
MAGNESIUM SERPL-MCNC: 1.9 MG/DL — SIGNIFICANT CHANGE UP (ref 1.6–2.6)
MANUAL SMEAR VERIFICATION: SIGNIFICANT CHANGE UP
MCHC RBC-ENTMCNC: 23.6 PG — LOW (ref 27–34)
MCHC RBC-ENTMCNC: 29.9 GM/DL — LOW (ref 32–36)
MCV RBC AUTO: 79.1 FL — LOW (ref 80–100)
MICROCYTES BLD QL: SLIGHT — SIGNIFICANT CHANGE UP
MONOCYTES # BLD AUTO: 0.26 K/UL — SIGNIFICANT CHANGE UP (ref 0–0.9)
MONOCYTES NFR BLD AUTO: 4.4 % — SIGNIFICANT CHANGE UP (ref 2–14)
NEUTROPHILS # BLD AUTO: 3.74 K/UL — SIGNIFICANT CHANGE UP (ref 1.8–7.4)
NEUTROPHILS NFR BLD AUTO: 64.3 % — SIGNIFICANT CHANGE UP (ref 43–77)
OVALOCYTES BLD QL SMEAR: SLIGHT — SIGNIFICANT CHANGE UP
PHOSPHATE SERPL-MCNC: 4.2 MG/DL — SIGNIFICANT CHANGE UP (ref 2.5–4.5)
PLAT MORPH BLD: ABNORMAL
PLATELET # BLD AUTO: 256 K/UL — SIGNIFICANT CHANGE UP (ref 150–400)
POLYCHROMASIA BLD QL SMEAR: SLIGHT — SIGNIFICANT CHANGE UP
POTASSIUM SERPL-MCNC: 3.7 MMOL/L — SIGNIFICANT CHANGE UP (ref 3.5–5.3)
POTASSIUM SERPL-SCNC: 3.7 MMOL/L — SIGNIFICANT CHANGE UP (ref 3.5–5.3)
RBC # BLD: 2.96 M/UL — LOW (ref 4.2–5.8)
RBC # FLD: 19.2 % — HIGH (ref 10.3–14.5)
RBC BLD AUTO: ABNORMAL
RH IG SCN BLD-IMP: POSITIVE — SIGNIFICANT CHANGE UP
SMUDGE CELLS # BLD: PRESENT — SIGNIFICANT CHANGE UP
SODIUM SERPL-SCNC: 137 MMOL/L — SIGNIFICANT CHANGE UP (ref 135–145)
SPHEROCYTES BLD QL SMEAR: SLIGHT — SIGNIFICANT CHANGE UP
WBC # BLD: 5.82 K/UL — SIGNIFICANT CHANGE UP (ref 3.8–10.5)
WBC # FLD AUTO: 5.82 K/UL — SIGNIFICANT CHANGE UP (ref 3.8–10.5)

## 2021-04-25 PROCEDURE — 99233 SBSQ HOSP IP/OBS HIGH 50: CPT | Mod: GC

## 2021-04-25 RX ADMIN — HYDROMORPHONE HYDROCHLORIDE 4 MILLIGRAM(S): 2 INJECTION INTRAMUSCULAR; INTRAVENOUS; SUBCUTANEOUS at 18:31

## 2021-04-25 RX ADMIN — CHLORHEXIDINE GLUCONATE 15 MILLILITER(S): 213 SOLUTION TOPICAL at 18:12

## 2021-04-25 RX ADMIN — Medication 3 MILLILITER(S): at 08:00

## 2021-04-25 RX ADMIN — ENOXAPARIN SODIUM 40 MILLIGRAM(S): 100 INJECTION SUBCUTANEOUS at 18:14

## 2021-04-25 RX ADMIN — Medication 3 MILLILITER(S): at 04:58

## 2021-04-25 RX ADMIN — HYDROMORPHONE HYDROCHLORIDE 4 MILLIGRAM(S): 2 INJECTION INTRAMUSCULAR; INTRAVENOUS; SUBCUTANEOUS at 18:14

## 2021-04-25 RX ADMIN — POLYETHYLENE GLYCOL 3350 17 GRAM(S): 17 POWDER, FOR SOLUTION ORAL at 08:45

## 2021-04-25 RX ADMIN — HYDROMORPHONE HYDROCHLORIDE 4 MILLIGRAM(S): 2 INJECTION INTRAMUSCULAR; INTRAVENOUS; SUBCUTANEOUS at 11:55

## 2021-04-25 RX ADMIN — HYDROMORPHONE HYDROCHLORIDE 4 MILLIGRAM(S): 2 INJECTION INTRAMUSCULAR; INTRAVENOUS; SUBCUTANEOUS at 16:29

## 2021-04-25 RX ADMIN — HYDROMORPHONE HYDROCHLORIDE 4 MILLIGRAM(S): 2 INJECTION INTRAMUSCULAR; INTRAVENOUS; SUBCUTANEOUS at 09:53

## 2021-04-25 RX ADMIN — Medication 650 MILLIGRAM(S): at 16:29

## 2021-04-25 RX ADMIN — HYDROMORPHONE HYDROCHLORIDE 4 MILLIGRAM(S): 2 INJECTION INTRAMUSCULAR; INTRAVENOUS; SUBCUTANEOUS at 03:05

## 2021-04-25 RX ADMIN — Medication 4 MILLIGRAM(S): at 05:10

## 2021-04-25 RX ADMIN — HYDROMORPHONE HYDROCHLORIDE 4 MILLIGRAM(S): 2 INJECTION INTRAMUSCULAR; INTRAVENOUS; SUBCUTANEOUS at 14:25

## 2021-04-25 RX ADMIN — Medication 4 MILLIGRAM(S): at 13:04

## 2021-04-25 RX ADMIN — Medication 3 MILLILITER(S): at 00:29

## 2021-04-25 RX ADMIN — Medication 4 MILLIGRAM(S): at 23:00

## 2021-04-25 RX ADMIN — ENOXAPARIN SODIUM 40 MILLIGRAM(S): 100 INJECTION SUBCUTANEOUS at 04:31

## 2021-04-25 RX ADMIN — QUETIAPINE FUMARATE 75 MILLIGRAM(S): 200 TABLET, FILM COATED ORAL at 01:00

## 2021-04-25 RX ADMIN — SENNA PLUS 2 TABLET(S): 8.6 TABLET ORAL at 21:19

## 2021-04-25 RX ADMIN — HYDROMORPHONE HYDROCHLORIDE 4 MILLIGRAM(S): 2 INJECTION INTRAMUSCULAR; INTRAVENOUS; SUBCUTANEOUS at 01:00

## 2021-04-25 RX ADMIN — CHLORHEXIDINE GLUCONATE 15 MILLILITER(S): 213 SOLUTION TOPICAL at 04:31

## 2021-04-25 RX ADMIN — HYDROMORPHONE HYDROCHLORIDE 4 MILLIGRAM(S): 2 INJECTION INTRAMUSCULAR; INTRAVENOUS; SUBCUTANEOUS at 21:19

## 2021-04-25 RX ADMIN — CHLORHEXIDINE GLUCONATE 1 APPLICATION(S): 213 SOLUTION TOPICAL at 14:23

## 2021-04-25 RX ADMIN — MIDAZOLAM HYDROCHLORIDE 4 MILLIGRAM(S): 1 INJECTION, SOLUTION INTRAMUSCULAR; INTRAVENOUS at 05:08

## 2021-04-25 RX ADMIN — HYDROMORPHONE HYDROCHLORIDE 4 MILLIGRAM(S): 2 INJECTION INTRAMUSCULAR; INTRAVENOUS; SUBCUTANEOUS at 05:10

## 2021-04-25 RX ADMIN — QUETIAPINE FUMARATE 75 MILLIGRAM(S): 200 TABLET, FILM COATED ORAL at 09:53

## 2021-04-25 RX ADMIN — HYDROMORPHONE HYDROCHLORIDE 4 MILLIGRAM(S): 2 INJECTION INTRAMUSCULAR; INTRAVENOUS; SUBCUTANEOUS at 05:55

## 2021-04-25 RX ADMIN — Medication 3 MILLILITER(S): at 16:57

## 2021-04-25 RX ADMIN — QUETIAPINE FUMARATE 75 MILLIGRAM(S): 200 TABLET, FILM COATED ORAL at 18:12

## 2021-04-25 RX ADMIN — Medication 3 MILLILITER(S): at 21:19

## 2021-04-25 RX ADMIN — Medication 650 MILLIGRAM(S): at 22:24

## 2021-04-25 RX ADMIN — Medication 650 MILLIGRAM(S): at 23:00

## 2021-04-25 RX ADMIN — Medication 4 MILLIGRAM(S): at 18:12

## 2021-04-25 RX ADMIN — MIDAZOLAM HYDROCHLORIDE 4 MILLIGRAM(S): 1 INJECTION, SOLUTION INTRAMUSCULAR; INTRAVENOUS at 17:58

## 2021-04-25 RX ADMIN — PANTOPRAZOLE SODIUM 40 MILLIGRAM(S): 20 TABLET, DELAYED RELEASE ORAL at 13:05

## 2021-04-25 RX ADMIN — Medication 3 MILLILITER(S): at 12:00

## 2021-04-25 RX ADMIN — HYDROMORPHONE HYDROCHLORIDE 4 MILLIGRAM(S): 2 INJECTION INTRAMUSCULAR; INTRAVENOUS; SUBCUTANEOUS at 22:23

## 2021-04-25 NOTE — PROGRESS NOTE ADULT - ASSESSMENT
57 yo M with PMHx of MU and gout who presented to ED with c/o progressively worsening SOB admitted to ICU for management of AHRF in the setting of COVID. Course c/b GRACE, ileus, septic shock 2/2 aspiration event, Afib with RVR. Now trached and PEG'd.    NEURO  #Sedated   -Sedated on ventilator  -Currently on ativan 4mg q6hr, IV dilaudid 4mg q4hr prn, seroquel 75mg q8hr, off IV sedation  -Continue with wrist restraints to prevent patient harm    RESPIRATORY  #Acute hypoxic hypercapnic respiratory failure in the setting of COVID PNA  -Patient initially presented with O2 saturation of 14% and cyanotic requiring immediate intubation  -CXR with diffuse bilateral opacities with + COVID19 PCR  -Elevated inflammatory markers  -s/p intubation 4/5  -Tracheostomy on 4/22  -Will continue w/ daily SBTs as tolerated    #COVID  -CXR with diffuse bilateral opacities with + COVID19 PCR  -Decadron (4/5-4/13) d/c'ed due to clinical presentation  -Remdesevir (4/5-4/9)    #MU  -As per patient's wife, patient has MU on CPAP  -Patient sleeps with 2-3 pillows at night, attributed to MU    CARDIOVASCULAR  #Atrial Fibrillation- RESOLVED  -Patient went into afib with RVR 4/17 @ about 1830  -No new medications started as resolved  -Anticoagulation for afib not started, patient now in NSR and [id number] Score 0 as patient has no CV hx (however, on admission patient was found to have low EF- repeat ECHO with EF 55%)    GI  -s/p PEG 4/21  -Tolerating tube feedings  -GI ppx with pantoprazole via PEG     #Ileus 2/2 opioids - RESOLVED and having BMs  -s/p sump for decompression  -s/p methylnaltrexone with BM afterwards  -con't senna, miralax    RENAL//F&E  #Acute Kidney Injury  -Baseline creatinine unknown, no reported kidney disease  -Urine lytes 4/17 showing pre-renal  -Now resolving  -Strict I&O monitoring  -Avoid nephrotoxic medications    ID  #Persistent Fever  -Recent aspiration pneumonia c/b septic shock (now resolved); sputum culture yielded growth of normal respiratory lexie. Fever despite meropenem ?2/2 periodic aspiration vs. COVID-19 vs. new nosocomial infectious process  -s/p Meropenem x7 days (4/16-4/22)  -clinical status improving and off of pressors, no Abx at this time    #Aspiration pneumonia vs HAP- TREATED  -Found 4/14 while trying to extubate w/ delirium, and numerous thick secretions in oral cavity requiring suctioning. 4/15 AM found w/ 102.3F oral temp and WBC increase to 18s, suspect possible aspiration pneumonia, with previous negative MRSA swab. 4/14 bcx NGTD, 4/15 bcx NGTD  -Zosyn started 4/9 and d/c'd 4/16 in the setting of continued septic shock  -Decision made to broaden antibiotics to meropenem 1 gram and vancomycin 1250 mg  -MRSA swab negative, no addt'l vanco dosed  -s/p meropenem course completion 4/16-4/22    #COVID PNA - TREATED  -Plan as above  -Off steroid course, remdesivir course completed    #?Fungal Infxn- TREATED  -Seen by ID, -ID ok'd Vori (loading doses x 2 given, then on 400 mg q12hr), stopped 4/14 given improvement in pt. status before medication was really started   -As per ID recs ====> s/p BAL 4/12--cultures have so far only yielded Keila dubliniensis which is probably a respiratory tract colonizer (and not the etiology of the patient's fevers). Voriconazole is not indicated for respiratory tract colonization with Candida. Suspect recent high fevers/leukocytosis due to aspiration event; Doubt COVID-19 associated pulmonary aspergillosis given BAL cultures without growth of mold, absence of cavitation on CXR, and time course relatively early for development of this rare entity. Moreover, would not attribute new fevers to discontinuation of voriconazole.  -BAL sputum samples taken from bronch 4/12, showing yeast   -Sputum cx from 4/16, no evidence of yeast  -Peripheral blood cultures from 4/22 NG @ 1 day  -Maintain MAP > 70 mmHg    ENDOCRINE  #Hyperglycemia  -q6hr correctional scale  -Lantus and regular insulin d/c'd as patient NPO  -Blood glucose trend   -Decadron started 4/5 AM, d/c'd 4/13  -A1c 6.2    HEME  #Anemia  -Hgb 11 on admission; unknown baseline. No evidence of bleed.  -Hgb 4/18 7.3, possibly hemodiluted as patient did get fluids and albumin overnight, s/p transfusion 1U PRBC  -Hgb 4/25 7.0 possibly 2/2 phlebotomy with AoCD, s/p transfusion 1u pRBCs  -Maintain active T+S    -DVT ppx with Lovenox SubQ    DISPO/GOALS OF CARE:  -Full Code  -4/23 de-escalated central line and arterial line as patient not requiring pressors and is hemodynamically stable.

## 2021-04-25 NOTE — PROGRESS NOTE ADULT - SUBJECTIVE AND OBJECTIVE BOX
CC: Patient is a 56y old  Male who presents with a chief complaint of SOB (24 Apr 2021 06:43)      INTERVAL EVENTS: Fever of 100.4 overnight, given tylenol. Hb 7 on AM labs, transfused 1u pRBCs.    SUBJECTIVE / INTERVAL HPI: Patient seen and examined at bedside. Pt on a vent and unable to participate in ROS.    ROS: negative unless otherwise stated above.    VITAL SIGNS:  Vital Signs Last 24 Hrs  T(C): 37.2 (25 Apr 2021 05:00), Max: 38 (24 Apr 2021 16:02)  T(F): 98.9 (25 Apr 2021 05:00), Max: 100.4 (24 Apr 2021 16:02)  HR: 82 (25 Apr 2021 08:00) (79 - 108)  BP: 94/52 (25 Apr 2021 08:00) (90/52 - 156/66)  BP(mean): 66 (25 Apr 2021 08:00) (65 - 95)  RR: 20 (25 Apr 2021 08:00) (20 - 50)  SpO2: 93% (25 Apr 2021 08:00) (90% - 99%)      04-24-21 @ 07:01  -  04-25-21 @ 07:00  --------------------------------------------------------  IN: 720 mL / OUT: 1985 mL / NET: -1265 mL    04-25-21 @ 07:01  -  04-25-21 @ 08:14  --------------------------------------------------------  IN: 30 mL / OUT: 50 mL / NET: -20 mL        PHYSICAL EXAM:    General: sedated on a ventilator  Cardiovascular: +S1/S2; RRR  Respiratory: CTA B/L; no W/R/R  Gastrointestinal: soft, non-distended; +BSx4  Extremities: WWP; trace edema of b/l LEs  Vascular: 2+ radial, DP/PT pulses B/L  Neurological: opens eyes to sound, grimaces to pain x4 but does not withdraw, unable to follow commands    MEDICATIONS:  MEDICATIONS  (STANDING):  albuterol/ipratropium for Nebulization 3 milliLiter(s) Nebulizer every 4 hours  chlorhexidine 0.12% Liquid 15 milliLiter(s) Oral Mucosa every 12 hours  chlorhexidine 2% Cloths 1 Application(s) Topical daily  dextrose 40% Gel 15 Gram(s) Oral once  dextrose 5%. 1000 milliLiter(s) (50 mL/Hr) IV Continuous <Continuous>  dextrose 5%. 1000 milliLiter(s) (100 mL/Hr) IV Continuous <Continuous>  dextrose 50% Injectable 25 Gram(s) IV Push once  dextrose 50% Injectable 12.5 Gram(s) IV Push once  dextrose 50% Injectable 25 Gram(s) IV Push once  enoxaparin Injectable 40 milliGRAM(s) SubCutaneous every 12 hours  glucagon  Injectable 1 milliGRAM(s) IntraMuscular once  HYDROmorphone   Tablet 4 milliGRAM(s) Enteral Tube every 4 hours  insulin regular  human corrective regimen sliding scale   SubCutaneous every 6 hours  LORazepam     Tablet 4 milliGRAM(s) Oral every 6 hours  pantoprazole   Suspension 40 milliGRAM(s) Enteral Tube daily  polyethylene glycol 3350 17 Gram(s) Oral every 24 hours  QUEtiapine 75 milliGRAM(s) Oral every 8 hours  senna 2 Tablet(s) Oral at bedtime    MEDICATIONS  (PRN):  acetaminophen    Suspension .. 650 milliGRAM(s) Oral every 6 hours PRN Temp greater or equal to 38C (100.4F)  midazolam Injectable 4 milliGRAM(s) IV Push every 6 hours PRN anxiety/agitation      ALLERGIES:  Allergies    No Known Allergies    Intolerances        LABS:                        7.0    5.82  )-----------( 256      ( 25 Apr 2021 06:00 )             23.4     04-25    137  |  104  |  20  ----------------------------<  116<H>  3.7   |  24  |  1.27    Ca    8.6      25 Apr 2021 06:02  Phos  4.2     04-25  Mg     1.9     04-25          CAPILLARY BLOOD GLUCOSE      POCT Blood Glucose.: 116 mg/dL (25 Apr 2021 06:33)      RADIOLOGY & ADDITIONAL TESTS: Reviewed.

## 2021-04-26 LAB
ANION GAP SERPL CALC-SCNC: 9 MMOL/L — SIGNIFICANT CHANGE UP (ref 5–17)
APPEARANCE UR: CLEAR — SIGNIFICANT CHANGE UP
BACTERIA # UR AUTO: PRESENT /HPF
BASOPHILS # BLD AUTO: 0.02 K/UL — SIGNIFICANT CHANGE UP (ref 0–0.2)
BASOPHILS NFR BLD AUTO: 0.3 % — SIGNIFICANT CHANGE UP (ref 0–2)
BILIRUB UR-MCNC: NEGATIVE — SIGNIFICANT CHANGE UP
BUN SERPL-MCNC: 17 MG/DL — SIGNIFICANT CHANGE UP (ref 7–23)
CALCIUM SERPL-MCNC: 8.5 MG/DL — SIGNIFICANT CHANGE UP (ref 8.4–10.5)
CHLORIDE SERPL-SCNC: 102 MMOL/L — SIGNIFICANT CHANGE UP (ref 96–108)
CO2 SERPL-SCNC: 26 MMOL/L — SIGNIFICANT CHANGE UP (ref 22–31)
COLOR SPEC: YELLOW — SIGNIFICANT CHANGE UP
CREAT SERPL-MCNC: 1.2 MG/DL — SIGNIFICANT CHANGE UP (ref 0.5–1.3)
CULTURE RESULTS: SIGNIFICANT CHANGE UP
CULTURE RESULTS: SIGNIFICANT CHANGE UP
DIFF PNL FLD: ABNORMAL
EOSINOPHIL # BLD AUTO: 0.82 K/UL — HIGH (ref 0–0.5)
EOSINOPHIL NFR BLD AUTO: 12.5 % — HIGH (ref 0–6)
EPI CELLS # UR: SIGNIFICANT CHANGE UP /HPF (ref 0–5)
GLUCOSE BLDC GLUCOMTR-MCNC: 114 MG/DL — HIGH (ref 70–99)
GLUCOSE BLDC GLUCOMTR-MCNC: 120 MG/DL — HIGH (ref 70–99)
GLUCOSE BLDC GLUCOMTR-MCNC: 149 MG/DL — HIGH (ref 70–99)
GLUCOSE SERPL-MCNC: 100 MG/DL — HIGH (ref 70–99)
GLUCOSE UR QL: NEGATIVE — SIGNIFICANT CHANGE UP
GRAM STN FLD: SIGNIFICANT CHANGE UP
GRAN CASTS # UR COMP ASSIST: ABNORMAL /LPF
HCT VFR BLD CALC: 27.3 % — LOW (ref 39–50)
HGB BLD-MCNC: 8.3 G/DL — LOW (ref 13–17)
HYALINE CASTS # UR AUTO: ABNORMAL /LPF (ref 0–2)
IMM GRANULOCYTES NFR BLD AUTO: 0.5 % — SIGNIFICANT CHANGE UP (ref 0–1.5)
KETONES UR-MCNC: NEGATIVE — SIGNIFICANT CHANGE UP
LEUKOCYTE ESTERASE UR-ACNC: NEGATIVE — SIGNIFICANT CHANGE UP
LYMPHOCYTES # BLD AUTO: 1.27 K/UL — SIGNIFICANT CHANGE UP (ref 1–3.3)
LYMPHOCYTES # BLD AUTO: 19.3 % — SIGNIFICANT CHANGE UP (ref 13–44)
MAGNESIUM SERPL-MCNC: 1.7 MG/DL — SIGNIFICANT CHANGE UP (ref 1.6–2.6)
MCHC RBC-ENTMCNC: 24.7 PG — LOW (ref 27–34)
MCHC RBC-ENTMCNC: 30.4 GM/DL — LOW (ref 32–36)
MCV RBC AUTO: 81.3 FL — SIGNIFICANT CHANGE UP (ref 80–100)
MONOCYTES # BLD AUTO: 0.57 K/UL — SIGNIFICANT CHANGE UP (ref 0–0.9)
MONOCYTES NFR BLD AUTO: 8.7 % — SIGNIFICANT CHANGE UP (ref 2–14)
NEUTROPHILS # BLD AUTO: 3.87 K/UL — SIGNIFICANT CHANGE UP (ref 1.8–7.4)
NEUTROPHILS NFR BLD AUTO: 58.7 % — SIGNIFICANT CHANGE UP (ref 43–77)
NITRITE UR-MCNC: NEGATIVE — SIGNIFICANT CHANGE UP
NRBC # BLD: 0 /100 WBCS — SIGNIFICANT CHANGE UP (ref 0–0)
PH UR: 5.5 — SIGNIFICANT CHANGE UP (ref 5–8)
PHOSPHATE SERPL-MCNC: 3.3 MG/DL — SIGNIFICANT CHANGE UP (ref 2.5–4.5)
PLATELET # BLD AUTO: 307 K/UL — SIGNIFICANT CHANGE UP (ref 150–400)
POTASSIUM SERPL-MCNC: 4.2 MMOL/L — SIGNIFICANT CHANGE UP (ref 3.5–5.3)
POTASSIUM SERPL-SCNC: 4.2 MMOL/L — SIGNIFICANT CHANGE UP (ref 3.5–5.3)
PROT UR-MCNC: 30 MG/DL
RBC # BLD: 3.36 M/UL — LOW (ref 4.2–5.8)
RBC # FLD: 18.8 % — HIGH (ref 10.3–14.5)
RBC CASTS # UR COMP ASSIST: < 5 /HPF — SIGNIFICANT CHANGE UP
SARS-COV-2 RNA SPEC QL NAA+PROBE: DETECTED
SODIUM SERPL-SCNC: 137 MMOL/L — SIGNIFICANT CHANGE UP (ref 135–145)
SP GR SPEC: >=1.03 — SIGNIFICANT CHANGE UP (ref 1–1.03)
SPECIMEN SOURCE: SIGNIFICANT CHANGE UP
SPECIMEN SOURCE: SIGNIFICANT CHANGE UP
UROBILINOGEN FLD QL: 0.2 E.U./DL — SIGNIFICANT CHANGE UP
WBC # BLD: 6.58 K/UL — SIGNIFICANT CHANGE UP (ref 3.8–10.5)
WBC # FLD AUTO: 6.58 K/UL — SIGNIFICANT CHANGE UP (ref 3.8–10.5)
WBC UR QL: < 5 /HPF — SIGNIFICANT CHANGE UP

## 2021-04-26 PROCEDURE — 71045 X-RAY EXAM CHEST 1 VIEW: CPT | Mod: 26

## 2021-04-26 PROCEDURE — 99233 SBSQ HOSP IP/OBS HIGH 50: CPT | Mod: GC

## 2021-04-26 RX ORDER — HYDROMORPHONE HYDROCHLORIDE 2 MG/ML
6 INJECTION INTRAMUSCULAR; INTRAVENOUS; SUBCUTANEOUS EVERY 4 HOURS
Refills: 0 | Status: DISCONTINUED | OUTPATIENT
Start: 2021-04-26 | End: 2021-04-28

## 2021-04-26 RX ORDER — MAGNESIUM SULFATE 500 MG/ML
2 VIAL (ML) INJECTION ONCE
Refills: 0 | Status: COMPLETED | OUTPATIENT
Start: 2021-04-26 | End: 2021-04-26

## 2021-04-26 RX ORDER — SODIUM CHLORIDE 9 MG/ML
500 INJECTION, SOLUTION INTRAVENOUS ONCE
Refills: 0 | Status: COMPLETED | OUTPATIENT
Start: 2021-04-26 | End: 2021-04-26

## 2021-04-26 RX ADMIN — HYDROMORPHONE HYDROCHLORIDE 4 MILLIGRAM(S): 2 INJECTION INTRAMUSCULAR; INTRAVENOUS; SUBCUTANEOUS at 05:25

## 2021-04-26 RX ADMIN — CHLORHEXIDINE GLUCONATE 1 APPLICATION(S): 213 SOLUTION TOPICAL at 12:14

## 2021-04-26 RX ADMIN — HYDROMORPHONE HYDROCHLORIDE 6 MILLIGRAM(S): 2 INJECTION INTRAMUSCULAR; INTRAVENOUS; SUBCUTANEOUS at 22:03

## 2021-04-26 RX ADMIN — HYDROMORPHONE HYDROCHLORIDE 4 MILLIGRAM(S): 2 INJECTION INTRAMUSCULAR; INTRAVENOUS; SUBCUTANEOUS at 01:00

## 2021-04-26 RX ADMIN — POLYETHYLENE GLYCOL 3350 17 GRAM(S): 17 POWDER, FOR SOLUTION ORAL at 09:27

## 2021-04-26 RX ADMIN — HYDROMORPHONE HYDROCHLORIDE 6 MILLIGRAM(S): 2 INJECTION INTRAMUSCULAR; INTRAVENOUS; SUBCUTANEOUS at 13:44

## 2021-04-26 RX ADMIN — Medication 650 MILLIGRAM(S): at 20:38

## 2021-04-26 RX ADMIN — Medication 50 GRAM(S): at 07:46

## 2021-04-26 RX ADMIN — Medication 650 MILLIGRAM(S): at 22:17

## 2021-04-26 RX ADMIN — CHLORHEXIDINE GLUCONATE 15 MILLILITER(S): 213 SOLUTION TOPICAL at 17:22

## 2021-04-26 RX ADMIN — Medication 3 MILLILITER(S): at 23:29

## 2021-04-26 RX ADMIN — HYDROMORPHONE HYDROCHLORIDE 6 MILLIGRAM(S): 2 INJECTION INTRAMUSCULAR; INTRAVENOUS; SUBCUTANEOUS at 18:00

## 2021-04-26 RX ADMIN — Medication 3 MILLILITER(S): at 04:51

## 2021-04-26 RX ADMIN — PANTOPRAZOLE SODIUM 40 MILLIGRAM(S): 20 TABLET, DELAYED RELEASE ORAL at 12:14

## 2021-04-26 RX ADMIN — Medication 4 MILLIGRAM(S): at 05:25

## 2021-04-26 RX ADMIN — HYDROMORPHONE HYDROCHLORIDE 4 MILLIGRAM(S): 2 INJECTION INTRAMUSCULAR; INTRAVENOUS; SUBCUTANEOUS at 10:00

## 2021-04-26 RX ADMIN — MIDAZOLAM HYDROCHLORIDE 4 MILLIGRAM(S): 1 INJECTION, SOLUTION INTRAMUSCULAR; INTRAVENOUS at 22:03

## 2021-04-26 RX ADMIN — ENOXAPARIN SODIUM 40 MILLIGRAM(S): 100 INJECTION SUBCUTANEOUS at 17:22

## 2021-04-26 RX ADMIN — MIDAZOLAM HYDROCHLORIDE 4 MILLIGRAM(S): 1 INJECTION, SOLUTION INTRAMUSCULAR; INTRAVENOUS at 04:50

## 2021-04-26 RX ADMIN — QUETIAPINE FUMARATE 75 MILLIGRAM(S): 200 TABLET, FILM COATED ORAL at 09:28

## 2021-04-26 RX ADMIN — Medication 3 MILLILITER(S): at 00:15

## 2021-04-26 RX ADMIN — HYDROMORPHONE HYDROCHLORIDE 6 MILLIGRAM(S): 2 INJECTION INTRAMUSCULAR; INTRAVENOUS; SUBCUTANEOUS at 17:22

## 2021-04-26 RX ADMIN — QUETIAPINE FUMARATE 75 MILLIGRAM(S): 200 TABLET, FILM COATED ORAL at 17:22

## 2021-04-26 RX ADMIN — QUETIAPINE FUMARATE 75 MILLIGRAM(S): 200 TABLET, FILM COATED ORAL at 01:00

## 2021-04-26 RX ADMIN — ENOXAPARIN SODIUM 40 MILLIGRAM(S): 100 INJECTION SUBCUTANEOUS at 05:25

## 2021-04-26 RX ADMIN — HYDROMORPHONE HYDROCHLORIDE 6 MILLIGRAM(S): 2 INJECTION INTRAMUSCULAR; INTRAVENOUS; SUBCUTANEOUS at 14:30

## 2021-04-26 RX ADMIN — SENNA PLUS 2 TABLET(S): 8.6 TABLET ORAL at 21:11

## 2021-04-26 RX ADMIN — HYDROMORPHONE HYDROCHLORIDE 4 MILLIGRAM(S): 2 INJECTION INTRAMUSCULAR; INTRAVENOUS; SUBCUTANEOUS at 07:03

## 2021-04-26 RX ADMIN — Medication 3 MILLILITER(S): at 16:00

## 2021-04-26 RX ADMIN — HYDROMORPHONE HYDROCHLORIDE 6 MILLIGRAM(S): 2 INJECTION INTRAMUSCULAR; INTRAVENOUS; SUBCUTANEOUS at 21:11

## 2021-04-26 RX ADMIN — CHLORHEXIDINE GLUCONATE 15 MILLILITER(S): 213 SOLUTION TOPICAL at 05:25

## 2021-04-26 RX ADMIN — Medication 3 MILLILITER(S): at 08:17

## 2021-04-26 RX ADMIN — Medication 4 MILLIGRAM(S): at 23:38

## 2021-04-26 RX ADMIN — SODIUM CHLORIDE 500 MILLILITER(S): 9 INJECTION, SOLUTION INTRAVENOUS at 07:02

## 2021-04-26 RX ADMIN — Medication 4 MILLIGRAM(S): at 17:22

## 2021-04-26 RX ADMIN — Medication 4 MILLIGRAM(S): at 12:14

## 2021-04-26 RX ADMIN — HYDROMORPHONE HYDROCHLORIDE 4 MILLIGRAM(S): 2 INJECTION INTRAMUSCULAR; INTRAVENOUS; SUBCUTANEOUS at 02:56

## 2021-04-26 RX ADMIN — Medication 3 MILLILITER(S): at 12:00

## 2021-04-26 RX ADMIN — Medication 3 MILLILITER(S): at 20:15

## 2021-04-26 RX ADMIN — HYDROMORPHONE HYDROCHLORIDE 4 MILLIGRAM(S): 2 INJECTION INTRAMUSCULAR; INTRAVENOUS; SUBCUTANEOUS at 09:28

## 2021-04-26 NOTE — PROGRESS NOTE ADULT - SUBJECTIVE AND OBJECTIVE BOX
CC: Patient is a 56y old  Male who presents with a chief complaint of SOB (25 Apr 2021 08:14)      INTERVAL EVENTS: febrile overnight and given 500cc LR bolus for decreased UOP.    SUBJECTIVE / INTERVAL HPI: Patient seen and examined at bedside. Pt on a vent and unable to participate in ROS.    ROS: negative unless otherwise stated above.    VITAL SIGNS:  Vital Signs Last 24 Hrs  T(C): 37.2 (26 Apr 2021 06:00), Max: 38.8 (25 Apr 2021 15:00)  T(F): 99 (26 Apr 2021 06:00), Max: 101.9 (25 Apr 2021 15:00)  HR: 97 (26 Apr 2021 08:00) (84 - 104)  BP: 91/55 (26 Apr 2021 08:00) (12/82 - 123/68)  BP(mean): 68 (26 Apr 2021 08:00) (64 - 96)  RR: 18 (26 Apr 2021 08:00) (14 - 41)  SpO2: 100% (26 Apr 2021 08:00) (91% - 100%)      04-25-21 @ 07:01  -  04-26-21 @ 07:00  --------------------------------------------------------  IN: 1220 mL / OUT: 2075 mL / NET: -855 mL    04-26-21 @ 07:01  - 04-26-21 @ 08:32  --------------------------------------------------------  IN: 30 mL / OUT: 100 mL / NET: -70 mL        PHYSICAL EXAM:    General: sedated on a ventilator  Cardiovascular: +S1/S2; RRR  Respiratory: CTA B/L; no W/R/R  Gastrointestinal: soft, non-distended  Extremities: WWP  Vascular: 2+ radial, DP/PT pulses B/L  Neurological: following commands, moving extremities x4    MEDICATIONS:  MEDICATIONS  (STANDING):  albuterol/ipratropium for Nebulization 3 milliLiter(s) Nebulizer every 4 hours  chlorhexidine 0.12% Liquid 15 milliLiter(s) Oral Mucosa every 12 hours  chlorhexidine 2% Cloths 1 Application(s) Topical daily  dextrose 40% Gel 15 Gram(s) Oral once  dextrose 5%. 1000 milliLiter(s) (50 mL/Hr) IV Continuous <Continuous>  dextrose 5%. 1000 milliLiter(s) (100 mL/Hr) IV Continuous <Continuous>  dextrose 50% Injectable 25 Gram(s) IV Push once  dextrose 50% Injectable 12.5 Gram(s) IV Push once  dextrose 50% Injectable 25 Gram(s) IV Push once  enoxaparin Injectable 40 milliGRAM(s) SubCutaneous every 12 hours  glucagon  Injectable 1 milliGRAM(s) IntraMuscular once  HYDROmorphone   Tablet 4 milliGRAM(s) Enteral Tube every 4 hours  insulin regular  human corrective regimen sliding scale   SubCutaneous every 6 hours  LORazepam     Tablet 4 milliGRAM(s) Oral every 6 hours  pantoprazole   Suspension 40 milliGRAM(s) Enteral Tube daily  polyethylene glycol 3350 17 Gram(s) Oral every 24 hours  QUEtiapine 75 milliGRAM(s) Oral every 8 hours  senna 2 Tablet(s) Oral at bedtime    MEDICATIONS  (PRN):  acetaminophen    Suspension .. 650 milliGRAM(s) Oral every 6 hours PRN Temp greater or equal to 38C (100.4F)  midazolam Injectable 4 milliGRAM(s) IV Push every 6 hours PRN anxiety/agitation      ALLERGIES:  Allergies    No Known Allergies    Intolerances        LABS:                        8.3    6.58  )-----------( 307      ( 26 Apr 2021 06:08 )             27.3     04-26    137  |  102  |  17  ----------------------------<  100<H>  4.2   |  26  |  1.20    Ca    8.5      26 Apr 2021 06:08  Phos  3.3     04-26  Mg     1.7     04-26          CAPILLARY BLOOD GLUCOSE      POCT Blood Glucose.: 136 mg/dL (25 Apr 2021 22:13)      RADIOLOGY & ADDITIONAL TESTS: Reviewed.

## 2021-04-26 NOTE — PROGRESS NOTE ADULT - ATTENDING COMMENTS
hypoxic respiratory failure  delirium   fevers     - check UA, repeat CXR  - increase hydromorphone   - tolerating SBT   - pending transfer to [facility]

## 2021-04-26 NOTE — PROGRESS NOTE ADULT - ASSESSMENT
55 yo M with PMHx of MU and gout who presented to ED with c/o progressively worsening SOB admitted to ICU for management of AHRF in the setting of COVID. Course c/b GRACE, ileus, septic shock 2/2 aspiration event, Afib with RVR. Now trached and PEG'd.    NEURO  #Sedated   -Sedated on ventilator  -Currently on ativan 4mg q6hr, IV dilaudid 4mg q4hr prn, seroquel 75mg q8hr, off IV sedation  -Continue with wrist restraints to prevent patient harm    RESPIRATORY  #Acute hypoxic hypercapnic respiratory failure in the setting of COVID PNA  -Patient initially presented with O2 saturation of 14% and cyanotic requiring immediate intubation  -CXR with diffuse bilateral opacities with + COVID19 PCR  -Elevated inflammatory markers  -s/p intubation 4/5  -Tracheostomy on 4/22  -tolerating CPAP    #COVID  -CXR with diffuse bilateral opacities with + COVID19 PCR  -Decadron (4/5-4/13) d/c'ed due to clinical presentation  -Remdesevir (4/5-4/9)    #MU  -As per patient's wife, patient has MU on CPAP  -Patient sleeps with 2-3 pillows at night, attributed to MU    CARDIOVASCULAR  #Atrial Fibrillation- RESOLVED  -Patient went into afib with RVR 4/17 @ about 1830  -No new medications started as resolved  -Anticoagulation for afib not started, patient now in NSR and [id number] Score 0 as patient has no CV hx (however, on admission patient was found to have low EF- repeat ECHO with EF 55%)    GI  -s/p PEG 4/21  -Tolerating tube feedings  -GI ppx with pantoprazole via PEG     #Ileus 2/2 opioids - RESOLVED and having BMs  -s/p sump for decompression  -s/p methylnaltrexone with BM afterwards  -con't senna, miralax    RENAL//F&E  #Acute Kidney Injury  -Baseline creatinine unknown, no reported kidney disease  -Urine lytes 4/17 showing pre-renal  -Now resolving  -Strict I&O monitoring  -Avoid nephrotoxic medications    ID  #Persistent Fever  -Recent aspiration pneumonia c/b septic shock (now resolved); sputum culture yielded growth of normal respiratory lexie. Fever despite meropenem ?2/2 periodic aspiration vs. COVID-19 vs. new nosocomial infectious process  -s/p Meropenem x7 days (4/16-4/22)  -clinical status improving and off of pressors, no Abx at this time    #Aspiration pneumonia vs HAP- TREATED  -Found 4/14 while trying to extubate w/ delirium, and numerous thick secretions in oral cavity requiring suctioning. 4/15 AM found w/ 102.3F oral temp and WBC increase to 18s, suspect possible aspiration pneumonia, with previous negative MRSA swab. 4/14 bcx NGTD, 4/15 bcx NGTD  -Zosyn started 4/9 and d/c'd 4/16 in the setting of continued septic shock  -Decision made to broaden antibiotics to meropenem 1 gram and vancomycin 1250 mg  -MRSA swab negative, no addt'l vanco dosed  -s/p meropenem course completion 4/16-4/22    #COVID PNA - TREATED  -Plan as above  -Off steroid course, remdesivir course completed    #?Fungal Infxn- TREATED  -Seen by ID, -ID ok'd Vori (loading doses x 2 given, then on 400 mg q12hr), stopped 4/14 given improvement in pt. status before medication was really started   -As per ID recs ====> s/p BAL 4/12--cultures have so far only yielded Keila dubliniensis which is probably a respiratory tract colonizer (and not the etiology of the patient's fevers). Voriconazole is not indicated for respiratory tract colonization with Candida. Suspect recent high fevers/leukocytosis due to aspiration event; Doubt COVID-19 associated pulmonary aspergillosis given BAL cultures without growth of mold, absence of cavitation on CXR, and time course relatively early for development of this rare entity. Moreover, would not attribute new fevers to discontinuation of voriconazole.  -BAL sputum samples taken from bronch 4/12, showing yeast   -Sputum cx from 4/16, no evidence of yeast  -Peripheral blood cultures from 4/22 NG @ 1 day  -Maintain MAP > 70 mmHg    ENDOCRINE  #Hyperglycemia  -q6hr correctional scale  -Lantus and regular insulin d/c'd as patient NPO  -Blood glucose trend   -Decadron started 4/5 AM, d/c'd 4/13  -A1c 6.2    HEME  #Anemia  -Hgb 11 on admission; unknown baseline. No evidence of bleed.  -Hgb 4/18 7.3, possibly hemodiluted as patient did get fluids and albumin overnight, s/p transfusion 1U PRBC  -Hgb 4/25 7.0 possibly 2/2 phlebotomy with AoCD, s/p transfusion 1u pRBCs  -Maintain active T+S    -DVT ppx with Lovenox SubQ    DISPO/GOALS OF CARE:  -Full Code  -4/23 de-escalated central line and arterial line as patient not requiring pressors and is hemodynamically stable.   55 yo M with PMHx of MU and gout who presented to ED with c/o progressively worsening SOB admitted to ICU for management of AHRF in the setting of COVID. Course c/b GRACE, ileus, septic shock 2/2 aspiration event, Afib with RVR. Now trached and PEG'd.    NEURO  #Sedated   -Sedated on ventilator  -Currently on ativan 4mg q6hr, IV dilaudid 6mg q4hr prn, seroquel 75mg q8hr, off IV sedation  -Continue with wrist restraints to prevent patient harm    RESPIRATORY  #Acute hypoxic hypercapnic respiratory failure in the setting of COVID PNA  -Patient initially presented with O2 saturation of 14% and cyanotic requiring immediate intubation  -CXR with diffuse bilateral opacities with + COVID19 PCR  -Elevated inflammatory markers  -s/p intubation 4/5  -Tracheostomy on 4/22  -tolerating CPAP    #COVID  -CXR with diffuse bilateral opacities with + COVID19 PCR  -Decadron (4/5-4/13) d/c'ed due to clinical presentation  -Remdesevir (4/5-4/9)    #MU  -As per patient's wife, patient has MU on CPAP  -Patient sleeps with 2-3 pillows at night, attributed to MU    CARDIOVASCULAR  #Atrial Fibrillation- RESOLVED  -Patient went into afib with RVR 4/17 @ about 1830  -No new medications started as resolved  -Anticoagulation for afib not started, patient now in NSR and [id number] Score 0 as patient has no CV hx (however, on admission patient was found to have low EF- repeat ECHO with EF 55%)    GI  -s/p PEG 4/21  -Tolerating tube feedings  -GI ppx with pantoprazole via PEG     #Ileus 2/2 opioids - RESOLVED and having BMs  -s/p sump for decompression  -s/p methylnaltrexone with BM afterwards  -con't senna, miralax    RENAL/  -alas removed 4/26    #Acute Kidney Injury  -Baseline creatinine unknown, no reported kidney disease  -Urine lytes 4/17 showing pre-renal  -Now resolving  -Strict I&O monitoring  -Avoid nephrotoxic medications    ID  #Persistent Fever  -Recent aspiration pneumonia c/b septic shock (now resolved); sputum culture yielded growth of normal respiratory lexie. Fever despite meropenem ?2/2 periodic aspiration vs. COVID-19 vs. new nosocomial infectious process  -s/p Meropenem x7 days (4/16-4/22)  -clinical status improving and off of pressors, no Abx at this time  -alas removed, f/u UA    #Aspiration pneumonia vs HAP- TREATED  -Found 4/14 while trying to extubate w/ delirium, and numerous thick secretions in oral cavity requiring suctioning. 4/15 AM found w/ 102.3F oral temp and WBC increase to 18s, suspect possible aspiration pneumonia, with previous negative MRSA swab. 4/14 bcx NGTD, 4/15 bcx NGTD  -Zosyn started 4/9 and d/c'd 4/16 in the setting of continued septic shock  -Decision made to broaden antibiotics to meropenem 1 gram and vancomycin 1250 mg  -MRSA swab negative, no addt'l vanco dosed  -s/p meropenem course completion 4/16-4/22    #COVID PNA - TREATED  -Plan as above  -Off steroid course, remdesivir course completed    #?Fungal Infxn- TREATED  -Seen by ID, -ID ok'd Vori (loading doses x 2 given, then on 400 mg q12hr), stopped 4/14 given improvement in pt. status before medication was really started   -As per ID recs ====> s/p BAL 4/12--cultures have so far only yielded Keila dubliniensis which is probably a respiratory tract colonizer (and not the etiology of the patient's fevers). Voriconazole is not indicated for respiratory tract colonization with Candida. Suspect recent high fevers/leukocytosis due to aspiration event; Doubt COVID-19 associated pulmonary aspergillosis given BAL cultures without growth of mold, absence of cavitation on CXR, and time course relatively early for development of this rare entity. Moreover, would not attribute new fevers to discontinuation of voriconazole.  -BAL sputum samples taken from bronch 4/12, showing yeast   -Sputum cx from 4/16, no evidence of yeast  -Peripheral blood cultures from 4/22 NG @ 1 day  -Maintain MAP > 70 mmHg    ENDOCRINE  #Hyperglycemia  -q6hr correctional scale  -Lantus and regular insulin d/c'd as patient NPO  -Blood glucose trend   -Decadron started 4/5 AM, d/c'd 4/13  -A1c 6.2    HEME  #Anemia  -Hgb 11 on admission; unknown baseline. No evidence of bleed.  -Hgb 4/18 7.3, possibly hemodiluted as patient did get fluids and albumin overnight, s/p transfusion 1U PRBC  -Hgb 4/25 7.0 possibly 2/2 phlebotomy with AoCD, s/p transfusion 1u pRBCs  -Maintain active T+S    -DVT ppx with Lovenox SubQ    DISPO/GOALS OF CARE:  -Full Code  -4/23 de-escalated central line and arterial line as patient not requiring pressors and is hemodynamically stable.   55 yo M with PMHx of MU and gout who presented to ED with c/o progressively worsening SOB admitted to ICU for management of AHRF in the setting of COVID. Course c/b GRACE, ileus, septic shock 2/2 aspiration event, Afib with RVR. Now trached and PEG'd.    NEURO  #Sedated   -Sedated on ventilator  -Currently on ativan 4mg q6hr, IV dilaudid 6mg q4hr prn, seroquel 75mg q8hr, off IV sedation  -Continue with wrist restraints to prevent patient harm    RESPIRATORY  #Acute hypoxic hypercapnic respiratory failure in the setting of COVID PNA  -Patient initially presented with O2 saturation of 14% and cyanotic requiring immediate intubation  -CXR with diffuse bilateral opacities with + COVID19 PCR  -Elevated inflammatory markers  -s/p intubation 4/5  -Tracheostomy on 4/22  -tolerating CPAP    #COVID  -CXR with diffuse bilateral opacities with + COVID19 PCR  -Decadron (4/5-4/13) d/c'ed due to clinical presentation  -Remdesevir (4/5-4/9)    #MU  -As per patient's wife, patient has MU on CPAP  -Patient sleeps with 2-3 pillows at night, attributed to MU    CARDIOVASCULAR  #Atrial Fibrillation- RESOLVED  -Patient went into afib with RVR 4/17 @ about 1830  -No new medications started as resolved  -Anticoagulation for afib not started, patient now in NSR and [id number] Score 0 as patient has no CV hx (however, on admission patient was found to have low EF- repeat ECHO with EF 55%)    GI  -s/p PEG 4/21  -Tolerating tube feedings  -GI ppx with pantoprazole via PEG     #Ileus 2/2 opioids - RESOLVED and having BMs  -s/p sump for decompression  -s/p methylnaltrexone with BM afterwards  -con't senna, miralax    RENAL/  -alas removed 4/26    #Acute Kidney Injury  -Baseline creatinine unknown, no reported kidney disease  -Urine lytes 4/17 showing pre-renal  -Now resolving  -Strict I&O monitoring  -Avoid nephrotoxic medications    ID  #Persistent Fever  -Recent aspiration pneumonia c/b septic shock (now resolved); sputum culture yielded growth of normal respiratory lexie. Fever despite meropenem ?2/2 periodic aspiration vs. COVID-19 vs. new nosocomial infectious process  -s/p Meropenem x7 days (4/16-4/22)  -clinical status improving and off of pressors, no Abx at this time  -alas removed 4/26  -repeat CXR 4/26 with interval worsening  Plan:  -f/u UA  -f/u sputum cx    #Aspiration pneumonia vs HAP- TREATED  -Found 4/14 while trying to extubate w/ delirium, and numerous thick secretions in oral cavity requiring suctioning. 4/15 AM found w/ 102.3F oral temp and WBC increase to 18s, suspect possible aspiration pneumonia, with previous negative MRSA swab. 4/14 bcx NGTD, 4/15 bcx NGTD  -Zosyn started 4/9 and d/c'd 4/16 in the setting of continued septic shock  -Decision made to broaden antibiotics to meropenem 1 gram and vancomycin 1250 mg  -MRSA swab negative, no addt'l vanco dosed  -s/p meropenem course completion 4/16-4/22    #COVID PNA - TREATED  -Plan as above  -Off steroid course, remdesivir course completed    #?Fungal Infxn- TREATED  -Seen by ID, -ID ok'd Vori (loading doses x 2 given, then on 400 mg q12hr), stopped 4/14 given improvement in pt. status before medication was really started   -As per ID recs ====> s/p BAL 4/12--cultures have so far only yielded Keila dubliniensis which is probably a respiratory tract colonizer (and not the etiology of the patient's fevers). Voriconazole is not indicated for respiratory tract colonization with Candida. Suspect recent high fevers/leukocytosis due to aspiration event; Doubt COVID-19 associated pulmonary aspergillosis given BAL cultures without growth of mold, absence of cavitation on CXR, and time course relatively early for development of this rare entity. Moreover, would not attribute new fevers to discontinuation of voriconazole.  -BAL sputum samples taken from bronch 4/12, showing yeast   -Sputum cx from 4/16, no evidence of yeast  -Peripheral blood cultures from 4/22 NG @ 1 day  -Maintain MAP > 70 mmHg    ENDOCRINE  #Hyperglycemia  -q6hr correctional scale  -Lantus and regular insulin d/c'd as patient NPO  -Blood glucose trend   -Decadron started 4/5 AM, d/c'd 4/13  -A1c 6.2    HEME  #Anemia  -Hgb 11 on admission; unknown baseline. No evidence of bleed.  -Hgb 4/18 7.3, possibly hemodiluted as patient did get fluids and albumin overnight, s/p transfusion 1U PRBC  -Hgb 4/25 7.0 possibly 2/2 phlebotomy with AoCD, s/p transfusion 1u pRBCs  -Maintain active T+S    -DVT ppx with Lovenox SubQ    DISPO/GOALS OF CARE:  -Full Code  -4/23 de-escalated central line and arterial line as patient not requiring pressors and is hemodynamically stable.

## 2021-04-26 NOTE — CHART NOTE - NSCHARTNOTEFT_GEN_A_CORE
Admitting Diagnosis:   Patient is a 56y old  Male who presents with a chief complaint of SOB (26 Apr 2021 08:31)      PAST MEDICAL & SURGICAL HISTORY:  Sleep apnea        Current Nutrition Order:  Vital 1.5 @ 30 ml/hr with x4 liquid protein supplement (LPS) daily via PEG (providing 720ml TV, 1480 kcal, 106g total pro, 720 TV, 547 ml free water, 72% RDI)    PO Intake: Good (%) [   ]  Fair (50-75%) [   ] Poor (<25%) [   ]- NA NPO w/EN    GI Issues: Unable to assess at this time 2/2 trach/vent  No residuals or regurgitation overnight  Rectal tube in place (390ml output x 24hrs)    Pain: Unable to assess at this time 2/2 trach/vent- ordered for versed IV pushes, seroquel, and dilaudid    Skin Integrity: Jagdeep 12; generalized trace edema  B/L cheek skin tear  Sacrum stage I pressure injury     Labs:   04-26    137  |  102  |  17  ----------------------------<  100<H>  4.2   |  26  |  1.20    Ca    8.5      26 Apr 2021 06:08  Phos  3.3     04-26  Mg     1.7     04-26      CAPILLARY BLOOD GLUCOSE      POCT Blood Glucose.: 114 mg/dL (26 Apr 2021 11:37)  POCT Blood Glucose.: 136 mg/dL (25 Apr 2021 22:13)  POCT Blood Glucose.: 108 mg/dL (25 Apr 2021 18:58)      Medications:  MEDICATIONS  (STANDING):  albuterol/ipratropium for Nebulization 3 milliLiter(s) Nebulizer every 4 hours  chlorhexidine 0.12% Liquid 15 milliLiter(s) Oral Mucosa every 12 hours  chlorhexidine 2% Cloths 1 Application(s) Topical daily  dextrose 40% Gel 15 Gram(s) Oral once  dextrose 5%. 1000 milliLiter(s) (50 mL/Hr) IV Continuous <Continuous>  dextrose 5%. 1000 milliLiter(s) (100 mL/Hr) IV Continuous <Continuous>  dextrose 50% Injectable 25 Gram(s) IV Push once  dextrose 50% Injectable 12.5 Gram(s) IV Push once  dextrose 50% Injectable 25 Gram(s) IV Push once  enoxaparin Injectable 40 milliGRAM(s) SubCutaneous every 12 hours  glucagon  Injectable 1 milliGRAM(s) IntraMuscular once  HYDROmorphone   Tablet 6 milliGRAM(s) Enteral Tube every 4 hours  insulin regular  human corrective regimen sliding scale   SubCutaneous every 6 hours  LORazepam     Tablet 4 milliGRAM(s) Oral every 6 hours  pantoprazole   Suspension 40 milliGRAM(s) Enteral Tube daily  polyethylene glycol 3350 17 Gram(s) Oral every 24 hours  QUEtiapine 75 milliGRAM(s) Oral every 8 hours  senna 2 Tablet(s) Oral at bedtime    MEDICATIONS  (PRN):  acetaminophen    Suspension .. 650 milliGRAM(s) Oral every 6 hours PRN Temp greater or equal to 38C (100.4F)  midazolam Injectable 4 milliGRAM(s) IV Push every 6 hours PRN anxiety/agitation      Admitted Anthropometrics:  Weight: 5'10"; ABW 120kg; IBW: 75.4kg; %IBW: 166%; BMI: 37    Weight Change:   No new weights recorded since admit     Estimated energy needs:   IBW (75.4kg) used for calculations as pt is >100% of IBW and critically ill  Nutrient needs based on [facility] standards of care for maintenance in older adults.   Needs adjusted for age and hypermetabolic/inflammatory state 2/2 COVID+ and oxygen demands  Energy: 1885-2262kcal (25-30cal/kg)  Protein: 106-121g pro (1.4-1.6g/kg pro)  Fluids per team    Subjective:   56M with PMHx of MU and gout who presented to ED with c/o progressively worsening SOB now admitted to ICU for management of AHRF in the setting of COVID. Pt now s/p PEG placement with 4/21 with plan to place trach 4/21 as well.     On assessment, pt was resting in bed unable to participate in interview- Pt discussed with team. Currently intubated on AC/CMV mode. Plan to d/c precedex with propofol cont to infuse @25ml/hr x24hrs (providing 660kcal from lipids/day). Fentanyl push x 2 given for agitation. MAP 92- now weened off pressure support medication. Pt currently NPO with EN regime via NGT. EN was held today for placement of PEG tube- please see nutr recs below to best optimize nutrition. Unable to obtain diet/weight history at this time 2/2 intubated/sedated. Notable labs: POCT 95 WDL, Glu 96 WDL, Cre 1.33H, Ca 8.1L. See EN recs below for current propofol rate. RD to follow.     Nutrition Diagnosis:  Increased nutrient needs RT increased demand for kcal/pro 2/2 hypermetabolic state AEB oxygen demands and viral infection COVID+  [  ] No active nutrition diagnosis at this time  [  ] Current medical condition precludes nutrition intervention    Goal: Pt to meet >/=75% EER consistently with good tolerance via most medically appropriate route.     Recommendations:  1. While off of propofol, recommend changing feeds to Vital 1.5 Derek @ 55ml/hr x 24hrs plus 2 LPS (200kcal, 30g pro)     Education: n/a    Risk Level: High [   x] Moderate [   ] Low [   ]. Admitting Diagnosis:   Patient is a 56y old  Male who presents with a chief complaint of SOB (26 Apr 2021 08:31)      PAST MEDICAL & SURGICAL HISTORY:  Sleep apnea        Current Nutrition Order:  Vital 1.5 @ 30 ml/hr with x4 liquid protein supplement (LPS) daily via PEG (providing 720ml TV, 1480 kcal, 106g total pro, 720 TV, 547 ml free water, 72% RDI)    PO Intake: Good (%) [   ]  Fair (50-75%) [   ] Poor (<25%) [   ]- NA NPO w/EN    GI Issues: Unable to assess at this time 2/2 trach/vent  No residuals or regurgitation overnight  Rectal tube in place (390ml output x 24hrs)    Pain: Unable to assess at this time 2/2 trach/vent- ordered for versed IV pushes, seroquel, and dilaudid    Skin Integrity: Jagdeep 12; generalized trace edema  B/L cheek skin tear  Sacrum stage I pressure injury     Labs:   04-26    137  |  102  |  17  ----------------------------<  100<H>  4.2   |  26  |  1.20    Ca    8.5      26 Apr 2021 06:08  Phos  3.3     04-26  Mg     1.7     04-26      CAPILLARY BLOOD GLUCOSE      POCT Blood Glucose.: 114 mg/dL (26 Apr 2021 11:37)  POCT Blood Glucose.: 136 mg/dL (25 Apr 2021 22:13)  POCT Blood Glucose.: 108 mg/dL (25 Apr 2021 18:58)      Medications:  MEDICATIONS  (STANDING):  albuterol/ipratropium for Nebulization 3 milliLiter(s) Nebulizer every 4 hours  chlorhexidine 0.12% Liquid 15 milliLiter(s) Oral Mucosa every 12 hours  chlorhexidine 2% Cloths 1 Application(s) Topical daily  dextrose 40% Gel 15 Gram(s) Oral once  dextrose 5%. 1000 milliLiter(s) (50 mL/Hr) IV Continuous <Continuous>  dextrose 5%. 1000 milliLiter(s) (100 mL/Hr) IV Continuous <Continuous>  dextrose 50% Injectable 25 Gram(s) IV Push once  dextrose 50% Injectable 12.5 Gram(s) IV Push once  dextrose 50% Injectable 25 Gram(s) IV Push once  enoxaparin Injectable 40 milliGRAM(s) SubCutaneous every 12 hours  glucagon  Injectable 1 milliGRAM(s) IntraMuscular once  HYDROmorphone   Tablet 6 milliGRAM(s) Enteral Tube every 4 hours  insulin regular  human corrective regimen sliding scale   SubCutaneous every 6 hours  LORazepam     Tablet 4 milliGRAM(s) Oral every 6 hours  pantoprazole   Suspension 40 milliGRAM(s) Enteral Tube daily  polyethylene glycol 3350 17 Gram(s) Oral every 24 hours  QUEtiapine 75 milliGRAM(s) Oral every 8 hours  senna 2 Tablet(s) Oral at bedtime    MEDICATIONS  (PRN):  acetaminophen    Suspension .. 650 milliGRAM(s) Oral every 6 hours PRN Temp greater or equal to 38C (100.4F)  midazolam Injectable 4 milliGRAM(s) IV Push every 6 hours PRN anxiety/agitation      Admitted Anthropometrics:  Weight: 5'10"; ABW 120kg; IBW: 75.4kg; %IBW: 166%; BMI: 37    Weight Change:   No new weights recorded since admit     Estimated energy needs:   IBW (75.4kg) used for calculations as pt is >100% of IBW and critically ill  Nutrient needs based on [facility] standards of care for maintenance in older adults.   Needs adjusted for age and hypermetabolic/inflammatory state 2/2 COVID+ and oxygen demands  Energy: 1885-2262kcal (25-30cal/kg)  Protein: 106-121g pro (1.4-1.6g/kg pro)  Fluids per team    Subjective:   56M with PMHx of MU and gout who presented to ED with c/o progressively worsening SOB now admitted to ICU for management of AHRF in the setting of COVID. Pt unable to be weaned from the vent and is now s/p trach on 4/22 and s/p PEG on 4/21. Pt remains trached to vent on CPAP trial, off of sedation drips but ordered for versed IV pushes, seroquel, dilaudid, and ativan. Intermittently awake/following few commands per RN. MAP 66- not requiring pressors at this time. NPO w/ EN running at goal of 30ml/hr with no s/s intolerance. Rectal tube in place. Febrile to 99.7F- being worked up. Lytes WNL, POC , 108mg/dL. Will continue to follow per RD protocol.     Nutrition Diagnosis:  Increased nutrient needs RT increased demand for kcal/pro 2/2 hypermetabolic state AEB oxygen demands and viral infection COVID+  Active [X ]  Resolved [    ]    Goal: Pt to meet >/=75% EER consistently with good tolerance via most medically appropriate route.     Recommendations:  1. While off of propofol, recommend changing feeds to Vital 1.5 Derek @ 55ml/hr x 24hrs plus 2 LPS (200kcal, 30g pro) via PEG (1320ml TV, 2180kcal, 119g protein, 1008ml free H2O, 132% RDI, 1.58g/kg IBW protein, 22.6npc/kg IBW). Monitor for s/s intolerance; maintain aspiration precautions at all times   2. Monitor lytes and replete prn. POC BG q6hrs (goal 140-180mg/dl)  3. Pain and bowel regimens per team   4. Trend weekly wts  5. Consider checking prealbumin 1x/wk  *d/w MD    Education: n/a    Risk Level: High [   x] Moderate [   ] Low [   ].

## 2021-04-27 LAB
ANION GAP SERPL CALC-SCNC: 6 MMOL/L — SIGNIFICANT CHANGE UP (ref 5–17)
BASOPHILS # BLD AUTO: 0.03 K/UL — SIGNIFICANT CHANGE UP (ref 0–0.2)
BASOPHILS NFR BLD AUTO: 0.5 % — SIGNIFICANT CHANGE UP (ref 0–2)
BUN SERPL-MCNC: 17 MG/DL — SIGNIFICANT CHANGE UP (ref 7–23)
CALCIUM SERPL-MCNC: 8.5 MG/DL — SIGNIFICANT CHANGE UP (ref 8.4–10.5)
CHLORIDE SERPL-SCNC: 100 MMOL/L — SIGNIFICANT CHANGE UP (ref 96–108)
CO2 SERPL-SCNC: 28 MMOL/L — SIGNIFICANT CHANGE UP (ref 22–31)
CREAT SERPL-MCNC: 1.24 MG/DL — SIGNIFICANT CHANGE UP (ref 0.5–1.3)
EOSINOPHIL # BLD AUTO: 0.91 K/UL — HIGH (ref 0–0.5)
EOSINOPHIL NFR BLD AUTO: 14.4 % — HIGH (ref 0–6)
GLUCOSE BLDC GLUCOMTR-MCNC: 140 MG/DL — HIGH (ref 70–99)
GLUCOSE BLDC GLUCOMTR-MCNC: 147 MG/DL — HIGH (ref 70–99)
GLUCOSE BLDC GLUCOMTR-MCNC: 166 MG/DL — HIGH (ref 70–99)
GLUCOSE SERPL-MCNC: 99 MG/DL — SIGNIFICANT CHANGE UP (ref 70–99)
GRAM STN FLD: SIGNIFICANT CHANGE UP
HCT VFR BLD CALC: 26.9 % — LOW (ref 39–50)
HGB BLD-MCNC: 8.2 G/DL — LOW (ref 13–17)
IMM GRANULOCYTES NFR BLD AUTO: 0.5 % — SIGNIFICANT CHANGE UP (ref 0–1.5)
LYMPHOCYTES # BLD AUTO: 1.1 K/UL — SIGNIFICANT CHANGE UP (ref 1–3.3)
LYMPHOCYTES # BLD AUTO: 17.4 % — SIGNIFICANT CHANGE UP (ref 13–44)
MAGNESIUM SERPL-MCNC: 1.9 MG/DL — SIGNIFICANT CHANGE UP (ref 1.6–2.6)
MCHC RBC-ENTMCNC: 25 PG — LOW (ref 27–34)
MCHC RBC-ENTMCNC: 30.5 GM/DL — LOW (ref 32–36)
MCV RBC AUTO: 82 FL — SIGNIFICANT CHANGE UP (ref 80–100)
MONOCYTES # BLD AUTO: 0.67 K/UL — SIGNIFICANT CHANGE UP (ref 0–0.9)
MONOCYTES NFR BLD AUTO: 10.6 % — SIGNIFICANT CHANGE UP (ref 2–14)
NEUTROPHILS # BLD AUTO: 3.59 K/UL — SIGNIFICANT CHANGE UP (ref 1.8–7.4)
NEUTROPHILS NFR BLD AUTO: 56.6 % — SIGNIFICANT CHANGE UP (ref 43–77)
NRBC # BLD: 0 /100 WBCS — SIGNIFICANT CHANGE UP (ref 0–0)
PHOSPHATE SERPL-MCNC: 4 MG/DL — SIGNIFICANT CHANGE UP (ref 2.5–4.5)
PLATELET # BLD AUTO: 368 K/UL — SIGNIFICANT CHANGE UP (ref 150–400)
POTASSIUM SERPL-MCNC: 4.4 MMOL/L — SIGNIFICANT CHANGE UP (ref 3.5–5.3)
POTASSIUM SERPL-SCNC: 4.4 MMOL/L — SIGNIFICANT CHANGE UP (ref 3.5–5.3)
RBC # BLD: 3.28 M/UL — LOW (ref 4.2–5.8)
RBC # FLD: 19 % — HIGH (ref 10.3–14.5)
SODIUM SERPL-SCNC: 134 MMOL/L — LOW (ref 135–145)
SPECIMEN SOURCE: SIGNIFICANT CHANGE UP
WBC # BLD: 6.33 K/UL — SIGNIFICANT CHANGE UP (ref 3.8–10.5)
WBC # FLD AUTO: 6.33 K/UL — SIGNIFICANT CHANGE UP (ref 3.8–10.5)

## 2021-04-27 PROCEDURE — 99291 CRITICAL CARE FIRST HOUR: CPT

## 2021-04-27 RX ORDER — MAGNESIUM SULFATE 500 MG/ML
1 VIAL (ML) INJECTION ONCE
Refills: 0 | Status: COMPLETED | OUTPATIENT
Start: 2021-04-27 | End: 2021-04-27

## 2021-04-27 RX ORDER — QUETIAPINE FUMARATE 200 MG/1
75 TABLET, FILM COATED ORAL EVERY 12 HOURS
Refills: 0 | Status: DISCONTINUED | OUTPATIENT
Start: 2021-04-27 | End: 2021-04-29

## 2021-04-27 RX ORDER — TAMSULOSIN HYDROCHLORIDE 0.4 MG/1
0.8 CAPSULE ORAL AT BEDTIME
Refills: 0 | Status: DISCONTINUED | OUTPATIENT
Start: 2021-04-27 | End: 2021-04-30

## 2021-04-27 RX ADMIN — INSULIN HUMAN 2: 100 INJECTION, SOLUTION SUBCUTANEOUS at 21:40

## 2021-04-27 RX ADMIN — CHLORHEXIDINE GLUCONATE 1 APPLICATION(S): 213 SOLUTION TOPICAL at 12:12

## 2021-04-27 RX ADMIN — QUETIAPINE FUMARATE 75 MILLIGRAM(S): 200 TABLET, FILM COATED ORAL at 09:40

## 2021-04-27 RX ADMIN — HYDROMORPHONE HYDROCHLORIDE 6 MILLIGRAM(S): 2 INJECTION INTRAMUSCULAR; INTRAVENOUS; SUBCUTANEOUS at 09:40

## 2021-04-27 RX ADMIN — CHLORHEXIDINE GLUCONATE 15 MILLILITER(S): 213 SOLUTION TOPICAL at 17:48

## 2021-04-27 RX ADMIN — HYDROMORPHONE HYDROCHLORIDE 6 MILLIGRAM(S): 2 INJECTION INTRAMUSCULAR; INTRAVENOUS; SUBCUTANEOUS at 01:02

## 2021-04-27 RX ADMIN — HYDROMORPHONE HYDROCHLORIDE 6 MILLIGRAM(S): 2 INJECTION INTRAMUSCULAR; INTRAVENOUS; SUBCUTANEOUS at 13:29

## 2021-04-27 RX ADMIN — Medication 650 MILLIGRAM(S): at 14:00

## 2021-04-27 RX ADMIN — Medication 3 MILLILITER(S): at 20:31

## 2021-04-27 RX ADMIN — Medication 4 MILLIGRAM(S): at 12:12

## 2021-04-27 RX ADMIN — HYDROMORPHONE HYDROCHLORIDE 6 MILLIGRAM(S): 2 INJECTION INTRAMUSCULAR; INTRAVENOUS; SUBCUTANEOUS at 06:37

## 2021-04-27 RX ADMIN — QUETIAPINE FUMARATE 75 MILLIGRAM(S): 200 TABLET, FILM COATED ORAL at 01:02

## 2021-04-27 RX ADMIN — Medication 3 MILLILITER(S): at 16:00

## 2021-04-27 RX ADMIN — Medication 4 MILLIGRAM(S): at 23:26

## 2021-04-27 RX ADMIN — ENOXAPARIN SODIUM 40 MILLIGRAM(S): 100 INJECTION SUBCUTANEOUS at 17:48

## 2021-04-27 RX ADMIN — HYDROMORPHONE HYDROCHLORIDE 6 MILLIGRAM(S): 2 INJECTION INTRAMUSCULAR; INTRAVENOUS; SUBCUTANEOUS at 22:25

## 2021-04-27 RX ADMIN — TAMSULOSIN HYDROCHLORIDE 0.8 MILLIGRAM(S): 0.4 CAPSULE ORAL at 21:07

## 2021-04-27 RX ADMIN — HYDROMORPHONE HYDROCHLORIDE 6 MILLIGRAM(S): 2 INJECTION INTRAMUSCULAR; INTRAVENOUS; SUBCUTANEOUS at 03:05

## 2021-04-27 RX ADMIN — MIDAZOLAM HYDROCHLORIDE 4 MILLIGRAM(S): 1 INJECTION, SOLUTION INTRAMUSCULAR; INTRAVENOUS at 21:04

## 2021-04-27 RX ADMIN — MIDAZOLAM HYDROCHLORIDE 4 MILLIGRAM(S): 1 INJECTION, SOLUTION INTRAMUSCULAR; INTRAVENOUS at 05:43

## 2021-04-27 RX ADMIN — Medication 3 MILLILITER(S): at 12:00

## 2021-04-27 RX ADMIN — Medication 650 MILLIGRAM(S): at 13:29

## 2021-04-27 RX ADMIN — HYDROMORPHONE HYDROCHLORIDE 6 MILLIGRAM(S): 2 INJECTION INTRAMUSCULAR; INTRAVENOUS; SUBCUTANEOUS at 21:04

## 2021-04-27 RX ADMIN — HYDROMORPHONE HYDROCHLORIDE 6 MILLIGRAM(S): 2 INJECTION INTRAMUSCULAR; INTRAVENOUS; SUBCUTANEOUS at 18:30

## 2021-04-27 RX ADMIN — CHLORHEXIDINE GLUCONATE 15 MILLILITER(S): 213 SOLUTION TOPICAL at 05:02

## 2021-04-27 RX ADMIN — HYDROMORPHONE HYDROCHLORIDE 6 MILLIGRAM(S): 2 INJECTION INTRAMUSCULAR; INTRAVENOUS; SUBCUTANEOUS at 05:02

## 2021-04-27 RX ADMIN — Medication 3 MILLILITER(S): at 04:49

## 2021-04-27 RX ADMIN — HYDROMORPHONE HYDROCHLORIDE 6 MILLIGRAM(S): 2 INJECTION INTRAMUSCULAR; INTRAVENOUS; SUBCUTANEOUS at 14:00

## 2021-04-27 RX ADMIN — POLYETHYLENE GLYCOL 3350 17 GRAM(S): 17 POWDER, FOR SOLUTION ORAL at 09:35

## 2021-04-27 RX ADMIN — HYDROMORPHONE HYDROCHLORIDE 6 MILLIGRAM(S): 2 INJECTION INTRAMUSCULAR; INTRAVENOUS; SUBCUTANEOUS at 17:49

## 2021-04-27 RX ADMIN — Medication 4 MILLIGRAM(S): at 17:48

## 2021-04-27 RX ADMIN — Medication 100 GRAM(S): at 09:35

## 2021-04-27 RX ADMIN — Medication 3 MILLILITER(S): at 08:00

## 2021-04-27 RX ADMIN — QUETIAPINE FUMARATE 75 MILLIGRAM(S): 200 TABLET, FILM COATED ORAL at 21:03

## 2021-04-27 RX ADMIN — Medication 4 MILLIGRAM(S): at 05:02

## 2021-04-27 RX ADMIN — Medication 650 MILLIGRAM(S): at 21:04

## 2021-04-27 RX ADMIN — Medication 650 MILLIGRAM(S): at 22:25

## 2021-04-27 RX ADMIN — ENOXAPARIN SODIUM 40 MILLIGRAM(S): 100 INJECTION SUBCUTANEOUS at 05:03

## 2021-04-27 RX ADMIN — HYDROMORPHONE HYDROCHLORIDE 6 MILLIGRAM(S): 2 INJECTION INTRAMUSCULAR; INTRAVENOUS; SUBCUTANEOUS at 10:30

## 2021-04-27 NOTE — PROGRESS NOTE ADULT - ATTENDING COMMENTS
Continue CPAP 5 PS 8. Follow off abx with low grade fevers. Repeat BCs. Hemodynamically stable now. Continue NG feeds.

## 2021-04-27 NOTE — PROGRESS NOTE ADULT - SUBJECTIVE AND OBJECTIVE BOX
SUBJECTIVE  Overnight events:     REVIEW OF SYSTEMS:   See HPI (as per chart review), unable to obtain 2/2 trach and neuro status                                    OBJECTIVE  Vital Signs Last 24 Hrs  T(C): 37.3 (27 Apr 2021 06:00), Max: 38.9 (26 Apr 2021 20:37)  T(F): 99.1 (27 Apr 2021 06:00), Max: 102 (26 Apr 2021 20:37)  HR: 96 (27 Apr 2021 07:00) (74 - 108)  BP: 113/67 (27 Apr 2021 07:00) (86/51 - 131/77)  BP(mean): 82 (27 Apr 2021 07:00) (62 - 98)  RR: 21 (27 Apr 2021 07:00) (12 - 39)  SpO2: 100% (27 Apr 2021 07:00) (98% - 100%)  Vent settings CPAP 8/5 40% FiO2    I&O's Detail    26 Apr 2021 07:01  -  27 Apr 2021 07:00  --------------------------------------------------------  IN:    Vital1.5: 1065 mL  Total IN: 1065 mL    OUT:    Incontinent per Condom Catheter (mL): 900 mL    Indwelling Catheter - Urethral (mL): 750 mL    Post-Void Residual per Intermittent Catheterization (mL): 500 mL    Rectal Tube (mL): 290 mL  Total OUT: 2440 mL    Total NET: -1375 mL    LABS:                        8.2    6.33  )-----------( 368      ( 27 Apr 2021 06:02 )             26.9     04-27    134<L>  |  100  |  17  ----------------------------<  99  4.4   |  28  |  1.24    Ca    8.5      27 Apr 2021 06:02  Phos  4.0     04-27  Mg     1.9     04-27    Microbiology  Culture - Sputum (collected 26 Apr 2021 13:45)  Source: .Sputum Sputum  Gram Stain (26 Apr 2021 22:08):    Moderate epithelial cells    Few WBC's    Few Gram positive cocci in pairs    Rare Gram Positive Rods    Rare Yeast  Final Report (26 Apr 2021 22:09):    Sputum specimen rejected.  Microscopic examination indicates    oropharyngeal contamination.  Please repeat.    Urinalysis Basic - ( 26 Apr 2021 12:41 )    Color: Yellow / Appearance: Clear / SG: >=1.030 / pH: x  Gluc: x / Ketone: NEGATIVE  / Bili: Negative / Urobili: 0.2 E.U./dL   Blood: x / Protein: 30 mg/dL / Nitrite: NEGATIVE   Leuk Esterase: NEGATIVE / RBC: < 5 /HPF / WBC < 5 /HPF   Sq Epi: x / Non Sq Epi: 0-5 /HPF / Bacteria: Present /HPF    RADIOLOGY  *CXr from 4/26 reviewed with attending/fellow during AM rounds    MEDICATIONS  (STANDING):  albuterol/ipratropium for Nebulization 3 milliLiter(s) Nebulizer every 4 hours  chlorhexidine 0.12% Liquid 15 milliLiter(s) Oral Mucosa every 12 hours  chlorhexidine 2% Cloths 1 Application(s) Topical daily  dextrose 40% Gel 15 Gram(s) Oral once  dextrose 5%. 1000 milliLiter(s) (50 mL/Hr) IV Continuous <Continuous>  dextrose 5%. 1000 milliLiter(s) (100 mL/Hr) IV Continuous <Continuous>  dextrose 50% Injectable 25 Gram(s) IV Push once  dextrose 50% Injectable 12.5 Gram(s) IV Push once  dextrose 50% Injectable 25 Gram(s) IV Push once  enoxaparin Injectable 40 milliGRAM(s) SubCutaneous every 12 hours  glucagon  Injectable 1 milliGRAM(s) IntraMuscular once  HYDROmorphone   Tablet 6 milliGRAM(s) Enteral Tube every 4 hours  insulin regular  human corrective regimen sliding scale   SubCutaneous every 6 hours  LORazepam     Tablet 4 milliGRAM(s) Oral every 6 hours  pantoprazole   Suspension 40 milliGRAM(s) Enteral Tube daily  polyethylene glycol 3350 17 Gram(s) Oral every 24 hours  QUEtiapine 75 milliGRAM(s) Oral every 8 hours  senna 2 Tablet(s) Oral at bedtime    MEDICATIONS  (PRN):  acetaminophen    Suspension .. 650 milliGRAM(s) Oral every 6 hours PRN Temp greater or equal to 38C (100.4F)  midazolam Injectable 4 milliGRAM(s) IV Push every 6 hours PRN anxiety/agitation    PHYSICAL EXAM:   SUBJECTIVE  Overnight events: no acute events; Tmax 38.9 last night    REVIEW OF SYSTEMS:   See HPI (as per chart review), unable to obtain 2/2 trach and neuro status                                    OBJECTIVE  Vital Signs Last 24 Hrs  T(C): 37.3 (27 Apr 2021 06:00), Max: 38.9 (26 Apr 2021 20:37)  T(F): 99.1 (27 Apr 2021 06:00), Max: 102 (26 Apr 2021 20:37)  HR: 96 (27 Apr 2021 07:00) (74 - 108)  BP: 113/67 (27 Apr 2021 07:00) (86/51 - 131/77)  BP(mean): 82 (27 Apr 2021 07:00) (62 - 98)  RR: 21 (27 Apr 2021 07:00) (12 - 39)  SpO2: 100% (27 Apr 2021 07:00) (98% - 100%)  Vent settings CPAP 8/5 40% FiO2    I&O's Detail  26 Apr 2021 07:01  -  27 Apr 2021 07:00  --------------------------------------------------------  IN:    Vital1.5: 1065 mL  Total IN: 1065 mL    OUT:    Incontinent per Condom Catheter (mL): 900 mL    Indwelling Catheter - Urethral (mL): 750 mL    Post-Void Residual per Intermittent Catheterization (mL): 500 mL    Rectal Tube (mL): 290 mL  Total OUT: 2440 mL    Total NET: -1375 mL    LABS:                        8.2    6.33  )-----------( 368      ( 27 Apr 2021 06:02 )             26.9     04-27    134<L>  |  100  |  17  ----------------------------<  99  4.4   |  28  |  1.24    Ca    8.5      27 Apr 2021 06:02  Phos  4.0     04-27  Mg     1.9     04-27    Microbiology  Culture - Sputum (collected 26 Apr 2021 13:45)  Source: .Sputum Sputum  Gram Stain (26 Apr 2021 22:08):    Moderate epithelial cells    Few WBC's    Few Gram positive cocci in pairs    Rare Gram Positive Rods    Rare Yeast  Final Report (26 Apr 2021 22:09):    Sputum specimen rejected.  Microscopic examination indicates    oropharyngeal contamination.  Please repeat.    Urinalysis Basic - ( 26 Apr 2021 12:41 )    Color: Yellow / Appearance: Clear / SG: >=1.030 / pH: x  Gluc: x / Ketone: NEGATIVE  / Bili: Negative / Urobili: 0.2 E.U./dL   Blood: x / Protein: 30 mg/dL / Nitrite: NEGATIVE   Leuk Esterase: NEGATIVE / RBC: < 5 /HPF / WBC < 5 /HPF   Sq Epi: x / Non Sq Epi: 0-5 /HPF / Bacteria: Present /HPF    RADIOLOGY  *CXr from 4/26 reviewed with attending/fellow during AM rounds    MEDICATIONS  (STANDING):  albuterol/ipratropium for Nebulization 3 milliLiter(s) Nebulizer every 4 hours  chlorhexidine 0.12% Liquid 15 milliLiter(s) Oral Mucosa every 12 hours  chlorhexidine 2% Cloths 1 Application(s) Topical daily  dextrose 40% Gel 15 Gram(s) Oral once  dextrose 5%. 1000 milliLiter(s) (50 mL/Hr) IV Continuous <Continuous>  dextrose 5%. 1000 milliLiter(s) (100 mL/Hr) IV Continuous <Continuous>  dextrose 50% Injectable 25 Gram(s) IV Push once  dextrose 50% Injectable 12.5 Gram(s) IV Push once  dextrose 50% Injectable 25 Gram(s) IV Push once  enoxaparin Injectable 40 milliGRAM(s) SubCutaneous every 12 hours  glucagon  Injectable 1 milliGRAM(s) IntraMuscular once  HYDROmorphone   Tablet 6 milliGRAM(s) Enteral Tube every 4 hours  insulin regular  human corrective regimen sliding scale   SubCutaneous every 6 hours  LORazepam     Tablet 4 milliGRAM(s) Oral every 6 hours  pantoprazole   Suspension 40 milliGRAM(s) Enteral Tube daily  polyethylene glycol 3350 17 Gram(s) Oral every 24 hours  QUEtiapine 75 milliGRAM(s) Oral every 8 hours  senna 2 Tablet(s) Oral at bedtime    MEDICATIONS  (PRN):  acetaminophen    Suspension .. 650 milliGRAM(s) Oral every 6 hours PRN Temp greater or equal to 38C (100.4F)  midazolam Injectable 4 milliGRAM(s) IV Push every 6 hours PRN anxiety/agitation    PHYSICAL EXAM:  GENERAL: obese, trached   SKIN/HAIR/NAILS: Nails without clubbing or cyanosis. CR<2 secs. No rash, petechiae, erythema or ecchymoses. Anicteric. Patient with pressure injury on cheeks 2/2 previous proning; stage I on sacrum.  HEAD AND NECK: The skull is NC/AT. B/L Sclera white. 3 mm PERRL (hx of cataract sx). Nasal mucous membrane dry; Trachea midline, neck supple.   THORAX/LUNGS: Thorax is symmetric with good expansion, assisted by mechanical ventilation. Lung sounds with coarse rhonchi throughout R>L, diminished at the bases.  CARDIOVASCULAR: No JVD. Carotid upstrokes are brisk, without bruits. +S1 and S2, RRR; no extra heart sounds or M/R/G. NSR at the time of my exam.  ABDOMEN/: Abdomen is rounded with hypoactive BS in all 4 quadrants; it is soft, no palpable masses; no grimacing to palpation noted; last BM 4/27 as patient has rectal tube with liquid brown stool. No indwelling urinary catheter in place.  PERIPHERAL VASCULAR: Extremities are warm and well perfused, radial and dorsalis pedis pulses +2 and symmetric. +2 generalized non-pitting edema noted. No clubbing, no cyanosis.  MUSCULOSKELETAL: spontaneous UE movements noted; no evidence of swelling or deformity.  NEUROLOGICAL: Patient opens eyes to name, able to follow "squeeze my hand" command with B/L UE and "wiggle your toes" command on B/L LE,    55 yo M with PMHx of MU and gout who presented to ED with c/o progressively worsening SOB admitted to ICU for management of AHRF in the setting of COVID. Course c/b GRACE, ileus, septic shock 2/2 aspiration event, Afib with RVR. Now trached and PEG'd.    NEURO  #Sedated   -No IV sedation infusing at this time  -Current regimen via PEG: Dilaudid 6 mg q4hrs, Ativan 4 mg q6hrs, Seroquel 75 mg q8hr  -Versed 4 mg IVP PRN q6hrs for breakthrough agitation    RESPIRATORY  #Acute hypoxic hypercapnic respiratory failure in the setting of COVID PNA  -Patient initially presented with O2 saturation of 14% and cyanotic requiring immediate intubation  -CXR with diffuse bilateral opacities with + COVID19 PCR  -Elevated inflammatory markers  -s/p intubation 4/5  -Tracheostomy on 4/22  -Tolerating SBTs on CPAP 8/5 40%    #COVID  -CXR with diffuse bilateral opacities with + COVID19 PCR  -Decadron (4/5-4/13) d/c'ed due to clinical presentation  -Remdesevir (4/5-4/9)    #MU  -As per patient's wife, patient has MU on CPAP  -Patient sleeps with 2-3 pillows at night, attributed to MU    CARDIOVASCULAR  #Atrial Fibrillation- RESOLVED  -Patient went into afib with RVR 4/17 @ about 1830  -No new medications started as resolved  -Anticoagulation for afib not started, patient now in NSR and [id number] Score 0 as patient has no CV hx (however, on admission patient was found to have low EF- repeat ECHO with EF 55%)    GI  -s/p PEG 4/21  -Tolerating tube feedings  -Vital 1.5 @ 55 mL/hr   -GI ppx with pantoprazole via PEG   -Rectal tube in place    #Ileus 2/2 opioids - RESOLVED and having BMs  -s/p sump for decompression  -s/p methylnaltrexone with BM afterwards    RENAL//F&E  #Acute Kidney Injury  -Baseline creatinine unknown, no reported kidney disease  -Urine lytes 4/17 showing pre-renal  -Now resolving, renal fxn 17/1.2  -Strict I&O monitoring  -Avoid nephrotoxic medications    ID  #Persistent Fever  -Recent aspiration pneumonia c/b septic shock (now resolved); sputum culture yielded growth of normal respiratory lexie. Fever despite meropenem ?2/2 periodic aspiration vs. COVID-19 vs. new nosocomial infectious process  -Meropenem x7 days (4/16-4/22)  -Tmax 38.9 overnight, will resend sputum cx  -UA negative  -Will send blood culture if febrile again    #Aspiration pneumonia vs HAP  -Found 4/14 while trying to extubate w/ delirium, and numerous thick secretions in oral cavity requiring suctioning. 4/15 AM found w/ 102.3F oral temp and WBC increase to 18s, suspect possible aspiration pneumonia, with previous negative MRSA swab. 4/14 bcx NGTD, 4/15 bcx NGTD  -Zosyn started 4/9 and d/c'd 4/16 in the setting of continued septic shock  -Decision made to broaden antibiotics to meropenem 1 gram and vancomycin 1250 mg  -MRSA swab negative, no addt'l vanco dosed  -s/p meropenem course completion 4/16-4/22    #COVID PNA   -Plan as above  -Off steroid course, remdesivir course completed    #?Fungal Infxn  -Seen by ID, -ID ok'd Vori (loading doses x 2 given, then on 400 mg q12hr), stopped 4/14 given improvement in pt. status before medication was really started   -As per ID recs ====> s/p BAL 4/12--cultures have so far only yielded Keila dubliniensis which is probably a respiratory tract colonizer (and not the etiology of the patient's fevers). Voriconazole is not indicated for respiratory tract colonization with Candida. Suspect recent high fevers/leukocytosis due to aspiration event; Doubt COVID-19 associated pulmonary aspergillosis given BAL cultures without growth of mold, absence of cavitation on CXR, and time course relatively early for development of this rare entity. Moreover, would not attribute new fevers to discontinuation of voriconazole.  -BAL sputum samples taken from bronch 4/12, showing yeast   -Sputum cx from 4/16, no evidence of yeast  -Peripheral blood cultures from 4/21 NG @ 1 day  -Maintain MAP > 70 mmHg    ENDOCRINE  #Hyperglycemia  -q6hr correctional scale  -Blood glucose trend   -Decadron started 4/5 AM, d/c'd 4/13  -A1c 6.2    HEME  #Anemia  -Hgb 11 on admission; unknown baseline. No evidence of bleed.  -Hgb 4/18 7.3, possibly hemodiluted as patient did get fluids and albumin overnight, s/p transfusion 1U PRBC  -Hgb 4/25 7.0 possibly 2/2 phlebotomy with AoCD, s/p transfusion 1u pRBCs  -Today Hgb 8.2/26.9, last pRBC transfusion on 4/25  -Maintain active T+S  -DVT ppx with Lovenox SubQ    DISPO/GOALS OF CARE:  -Full Code  -Plan for LTAC placement   SUBJECTIVE  Overnight events: no acute events; Tmax 38.9 last night    REVIEW OF SYSTEMS:   See HPI (as per chart review), unable to obtain 2/2 trach and neuro status                                    OBJECTIVE  Vital Signs Last 24 Hrs  T(C): 37.3 (27 Apr 2021 06:00), Max: 38.9 (26 Apr 2021 20:37)  T(F): 99.1 (27 Apr 2021 06:00), Max: 102 (26 Apr 2021 20:37)  HR: 96 (27 Apr 2021 07:00) (74 - 108)  BP: 113/67 (27 Apr 2021 07:00) (86/51 - 131/77)  BP(mean): 82 (27 Apr 2021 07:00) (62 - 98)  RR: 21 (27 Apr 2021 07:00) (12 - 39)  SpO2: 100% (27 Apr 2021 07:00) (98% - 100%)  Vent settings CPAP 8/5 40% FiO2    I&O's Detail  26 Apr 2021 07:01  -  27 Apr 2021 07:00  --------------------------------------------------------  IN:    Vital1.5: 1065 mL  Total IN: 1065 mL    OUT:    Incontinent per Condom Catheter (mL): 900 mL    Indwelling Catheter - Urethral (mL): 750 mL    Post-Void Residual per Intermittent Catheterization (mL): 500 mL    Rectal Tube (mL): 290 mL  Total OUT: 2440 mL    Total NET: -1375 mL    LABS:                        8.2    6.33  )-----------( 368      ( 27 Apr 2021 06:02 )             26.9     04-27    134<L>  |  100  |  17  ----------------------------<  99  4.4   |  28  |  1.24    Ca    8.5      27 Apr 2021 06:02  Phos  4.0     04-27  Mg     1.9     04-27    Microbiology  Culture - Sputum (collected 26 Apr 2021 13:45)  Source: .Sputum Sputum  Gram Stain (26 Apr 2021 22:08):    Moderate epithelial cells    Few WBC's    Few Gram positive cocci in pairs    Rare Gram Positive Rods    Rare Yeast  Final Report (26 Apr 2021 22:09):    Sputum specimen rejected.  Microscopic examination indicates    oropharyngeal contamination.  Please repeat.    Urinalysis Basic - ( 26 Apr 2021 12:41 )    Color: Yellow / Appearance: Clear / SG: >=1.030 / pH: x  Gluc: x / Ketone: NEGATIVE  / Bili: Negative / Urobili: 0.2 E.U./dL   Blood: x / Protein: 30 mg/dL / Nitrite: NEGATIVE   Leuk Esterase: NEGATIVE / RBC: < 5 /HPF / WBC < 5 /HPF   Sq Epi: x / Non Sq Epi: 0-5 /HPF / Bacteria: Present /HPF    RADIOLOGY  *CXr from 4/26 reviewed with attending/fellow during AM rounds    MEDICATIONS  (STANDING):  albuterol/ipratropium for Nebulization 3 milliLiter(s) Nebulizer every 4 hours  chlorhexidine 0.12% Liquid 15 milliLiter(s) Oral Mucosa every 12 hours  chlorhexidine 2% Cloths 1 Application(s) Topical daily  dextrose 40% Gel 15 Gram(s) Oral once  dextrose 5%. 1000 milliLiter(s) (50 mL/Hr) IV Continuous <Continuous>  dextrose 5%. 1000 milliLiter(s) (100 mL/Hr) IV Continuous <Continuous>  dextrose 50% Injectable 25 Gram(s) IV Push once  dextrose 50% Injectable 12.5 Gram(s) IV Push once  dextrose 50% Injectable 25 Gram(s) IV Push once  enoxaparin Injectable 40 milliGRAM(s) SubCutaneous every 12 hours  glucagon  Injectable 1 milliGRAM(s) IntraMuscular once  HYDROmorphone   Tablet 6 milliGRAM(s) Enteral Tube every 4 hours  insulin regular  human corrective regimen sliding scale   SubCutaneous every 6 hours  LORazepam     Tablet 4 milliGRAM(s) Oral every 6 hours  pantoprazole   Suspension 40 milliGRAM(s) Enteral Tube daily  polyethylene glycol 3350 17 Gram(s) Oral every 24 hours  QUEtiapine 75 milliGRAM(s) Oral every 8 hours  senna 2 Tablet(s) Oral at bedtime    MEDICATIONS  (PRN):  acetaminophen    Suspension .. 650 milliGRAM(s) Oral every 6 hours PRN Temp greater or equal to 38C (100.4F)  midazolam Injectable 4 milliGRAM(s) IV Push every 6 hours PRN anxiety/agitation    PHYSICAL EXAM:  GENERAL: obese, trached   SKIN/HAIR/NAILS: Nails without clubbing or cyanosis. CR<2 secs. No rash, petechiae, erythema or ecchymoses. Anicteric. Patient with pressure injury on cheeks 2/2 previous proning; stage I on sacrum.  HEAD AND NECK: The skull is NC/AT. B/L Sclera white. 3 mm PERRL (hx of cataract sx). Nasal mucous membrane dry; Trachea midline, neck supple.   THORAX/LUNGS: Thorax is symmetric with good expansion, assisted by mechanical ventilation. Lung sounds with coarse rhonchi throughout R>L, diminished at the bases.  CARDIOVASCULAR: No JVD. Carotid upstrokes are brisk, without bruits. +S1 and S2, RRR; no extra heart sounds or M/R/G. NSR at the time of my exam.  ABDOMEN/: Abdomen is rounded with hypoactive BS in all 4 quadrants; it is soft, no palpable masses; no grimacing to palpation noted; last BM 4/27 as patient has rectal tube with liquid brown stool. No indwelling urinary catheter in place.  PERIPHERAL VASCULAR: Extremities are warm and well perfused, radial and dorsalis pedis pulses +2 and symmetric. +2 generalized non-pitting edema noted. No clubbing, no cyanosis.  MUSCULOSKELETAL: spontaneous UE movements noted; no evidence of swelling or deformity.  NEUROLOGICAL: Patient opens eyes to name, able to follow "squeeze my hand" command with B/L UE and "wiggle your toes" command on B/L LE,    55 yo M with PMHx of MU and gout who presented to ED with c/o progressively worsening SOB admitted to ICU for management of AHRF in the setting of COVID. Course c/b GRACE, ileus, septic shock 2/2 aspiration event, Afib with RVR. Now trached and PEG'd.    NEURO  #Sedated   -No IV sedation infusing at this time  -Current regimen via PEG: Dilaudid 6 mg q4hrs, Ativan 4 mg q6hrs, Seroquel 75 mg q8hr  -Versed 4 mg IVP PRN q6hrs for breakthrough agitation    RESPIRATORY  #Acute hypoxic hypercapnic respiratory failure in the setting of COVID PNA  -Patient initially presented with O2 saturation of 14% and cyanotic requiring immediate intubation  -CXR with diffuse bilateral opacities with + COVID19 PCR  -Elevated inflammatory markers  -s/p intubation 4/5  -Tracheostomy on 4/22  -Tolerating SBTs on CPAP 8/5 40%    #COVID  -CXR with diffuse bilateral opacities with + COVID19 PCR  -Decadron (4/5-4/13) d/c'ed due to clinical presentation  -Remdesevir (4/5-4/9)    #MU  -As per patient's wife, patient has MU on CPAP  -Patient sleeps with 2-3 pillows at night, attributed to MU    CARDIOVASCULAR  #Atrial Fibrillation- RESOLVED  -Patient went into afib with RVR 4/17 @ about 1830  -No new medications started as resolved  -Anticoagulation for afib not started, patient now in NSR and [id number] Score 0 as patient has no CV hx (however, on admission patient was found to have low EF- repeat ECHO with EF 55%)    GI  -s/p PEG 4/21  -Tolerating tube feedings  -Vital 1.5 @ 55 mL/hr   -GI ppx with pantoprazole via PEG   -Rectal tube in place    #Ileus 2/2 opioids - RESOLVED and having BMs  -s/p sump for decompression  -s/p methylnaltrexone with BM afterwards    RENAL//F&E  #Acute Kidney Injury  -Baseline creatinine unknown, no reported kidney disease  -Urine lytes 4/17 showing pre-renal  -Now resolving, renal fxn 17/1.2  -Strict I&O monitoring  -Avoid nephrotoxic medications    #Urinary Retention  -Patient had alas d/c'd yesterday 4/26 and failed TOV  -Has required straight cath'ing x2  -Flomax 0.8mg to be started  -Will re-assess for potential Alas re-insertion    ID  #Persistent Fever  -Recent aspiration pneumonia c/b septic shock (now resolved); sputum culture yielded growth of normal respiratory lexie. Fever despite meropenem ?2/2 periodic aspiration vs. COVID-19 vs. new nosocomial infectious process  -Meropenem x7 days (4/16-4/22)  -Tmax 38.9 overnight, will resend sputum cx  -UA negative  -Will send blood culture if febrile again    #Aspiration pneumonia vs HAP  -Found 4/14 while trying to extubate w/ delirium, and numerous thick secretions in oral cavity requiring suctioning. 4/15 AM found w/ 102.3F oral temp and WBC increase to 18s, suspect possible aspiration pneumonia, with previous negative MRSA swab. 4/14 bcx NGTD, 4/15 bcx NGTD  -Zosyn started 4/9 and d/c'd 4/16 in the setting of continued septic shock  -Decision made to broaden antibiotics to meropenem 1 gram and vancomycin 1250 mg  -MRSA swab negative, no addt'l vanco dosed  -s/p meropenem course completion 4/16-4/22    #COVID PNA   -Plan as above  -Off steroid course, remdesivir course completed    #?Fungal Infxn  -Seen by ID, -ID ok'd Vori (loading doses x 2 given, then on 400 mg q12hr), stopped 4/14 given improvement in pt. status before medication was really started   -As per ID recs ====> s/p BAL 4/12--cultures have so far only yielded Keila dubliniensis which is probably a respiratory tract colonizer (and not the etiology of the patient's fevers). Voriconazole is not indicated for respiratory tract colonization with Candida. Suspect recent high fevers/leukocytosis due to aspiration event; Doubt COVID-19 associated pulmonary aspergillosis given BAL cultures without growth of mold, absence of cavitation on CXR, and time course relatively early for development of this rare entity. Moreover, would not attribute new fevers to discontinuation of voriconazole.  -BAL sputum samples taken from bronch 4/12, showing yeast   -Sputum cx from 4/16, no evidence of yeast  -Peripheral blood cultures from 4/21 NG @ 1 day  -Maintain MAP > 70 mmHg    ENDOCRINE  #Hyperglycemia  -q6hr correctional scale  -Blood glucose trend   -Decadron started 4/5 AM, d/c'd 4/13  -A1c 6.2    HEME  #Anemia  -Hgb 11 on admission; unknown baseline. No evidence of bleed.  -Hgb 4/18 7.3, possibly hemodiluted as patient did get fluids and albumin overnight, s/p transfusion 1U PRBC  -Hgb 4/25 7.0 possibly 2/2 phlebotomy with AoCD, s/p transfusion 1u pRBCs  -Today Hgb 8.2/26.9, last pRBC transfusion on 4/25  -Maintain active T+S  -DVT ppx with Lovenox SubQ    DISPO/GOALS OF CARE:  -Full Code  -Plan for LTAC placement

## 2021-04-28 LAB
ANION GAP SERPL CALC-SCNC: 9 MMOL/L — SIGNIFICANT CHANGE UP (ref 5–17)
BASOPHILS # BLD AUTO: 0.02 K/UL — SIGNIFICANT CHANGE UP (ref 0–0.2)
BASOPHILS NFR BLD AUTO: 0.3 % — SIGNIFICANT CHANGE UP (ref 0–2)
BLD GP AB SCN SERPL QL: NEGATIVE — SIGNIFICANT CHANGE UP
BUN SERPL-MCNC: 21 MG/DL — SIGNIFICANT CHANGE UP (ref 7–23)
CALCIUM SERPL-MCNC: 8.6 MG/DL — SIGNIFICANT CHANGE UP (ref 8.4–10.5)
CHLORIDE SERPL-SCNC: 98 MMOL/L — SIGNIFICANT CHANGE UP (ref 96–108)
CO2 SERPL-SCNC: 27 MMOL/L — SIGNIFICANT CHANGE UP (ref 22–31)
CREAT ?TM UR-MCNC: 86 MG/DL — SIGNIFICANT CHANGE UP
CREAT SERPL-MCNC: 1.31 MG/DL — HIGH (ref 0.5–1.3)
EOSINOPHIL # BLD AUTO: 0.92 K/UL — HIGH (ref 0–0.5)
EOSINOPHIL NFR BLD AUTO: 13.9 % — HIGH (ref 0–6)
GLUCOSE BLDC GLUCOMTR-MCNC: 127 MG/DL — HIGH (ref 70–99)
GLUCOSE BLDC GLUCOMTR-MCNC: 135 MG/DL — HIGH (ref 70–99)
GLUCOSE BLDC GLUCOMTR-MCNC: 142 MG/DL — HIGH (ref 70–99)
GLUCOSE BLDC GLUCOMTR-MCNC: 149 MG/DL — HIGH (ref 70–99)
GLUCOSE SERPL-MCNC: 106 MG/DL — HIGH (ref 70–99)
HCT VFR BLD CALC: 27.3 % — LOW (ref 39–50)
HGB BLD-MCNC: 8.2 G/DL — LOW (ref 13–17)
IMM GRANULOCYTES NFR BLD AUTO: 0.6 % — SIGNIFICANT CHANGE UP (ref 0–1.5)
LYMPHOCYTES # BLD AUTO: 1.53 K/UL — SIGNIFICANT CHANGE UP (ref 1–3.3)
LYMPHOCYTES # BLD AUTO: 23.1 % — SIGNIFICANT CHANGE UP (ref 13–44)
MAGNESIUM SERPL-MCNC: 1.9 MG/DL — SIGNIFICANT CHANGE UP (ref 1.6–2.6)
MCHC RBC-ENTMCNC: 24.3 PG — LOW (ref 27–34)
MCHC RBC-ENTMCNC: 30 GM/DL — LOW (ref 32–36)
MCV RBC AUTO: 80.8 FL — SIGNIFICANT CHANGE UP (ref 80–100)
MONOCYTES # BLD AUTO: 0.67 K/UL — SIGNIFICANT CHANGE UP (ref 0–0.9)
MONOCYTES NFR BLD AUTO: 10.1 % — SIGNIFICANT CHANGE UP (ref 2–14)
NEUTROPHILS # BLD AUTO: 3.44 K/UL — SIGNIFICANT CHANGE UP (ref 1.8–7.4)
NEUTROPHILS NFR BLD AUTO: 52 % — SIGNIFICANT CHANGE UP (ref 43–77)
NRBC # BLD: 0 /100 WBCS — SIGNIFICANT CHANGE UP (ref 0–0)
OSMOLALITY UR: 487 MOSM/KG — SIGNIFICANT CHANGE UP (ref 300–900)
PHOSPHATE SERPL-MCNC: 4.2 MG/DL — SIGNIFICANT CHANGE UP (ref 2.5–4.5)
PLATELET # BLD AUTO: 413 K/UL — HIGH (ref 150–400)
POTASSIUM SERPL-MCNC: 4.3 MMOL/L — SIGNIFICANT CHANGE UP (ref 3.5–5.3)
POTASSIUM SERPL-SCNC: 4.3 MMOL/L — SIGNIFICANT CHANGE UP (ref 3.5–5.3)
RBC # BLD: 3.38 M/UL — LOW (ref 4.2–5.8)
RBC # FLD: 19.1 % — HIGH (ref 10.3–14.5)
RH IG SCN BLD-IMP: POSITIVE — SIGNIFICANT CHANGE UP
SODIUM SERPL-SCNC: 134 MMOL/L — LOW (ref 135–145)
SODIUM UR-SCNC: 68 MMOL/L — SIGNIFICANT CHANGE UP
WBC # BLD: 6.62 K/UL — SIGNIFICANT CHANGE UP (ref 3.8–10.5)
WBC # FLD AUTO: 6.62 K/UL — SIGNIFICANT CHANGE UP (ref 3.8–10.5)

## 2021-04-28 PROCEDURE — 99291 CRITICAL CARE FIRST HOUR: CPT

## 2021-04-28 RX ORDER — SODIUM CHLORIDE 9 MG/ML
2000 INJECTION, SOLUTION INTRAVENOUS ONCE
Refills: 0 | Status: DISCONTINUED | OUTPATIENT
Start: 2021-04-28 | End: 2021-04-28

## 2021-04-28 RX ORDER — ACETYLCYSTEINE 200 MG/ML
5 VIAL (ML) MISCELLANEOUS EVERY 6 HOURS
Refills: 0 | Status: COMPLETED | OUTPATIENT
Start: 2021-04-28 | End: 2021-04-29

## 2021-04-28 RX ORDER — HYDROMORPHONE HYDROCHLORIDE 2 MG/ML
4 INJECTION INTRAMUSCULAR; INTRAVENOUS; SUBCUTANEOUS EVERY 4 HOURS
Refills: 0 | Status: DISCONTINUED | OUTPATIENT
Start: 2021-04-28 | End: 2021-04-30

## 2021-04-28 RX ORDER — SODIUM CHLORIDE 9 MG/ML
2000 INJECTION, SOLUTION INTRAVENOUS ONCE
Refills: 0 | Status: COMPLETED | OUTPATIENT
Start: 2021-04-28 | End: 2021-04-28

## 2021-04-28 RX ORDER — MAGNESIUM SULFATE 500 MG/ML
2 VIAL (ML) INJECTION ONCE
Refills: 0 | Status: COMPLETED | OUTPATIENT
Start: 2021-04-28 | End: 2021-04-28

## 2021-04-28 RX ORDER — IPRATROPIUM/ALBUTEROL SULFATE 18-103MCG
3 AEROSOL WITH ADAPTER (GRAM) INHALATION EVERY 6 HOURS
Refills: 0 | Status: DISCONTINUED | OUTPATIENT
Start: 2021-04-28 | End: 2021-04-30

## 2021-04-28 RX ORDER — HYDROMORPHONE HYDROCHLORIDE 2 MG/ML
4 INJECTION INTRAMUSCULAR; INTRAVENOUS; SUBCUTANEOUS EVERY 6 HOURS
Refills: 0 | Status: DISCONTINUED | OUTPATIENT
Start: 2021-04-28 | End: 2021-04-28

## 2021-04-28 RX ADMIN — Medication 5 MILLILITER(S): at 20:12

## 2021-04-28 RX ADMIN — HYDROMORPHONE HYDROCHLORIDE 6 MILLIGRAM(S): 2 INJECTION INTRAMUSCULAR; INTRAVENOUS; SUBCUTANEOUS at 11:44

## 2021-04-28 RX ADMIN — SODIUM CHLORIDE 500 MILLILITER(S): 9 INJECTION, SOLUTION INTRAVENOUS at 17:52

## 2021-04-28 RX ADMIN — HYDROMORPHONE HYDROCHLORIDE 4 MILLIGRAM(S): 2 INJECTION INTRAMUSCULAR; INTRAVENOUS; SUBCUTANEOUS at 14:04

## 2021-04-28 RX ADMIN — Medication 3 MILLILITER(S): at 20:12

## 2021-04-28 RX ADMIN — Medication 3 MILLILITER(S): at 04:45

## 2021-04-28 RX ADMIN — Medication 4 MILLIGRAM(S): at 05:16

## 2021-04-28 RX ADMIN — HYDROMORPHONE HYDROCHLORIDE 6 MILLIGRAM(S): 2 INJECTION INTRAMUSCULAR; INTRAVENOUS; SUBCUTANEOUS at 05:16

## 2021-04-28 RX ADMIN — HYDROMORPHONE HYDROCHLORIDE 6 MILLIGRAM(S): 2 INJECTION INTRAMUSCULAR; INTRAVENOUS; SUBCUTANEOUS at 06:37

## 2021-04-28 RX ADMIN — Medication 3 MILLILITER(S): at 08:23

## 2021-04-28 RX ADMIN — QUETIAPINE FUMARATE 75 MILLIGRAM(S): 200 TABLET, FILM COATED ORAL at 21:46

## 2021-04-28 RX ADMIN — HYDROMORPHONE HYDROCHLORIDE 4 MILLIGRAM(S): 2 INJECTION INTRAMUSCULAR; INTRAVENOUS; SUBCUTANEOUS at 19:48

## 2021-04-28 RX ADMIN — HYDROMORPHONE HYDROCHLORIDE 6 MILLIGRAM(S): 2 INJECTION INTRAMUSCULAR; INTRAVENOUS; SUBCUTANEOUS at 02:02

## 2021-04-28 RX ADMIN — Medication 5 MILLILITER(S): at 15:02

## 2021-04-28 RX ADMIN — MIDAZOLAM HYDROCHLORIDE 4 MILLIGRAM(S): 1 INJECTION, SOLUTION INTRAMUSCULAR; INTRAVENOUS at 04:23

## 2021-04-28 RX ADMIN — HYDROMORPHONE HYDROCHLORIDE 4 MILLIGRAM(S): 2 INJECTION INTRAMUSCULAR; INTRAVENOUS; SUBCUTANEOUS at 18:38

## 2021-04-28 RX ADMIN — HYDROMORPHONE HYDROCHLORIDE 4 MILLIGRAM(S): 2 INJECTION INTRAMUSCULAR; INTRAVENOUS; SUBCUTANEOUS at 21:45

## 2021-04-28 RX ADMIN — Medication 3 MILLILITER(S): at 00:28

## 2021-04-28 RX ADMIN — CHLORHEXIDINE GLUCONATE 15 MILLILITER(S): 213 SOLUTION TOPICAL at 18:37

## 2021-04-28 RX ADMIN — Medication 650 MILLIGRAM(S): at 18:38

## 2021-04-28 RX ADMIN — Medication 4 MILLIGRAM(S): at 21:45

## 2021-04-28 RX ADMIN — CHLORHEXIDINE GLUCONATE 1 APPLICATION(S): 213 SOLUTION TOPICAL at 12:51

## 2021-04-28 RX ADMIN — Medication 4 MILLIGRAM(S): at 14:04

## 2021-04-28 RX ADMIN — TAMSULOSIN HYDROCHLORIDE 0.8 MILLIGRAM(S): 0.4 CAPSULE ORAL at 21:45

## 2021-04-28 RX ADMIN — HYDROMORPHONE HYDROCHLORIDE 4 MILLIGRAM(S): 2 INJECTION INTRAMUSCULAR; INTRAVENOUS; SUBCUTANEOUS at 14:37

## 2021-04-28 RX ADMIN — Medication 650 MILLIGRAM(S): at 19:48

## 2021-04-28 RX ADMIN — MIDAZOLAM HYDROCHLORIDE 4 MILLIGRAM(S): 1 INJECTION, SOLUTION INTRAMUSCULAR; INTRAVENOUS at 19:14

## 2021-04-28 RX ADMIN — ENOXAPARIN SODIUM 40 MILLIGRAM(S): 100 INJECTION SUBCUTANEOUS at 05:16

## 2021-04-28 RX ADMIN — Medication 50 GRAM(S): at 10:30

## 2021-04-28 RX ADMIN — HYDROMORPHONE HYDROCHLORIDE 6 MILLIGRAM(S): 2 INJECTION INTRAMUSCULAR; INTRAVENOUS; SUBCUTANEOUS at 01:18

## 2021-04-28 RX ADMIN — CHLORHEXIDINE GLUCONATE 15 MILLILITER(S): 213 SOLUTION TOPICAL at 05:16

## 2021-04-28 RX ADMIN — HYDROMORPHONE HYDROCHLORIDE 6 MILLIGRAM(S): 2 INJECTION INTRAMUSCULAR; INTRAVENOUS; SUBCUTANEOUS at 10:30

## 2021-04-28 RX ADMIN — ENOXAPARIN SODIUM 40 MILLIGRAM(S): 100 INJECTION SUBCUTANEOUS at 18:37

## 2021-04-28 RX ADMIN — QUETIAPINE FUMARATE 75 MILLIGRAM(S): 200 TABLET, FILM COATED ORAL at 10:30

## 2021-04-28 RX ADMIN — Medication 3 MILLILITER(S): at 15:02

## 2021-04-28 NOTE — PROGRESS NOTE ADULT - SUBJECTIVE AND OBJECTIVE BOX
SUBJECTIVE    OBJECTIVE    Vital Signs Last 24 Hrs  T(C): 37.4 (28 Apr 2021 05:11), Max: 38.4 (27 Apr 2021 13:00)  T(F): 99.4 (28 Apr 2021 05:11), Max: 101.1 (27 Apr 2021 13:00)  HR: 85 (28 Apr 2021 07:00) (74 - 102)  BP: 105/55 (28 Apr 2021 07:00) (99/54 - 126/73)  BP(mean): 73 (28 Apr 2021 07:00) (71 - 94)  RR: 16 (28 Apr 2021 07:00) (16 - 32)  SpO2: 100% (28 Apr 2021 07:00) (93% - 100%)    *insert advanced hemodynamics    Vent settings   Mode: CPAP with PS, FiO2: 40, PEEP: 5, MAP: 8, PC: 8, PIP: 16  *insert ventilator settings    Mode: RR: TV: PEEP: FiO2    *insert I&Os monitoring  I&O's Detail    27 Apr 2021 07:01  -  28 Apr 2021 07:00  --------------------------------------------------------  IN:    Enteral Tube Flush: 180 mL    IV PiggyBack: 100 mL    Modular Fluid: 60 mL    Vital1.5: 1320 mL  Total IN: 1660 mL    OUT:    Intermittent Catheterization - Urethral (mL): 1150 mL    Post-Void Residual per Intermittent Catheterization (mL): 1500 mL    Rectal Tube (mL): 450 mL  Total OUT: 3100 mL    Total NET: -1440 mL      28 Apr 2021 07:01  -  28 Apr 2021 07:57  --------------------------------------------------------  IN:    Vital1.5: 55 mL  Total IN: 55 mL    OUT:  Total OUT: 0 mL    Total NET: 55 mL            04-27-21 @ 07:01  -  04-28-21 @ 07:00  --------------------------------------------------------  IN: 1660 mL / OUT: 3100 mL / NET: -1440 mL    04-28-21 @ 07:01  -  04-28-21 @ 07:57  --------------------------------------------------------  IN: 55 mL / OUT: 0 mL / NET: 55 mL            LABS:      venous and ABG gases      *microbiology    Culture - Blood (collected 27 Apr 2021 15:26)  Source: .Blood Blood  Preliminary Report (28 Apr 2021 04:00):    No growth at 12 hours    Culture - Blood (collected 27 Apr 2021 15:26)  Source: .Blood Blood  Preliminary Report (28 Apr 2021 04:00):    No growth at 12 hours    Culture - Sputum (collected 27 Apr 2021 10:15)  Source: .Sputum tracheostomy  Gram Stain (27 Apr 2021 13:50):    Few epithelial cells    Moderate White blood cells    Rare to few Gram positive cocci in pairs    Rare Yeast like cells    Rare Gram Variable Rods    Culture - Sputum (collected 26 Apr 2021 13:45)  Source: .Sputum Sputum  Gram Stain (26 Apr 2021 22:08):    Moderate epithelial cells    Few WBC's    Few Gram positive cocci in pairs    Rare Gram Positive Rods    Rare Yeast  Final Report (26 Apr 2021 22:09):    Sputum specimen rejected.  Microscopic examination indicates    oropharyngeal contamination.  Please repeat.        *UA  Urinalysis Basic - ( 26 Apr 2021 12:41 )    Color: Yellow / Appearance: Clear / SG: >=1.030 / pH: x  Gluc: x / Ketone: NEGATIVE  / Bili: Negative / Urobili: 0.2 E.U./dL   Blood: x / Protein: 30 mg/dL / Nitrite: NEGATIVE   Leuk Esterase: NEGATIVE / RBC: < 5 /HPF / WBC < 5 /HPF   Sq Epi: x / Non Sq Epi: 0-5 /HPF / Bacteria: Present /HPF        RADIOLOGY    *CXr    *Others    MEDICATIONS  (STANDING):  albuterol/ipratropium for Nebulization 3 milliLiter(s) Nebulizer every 4 hours  chlorhexidine 0.12% Liquid 15 milliLiter(s) Oral Mucosa every 12 hours  chlorhexidine 2% Cloths 1 Application(s) Topical daily  dextrose 40% Gel 15 Gram(s) Oral once  dextrose 5%. 1000 milliLiter(s) (50 mL/Hr) IV Continuous <Continuous>  dextrose 5%. 1000 milliLiter(s) (100 mL/Hr) IV Continuous <Continuous>  dextrose 50% Injectable 25 Gram(s) IV Push once  dextrose 50% Injectable 12.5 Gram(s) IV Push once  dextrose 50% Injectable 25 Gram(s) IV Push once  enoxaparin Injectable 40 milliGRAM(s) SubCutaneous every 12 hours  glucagon  Injectable 1 milliGRAM(s) IntraMuscular once  HYDROmorphone   Tablet 6 milliGRAM(s) Enteral Tube every 4 hours  insulin regular  human corrective regimen sliding scale   SubCutaneous every 6 hours  LORazepam     Tablet 4 milliGRAM(s) Oral every 6 hours  magnesium sulfate  IVPB 2 Gram(s) IV Intermittent once  QUEtiapine 75 milliGRAM(s) Oral every 12 hours  tamsulosin 0.8 milliGRAM(s) Oral at bedtime    MEDICATIONS  (PRN):  acetaminophen    Suspension .. 650 milliGRAM(s) Oral every 6 hours PRN Temp greater or equal to 38C (100.4F)  midazolam Injectable 4 milliGRAM(s) IV Push every 6 hours PRN anxiety/agitation    PHYSICAL EXAM:   SUBJECTIVE  Overnight events: no acute events; Tmax 38.1 overnight; required Versed 4 mg IVP x 2 PRN doses for agitation    REVIEW OF SYSTEMS:   See HPI (as per chart review), unable to obtain 2/2 trach and neuro status    OBJECTIVE  Vital Signs Last 24 Hrs  T(C): 37.4 (28 Apr 2021 05:11), Max: 38.4 (27 Apr 2021 13:00)  T(F): 99.4 (28 Apr 2021 05:11), Max: 101.1 (27 Apr 2021 13:00)  HR: 85 (28 Apr 2021 07:00) (74 - 102)  BP: 105/55 (28 Apr 2021 07:00) (99/54 - 126/73)  BP(mean): 73 (28 Apr 2021 07:00) (71 - 94)  RR: 16 (28 Apr 2021 07:00) (16 - 32)  SpO2: 100% (28 Apr 2021 07:00) (93% - 100%) on trach collar 40%  Ventilator available at bedside    I&O's Detail  27 Apr 2021 07:01  -  28 Apr 2021 07:00  --------------------------------------------------------  IN:    Enteral Tube Flush: 180 mL    IV PiggyBack: 100 mL    Modular Fluid: 60 mL    Vital1.5: 1320 mL  Total IN: 1660 mL    OUT:    Intermittent Catheterization - Urethral (mL): 1150 mL    Post-Void Residual per Intermittent Catheterization (mL): 1500 mL    Rectal Tube (mL): 450 mL  Total OUT: 3100 mL    Total NET: -1440 mL    LABS:                        8.2    6.62  )-----------( 413      ( 28 Apr 2021 05:12 )             27.3     04-28    134<L>  |  98  |  21  ----------------------------<  106<H>  4.3   |  27  |  1.31<H>    Ca    8.6      28 Apr 2021 05:12  Phos  4.2     04-28  Mg     1.9     04-28    Microbiology  Culture - Blood (collected 27 Apr 2021 15:26)  Source: .Blood Blood  Preliminary Report (28 Apr 2021 04:00):    No growth at 12 hours    Culture - Blood (collected 27 Apr 2021 15:26)  Source: .Blood Blood  Preliminary Report (28 Apr 2021 04:00):    No growth at 12 hours    Culture - Sputum (collected 27 Apr 2021 10:15)  Source: .Sputum tracheostomy  Gram Stain (27 Apr 2021 13:50):    Few epithelial cells    Moderate White blood cells    Rare to few Gram positive cocci in pairs    Rare Yeast like cells    Rare Gram Variable Rods    Urinalysis Basic - ( 26 Apr 2021 12:41 )    Color: Yellow / Appearance: Clear / SG: >=1.030 / pH: x  Gluc: x / Ketone: NEGATIVE  / Bili: Negative / Urobili: 0.2 E.U./dL   Blood: x / Protein: 30 mg/dL / Nitrite: NEGATIVE   Leuk Esterase: NEGATIVE / RBC: < 5 /HPF / WBC < 5 /HPF   Sq Epi: x / Non Sq Epi: 0-5 /HPF / Bacteria: Present /HPF    MEDICATIONS  (STANDING):  albuterol/ipratropium for Nebulization 3 milliLiter(s) Nebulizer every 4 hours  chlorhexidine 0.12% Liquid 15 milliLiter(s) Oral Mucosa every 12 hours  chlorhexidine 2% Cloths 1 Application(s) Topical daily  dextrose 40% Gel 15 Gram(s) Oral once  dextrose 5%. 1000 milliLiter(s) (50 mL/Hr) IV Continuous <Continuous>  dextrose 5%. 1000 milliLiter(s) (100 mL/Hr) IV Continuous <Continuous>  dextrose 50% Injectable 25 Gram(s) IV Push once  dextrose 50% Injectable 12.5 Gram(s) IV Push once  dextrose 50% Injectable 25 Gram(s) IV Push once  enoxaparin Injectable 40 milliGRAM(s) SubCutaneous every 12 hours  glucagon  Injectable 1 milliGRAM(s) IntraMuscular once  HYDROmorphone   Tablet 6 milliGRAM(s) Enteral Tube every 4 hours  insulin regular  human corrective regimen sliding scale   SubCutaneous every 6 hours  LORazepam     Tablet 4 milliGRAM(s) Oral every 6 hours  magnesium sulfate  IVPB 2 Gram(s) IV Intermittent once  QUEtiapine 75 milliGRAM(s) Oral every 12 hours  tamsulosin 0.8 milliGRAM(s) Oral at bedtime    MEDICATIONS  (PRN):  acetaminophen    Suspension .. 650 milliGRAM(s) Oral every 6 hours PRN Temp greater or equal to 38C (100.4F)  midazolam Injectable 4 milliGRAM(s) IV Push every 6 hours PRN anxiety/agitation    PHYSICAL EXAM:  GENERAL: obese, trached   SKIN/HAIR/NAILS: Nails without clubbing or cyanosis. CR<2 secs. No rash, petechiae, erythema or ecchymoses. Anicteric. Patient with pressure injury on cheeks 2/2 previous proning; stage I on sacrum.  HEAD AND NECK: The skull is NC/AT. B/L Sclera white. 2 mm PERRL (hx of cataract sx). Nasal mucous membrane dry; Trachea midline, neck supple. Small creamy yellow drainage noted from trach site.  THORAX/LUNGS: Thorax is symmetric with good expansion, currently on trach collar- no respiratory distress noted. Lung sounds with coarse,  diminished at the bases.  CARDIOVASCULAR: No JVD. Carotid upstrokes are brisk, without bruits. +S1 and S2, RRR; no extra heart sounds or M/R/G. NSR at the time of my exam.  ABDOMEN/: Abdomen is rounded with hypoactive BS in all 4 quadrants; it is soft, no palpable masses; no grimacing to palpation noted; last BM 4/28 as patient has rectal tube with liquid brown stool. No indwelling urinary catheter in place.  PERIPHERAL VASCULAR: Extremities are warm and well perfused, radial and dorsalis pedis pulses +2 and symmetric. +2 generalized non-pitting edema noted, +3 on hands. No clubbing, no cyanosis.  MUSCULOSKELETAL: spontaneous UE movements noted; no evidence of swelling or deformity.  NEUROLOGICAL: Patient opens eyes to name, able to follow "squeeze my hand" command with B/L UE and "wiggle your toes" command on B/L LE.    Assessment and Plan  57 yo M with PMHx of MU and gout who presented to ED with c/o progressively worsening SOB admitted to ICU for management of AHRF in the setting of COVID. Course c/b GRACE, ileus, septic shock 2/2 aspiration event, Afib with RVR. Now trached and PEG'd.    NEURO  #Sedated   -No IV sedation infusing at this time  -Current regimen via PEG: Dilaudid 6 mg q4hrs, Seroquel 75 mg (decreased to BID yesterday), Ativan 4 mg (decreased to q8hrs today)  -Versed 4 mg IVP PRN q6hrs for breakthrough agitation    RESPIRATORY  #Acute hypoxic hypercapnic respiratory failure in the setting of COVID PNA  -Patient initially presented with O2 saturation of 14% and cyanotic requiring immediate intubation  -CXR with diffuse bilateral opacities with + COVID19 PCR  -Elevated inflammatory markers  -s/p intubation 4/5  -Tracheostomy on 4/22  -Tolerating trach collar 40% today    #COVID  -CXR with diffuse bilateral opacities with + COVID19 PCR  -Decadron (4/5-4/13) d/c'ed due to clinical presentation  -Remdesevir (4/5-4/9)    #MU  -As per patient's wife, patient has MU on CPAP  -Patient sleeps with 2-3 pillows at night, attributed to MU    CARDIOVASCULAR  #Atrial Fibrillation- RESOLVED  -Patient went into afib with RVR 4/17 @ about 1830  -No new medications started as resolved  -Anticoagulation for afib not started, patient now in NSR and [id number] Score 0 as patient has no CV hx (however, on admission patient was found to have low EF- repeat ECHO with EF 55%)    GI  -s/p PEG 4/21  -Tolerating tube feedings  -Vital 1.5 @ 55 mL/hr   -GI ppx with pantoprazole d/c'd due to eosinophilia and patient being enterally fed at goal  -Rectal tube in place  -Banatrol added to help with diarrhea  -Bowel regimen d/c'd due to diarrhea    #Ileus 2/2 opioids - RESOLVED and having BMs  -s/p sump for decompression  -s/p methylnaltrexone with BM afterwards    RENAL//F&E  #Acute Kidney Injury  -Baseline creatinine unknown, no reported kidney disease  -Urine lytes 4/17 showing pre-renal  -Now resolving, renal fxn today with mild bump in SCr (1.24->1.31)  -Strict I&O monitoring  -Avoid nephrotoxic medications    #Urinary Retention  -Patient had alas d/c'd 4/26 and failed TOV  -Has required bladder scanning and straight cath'ing  -Flomax 0.8mg started 4/27 PM    ID  #Persistent Fever  -Recent aspiration pneumonia c/b septic shock (now resolved); sputum culture yielded growth of normal respiratory lexie. Fever despite meropenem ?2/2 periodic aspiration vs. COVID-19 vs. new nosocomial infectious process  -Meropenem x7 days (4/16-4/22)  -Tmax 38.9 overnight, will resend sputum cx  -UA negative  -BCx2 from 4/27 no growth at 12 hours  -Pantoprazole d/c'd as patient with eosinophilia (was also on meropenem course)  -Will monitor off abx with low grade fevers unless hemodynamically unstable     #Aspiration pneumonia vs HAP  -Found 4/14 while trying to extubate w/ delirium, and numerous thick secretions in oral cavity requiring suctioning. 4/15 AM found w/ 102.3F oral temp and WBC increase to 18s, suspect possible aspiration pneumonia, with previous negative MRSA swab. 4/14 bcx NGTD, 4/15 bcx NGTD  -Zosyn started 4/9 and d/c'd 4/16 in the setting of continued septic shock  -Decision made to broaden antibiotics to meropenem 1 gram and vancomycin 1250 mg  -MRSA swab negative, no addt'l vanco dosed  -s/p meropenem course completion 4/16-4/22    #COVID PNA   -Plan as above  -Off steroid course, remdesivir course completed    #?Fungal Infxn  -Seen by ID, -ID ok'd Vori (loading doses x 2 given, then on 400 mg q12hr), stopped 4/14 given improvement in pt. status before medication was really started   -As per ID recs ====> s/p BAL 4/12--cultures have so far only yielded Keila dubliniensis which is probably a respiratory tract colonizer (and not the etiology of the patient's fevers). Voriconazole is not indicated for respiratory tract colonization with Candida. Suspect recent high fevers/leukocytosis due to aspiration event; Doubt COVID-19 associated pulmonary aspergillosis given BAL cultures without growth of mold, absence of cavitation on CXR, and time course relatively early for development of this rare entity. Moreover, would not attribute new fevers to discontinuation of voriconazole.  -BAL sputum samples taken from bronch 4/12, showing yeast   -Sputum cx from 4/16, no evidence of yeast  -Peripheral blood cultures from 4/21 NG @ 1 day  -Maintain MAP > 70 mmHg    ENDOCRINE  #Hyperglycemia  -q6hr correctional scale  -Blood glucose trend 120-160's  -Decadron started 4/5 AM, d/c'd 4/13  -A1c 6.2    HEME  #Anemia  -Hgb 11 on admission; unknown baseline. No evidence of bleed.  -Hgb 4/18 7.3, possibly hemodiluted as patient did get fluids and albumin overnight, s/p transfusion 1U PRBC  -Hgb 4/25 7.0 possibly 2/2 phlebotomy with AoCD, s/p transfusion 1u pRBCs  -Today Hgb stable @ 8.2, last pRBC transfusion on 4/25  -Maintain active T+S  -DVT ppx with Lovenox SubQ    DISPO/GOALS OF CARE:  -Full Code  -Plan for LTAC placement

## 2021-04-28 NOTE — PROGRESS NOTE ADULT - ATTENDING COMMENTS
Tolerating TC today. Will continue as tolerated. Trying to slowly decrease sedation without promoting delirium. Renal function slightly worse, will bolus more IVF as likely dry.

## 2021-04-29 LAB
ANION GAP SERPL CALC-SCNC: 10 MMOL/L — SIGNIFICANT CHANGE UP (ref 5–17)
BASOPHILS # BLD AUTO: 0.04 K/UL — SIGNIFICANT CHANGE UP (ref 0–0.2)
BASOPHILS NFR BLD AUTO: 0.4 % — SIGNIFICANT CHANGE UP (ref 0–2)
BUN SERPL-MCNC: 19 MG/DL — SIGNIFICANT CHANGE UP (ref 7–23)
CALCIUM SERPL-MCNC: 8.9 MG/DL — SIGNIFICANT CHANGE UP (ref 8.4–10.5)
CHLORIDE SERPL-SCNC: 98 MMOL/L — SIGNIFICANT CHANGE UP (ref 96–108)
CO2 SERPL-SCNC: 28 MMOL/L — SIGNIFICANT CHANGE UP (ref 22–31)
CREAT SERPL-MCNC: 1.18 MG/DL — SIGNIFICANT CHANGE UP (ref 0.5–1.3)
CULTURE RESULTS: SIGNIFICANT CHANGE UP
EOSINOPHIL # BLD AUTO: 1.16 K/UL — HIGH (ref 0–0.5)
EOSINOPHIL NFR BLD AUTO: 13 % — HIGH (ref 0–6)
GLUCOSE BLDC GLUCOMTR-MCNC: 113 MG/DL — HIGH (ref 70–99)
GLUCOSE BLDC GLUCOMTR-MCNC: 139 MG/DL — HIGH (ref 70–99)
GLUCOSE BLDC GLUCOMTR-MCNC: 177 MG/DL — HIGH (ref 70–99)
GLUCOSE SERPL-MCNC: 130 MG/DL — HIGH (ref 70–99)
HCT VFR BLD CALC: 26.7 % — LOW (ref 39–50)
HGB BLD-MCNC: 8.1 G/DL — LOW (ref 13–17)
IMM GRANULOCYTES NFR BLD AUTO: 0.7 % — SIGNIFICANT CHANGE UP (ref 0–1.5)
LYMPHOCYTES # BLD AUTO: 1.17 K/UL — SIGNIFICANT CHANGE UP (ref 1–3.3)
LYMPHOCYTES # BLD AUTO: 13.1 % — SIGNIFICANT CHANGE UP (ref 13–44)
MAGNESIUM SERPL-MCNC: 1.9 MG/DL — SIGNIFICANT CHANGE UP (ref 1.6–2.6)
MCHC RBC-ENTMCNC: 24.3 PG — LOW (ref 27–34)
MCHC RBC-ENTMCNC: 30.3 GM/DL — LOW (ref 32–36)
MCV RBC AUTO: 79.9 FL — LOW (ref 80–100)
MONOCYTES # BLD AUTO: 0.94 K/UL — HIGH (ref 0–0.9)
MONOCYTES NFR BLD AUTO: 10.6 % — SIGNIFICANT CHANGE UP (ref 2–14)
NEUTROPHILS # BLD AUTO: 5.53 K/UL — SIGNIFICANT CHANGE UP (ref 1.8–7.4)
NEUTROPHILS NFR BLD AUTO: 62.2 % — SIGNIFICANT CHANGE UP (ref 43–77)
NRBC # BLD: 0 /100 WBCS — SIGNIFICANT CHANGE UP (ref 0–0)
PHOSPHATE SERPL-MCNC: 3.6 MG/DL — SIGNIFICANT CHANGE UP (ref 2.5–4.5)
PLATELET # BLD AUTO: 498 K/UL — HIGH (ref 150–400)
POTASSIUM SERPL-MCNC: 4.5 MMOL/L — SIGNIFICANT CHANGE UP (ref 3.5–5.3)
POTASSIUM SERPL-SCNC: 4.5 MMOL/L — SIGNIFICANT CHANGE UP (ref 3.5–5.3)
RBC # BLD: 3.34 M/UL — LOW (ref 4.2–5.8)
RBC # FLD: 19.1 % — HIGH (ref 10.3–14.5)
SODIUM SERPL-SCNC: 136 MMOL/L — SIGNIFICANT CHANGE UP (ref 135–145)
SPECIMEN SOURCE: SIGNIFICANT CHANGE UP
URATE SERPL-MCNC: 4.3 MG/DL — SIGNIFICANT CHANGE UP (ref 3.4–8.8)
WBC # BLD: 8.9 K/UL — SIGNIFICANT CHANGE UP (ref 3.8–10.5)
WBC # FLD AUTO: 8.9 K/UL — SIGNIFICANT CHANGE UP (ref 3.8–10.5)

## 2021-04-29 PROCEDURE — 99291 CRITICAL CARE FIRST HOUR: CPT

## 2021-04-29 RX ORDER — ALLOPURINOL 300 MG
100 TABLET ORAL DAILY
Refills: 0 | Status: DISCONTINUED | OUTPATIENT
Start: 2021-04-29 | End: 2021-04-30

## 2021-04-29 RX ORDER — QUETIAPINE FUMARATE 200 MG/1
75 TABLET, FILM COATED ORAL EVERY 8 HOURS
Refills: 0 | Status: DISCONTINUED | OUTPATIENT
Start: 2021-04-29 | End: 2021-04-30

## 2021-04-29 RX ORDER — MAGNESIUM SULFATE 500 MG/ML
2 VIAL (ML) INJECTION ONCE
Refills: 0 | Status: COMPLETED | OUTPATIENT
Start: 2021-04-29 | End: 2021-04-29

## 2021-04-29 RX ORDER — ACETAMINOPHEN 500 MG
650 TABLET ORAL ONCE
Refills: 0 | Status: COMPLETED | OUTPATIENT
Start: 2021-04-29 | End: 2021-04-29

## 2021-04-29 RX ORDER — OLANZAPINE 15 MG/1
10 TABLET, FILM COATED ORAL ONCE
Refills: 0 | Status: COMPLETED | OUTPATIENT
Start: 2021-04-29 | End: 2021-04-29

## 2021-04-29 RX ADMIN — HYDROMORPHONE HYDROCHLORIDE 4 MILLIGRAM(S): 2 INJECTION INTRAMUSCULAR; INTRAVENOUS; SUBCUTANEOUS at 16:02

## 2021-04-29 RX ADMIN — HYDROMORPHONE HYDROCHLORIDE 4 MILLIGRAM(S): 2 INJECTION INTRAMUSCULAR; INTRAVENOUS; SUBCUTANEOUS at 09:55

## 2021-04-29 RX ADMIN — OLANZAPINE 10 MILLIGRAM(S): 15 TABLET, FILM COATED ORAL at 19:21

## 2021-04-29 RX ADMIN — Medication 650 MILLIGRAM(S): at 04:28

## 2021-04-29 RX ADMIN — TAMSULOSIN HYDROCHLORIDE 0.8 MILLIGRAM(S): 0.4 CAPSULE ORAL at 22:21

## 2021-04-29 RX ADMIN — Medication 50 GRAM(S): at 11:46

## 2021-04-29 RX ADMIN — CHLORHEXIDINE GLUCONATE 15 MILLILITER(S): 213 SOLUTION TOPICAL at 17:28

## 2021-04-29 RX ADMIN — QUETIAPINE FUMARATE 75 MILLIGRAM(S): 200 TABLET, FILM COATED ORAL at 16:01

## 2021-04-29 RX ADMIN — Medication 4 MILLIGRAM(S): at 13:52

## 2021-04-29 RX ADMIN — HYDROMORPHONE HYDROCHLORIDE 4 MILLIGRAM(S): 2 INJECTION INTRAMUSCULAR; INTRAVENOUS; SUBCUTANEOUS at 09:35

## 2021-04-29 RX ADMIN — ENOXAPARIN SODIUM 40 MILLIGRAM(S): 100 INJECTION SUBCUTANEOUS at 17:28

## 2021-04-29 RX ADMIN — Medication 4 MILLIGRAM(S): at 22:21

## 2021-04-29 RX ADMIN — HYDROMORPHONE HYDROCHLORIDE 4 MILLIGRAM(S): 2 INJECTION INTRAMUSCULAR; INTRAVENOUS; SUBCUTANEOUS at 02:05

## 2021-04-29 RX ADMIN — Medication 100 MILLIGRAM(S): at 17:31

## 2021-04-29 RX ADMIN — HYDROMORPHONE HYDROCHLORIDE 4 MILLIGRAM(S): 2 INJECTION INTRAMUSCULAR; INTRAVENOUS; SUBCUTANEOUS at 23:20

## 2021-04-29 RX ADMIN — HYDROMORPHONE HYDROCHLORIDE 4 MILLIGRAM(S): 2 INJECTION INTRAMUSCULAR; INTRAVENOUS; SUBCUTANEOUS at 17:29

## 2021-04-29 RX ADMIN — MIDAZOLAM HYDROCHLORIDE 4 MILLIGRAM(S): 1 INJECTION, SOLUTION INTRAMUSCULAR; INTRAVENOUS at 08:39

## 2021-04-29 RX ADMIN — Medication 650 MILLIGRAM(S): at 19:22

## 2021-04-29 RX ADMIN — Medication 650 MILLIGRAM(S): at 02:05

## 2021-04-29 RX ADMIN — CHLORHEXIDINE GLUCONATE 15 MILLILITER(S): 213 SOLUTION TOPICAL at 06:35

## 2021-04-29 RX ADMIN — Medication 3 MILLILITER(S): at 01:50

## 2021-04-29 RX ADMIN — INSULIN HUMAN 2: 100 INJECTION, SOLUTION SUBCUTANEOUS at 12:10

## 2021-04-29 RX ADMIN — Medication 3 MILLILITER(S): at 19:22

## 2021-04-29 RX ADMIN — HYDROMORPHONE HYDROCHLORIDE 4 MILLIGRAM(S): 2 INJECTION INTRAMUSCULAR; INTRAVENOUS; SUBCUTANEOUS at 19:23

## 2021-04-29 RX ADMIN — Medication 650 MILLIGRAM(S): at 05:00

## 2021-04-29 RX ADMIN — HYDROMORPHONE HYDROCHLORIDE 4 MILLIGRAM(S): 2 INJECTION INTRAMUSCULAR; INTRAVENOUS; SUBCUTANEOUS at 01:23

## 2021-04-29 RX ADMIN — ENOXAPARIN SODIUM 40 MILLIGRAM(S): 100 INJECTION SUBCUTANEOUS at 06:44

## 2021-04-29 RX ADMIN — Medication 5 MILLILITER(S): at 01:50

## 2021-04-29 RX ADMIN — HYDROMORPHONE HYDROCHLORIDE 4 MILLIGRAM(S): 2 INJECTION INTRAMUSCULAR; INTRAVENOUS; SUBCUTANEOUS at 06:44

## 2021-04-29 RX ADMIN — Medication 5 MILLILITER(S): at 13:25

## 2021-04-29 RX ADMIN — Medication 4 MILLIGRAM(S): at 06:44

## 2021-04-29 RX ADMIN — Medication 3 MILLILITER(S): at 08:22

## 2021-04-29 RX ADMIN — HYDROMORPHONE HYDROCHLORIDE 4 MILLIGRAM(S): 2 INJECTION INTRAMUSCULAR; INTRAVENOUS; SUBCUTANEOUS at 13:45

## 2021-04-29 RX ADMIN — Medication 5 MILLILITER(S): at 19:22

## 2021-04-29 RX ADMIN — QUETIAPINE FUMARATE 75 MILLIGRAM(S): 200 TABLET, FILM COATED ORAL at 09:38

## 2021-04-29 RX ADMIN — Medication 5 MILLILITER(S): at 08:22

## 2021-04-29 RX ADMIN — HYDROMORPHONE HYDROCHLORIDE 4 MILLIGRAM(S): 2 INJECTION INTRAMUSCULAR; INTRAVENOUS; SUBCUTANEOUS at 22:20

## 2021-04-29 RX ADMIN — Medication 3 MILLILITER(S): at 13:24

## 2021-04-29 NOTE — PROGRESS NOTE ADULT - SUBJECTIVE AND OBJECTIVE BOX
SUBJECTIVE    REVIEW OF SYSTEMS:                                     OBJECTIVE  Vital Signs Last 24 Hrs  T(C): 37.6 (29 Apr 2021 05:27), Max: 38.3 (29 Apr 2021 00:00)  T(F): 99.6 (29 Apr 2021 05:27), Max: 101 (29 Apr 2021 00:00)  HR: 100 (29 Apr 2021 08:00) (82 - 101)  BP: 113/55 (29 Apr 2021 08:00) (101/54 - 141/60)  BP(mean): 71 (29 Apr 2021 08:00) (71 - 98)  RR: 34 (29 Apr 2021 08:00) (19 - 46)  SpO2: 91% (29 Apr 2021 08:00) (91% - 100%) on TC40%    Vent settings   Mode: standby, FiO2: 40    I&O's Detail    28 Apr 2021 07:01  -  29 Apr 2021 07:00  --------------------------------------------------------  IN:    IV PiggyBack: 50 mL    Lactated Ringers Bolus: 1500 mL    Vital1.5: 1155 mL  Total IN: 2705 mL    OUT:    Intermittent Catheterization - Urethral (mL): 1550 mL    Post-Void Residual per Intermittent Catheterization (mL): 600 mL    Rectal Tube (mL): 100 mL  Total OUT: 2250 mL    Total NET: 455 mL    LABS:  *pending, will add once received    Microbiology    Culture - Blood (collected 27 Apr 2021 15:26)  Source: .Blood Blood  Preliminary Report (28 Apr 2021 16:00):    No growth at 1 day.    Culture - Blood (collected 27 Apr 2021 15:26)  Source: .Blood Blood  Preliminary Report (28 Apr 2021 16:00):    No growth at 1 day.    Culture - Sputum (collected 27 Apr 2021 10:15)  Source: .Sputum tracheostomy  Gram Stain (27 Apr 2021 13:50):    Few epithelial cells    Moderate White blood cells    Rare to few Gram positive cocci in pairs    Rare Yeast like cells    Rare Gram Variable Rods  Preliminary Report (28 Apr 2021 09:12):    Culture in progress     MEDICATIONS  (STANDING):  acetylcysteine 10%  Inhalation 5 milliLiter(s) Inhalation every 6 hours  albuterol/ipratropium for Nebulization 3 milliLiter(s) Nebulizer every 6 hours  chlorhexidine 0.12% Liquid 15 milliLiter(s) Oral Mucosa every 12 hours  dextrose 40% Gel 15 Gram(s) Oral once  dextrose 5%. 1000 milliLiter(s) (50 mL/Hr) IV Continuous <Continuous>  dextrose 5%. 1000 milliLiter(s) (100 mL/Hr) IV Continuous <Continuous>  dextrose 50% Injectable 25 Gram(s) IV Push once  dextrose 50% Injectable 12.5 Gram(s) IV Push once  dextrose 50% Injectable 25 Gram(s) IV Push once  enoxaparin Injectable 40 milliGRAM(s) SubCutaneous every 12 hours  glucagon  Injectable 1 milliGRAM(s) IntraMuscular once  HYDROmorphone   Tablet 4 milliGRAM(s) Enteral Tube every 4 hours  insulin regular  human corrective regimen sliding scale   SubCutaneous every 6 hours  LORazepam     Tablet 4 milliGRAM(s) Oral every 8 hours  QUEtiapine 75 milliGRAM(s) Oral every 12 hours  tamsulosin 0.8 milliGRAM(s) Oral at bedtime    MEDICATIONS  (PRN):  acetaminophen    Suspension .. 650 milliGRAM(s) Oral every 6 hours PRN Temp greater or equal to 38C (100.4F)  midazolam Injectable 4 milliGRAM(s) IV Push every 6 hours PRN anxiety/agitation        PHYSICAL EXAM:    ASSESSMENT AND PLAN:   SUBJECTIVE  Overnight events: no acute events; Tmax 38.3 overnight; required Versed 4 mg IVP x 1 PRN dose for agitation; very restless overnight, tachypneic on trach collar    REVIEW OF SYSTEMS:   See HPI (as per chart review), unable to obtain 2/2 trach and neuro status    OBJECTIVE  Vital Signs Last 24 Hrs  T(C): 37.6 (29 Apr 2021 05:27), Max: 38.3 (29 Apr 2021 00:00)  T(F): 99.6 (29 Apr 2021 05:27), Max: 101 (29 Apr 2021 00:00)  HR: 100 (29 Apr 2021 08:00) (82 - 101)  BP: 113/55 (29 Apr 2021 08:00) (101/54 - 141/60)  BP(mean): 71 (29 Apr 2021 08:00) (71 - 98)  RR: 34 (29 Apr 2021 08:00) (19 - 46)  SpO2: 91% (29 Apr 2021 08:00) (91% - 100%) on TC40%    Vent settings- Mode: standby, FiO2: 40%    I&O's Detail    28 Apr 2021 07:01  -  29 Apr 2021 07:00  --------------------------------------------------------  IN:    IV PiggyBack: 50 mL    Lactated Ringers Bolus: 1500 mL    Vital1.5: 1155 mL  Total IN: 2705 mL    OUT:    Intermittent Catheterization - Urethral (mL): 1550 mL    Post-Void Residual per Intermittent Catheterization (mL): 600 mL    Rectal Tube (mL): 100 mL  Total OUT: 2250 mL    Total NET: 455 mL    LABS:  *pending, will add once received    Microbiology    Culture - Blood (collected 27 Apr 2021 15:26)  Source: .Blood Blood  Preliminary Report (28 Apr 2021 16:00):    No growth at 1 day.    Culture - Blood (collected 27 Apr 2021 15:26)  Source: .Blood Blood  Preliminary Report (28 Apr 2021 16:00):    No growth at 1 day.    Culture - Sputum (collected 27 Apr 2021 10:15)  Source: .Sputum tracheostomy  Gram Stain (27 Apr 2021 13:50):    Few epithelial cells    Moderate White blood cells    Rare to few Gram positive cocci in pairs    Rare Yeast like cells    Rare Gram Variable Rods  Preliminary Report (28 Apr 2021 09:12):    Culture in progress     MEDICATIONS  (STANDING):  acetylcysteine 10%  Inhalation 5 milliLiter(s) Inhalation every 6 hours  albuterol/ipratropium for Nebulization 3 milliLiter(s) Nebulizer every 6 hours  chlorhexidine 0.12% Liquid 15 milliLiter(s) Oral Mucosa every 12 hours  dextrose 40% Gel 15 Gram(s) Oral once  dextrose 5%. 1000 milliLiter(s) (50 mL/Hr) IV Continuous <Continuous>  dextrose 5%. 1000 milliLiter(s) (100 mL/Hr) IV Continuous <Continuous>  dextrose 50% Injectable 25 Gram(s) IV Push once  dextrose 50% Injectable 12.5 Gram(s) IV Push once  dextrose 50% Injectable 25 Gram(s) IV Push once  enoxaparin Injectable 40 milliGRAM(s) SubCutaneous every 12 hours  glucagon  Injectable 1 milliGRAM(s) IntraMuscular once  HYDROmorphone   Tablet 4 milliGRAM(s) Enteral Tube every 4 hours  insulin regular  human corrective regimen sliding scale   SubCutaneous every 6 hours  LORazepam     Tablet 4 milliGRAM(s) Oral every 8 hours  QUEtiapine 75 milliGRAM(s) Oral every 12 hours  tamsulosin 0.8 milliGRAM(s) Oral at bedtime    MEDICATIONS  (PRN):  acetaminophen    Suspension .. 650 milliGRAM(s) Oral every 6 hours PRN Temp greater or equal to 38C (100.4F)  midazolam Injectable 4 milliGRAM(s) IV Push every 6 hours PRN anxiety/agitation    PHYSICAL EXAM:  GENERAL: obese, trached, on TC, moderately increased work of breathing noted  SKIN/HAIR/NAILS: Nails without clubbing or cyanosis. CR<2 secs. No rash, petechiae, erythema or ecchymoses. Anicteric. Patient with pressure injury on cheeks 2/2 previous proning; stage I on sacrum.  HEAD AND NECK: The skull is NC/AT. B/L Sclera white. 3 mm PERRL (hx of cataract sx). Nasal mucous membrane dry; Trachea midline, neck supple. Moderate creamy yellow drainage noted from trach site.  THORAX/LUNGS: Thorax is symmetric, shallow breathing noted, tachypneic 30-36 bpm; currently on trach collar; lung sounds with coarse rhonchi, diminished at the bases. Moderate, thick, creamy secretions from tracheostomy.   CARDIOVASCULAR: No JVD. Carotid upstrokes are brisk, without bruits. +S1 and S2, RRR; no extra heart sounds or M/R/G. NSR at the time of my exam.  ABDOMEN/: Abdomen is rounded with hypoactive BS in all 4 quadrants; it is soft, no palpable masses; no grimacing to palpation noted; last BM 4/29 as patient has rectal tube with liquid brown stool, rectal tube output has significantly decreased. No indwelling urinary catheter in place. Pt. requiring straight cath for urinary retention.  PERIPHERAL VASCULAR: Extremities are warm and well perfused, radial and dorsalis pedis pulses +2 and symmetric. +1 generalized non-pitting edema noted, +2 on hands. No clubbing, no cyanosis.  MUSCULOSKELETAL: spontaneous UE movements noted; no evidence of swelling or deformity.  NEUROLOGICAL: Patient opens eyes to name, able to follow "squeeze my hand" command with B/L UE and "wiggle your toes" command on B/L LE.    Assessment and Plan  55 yo M with PMHx of MU and gout who presented to ED with c/o progressively worsening SOB admitted to ICU for management of AHRF in the setting of COVID. Course c/b GRACE, ileus, septic shock 2/2 aspiration event, Afib with RVR. Now trached and PEG'd.    NEURO  #Sedated   -No IV sedation infusing at this time  -Current regimen via PEG: Dilaudid 6 mg q4hrs, Seroquel 75 mg BID, Ativan 4 mg q8hrs  -Versed 4 mg IVP PRN q6hrs for breakthrough agitation, given last night x 1 dose    RESPIRATORY  #Acute hypoxic hypercapnic respiratory failure in the setting of COVID PNA  -Patient initially presented with O2 saturation of 14% and cyanotic requiring immediate intubation  -CXR with diffuse bilateral opacities with + COVID19 PCR  -Elevated inflammatory markers  -s/p intubation 4/5  -Tracheostomy on 4/22  -Patient tachypneic and in moderate distress, placed on CPAP 10/5  -Will trial one hour of CPAP vent support q4-6hrs alternating with trach collar    #COVID  -CXR with diffuse bilateral opacities with + COVID19 PCR  -Decadron (4/5-4/13) d/c'ed due to clinical presentation  -Remdesevir (4/5-4/9)    #MU  -As per patient's wife, patient has MU on CPAP  -Patient sleeps with 2-3 pillows at night, attributed to MU    CARDIOVASCULAR  #Atrial Fibrillation- RESOLVED  -Patient went into afib with RVR 4/17 @ about 1830  -No new medications started as resolved  -Anticoagulation for afib not started, patient now in NSR and [id number] Score 0 as patient has no CV hx (however, on admission patient was found to have low EF- repeat ECHO with EF 55%)    GI  -s/p PEG 4/21  -Tolerating tube feedings  -Vital 1.5 @ 55 mL/hr   -GI ppx with pantoprazole d/c'd due to eosinophilia and patient being enterally fed at goal  -Rectal tube in place  -Banatrol added to help with diarrhea  -Bowel regimen d/c'd due to diarrhea  -Rectal tube output decreased, will re-assess for discontinuation    #Ileus 2/2 opioids - RESOLVED and having BMs  -s/p sump for decompression  -s/p methylnaltrexone with BM afterwards    RENAL//F&E  #Acute Kidney Injury  -Baseline creatinine unknown, no reported kidney disease  -Urine lytes 4/28 showing pre-renal, 2L LR given 4/28 PM  -Renal fxn 4/28 with mild bump in SCr (1.24->1.31)  -Labs pending for 4/29  -Strict I&O monitoring  -Avoid nephrotoxic medications    #Urinary Retention  -Patient had alas d/c'd 4/26 and failed TOV  -Has required bladder scanning and straight cath'ing  -Flomax 0.8mg started 4/27 PM    ID  #Persistent Fever  -Recent aspiration pneumonia c/b septic shock (now resolved); sputum culture yielded growth of normal respiratory lexie. Fever despite meropenem ?2/2 periodic aspiration vs. COVID-19 vs. new nosocomial infectious process  -Meropenem x7 days (4/16-4/22)  -Tmax 38.9 overnight, will resend sputum cx  -UA negative  -BCx2 from 4/27 no growth at 12 hours  -Pantoprazole d/c'd as patient with eosinophilia (was also on meropenem course)  -Will monitor off abx with low grade fevers unless hemodynamically unstable     #Aspiration pneumonia vs HAP  -Found 4/14 while trying to extubate w/ delirium, and numerous thick secretions in oral cavity requiring suctioning. 4/15 AM found w/ 102.3F oral temp and WBC increase to 18s, suspect possible aspiration pneumonia, with previous negative MRSA swab. 4/14 bcx NGTD, 4/15 bcx NGTD  -Zosyn started 4/9 and d/c'd 4/16 in the setting of continued septic shock  -Decision made to broaden antibiotics to meropenem 1 gram and vancomycin 1250 mg  -MRSA swab negative, no addt'l vanco dosed  -s/p meropenem course completion 4/16-4/22    #COVID PNA   -Plan as above  -Off steroid course, remdesivir course completed    #?Fungal Infxn  -Seen by ID, -ID ok'd Vori (loading doses x 2 given, then on 400 mg q12hr), stopped 4/14 given improvement in pt. status before medication was really started   -As per ID recs ====> s/p BAL 4/12--cultures have so far only yielded Keila dubliniensis which is probably a respiratory tract colonizer (and not the etiology of the patient's fevers). Voriconazole is not indicated for respiratory tract colonization with Candida. Suspect recent high fevers/leukocytosis due to aspiration event; Doubt COVID-19 associated pulmonary aspergillosis given BAL cultures without growth of mold, absence of cavitation on CXR, and time course relatively early for development of this rare entity. Moreover, would not attribute new fevers to discontinuation of voriconazole.  -BAL sputum samples taken from bronch 4/12, showing yeast   -Sputum cx from 4/16, no evidence of yeast  -Peripheral blood cultures from 4/21 NG @ 1 day  -Maintain MAP > 70 mmHg    ENDOCRINE  #Hyperglycemia  -q6hr correctional scale  -Blood glucose trend 120-160's  -Decadron started 4/5 AM, d/c'd 4/13  -A1c 6.2    HEME  #Anemia  -Hgb 11 on admission; unknown baseline. No evidence of bleed.  -Hgb 4/18 7.3, possibly hemodiluted as patient did get fluids and albumin overnight, s/p transfusion 1U PRBC  -Hgb 4/25 7.0 possibly 2/2 phlebotomy with AoCD, s/p transfusion 1u pRBCs  -Today Hgb stable @ _______; last pRBC transfusion on 4/25  -Maintain active T+S  -DVT ppx with Lovenox SubQ    DISPO/GOALS OF CARE:  -Full Code  -Plan for LTAC placement   SUBJECTIVE  Overnight events: no acute events; Tmax 38.3 overnight; required Versed 4 mg IVP x 1 PRN dose for agitation; very restless overnight, tachypneic on trach collar    REVIEW OF SYSTEMS:   See HPI (as per chart review), unable to obtain 2/2 trach and neuro status    OBJECTIVE  Vital Signs Last 24 Hrs  T(C): 37.6 (29 Apr 2021 05:27), Max: 38.3 (29 Apr 2021 00:00)  T(F): 99.6 (29 Apr 2021 05:27), Max: 101 (29 Apr 2021 00:00)  HR: 100 (29 Apr 2021 08:00) (82 - 101)  BP: 113/55 (29 Apr 2021 08:00) (101/54 - 141/60)  BP(mean): 71 (29 Apr 2021 08:00) (71 - 98)  RR: 34 (29 Apr 2021 08:00) (19 - 46)  SpO2: 91% (29 Apr 2021 08:00) (91% - 100%) on TC40%    Vent settings- Mode: standby, FiO2: 40%    I&O's Detail    28 Apr 2021 07:01  -  29 Apr 2021 07:00  --------------------------------------------------------  IN:    IV PiggyBack: 50 mL    Lactated Ringers Bolus: 1500 mL    Vital1.5: 1155 mL  Total IN: 2705 mL    OUT:    Intermittent Catheterization - Urethral (mL): 1550 mL    Post-Void Residual per Intermittent Catheterization (mL): 600 mL    Rectal Tube (mL): 100 mL  Total OUT: 2250 mL    Total NET: 455 mL    LABS:                        8.1    8.90  )-----------( 498      ( 29 Apr 2021 08:14 )             26.7     04-29    136  |  98  |  19  ----------------------------<  130<H>  4.5   |  28  |  1.18    Ca    8.9      29 Apr 2021 08:14  Phos  3.6     04-29  Mg     1.9     04-29    Microbiology    Culture - Blood (collected 27 Apr 2021 15:26)  Source: .Blood Blood  Preliminary Report (28 Apr 2021 16:00):    No growth at 1 day.    Culture - Blood (collected 27 Apr 2021 15:26)  Source: .Blood Blood  Preliminary Report (28 Apr 2021 16:00):    No growth at 1 day.    Culture - Sputum (collected 27 Apr 2021 10:15)  Source: .Sputum tracheostomy  Gram Stain (27 Apr 2021 13:50):    Few epithelial cells    Moderate White blood cells    Rare to few Gram positive cocci in pairs    Rare Yeast like cells    Rare Gram Variable Rods  Preliminary Report (28 Apr 2021 09:12):    Culture in progress     MEDICATIONS  (STANDING):  acetylcysteine 10%  Inhalation 5 milliLiter(s) Inhalation every 6 hours  albuterol/ipratropium for Nebulization 3 milliLiter(s) Nebulizer every 6 hours  chlorhexidine 0.12% Liquid 15 milliLiter(s) Oral Mucosa every 12 hours  dextrose 40% Gel 15 Gram(s) Oral once  dextrose 5%. 1000 milliLiter(s) (50 mL/Hr) IV Continuous <Continuous>  dextrose 5%. 1000 milliLiter(s) (100 mL/Hr) IV Continuous <Continuous>  dextrose 50% Injectable 25 Gram(s) IV Push once  dextrose 50% Injectable 12.5 Gram(s) IV Push once  dextrose 50% Injectable 25 Gram(s) IV Push once  enoxaparin Injectable 40 milliGRAM(s) SubCutaneous every 12 hours  glucagon  Injectable 1 milliGRAM(s) IntraMuscular once  HYDROmorphone   Tablet 4 milliGRAM(s) Enteral Tube every 4 hours  insulin regular  human corrective regimen sliding scale   SubCutaneous every 6 hours  LORazepam     Tablet 4 milliGRAM(s) Oral every 8 hours  QUEtiapine 75 milliGRAM(s) Oral every 12 hours  tamsulosin 0.8 milliGRAM(s) Oral at bedtime    MEDICATIONS  (PRN):  acetaminophen    Suspension .. 650 milliGRAM(s) Oral every 6 hours PRN Temp greater or equal to 38C (100.4F)  midazolam Injectable 4 milliGRAM(s) IV Push every 6 hours PRN anxiety/agitation    PHYSICAL EXAM:  GENERAL: obese, trached, on TC, moderately increased work of breathing noted  SKIN/HAIR/NAILS: Nails without clubbing or cyanosis. CR<2 secs. No rash, petechiae, erythema or ecchymoses. Anicteric. Patient with pressure injury on cheeks 2/2 previous proning; stage I on sacrum.  HEAD AND NECK: The skull is NC/AT. B/L Sclera white. 3 mm PERRL (hx of cataract sx). Nasal mucous membrane dry; Trachea midline, neck supple. Moderate creamy yellow drainage noted from trach site.  THORAX/LUNGS: Thorax is symmetric, shallow breathing noted, tachypneic 30-36 bpm; currently on trach collar; lung sounds with coarse rhonchi, diminished at the bases. Moderate, thick, creamy secretions from tracheostomy.   CARDIOVASCULAR: No JVD. Carotid upstrokes are brisk, without bruits. +S1 and S2, RRR; no extra heart sounds or M/R/G. NSR at the time of my exam.  ABDOMEN/: Abdomen is rounded with hypoactive BS in all 4 quadrants; it is soft, no palpable masses; no grimacing to palpation noted; last BM 4/29 as patient has rectal tube with liquid brown stool, rectal tube output has significantly decreased. No indwelling urinary catheter in place. Pt. requiring straight cath for urinary retention.  PERIPHERAL VASCULAR: Extremities are warm and well perfused, radial and dorsalis pedis pulses +2 and symmetric. +1 generalized non-pitting edema noted, +2 on hands. No clubbing, no cyanosis.  MUSCULOSKELETAL: spontaneous UE movements noted; no evidence of swelling or deformity.  NEUROLOGICAL: Patient opens eyes to name, able to follow "squeeze my hand" command with B/L UE and "wiggle your toes" command on B/L LE.    Assessment and Plan  57 yo M with PMHx of MU and gout who presented to ED with c/o progressively worsening SOB admitted to ICU for management of AHRF in the setting of COVID. Course c/b GRACE, ileus, septic shock 2/2 aspiration event, Afib with RVR. Now trached and PEG'd.    NEURO  #Sedated   -No IV sedation infusing at this time  -Current regimen via PEG: Dilaudid 6 mg q4hrs, Seroquel 75 mg BID, Ativan 4 mg q8hrs  -Versed 4 mg IVP PRN q6hrs for breakthrough agitation, given last night x 1 dose    RESPIRATORY  #Acute hypoxic hypercapnic respiratory failure in the setting of COVID PNA  -Patient initially presented with O2 saturation of 14% and cyanotic requiring immediate intubation  -CXR with diffuse bilateral opacities with + COVID19 PCR  -Elevated inflammatory markers  -s/p intubation 4/5  -Tracheostomy on 4/22  -Patient tachypneic and in moderate distress, placed on CPAP 10/5  -Will trial one hour of CPAP vent support q4-6hrs alternating with trach collar    #COVID  -CXR with diffuse bilateral opacities with + COVID19 PCR  -Decadron (4/5-4/13) d/c'ed due to clinical presentation  -Remdesevir (4/5-4/9)    #MU  -As per patient's wife, patient has MU on CPAP  -Patient sleeps with 2-3 pillows at night, attributed to MU    CARDIOVASCULAR  #Atrial Fibrillation- RESOLVED  -Patient went into afib with RVR 4/17 @ about 1830  -No new medications started as resolved  -Anticoagulation for afib not started, patient now in NSR and [id number] Score 0 as patient has no CV hx (however, on admission patient was found to have low EF- repeat ECHO with EF 55%)    GI  -s/p PEG 4/21  -Tolerating tube feedings  -Vital 1.5 @ 55 mL/hr   -GI ppx with pantoprazole d/c'd due to eosinophilia and patient being enterally fed at goal  -Rectal tube in place  -Banatrol added to help with diarrhea  -Bowel regimen d/c'd due to diarrhea  -Rectal tube output decreased, will re-assess for discontinuation    #Ileus 2/2 opioids - RESOLVED and having BMs  -s/p sump for decompression  -s/p methylnaltrexone with BM afterwards    RENAL//F&E  #Acute Kidney Injury  -Baseline creatinine unknown, no reported kidney disease  -Urine lytes 4/28 showing pre-renal, 2L LR given 4/28 PM  -Renal fxn 4/28 with mild bump in SCr (1.24->1.31)  -Today SCr. 1.18  -Strict I&O monitoring  -Avoid nephrotoxic medications    #Urinary Retention  -Patient had alas d/c'd 4/26 and failed TOV  -Has required bladder scanning and straight cath'ing  -Flomax 0.8mg started 4/27 PM    ID  #Persistent Fever  -Recent aspiration pneumonia c/b septic shock (now resolved); sputum culture yielded growth of normal respiratory lexie. Fever despite meropenem ?2/2 periodic aspiration vs. COVID-19 vs. new nosocomial infectious process  -Meropenem x7 days (4/16-4/22)  -Tmax 38.9 overnight, will resend sputum cx  -UA negative  -BCx2 from 4/27 no growth at 12 hours  -Pantoprazole d/c'd as patient with eosinophilia (was also on meropenem course)  -Will monitor off abx with low grade fevers unless hemodynamically unstable   -Sputum cx pending    #Aspiration pneumonia vs HAP  -Found 4/14 while trying to extubate w/ delirium, and numerous thick secretions in oral cavity requiring suctioning. 4/15 AM found w/ 102.3F oral temp and WBC increase to 18s, suspect possible aspiration pneumonia, with previous negative MRSA swab. 4/14 bcx NGTD, 4/15 bcx NGTD  -Zosyn started 4/9 and d/c'd 4/16 in the setting of continued septic shock  -Decision made to broaden antibiotics to meropenem 1 gram and vancomycin 1250 mg  -MRSA swab negative, no addt'l vanco dosed  -s/p meropenem course completion 4/16-4/22    #COVID PNA   -Plan as above  -Off steroid course, remdesivir course completed    #?Fungal Infxn  -Seen by ID, -ID ok'd Vori (loading doses x 2 given, then on 400 mg q12hr), stopped 4/14 given improvement in pt. status before medication was really started   -As per ID recs ====> s/p BAL 4/12--cultures have so far only yielded Keila dubliniensis which is probably a respiratory tract colonizer (and not the etiology of the patient's fevers). Voriconazole is not indicated for respiratory tract colonization with Candida. Suspect recent high fevers/leukocytosis due to aspiration event; Doubt COVID-19 associated pulmonary aspergillosis given BAL cultures without growth of mold, absence of cavitation on CXR, and time course relatively early for development of this rare entity. Moreover, would not attribute new fevers to discontinuation of voriconazole.  -BAL sputum samples taken from bronch 4/12, showing yeast   -Sputum cx from 4/16, no evidence of yeast  -Peripheral blood cultures from 4/21 NG @ 1 day  -Maintain MAP > 70 mmHg    ENDOCRINE  #Hyperglycemia  -q6hr correctional scale  -Blood glucose trend 120-160's  -Decadron started 4/5 AM, d/c'd 4/13  -A1c 6.2    HEME  #Anemia  -Hgb 11 on admission; unknown baseline. No evidence of bleed.  -Hgb 4/18 7.3, possibly hemodiluted as patient did get fluids and albumin overnight, s/p transfusion 1U PRBC  -Hgb 4/25 7.0 possibly 2/2 phlebotomy with AoCD, s/p transfusion 1u pRBCs  -Today Hgb stable @ 8.1; last pRBC transfusion on 4/25  -Maintain active T+S  -DVT ppx with Lovenox SubQ    DISPO/GOALS OF CARE:  -Full Code  -Plan for LTAC placement

## 2021-04-29 NOTE — PROGRESS NOTE ADULT - SUBJECTIVE AND OBJECTIVE BOX
ENT [facility] DAILY PROGRESS NOTE    4/19/21 HPI:   56M, w/ PMH MU (home CPAP) and gout,  presented on 4/05/21 to [facility] ED for SOB.  Had fatigue, malaise, myalgias x 4 days & worsening SOB x 2 days.  S/p first dose of Moderna vaccine on 3/25/2021.  In ED, had tachypnea and cyanosis with O2sat 14%; intubated after O2sat only 51% on NRB.  CXR w/ diffuse bilateral infiltrates, and COVID NP PCR positive.  Initial echo (4/05) w/ EF LV 15% and mild RV dilation.  Repeat echo (4/13) w/ EF 55%, mild concentric LVH; normal RV function; no pericardial effusion.  Started on vanco and zosyn for possible superimposed bacterial PNA since septic.  Now on merepenem (4/15-) for possible aspiration PNA after vomiting and fevers to 102F.  S/p Decadron and remdesivir for COVID.  Treated for GRACE (partly prerenal); hypernatremia (high 147, now WNL), anemia (hgb 11 on admission; no bleeding source; likely dilutional).  In acute hypoxic respiratory failure 2/2 COVID c/b septic shock due to aspiration (now resolved), continued fevers and difficulty weaning off vent due to delirium.   No history of prior trach procedures  Pt was on heparin 7500 q8, but no other anticoagulants or antiplatelet agents.      Overnight events/Interval HPI:   4/21:  Tracheotomy was cancelled because procedure bumped by OR for emergency case.     PEG placed.  4/22:  Still on FiO2 40% on vent. No pressors.  Dr. Mixon or Dr. Calhoun will do trach today in afternoon.  4/29: POD7 s/p trach with 8LPC. Trach sutures removed today. No trach issues, remains on vent        ALLERGIES  No Known Allergies/ Intolerances    Vital Signs Last 24 Hrs  T(C): 37.9 (29 Apr 2021 09:57), Max: 38.3 (29 Apr 2021 00:00)  T(F): 100.2 (29 Apr 2021 09:57), Max: 101 (29 Apr 2021 00:00)  HR: 101 (29 Apr 2021 10:00) (82 - 101)  BP: 125/63 (29 Apr 2021 09:00) (101/54 - 141/60)  BP(mean): 86 (29 Apr 2021 09:00) (71 - 98)  RR: 43 (29 Apr 2021 10:00) (20 - 46)  SpO2: 95% (29 Apr 2021 10:00) (91% - 100%)      PHYSICAL EXAM:  Awake and agitated, on vent  Neck:  8LPC in place. No skin breakdown or bleeding  Lymph:  No cervical adenopathy.      LABS:                        8.1    8.90  )-----------( 498      ( 29 Apr 2021 08:14 )             26.7     04-29    136  |  98  |  19  ----------------------------<  130<H>  4.5   |  28  |  1.18    Ca    8.9      29 Apr 2021 08:14  Phos  3.6     04-29  Mg     1.9     04-29

## 2021-04-29 NOTE — PROGRESS NOTE ADULT - ATTENDING COMMENTS
Some SOB, back on AC. Will retry on higher dose seroquel to progress weaning. Fevers low grade and stable off abx.

## 2021-04-29 NOTE — PROGRESS NOTE ADULT - ASSESSMENT
56y Male, with MU, obesity and gout, has been intubated since 4/05/21 due to COVID PNA; also treated with antibiotics for suspected aspiration event, now s/p trach 4/22 with placement of 8LPC    ROUTINE TRACH CARE:   - HOB at 30 degrees   - Suction with red rubber catheters only   - Obturator at HOB  - Change inner cannula q shift and PRN  - Keep surrounding skin clean and dry  - ENT to sign off at this time, please re-consult as needed

## 2021-04-30 VITALS
DIASTOLIC BLOOD PRESSURE: 60 MMHG | SYSTOLIC BLOOD PRESSURE: 123 MMHG | OXYGEN SATURATION: 100 % | RESPIRATION RATE: 26 BRPM | HEART RATE: 92 BPM

## 2021-04-30 LAB
ANION GAP SERPL CALC-SCNC: 11 MMOL/L — SIGNIFICANT CHANGE UP (ref 5–17)
BASOPHILS # BLD AUTO: 0.03 K/UL — SIGNIFICANT CHANGE UP (ref 0–0.2)
BASOPHILS NFR BLD AUTO: 0.5 % — SIGNIFICANT CHANGE UP (ref 0–2)
BUN SERPL-MCNC: 21 MG/DL — SIGNIFICANT CHANGE UP (ref 7–23)
CALCIUM SERPL-MCNC: 8.7 MG/DL — SIGNIFICANT CHANGE UP (ref 8.4–10.5)
CHLORIDE SERPL-SCNC: 100 MMOL/L — SIGNIFICANT CHANGE UP (ref 96–108)
CO2 SERPL-SCNC: 27 MMOL/L — SIGNIFICANT CHANGE UP (ref 22–31)
CREAT SERPL-MCNC: 1.28 MG/DL — SIGNIFICANT CHANGE UP (ref 0.5–1.3)
EOSINOPHIL # BLD AUTO: 1 K/UL — HIGH (ref 0–0.5)
EOSINOPHIL NFR BLD AUTO: 15.1 % — HIGH (ref 0–6)
GLUCOSE BLDC GLUCOMTR-MCNC: 133 MG/DL — HIGH (ref 70–99)
GLUCOSE BLDC GLUCOMTR-MCNC: 145 MG/DL — HIGH (ref 70–99)
GLUCOSE BLDC GLUCOMTR-MCNC: 152 MG/DL — HIGH (ref 70–99)
GLUCOSE BLDC GLUCOMTR-MCNC: 152 MG/DL — HIGH (ref 70–99)
GLUCOSE SERPL-MCNC: 134 MG/DL — HIGH (ref 70–99)
HCT VFR BLD CALC: 25.5 % — LOW (ref 39–50)
HGB BLD-MCNC: 7.9 G/DL — LOW (ref 13–17)
IMM GRANULOCYTES NFR BLD AUTO: 0.6 % — SIGNIFICANT CHANGE UP (ref 0–1.5)
LYMPHOCYTES # BLD AUTO: 1.11 K/UL — SIGNIFICANT CHANGE UP (ref 1–3.3)
LYMPHOCYTES # BLD AUTO: 16.7 % — SIGNIFICANT CHANGE UP (ref 13–44)
MAGNESIUM SERPL-MCNC: 2 MG/DL — SIGNIFICANT CHANGE UP (ref 1.6–2.6)
MCHC RBC-ENTMCNC: 24.8 PG — LOW (ref 27–34)
MCHC RBC-ENTMCNC: 31 GM/DL — LOW (ref 32–36)
MCV RBC AUTO: 79.9 FL — LOW (ref 80–100)
MONOCYTES # BLD AUTO: 0.71 K/UL — SIGNIFICANT CHANGE UP (ref 0–0.9)
MONOCYTES NFR BLD AUTO: 10.7 % — SIGNIFICANT CHANGE UP (ref 2–14)
NEUTROPHILS # BLD AUTO: 3.74 K/UL — SIGNIFICANT CHANGE UP (ref 1.8–7.4)
NEUTROPHILS NFR BLD AUTO: 56.4 % — SIGNIFICANT CHANGE UP (ref 43–77)
NRBC # BLD: 0 /100 WBCS — SIGNIFICANT CHANGE UP (ref 0–0)
PHOSPHATE SERPL-MCNC: 4 MG/DL — SIGNIFICANT CHANGE UP (ref 2.5–4.5)
PLATELET # BLD AUTO: 566 K/UL — HIGH (ref 150–400)
POTASSIUM SERPL-MCNC: 4.6 MMOL/L — SIGNIFICANT CHANGE UP (ref 3.5–5.3)
POTASSIUM SERPL-SCNC: 4.6 MMOL/L — SIGNIFICANT CHANGE UP (ref 3.5–5.3)
RBC # BLD: 3.19 M/UL — LOW (ref 4.2–5.8)
RBC # FLD: 19.2 % — HIGH (ref 10.3–14.5)
SODIUM SERPL-SCNC: 138 MMOL/L — SIGNIFICANT CHANGE UP (ref 135–145)
WBC # BLD: 6.63 K/UL — SIGNIFICANT CHANGE UP (ref 3.8–10.5)
WBC # FLD AUTO: 6.63 K/UL — SIGNIFICANT CHANGE UP (ref 3.8–10.5)

## 2021-04-30 PROCEDURE — 85379 FIBRIN DEGRADATION QUANT: CPT

## 2021-04-30 PROCEDURE — 82607 VITAMIN B-12: CPT

## 2021-04-30 PROCEDURE — 94002 VENT MGMT INPAT INIT DAY: CPT

## 2021-04-30 PROCEDURE — 86140 C-REACTIVE PROTEIN: CPT

## 2021-04-30 PROCEDURE — 99285 EMERGENCY DEPT VISIT HI MDM: CPT

## 2021-04-30 PROCEDURE — 80053 COMPREHEN METABOLIC PANEL: CPT

## 2021-04-30 PROCEDURE — 80061 LIPID PANEL: CPT

## 2021-04-30 PROCEDURE — 80048 BASIC METABOLIC PNL TOTAL CA: CPT

## 2021-04-30 PROCEDURE — 87640 STAPH A DNA AMP PROBE: CPT

## 2021-04-30 PROCEDURE — 87070 CULTURE OTHR SPECIMN AEROBIC: CPT

## 2021-04-30 PROCEDURE — 84443 ASSAY THYROID STIM HORMONE: CPT

## 2021-04-30 PROCEDURE — 83036 HEMOGLOBIN GLYCOSYLATED A1C: CPT

## 2021-04-30 PROCEDURE — 93970 EXTREMITY STUDY: CPT

## 2021-04-30 PROCEDURE — 84478 ASSAY OF TRIGLYCERIDES: CPT

## 2021-04-30 PROCEDURE — 82550 ASSAY OF CK (CPK): CPT

## 2021-04-30 PROCEDURE — U0005: CPT

## 2021-04-30 PROCEDURE — 86850 RBC ANTIBODY SCREEN: CPT

## 2021-04-30 PROCEDURE — 85384 FIBRINOGEN ACTIVITY: CPT

## 2021-04-30 PROCEDURE — 82803 BLOOD GASES ANY COMBINATION: CPT

## 2021-04-30 PROCEDURE — 86900 BLOOD TYPING SEROLOGIC ABO: CPT

## 2021-04-30 PROCEDURE — 88305 TISSUE EXAM BY PATHOLOGIST: CPT

## 2021-04-30 PROCEDURE — 93005 ELECTROCARDIOGRAM TRACING: CPT

## 2021-04-30 PROCEDURE — 87389 HIV-1 AG W/HIV-1&-2 AB AG IA: CPT

## 2021-04-30 PROCEDURE — 99291 CRITICAL CARE FIRST HOUR: CPT

## 2021-04-30 PROCEDURE — 80202 ASSAY OF VANCOMYCIN: CPT

## 2021-04-30 PROCEDURE — 87206 SMEAR FLUORESCENT/ACID STAI: CPT

## 2021-04-30 PROCEDURE — 83615 LACTATE (LD) (LDH) ENZYME: CPT

## 2021-04-30 PROCEDURE — 80076 HEPATIC FUNCTION PANEL: CPT

## 2021-04-30 PROCEDURE — 87102 FUNGUS ISOLATION CULTURE: CPT

## 2021-04-30 PROCEDURE — 87305 ASPERGILLUS AG IA: CPT

## 2021-04-30 PROCEDURE — 87186 SC STD MICRODIL/AGAR DIL: CPT

## 2021-04-30 PROCEDURE — 85730 THROMBOPLASTIN TIME PARTIAL: CPT

## 2021-04-30 PROCEDURE — 87449 NOS EACH ORGANISM AG IA: CPT

## 2021-04-30 PROCEDURE — 85025 COMPLETE CBC W/AUTO DIFF WBC: CPT

## 2021-04-30 PROCEDURE — 74018 RADEX ABDOMEN 1 VIEW: CPT

## 2021-04-30 PROCEDURE — 86901 BLOOD TYPING SEROLOGIC RH(D): CPT

## 2021-04-30 PROCEDURE — 82746 ASSAY OF FOLIC ACID SERUM: CPT

## 2021-04-30 PROCEDURE — 84100 ASSAY OF PHOSPHORUS: CPT

## 2021-04-30 PROCEDURE — 93308 TTE F-UP OR LMTD: CPT

## 2021-04-30 PROCEDURE — 81001 URINALYSIS AUTO W/SCOPE: CPT

## 2021-04-30 PROCEDURE — 82728 ASSAY OF FERRITIN: CPT

## 2021-04-30 PROCEDURE — 36430 TRANSFUSION BLD/BLD COMPNT: CPT

## 2021-04-30 PROCEDURE — 83880 ASSAY OF NATRIURETIC PEPTIDE: CPT

## 2021-04-30 PROCEDURE — 87116 MYCOBACTERIA CULTURE: CPT

## 2021-04-30 PROCEDURE — 84484 ASSAY OF TROPONIN QUANT: CPT

## 2021-04-30 PROCEDURE — U0003: CPT

## 2021-04-30 PROCEDURE — 84145 PROCALCITONIN (PCT): CPT

## 2021-04-30 PROCEDURE — 84295 ASSAY OF SERUM SODIUM: CPT

## 2021-04-30 PROCEDURE — 85027 COMPLETE CBC AUTOMATED: CPT

## 2021-04-30 PROCEDURE — 82570 ASSAY OF URINE CREATININE: CPT

## 2021-04-30 PROCEDURE — 83550 IRON BINDING TEST: CPT

## 2021-04-30 PROCEDURE — 87641 MR-STAPH DNA AMP PROBE: CPT

## 2021-04-30 PROCEDURE — 0225U NFCT DS DNA&RNA 21 SARSCOV2: CPT

## 2021-04-30 PROCEDURE — 83735 ASSAY OF MAGNESIUM: CPT

## 2021-04-30 PROCEDURE — 82553 CREATINE MB FRACTION: CPT

## 2021-04-30 PROCEDURE — 83935 ASSAY OF URINE OSMOLALITY: CPT

## 2021-04-30 PROCEDURE — 83540 ASSAY OF IRON: CPT

## 2021-04-30 PROCEDURE — 87086 URINE CULTURE/COLONY COUNT: CPT

## 2021-04-30 PROCEDURE — 89051 BODY FLUID CELL COUNT: CPT

## 2021-04-30 PROCEDURE — 82962 GLUCOSE BLOOD TEST: CPT

## 2021-04-30 PROCEDURE — 86923 COMPATIBILITY TEST ELECTRIC: CPT

## 2021-04-30 PROCEDURE — 84550 ASSAY OF BLOOD/URIC ACID: CPT

## 2021-04-30 PROCEDURE — 36415 COLL VENOUS BLD VENIPUNCTURE: CPT

## 2021-04-30 PROCEDURE — 84300 ASSAY OF URINE SODIUM: CPT

## 2021-04-30 PROCEDURE — 88112 CYTOPATH CELL ENHANCE TECH: CPT

## 2021-04-30 PROCEDURE — 71045 X-RAY EXAM CHEST 1 VIEW: CPT

## 2021-04-30 PROCEDURE — L8699: CPT

## 2021-04-30 PROCEDURE — P9016: CPT

## 2021-04-30 PROCEDURE — 94640 AIRWAY INHALATION TREATMENT: CPT

## 2021-04-30 PROCEDURE — 83605 ASSAY OF LACTIC ACID: CPT

## 2021-04-30 PROCEDURE — 82330 ASSAY OF CALCIUM: CPT

## 2021-04-30 PROCEDURE — 84540 ASSAY OF URINE/UREA-N: CPT

## 2021-04-30 PROCEDURE — 86606 ASPERGILLUS ANTIBODY: CPT

## 2021-04-30 PROCEDURE — 85610 PROTHROMBIN TIME: CPT

## 2021-04-30 PROCEDURE — 87015 SPECIMEN INFECT AGNT CONCNTJ: CPT

## 2021-04-30 PROCEDURE — 87150 DNA/RNA AMPLIFIED PROBE: CPT

## 2021-04-30 PROCEDURE — 87899 AGENT NOS ASSAY W/OPTIC: CPT

## 2021-04-30 PROCEDURE — 87040 BLOOD CULTURE FOR BACTERIA: CPT

## 2021-04-30 PROCEDURE — 94003 VENT MGMT INPAT SUBQ DAY: CPT

## 2021-04-30 PROCEDURE — 86769 SARS-COV-2 COVID-19 ANTIBODY: CPT

## 2021-04-30 PROCEDURE — 84132 ASSAY OF SERUM POTASSIUM: CPT

## 2021-04-30 PROCEDURE — 93306 TTE W/DOPPLER COMPLETE: CPT

## 2021-04-30 RX ORDER — HYDROMORPHONE HYDROCHLORIDE 2 MG/ML
2 INJECTION INTRAMUSCULAR; INTRAVENOUS; SUBCUTANEOUS EVERY 4 HOURS
Refills: 0 | Status: DISCONTINUED | OUTPATIENT
Start: 2021-04-30 | End: 2021-04-30

## 2021-04-30 RX ORDER — ALLOPURINOL 300 MG
1 TABLET ORAL
Qty: 0 | Refills: 0 | DISCHARGE
Start: 2021-04-30

## 2021-04-30 RX ORDER — COLCHICINE 0.6 MG
0 TABLET ORAL
Qty: 0 | Refills: 0 | DISCHARGE

## 2021-04-30 RX ORDER — TAMSULOSIN HYDROCHLORIDE 0.4 MG/1
2 CAPSULE ORAL
Qty: 0 | Refills: 0 | DISCHARGE
Start: 2021-04-30

## 2021-04-30 RX ORDER — MIDAZOLAM HYDROCHLORIDE 1 MG/ML
4 INJECTION, SOLUTION INTRAMUSCULAR; INTRAVENOUS EVERY 6 HOURS
Refills: 0 | Status: DISCONTINUED | OUTPATIENT
Start: 2021-04-30 | End: 2021-04-30

## 2021-04-30 RX ORDER — INSULIN HUMAN 100 [IU]/ML
0 INJECTION, SOLUTION SUBCUTANEOUS
Qty: 0 | Refills: 0 | DISCHARGE
Start: 2021-04-30

## 2021-04-30 RX ORDER — MIDAZOLAM HYDROCHLORIDE 1 MG/ML
2 INJECTION, SOLUTION INTRAMUSCULAR; INTRAVENOUS
Qty: 0 | Refills: 0 | DISCHARGE
Start: 2021-04-30

## 2021-04-30 RX ORDER — ENOXAPARIN SODIUM 100 MG/ML
40 INJECTION SUBCUTANEOUS
Qty: 0 | Refills: 0 | DISCHARGE
Start: 2021-04-30

## 2021-04-30 RX ORDER — HYDROMORPHONE HYDROCHLORIDE 2 MG/ML
1 INJECTION INTRAMUSCULAR; INTRAVENOUS; SUBCUTANEOUS
Qty: 0 | Refills: 0 | DISCHARGE
Start: 2021-04-30

## 2021-04-30 RX ORDER — ACETAMINOPHEN 500 MG
20.31 TABLET ORAL
Qty: 0 | Refills: 0 | DISCHARGE
Start: 2021-04-30

## 2021-04-30 RX ORDER — CHLORHEXIDINE GLUCONATE 213 G/1000ML
15 SOLUTION TOPICAL
Qty: 0 | Refills: 0 | DISCHARGE
Start: 2021-04-30

## 2021-04-30 RX ORDER — ALLOPURINOL 300 MG
0 TABLET ORAL
Qty: 0 | Refills: 0 | DISCHARGE

## 2021-04-30 RX ORDER — QUETIAPINE FUMARATE 200 MG/1
3 TABLET, FILM COATED ORAL
Qty: 0 | Refills: 0 | DISCHARGE
Start: 2021-04-30

## 2021-04-30 RX ORDER — SODIUM CHLORIDE 9 MG/ML
1000 INJECTION INTRAMUSCULAR; INTRAVENOUS; SUBCUTANEOUS ONCE
Refills: 0 | Status: COMPLETED | OUTPATIENT
Start: 2021-04-30 | End: 2021-04-30

## 2021-04-30 RX ORDER — IPRATROPIUM/ALBUTEROL SULFATE 18-103MCG
3 AEROSOL WITH ADAPTER (GRAM) INHALATION
Qty: 0 | Refills: 0 | DISCHARGE
Start: 2021-04-30

## 2021-04-30 RX ADMIN — HYDROMORPHONE HYDROCHLORIDE 4 MILLIGRAM(S): 2 INJECTION INTRAMUSCULAR; INTRAVENOUS; SUBCUTANEOUS at 09:41

## 2021-04-30 RX ADMIN — SODIUM CHLORIDE 1000 MILLILITER(S): 9 INJECTION INTRAMUSCULAR; INTRAVENOUS; SUBCUTANEOUS at 12:32

## 2021-04-30 RX ADMIN — INSULIN HUMAN 2: 100 INJECTION, SOLUTION SUBCUTANEOUS at 12:40

## 2021-04-30 RX ADMIN — Medication 4 MILLIGRAM(S): at 14:04

## 2021-04-30 RX ADMIN — Medication 4 MILLIGRAM(S): at 07:47

## 2021-04-30 RX ADMIN — HYDROMORPHONE HYDROCHLORIDE 4 MILLIGRAM(S): 2 INJECTION INTRAMUSCULAR; INTRAVENOUS; SUBCUTANEOUS at 11:03

## 2021-04-30 RX ADMIN — HYDROMORPHONE HYDROCHLORIDE 2 MILLIGRAM(S): 2 INJECTION INTRAMUSCULAR; INTRAVENOUS; SUBCUTANEOUS at 17:42

## 2021-04-30 RX ADMIN — Medication 100 MILLIGRAM(S): at 12:48

## 2021-04-30 RX ADMIN — HYDROMORPHONE HYDROCHLORIDE 2 MILLIGRAM(S): 2 INJECTION INTRAMUSCULAR; INTRAVENOUS; SUBCUTANEOUS at 14:04

## 2021-04-30 RX ADMIN — QUETIAPINE FUMARATE 75 MILLIGRAM(S): 200 TABLET, FILM COATED ORAL at 17:00

## 2021-04-30 RX ADMIN — HYDROMORPHONE HYDROCHLORIDE 2 MILLIGRAM(S): 2 INJECTION INTRAMUSCULAR; INTRAVENOUS; SUBCUTANEOUS at 17:04

## 2021-04-30 RX ADMIN — CHLORHEXIDINE GLUCONATE 15 MILLILITER(S): 213 SOLUTION TOPICAL at 06:17

## 2021-04-30 RX ADMIN — Medication 3 MILLILITER(S): at 14:04

## 2021-04-30 RX ADMIN — HYDROMORPHONE HYDROCHLORIDE 4 MILLIGRAM(S): 2 INJECTION INTRAMUSCULAR; INTRAVENOUS; SUBCUTANEOUS at 06:00

## 2021-04-30 RX ADMIN — HYDROMORPHONE HYDROCHLORIDE 4 MILLIGRAM(S): 2 INJECTION INTRAMUSCULAR; INTRAVENOUS; SUBCUTANEOUS at 01:00

## 2021-04-30 RX ADMIN — INSULIN HUMAN 2: 100 INJECTION, SOLUTION SUBCUTANEOUS at 00:23

## 2021-04-30 RX ADMIN — HYDROMORPHONE HYDROCHLORIDE 4 MILLIGRAM(S): 2 INJECTION INTRAMUSCULAR; INTRAVENOUS; SUBCUTANEOUS at 07:10

## 2021-04-30 RX ADMIN — Medication 3 MILLILITER(S): at 11:04

## 2021-04-30 RX ADMIN — ENOXAPARIN SODIUM 40 MILLIGRAM(S): 100 INJECTION SUBCUTANEOUS at 17:04

## 2021-04-30 RX ADMIN — Medication 3 MILLILITER(S): at 04:07

## 2021-04-30 RX ADMIN — QUETIAPINE FUMARATE 75 MILLIGRAM(S): 200 TABLET, FILM COATED ORAL at 09:41

## 2021-04-30 RX ADMIN — HYDROMORPHONE HYDROCHLORIDE 4 MILLIGRAM(S): 2 INJECTION INTRAMUSCULAR; INTRAVENOUS; SUBCUTANEOUS at 02:32

## 2021-04-30 RX ADMIN — ENOXAPARIN SODIUM 40 MILLIGRAM(S): 100 INJECTION SUBCUTANEOUS at 06:00

## 2021-04-30 RX ADMIN — QUETIAPINE FUMARATE 75 MILLIGRAM(S): 200 TABLET, FILM COATED ORAL at 01:27

## 2021-04-30 RX ADMIN — CHLORHEXIDINE GLUCONATE 15 MILLILITER(S): 213 SOLUTION TOPICAL at 17:04

## 2021-04-30 RX ADMIN — HYDROMORPHONE HYDROCHLORIDE 2 MILLIGRAM(S): 2 INJECTION INTRAMUSCULAR; INTRAVENOUS; SUBCUTANEOUS at 21:08

## 2021-04-30 RX ADMIN — HYDROMORPHONE HYDROCHLORIDE 2 MILLIGRAM(S): 2 INJECTION INTRAMUSCULAR; INTRAVENOUS; SUBCUTANEOUS at 15:21

## 2021-04-30 NOTE — PROGRESS NOTE ADULT - REASON FOR ADMISSION
SOB
SOB due to COVID
SOB

## 2021-04-30 NOTE — PROGRESS NOTE ADULT - ATTENDING SUPERVISION STATEMENT
Fellow
Resident
ACP
Resident
ACP
ACP
Fellow
Resident
Resident
Resident/Fellow
ACP
Fellow
Resident/Fellow
Resident
Resident/Fellow
Resident
Resident/Fellow
Resident

## 2021-04-30 NOTE — DISCHARGE NOTE NURSING/CASE MANAGEMENT/SOCIAL WORK - PATIENT PORTAL LINK FT
You can access the FollowMyHealth Patient Portal offered by [facility] by registering at the following website: [email or web address]. By joining [facility]’s FollowMyHealth portal, you will also be able to view your health information using other applications (apps) compatible with our system.

## 2021-04-30 NOTE — PROGRESS NOTE ADULT - CRITICAL CARE SERVICES PROVIDED
Critical care services provided

## 2021-04-30 NOTE — PROGRESS NOTE ADULT - SUBJECTIVE AND OBJECTIVE BOX
*INCOMPLETE  Patient is a 56y old  Male who presents with a chief complaint of SOB (30 Apr 2021 08:28)    INTERVAL HPI/OVERNIGHT EVENTS:   Did not require Versed.   Afebrile, hemodynamically stable     Subjective: Patient seen and examined at bedside. Unable to obtain ROS due to trach and neuro status.    ICU Vital Signs Last 24 Hrs  T(C): 37.1 (30 Apr 2021 09:36), Max: 37.8 (30 Apr 2021 05:03)  T(F): 98.8 (30 Apr 2021 09:36), Max: 100.1 (30 Apr 2021 05:03)  HR: 78 (30 Apr 2021 11:00) (78 - 101)  BP: 101/58 (30 Apr 2021 11:00) (101/58 - 132/69)  BP(mean): 76 (30 Apr 2021 11:00) (72 - 104)  ABP: --  ABP(mean): --  RR: 22 (30 Apr 2021 11:00) (9 - 35)  SpO2: 98% (30 Apr 2021 11:00) (95% - 100%)    I&O's Summary    29 Apr 2021 07:01  -  30 Apr 2021 07:00  --------------------------------------------------------  IN: 1370 mL / OUT: 2880 mL / NET: -1510 mL    30 Apr 2021 07:01  -  30 Apr 2021 12:52  --------------------------------------------------------  IN: 275 mL / OUT: 0 mL / NET: 275 mL      Mode: AC/ CMV (Assist Control/ Continuous Mandatory Ventilation)  RR (machine): 20  TV (machine): 450  FiO2: 40  PEEP: 5  ITime: 0.9  MAP: 11  PIP: 21    PHYSICAL EXAM:  GENERAL: No acute distress   HEAD:  Atraumatic, Normocephalic  HEENT: PERRLA, conjunctiva and sclera clear. Trach site c/d/i   NECK: Supple, No JVD  HEART: RRR; No murmurs, rubs, or gallops  RESPIRATORY: Tachypneic to mid 20s. Mechanical breath sounds, No W/R/R  ABDOMEN: Soft, Nontender, Nondistended; PEG c/d/i   EXTREMITIES:  2+ Peripheral Pulses, No clubbing, cyanosis, or edema  SKIN: Warm, dry, normal color, no rash or abnormal lesions  NEUROLOGY: Opens eyes to name, following commands. Moving extremities x3    LABS:                        7.9    6.63  )-----------( 566      ( 30 Apr 2021 08:06 )             25.5     04-30    138  |  100  |  21  ----------------------------<  134<H>  4.6   |  27  |  1.28    Ca    8.7      30 Apr 2021 08:06  Phos  4.0     04-30  Mg     2.0     04-30    CAPILLARY BLOOD GLUCOSE      POCT Blood Glucose.: 152 mg/dL (30 Apr 2021 11:43)  POCT Blood Glucose.: 133 mg/dL (30 Apr 2021 06:18)  POCT Blood Glucose.: 152 mg/dL (30 Apr 2021 00:08)  POCT Blood Glucose.: 139 mg/dL (29 Apr 2021 17:38)    Consultant(s) Notes Reviewed:  [x ] YES  [ ] NO    MEDICATIONS  (STANDING):  albuterol/ipratropium for Nebulization 3 milliLiter(s) Nebulizer every 6 hours  allopurinol 100 milliGRAM(s) Oral daily  chlorhexidine 0.12% Liquid 15 milliLiter(s) Oral Mucosa every 12 hours  dextrose 40% Gel 15 Gram(s) Oral once  dextrose 5%. 1000 milliLiter(s) (50 mL/Hr) IV Continuous <Continuous>  dextrose 5%. 1000 milliLiter(s) (100 mL/Hr) IV Continuous <Continuous>  dextrose 50% Injectable 25 Gram(s) IV Push once  dextrose 50% Injectable 12.5 Gram(s) IV Push once  dextrose 50% Injectable 25 Gram(s) IV Push once  enoxaparin Injectable 40 milliGRAM(s) SubCutaneous every 12 hours  glucagon  Injectable 1 milliGRAM(s) IntraMuscular once  HYDROmorphone   Tablet 2 milliGRAM(s) Oral every 4 hours  insulin regular  human corrective regimen sliding scale   SubCutaneous every 6 hours  LORazepam     Tablet 4 milliGRAM(s) Oral every 8 hours  QUEtiapine 75 milliGRAM(s) Oral every 8 hours  tamsulosin 0.8 milliGRAM(s) Oral at bedtime    MEDICATIONS  (PRN):  acetaminophen    Suspension .. 650 milliGRAM(s) Oral every 6 hours PRN Temp greater or equal to 38C (100.4F)  midazolam Injectable 4 milliGRAM(s) IV Push every 6 hours PRN anxiety/agitation      Care Discussed with Consultants/Other Providers [ x] YES  [ ] NO    RADIOLOGY & ADDITIONAL TESTS: REVIEWED

## 2021-04-30 NOTE — PROGRESS NOTE ADULT - ASSESSMENT
55 yo M with PMHx of MU and gout who presented to ED with c/o progressively worsening SOB admitted to ICU for management of AHRF in the setting of COVID. Course c/b GRACE, ileus, septic shock 2/2 aspiration event, Afib with RVR. Now trached and PEG'd.    NEURO  #Sedated   -No IV sedation infusing at this time  -Current regimen via PEG: Dilaudid 2 mg q4hrs, Seroquel 75 mg BID, Ativan 4 mg q8hrs (Dilaudid decreased from 6mg to 2mg today)   -Versed 4 mg IVP PRN q6hrs for breakthrough agitation, has not required since lasdt night     RESPIRATORY  #Acute hypoxic hypercapnic respiratory failure in the setting of COVID PNA  -Patient initially presented with O2 saturation of 14% and cyanotic requiring immediate intubation  -CXR with diffuse bilateral opacities with + COVID19 PCR  -Elevated inflammatory markers  -Tracheostomy on 4/22  -Currently on AC/CMV  -Will trial one hour of CPAP vent support q4-6hrs alternating with trach collar    #COVID  -CXR with diffuse bilateral opacities with + COVID19 PCR  -Decadron (4/5-4/13) d/c'ed due to clinical presentation  -Remdesevir (4/5-4/9)    #MU  -As per patient's wife, patient has MU on CPAP  -Patient sleeps with 2-3 pillows at night, attributed to MU    CARDIOVASCULAR  #Atrial Fibrillation- RESOLVED  -Patient went into afib with RVR 4/17 @ about 1830  -No new medications started as resolved  -Anticoagulation for afib not started, patient now in NSR and [id number] Score 0 as patient has no CV hx (however, on admission patient was found to have low EF- repeat ECHO with EF 55%)    GI  -s/p PEG 4/21  -Tolerating tube feedings  -Vital 1.5 @ 55 mL/hr   -GI ppx with pantoprazole d/c'd due to eosinophilia and patient being enterally fed at goal  -Rectal tube in place  -Banatrol added to help with diarrhea  -Bowel regimen d/c'd due to diarrhea  -Rectal tube output decreased, will re-assess for discontinuation    #Ileus 2/2 opioids - RESOLVED and having BMs  -s/p sump for decompression  -s/p methylnaltrexone with BM afterwards    RENAL//F&E  #Acute Kidney Injury  -Baseline creatinine unknown, no reported kidney disease  -Urine lytes 4/28 showing pre-renal, 2L LR given 4/28 PM  -Renal fxn 4/28 with mild bump in SCr (1.24->1.31)  -Today SCr. 1.28  -Ordered for 1L NC today 4/30  -Strict I&O monitoring  -Avoid nephrotoxic medications    #Urinary Retention  -Patient had alas d/c'd 4/26 and failed TOV  -Has required bladder scanning and straight cath'ing  -Flomax 0.8mg started 4/27 PM    ID  #Persistent Fever  -Recent aspiration pneumonia c/b septic shock (now resolved); sputum culture yielded growth of normal respiratory lexie. Fever despite meropenem ?2/2 periodic aspiration vs. COVID-19 vs. new nosocomial infectious process  -Meropenem x7 days (4/16-4/22)  -Tmax 38.9 overnight, will resend sputum cx  -UA negative  -BCx2 from 4/27 no growth at 12 hours  -Pantoprazole d/c'd as patient with eosinophilia (was also on meropenem course)  -Will monitor off abx with low grade fevers unless hemodynamically unstable   -Sputum cx pending    #Aspiration pneumonia vs HAP  -Found 4/14 while trying to extubate w/ delirium, and numerous thick secretions in oral cavity requiring suctioning. 4/15 AM found w/ 102.3F oral temp and WBC increase to 18s, suspect possible aspiration pneumonia, with previous negative MRSA swab. 4/14 bcx NGTD, 4/15 bcx NGTD  -Zosyn started 4/9 and d/c'd 4/16 in the setting of continued septic shock  -Decision made to broaden antibiotics to meropenem 1 gram and vancomycin 1250 mg  -MRSA swab negative, no addt'l vanco dosed  -s/p meropenem course completion 4/16-4/22    #COVID PNA   -Plan as above  -Off steroid course, remdesivir course completed    #?Fungal Infxn  -Seen by ID, -ID ok'd Vori (loading doses x 2 given, then on 400 mg q12hr), stopped 4/14 given improvement in pt. status before medication was really started   -As per ID recs ====> s/p BAL 4/12--cultures have so far only yielded Keila dubliniensis which is probably a respiratory tract colonizer (and not the etiology of the patient's fevers). Voriconazole is not indicated for respiratory tract colonization with Candida. Suspect recent high fevers/leukocytosis due to aspiration event; Doubt COVID-19 associated pulmonary aspergillosis given BAL cultures without growth of mold, absence of cavitation on CXR, and time course relatively early for development of this rare entity. Moreover, would not attribute new fevers to discontinuation of voriconazole.  -BAL sputum samples taken from bronch 4/12, showing yeast   -Sputum cx from 4/16, no evidence of yeast  -Peripheral blood cultures from 4/21 NG @ 1 day  -Maintain MAP > 70 mmHg    ENDOCRINE  #Hyperglycemia  -q6hr correctional scale  -Blood glucose trend 120-160's  -Decadron started 4/5 AM, d/c'd 4/13  -A1c 6.2    HEME  #Anemia  -Hgb 11 on admission; unknown baseline. No evidence of bleed.  -Hgb 4/18 7.3, possibly hemodiluted as patient did get fluids and albumin overnight, s/p transfusion 1U PRBC  -Hgb 4/25 7.0 possibly 2/2 phlebotomy with AoCD, s/p transfusion 1u pRBCs  -Today Hgb stable @ 8.1; last pRBC transfusion on 4/25  -Maintain active T+S  -DVT ppx with Lovenox SubQ    DISPO/GOALS OF CARE:  -Full Code  -Plan for LTAC placement (pending)

## 2021-04-30 NOTE — CHART NOTE - NSCHARTNOTEFT_GEN_A_CORE
Admitting Diagnosis:   Patient is a 56y old  Male who presents with a chief complaint of SOB (30 Apr 2021 08:28)      PAST MEDICAL & SURGICAL HISTORY:  Sleep apnea        Current Nutrition Order:  Vital 1.5 Derek @ 55ml/hr x 24hrs plus 2 LPS (200kcal, 30g pro) via PEG (1320ml TV, 2180kcal, 119g protein, 1008ml free H2O, 132% RDI, 1.58g/kg IBW protein, 22.6npc/kg IBW).   *infusing at ordered goal    PO Intake: Good (%) [   ]  Fair (50-75%) [   ] Poor (<25%) [   ]- NA NPO w/EN    GI Issues:   Unable to assess at this time 2/2 trach/vent  No residuals or regurgitation overnight  Rectal tube in place (30ml output x 24hrs)    Pain:   Unable to assess at this time 2/2 trach/vent- ordered for versed IV pushes, seroquel, and dilaudid    Skin Integrity:  Jagdeep 12; generalized trace edema  B/L cheek skin tear  Sacrum stage I pressure injury       Labs:   04-30    138  |  100  |  21  ----------------------------<  134<H>  4.6   |  27  |  1.28    Ca    8.7      30 Apr 2021 08:06  Phos  4.0     04-30  Mg     2.0     04-30      CAPILLARY BLOOD GLUCOSE      POCT Blood Glucose.: 152 mg/dL (30 Apr 2021 11:43)  POCT Blood Glucose.: 133 mg/dL (30 Apr 2021 06:18)  POCT Blood Glucose.: 152 mg/dL (30 Apr 2021 00:08)  POCT Blood Glucose.: 139 mg/dL (29 Apr 2021 17:38)      Medications:  MEDICATIONS  (STANDING):  albuterol/ipratropium for Nebulization 3 milliLiter(s) Nebulizer every 6 hours  allopurinol 100 milliGRAM(s) Oral daily  chlorhexidine 0.12% Liquid 15 milliLiter(s) Oral Mucosa every 12 hours  dextrose 40% Gel 15 Gram(s) Oral once  dextrose 5%. 1000 milliLiter(s) (50 mL/Hr) IV Continuous <Continuous>  dextrose 5%. 1000 milliLiter(s) (100 mL/Hr) IV Continuous <Continuous>  dextrose 50% Injectable 25 Gram(s) IV Push once  dextrose 50% Injectable 12.5 Gram(s) IV Push once  dextrose 50% Injectable 25 Gram(s) IV Push once  enoxaparin Injectable 40 milliGRAM(s) SubCutaneous every 12 hours  glucagon  Injectable 1 milliGRAM(s) IntraMuscular once  HYDROmorphone   Tablet 2 milliGRAM(s) Oral every 4 hours  insulin regular  human corrective regimen sliding scale   SubCutaneous every 6 hours  LORazepam     Tablet 4 milliGRAM(s) Oral every 8 hours  QUEtiapine 75 milliGRAM(s) Oral every 8 hours  sodium chloride 0.9% Bolus 1000 milliLiter(s) IV Bolus once  tamsulosin 0.8 milliGRAM(s) Oral at bedtime    MEDICATIONS  (PRN):  acetaminophen    Suspension .. 650 milliGRAM(s) Oral every 6 hours PRN Temp greater or equal to 38C (100.4F)  midazolam Injectable 4 milliGRAM(s) IV Push every 6 hours PRN anxiety/agitation      Admitted Anthropometrics:  Weight: 5'10"; ABW 120kg; IBW: 75.4kg; %IBW: 166%; BMI: 37    Weight Change:   No new weights recorded since admit     Estimated energy needs:   IBW (75.4kg) used for calculations as pt is >100% of IBW and critically ill  Nutrient needs based on [facility] standards of care for maintenance in older adults.   Needs adjusted for age and hypermetabolic/inflammatory state 2/2 COVID+ and oxygen demands  Energy: 1885-2262kcal (25-30cal/kg)  Protein: 106-121g pro (1.4-1.6g/kg pro)  Fluids per team    Subjective:   56M with PMHx of MU and gout who presented to ED with c/o progressively worsening SOB now admitted to ICU for management of AHRF in the setting of COVID. Pt unable to be weaned from the vent and is now s/p trach on 4/22 and s/p PEG on 4/21. Pt remains trached to vent on CPAP trial, off of sedation drips but ordered for versed IV pushes, seroquel, dilaudid. Not requiring pressors; no A-line in place. NPO w/ EN running at goal of 55ml/hr with no s/s intolerance. Rectal tube in place; may d/c. Febrile to 100.2. Lytes WNL, POC , 153, 139, 117mg/dL. Will continue to follow per RD protocol.     Nutrition Diagnosis:  Increased nutrient needs RT increased demand for kcal/pro 2/2 hypermetabolic state AEB oxygen demands and viral infection COVID+  Active [X ]  Resolved [    ]    Goal: Pt to meet >/=75% EER consistently with good tolerance via most medically appropriate route.     Recommendations:  1. While off of propofol, continue with Vital 1.5 Derek @ 55ml/hr x 24hrs plus 2 LPS (200kcal, 30g pro) via PEG (1320ml TV, 2180kcal, 119g protein, 1008ml free H2O, 132% RDI, 1.58g/kg IBW protein, 22.6npc/kg IBW). Monitor for s/s intolerance; maintain aspiration precautions at all times   2. Monitor lytes and replete prn. POC BG q6hrs (goal 140-180mg/dl)  3. Pain and bowel regimens per team   4. Trend weekly wts  5. Consider checking prealbumin 1x/wk  *d/w MD    Education: n/a    Risk Level: High [   x] Moderate [   ] Low [   ].

## 2021-04-30 NOTE — PROGRESS NOTE ADULT - ATTENDING COMMENTS
More calm on higher seroquel, will try to decrease dilaudid today. Continue CPAP, not tolerating TC trials.

## 2021-04-30 NOTE — PROGRESS NOTE ADULT - NSICDXPILOT_GEN_ALL_CORE
[place]

## 2021-05-02 LAB
CULTURE RESULTS: SIGNIFICANT CHANGE UP
CULTURE RESULTS: SIGNIFICANT CHANGE UP
SPECIMEN SOURCE: SIGNIFICANT CHANGE UP
SPECIMEN SOURCE: SIGNIFICANT CHANGE UP

## 2021-05-05 DIAGNOSIS — K56.7 ILEUS, UNSPECIFIED: ICD-10-CM

## 2021-05-05 DIAGNOSIS — Z78.1 PHYSICAL RESTRAINT STATUS: ICD-10-CM

## 2021-05-05 DIAGNOSIS — J12.82 PNEUMONIA DUE TO CORONAVIRUS DISEASE 2019: ICD-10-CM

## 2021-05-05 DIAGNOSIS — E11.65 TYPE 2 DIABETES MELLITUS WITH HYPERGLYCEMIA: ICD-10-CM

## 2021-05-05 DIAGNOSIS — I48.91 UNSPECIFIED ATRIAL FIBRILLATION: ICD-10-CM

## 2021-05-05 DIAGNOSIS — E87.2 ACIDOSIS: ICD-10-CM

## 2021-05-05 DIAGNOSIS — N17.0 ACUTE KIDNEY FAILURE WITH TUBULAR NECROSIS: ICD-10-CM

## 2021-05-05 DIAGNOSIS — R65.20 SEVERE SEPSIS WITHOUT SEPTIC SHOCK: ICD-10-CM

## 2021-05-05 DIAGNOSIS — E66.9 OBESITY, UNSPECIFIED: ICD-10-CM

## 2021-05-05 DIAGNOSIS — U07.1 COVID-19: ICD-10-CM

## 2021-05-05 DIAGNOSIS — A41.89 OTHER SPECIFIED SEPSIS: ICD-10-CM

## 2021-05-05 DIAGNOSIS — J80 ACUTE RESPIRATORY DISTRESS SYNDROME: ICD-10-CM

## 2021-05-05 DIAGNOSIS — G47.33 OBSTRUCTIVE SLEEP APNEA (ADULT) (PEDIATRIC): ICD-10-CM

## 2021-05-05 DIAGNOSIS — Z99.89 DEPENDENCE ON OTHER ENABLING MACHINES AND DEVICES: ICD-10-CM

## 2021-05-05 DIAGNOSIS — J96.01 ACUTE RESPIRATORY FAILURE WITH HYPOXIA: ICD-10-CM

## 2021-06-02 LAB
CULTURE RESULTS: SIGNIFICANT CHANGE UP
SPECIMEN SOURCE: SIGNIFICANT CHANGE UP

## 2022-01-01 NOTE — PROCEDURE NOTE - NSINFORMCONSENT_GEN_A_CORE
This was an emergent procedure.
wife Karissa [phone]/Benefits, risks, and possible complications of procedure explained to patient/caregiver who verbalized understanding and gave verbal consent.
This was an emergent procedure.
wife/Benefits, risks, and possible complications of procedure explained to patient/caregiver who verbalized understanding and gave verbal consent.
02-Dec-2021

## 2022-08-06 NOTE — PROCEDURE NOTE - NSSITEPREP_SKIN_A_CORE
povidone iodine (if allergic to chlorhexidine)
chlorhexidine/Adherence to aseptic technique: hand hygiene prior to donning barriers (gown, gloves), don cap and mask, sterile drape over patient
chlorhexidine
This document is complete and the patient is ready for discharge.

## 2022-11-04 NOTE — PROGRESS NOTE ADULT - ASSESSMENT
Pt received from triage. Pt alert & responsive with mom at bedside. Pt noted to have onc history & fever x 2 days with tachycardia. MD made aware & code onc called. IV inserted & labs sent. Fluids started immediately. Abx to be initiated pending receipt from pharmacy. Call bell within reach. Safety maintained. Patient is a 57 yo M with PMHx of MU and gout who presented to ED with c/o progressively worsening SOB admitted to ICU for management of AHRF in the setting of COVID.     NEURO  #Sedated   -propofol and fentanyl and precedex, goal to (0 to -1)  -Will increase precedex to come down on other drips, planning for CPAP trial today  -Seroquel regimen avoid delirium, will increase as needed, 75daytime and 100 nighttime started 4/14  -Neuro checks per ICU protocol, pain/sedation meds assessed and addressed  -Continue with wrist restraints to prevent patient harm    RESPIRATORY  #Acute hypoxic hypercapnic respiratory failure in the setting of COVID PNA  -Patient initially presented with O2 saturation of 14% and cyanotic requiring immediate intubation  -CXr with diffuse bilateral opacities with + COVID19 PCR  -Elevated inflammatory markers  -On AC/VC mechanical Ventilation:  Mode: AC/ CMV; RR: 24; TV: 450; FiO2: 40; PEEP: 5  -Will attempt CPAP trials for potential extubation again today    #COVID  -CXr with diffuse bilateral opacities with + COVID19 PCR  -Start Date 4/5 Remdesivir and Decadron 6 mg (x 10 day course)  -Decadron d/c'd after 4/13 AM dose due to current clinical presentation  -Remdesevir course given 4/5-4/9 per chart review (was briefly d/c'd due to renal fxn worsening but was REORDERED and completed)    #MU  -As per patient's wife, patient has MU on CPAP  -Patient sleeps with 2-3 pillows at night, attributed to MU    CARDIOVASCULAR  #RV dilation  -Bedside POCUS demonstrating dilated RV and mildly enlarged pulm artery possibly 2/2 MU vs PE (unlikely)  -Echo: Severe hypokinesis of the entire anteroseptum/inferoseptum. Akinesis of the mid to apical anterolateral region, apical anterior, apical inferior and true apex. Mild hypokinesis of the basal anterolateral, basal anterior and basal inferior regions. The estimated left ventricular systolic function is 15%. (repeat 4/13 55%). The right ventricle is mildly dilated. Right ventricular systolic function is mildly reduced. No pericardial effusion is seen.  -Based on these findings, cardiology was consulted  -As per spouse, no hx of cardiac disease and no change in ADLs or activity tolerance prior to current presentation  -Holding off on diuretics as per primary team    #LV Dysfunction  As per Cardiology:  -Echo reviewed. Images were poor due to body habitus but suggest a stress cardiomyopathy pattern (takotsubo cardiomyopathy) rather than an ischemic event   -EKG reviewed showing NSR, with non specific ST changes and poor R wave progression in the precordial leads likely due to body habitus  -Clinically patient is warm and well perfused and making urine.  -Patient's LV dysfunction appears to be multifactorial COVID, Morbid Obesity, Sleep Apnea. Myocarditis is also a possibility; now exacerbated in setting of COVID PNA  -Unfortunately patient is unable to undergo further ischemic or confirmatory imaging/workup  -Hold off BB or ACE/ARB therapy while on pressors with Renal impairment  -EKG with RBBB, without ischemic changes  -Please keep K>4 and Mg>2  -Repeat ECHO 4/13 with EF 55%    GI  #Constipation:  -Still no BM since 4/11 per report, even on bowel regimen, wet read 4/15 xray with abd xray showing dilated loops of bowel with no apparent fluid collection, s/p dulcolax suppository 4/14 w/out BM. 4/15 will trial methylnaltrexone to aid w/ BM.   -Protonix IVP for GI ppx  -Tube feeds    RENAL//F&E  #Acute Kidney Injury- RESOLVING  -SCr peaked in [place] 3.16 likely 2/2 alas obstruction  -Trending down from mid 2's over last few days => => 1.52 => 1.69 4/15, will monitor  -Alas catheter changed 4/12  -Baseline creatinine unknown, no reported kidney disease  -Electrolytes stable  -Strict I&O monitoring  -Avoid nephrotoxic medications    #Hypernatremia- RESOLVED  -Na 147 => 145 4/15 after 4/14 put on D5 at 70cc/hr for 20 hr starting 2 pm  -Continue to monitor    ID  #?Aspiration pneumonia  -Found 4/14 while trying to extubate w/ delirium, and numerous thick secretions in oral cavity requiring suctioning. 4/15 AM found w/ 102.3F oral temp and WBC increase to 18s, suspect possible aspiration pneumonia, pt is still on zosyn, with previous negative MRSA swab. 4/14 bcx NGTD, will continue to follow.     #COVID PNA   -Plan as above  -Remains on steroid rx, remdesivir course completed    #Pneumonia:  -Zosyn started 4/9 x 7-10 day course, last day scheduled as 4/16 but will have to reassess given possible aspiration event 4/14    #Fungal infxn- RESOLVING  -Seen by ID, -ID ok'd Vori (loading doses x 2 given, now on 400 mg q12hr), stopped 4/14 given improvement in pt status before medication was really started   -Daily EKG for QTc monitoring  -fungitell 288 and galactomannon, aspergillus Ab  -BAL sputum samples taken from bronch 4/12, showing yeast   -Peripheral blood cultures ordered x 2; one 4/12 grew coag - staph, likely contaminant    -Central line changed 4/12  -Maintain MAP > 65 mmHg    #Leukocytosis  -See ID above, continue to monitor    ENDOCRINE  #Hyperglycemia  -q6hr correctional scale ordered, will adjust as needed, may need premeal  -Lantus 12 units, regular insulin 2 units as per order  -Blood glucose trend   -Decadron started 4/5 AM, d/c'd 4/13  -A1c 6.2    HEME  #Anemia  -Hgb 11 on admission; unknown baseline. No evidence of bleed.  -Hgb today 9.7  -Trend CBC  -Maintain active T+S  -Transfuse for Hb <7    -DVT ppx with Lovenox BID    DISPO/GOALS OF CARE:  Patient requires continuous monitoring with bedside rhythm monitoring, arterial line, pulse oximetry, ventilator monitoring and intermittent blood gas analysis. Care plan discussed with ICU care team. Patient remains critical at this time. Patient is a 57 yo M with PMHx of MU and gout who presented to ED with c/o progressively worsening SOB admitted to ICU for management of AHRF in the setting of COVID.     NEURO  #Sedated   -propofol and fentanyl and precedex, goal to (0 to -1)  -Will increase precedex to come down on other drips, planning for CPAP trial today  -Seroquel regimen avoid delirium, will increase as needed, continue 75 daytime and 100 nighttime started 4/14  -Neuro checks per ICU protocol, pain/sedation meds assessed and addressed  -Continue with wrist restraints to prevent patient harm    RESPIRATORY  #Acute hypoxic hypercapnic respiratory failure in the setting of COVID PNA  -Patient initially presented with O2 saturation of 14% and cyanotic requiring immediate intubation  -CXr with diffuse bilateral opacities with + COVID19 PCR  -Elevated inflammatory markers  -On AC/VC mechanical Ventilation:  Mode: AC/ CMV; RR: 24; TV: 450; FiO2: 40; PEEP: 5  -Will attempt CPAP trials for potential extubation again today    #COVID  -CXr with diffuse bilateral opacities with + COVID19 PCR  -Start Date 4/5 Remdesivir and Decadron 6 mg (x 10 day course)  -Decadron d/c'd after 4/13 AM dose due to current clinical presentation  -Remdesevir course given 4/5-4/9 per chart review (was briefly d/c'd due to renal fxn worsening but was REORDERED and completed)    #MU  -As per patient's wife, patient has MU on CPAP  -Patient sleeps with 2-3 pillows at night, attributed to MU    CARDIOVASCULAR  #RV dilation  -Bedside POCUS demonstrating dilated RV and mildly enlarged pulm artery possibly 2/2 MU vs PE (unlikely)  -Echo: Severe hypokinesis of the entire anteroseptum/inferoseptum. Akinesis of the mid to apical anterolateral region, apical anterior, apical inferior and true apex. Mild hypokinesis of the basal anterolateral, basal anterior and basal inferior regions. The estimated left ventricular systolic function is 15%. (repeat 4/13 55%). The right ventricle is mildly dilated. Right ventricular systolic function is mildly reduced. No pericardial effusion is seen.  -Based on these findings, cardiology was consulted  -As per spouse, no hx of cardiac disease and no change in ADLs or activity tolerance prior to current presentation  -Holding off on diuretics as per primary team    #LV Dysfunction  As per Cardiology:  -Echo reviewed. Images were poor due to body habitus but suggest a stress cardiomyopathy pattern (takotsubo cardiomyopathy) rather than an ischemic event   -EKG reviewed showing NSR, with non specific ST changes and poor R wave progression in the precordial leads likely due to body habitus  -Clinically patient is warm and well perfused and making urine.  -Patient's LV dysfunction appears to be multifactorial COVID, Morbid Obesity, Sleep Apnea. Myocarditis is also a possibility; now exacerbated in setting of COVID PNA  -Unfortunately patient is unable to undergo further ischemic or confirmatory imaging/workup  -Hold off BB or ACE/ARB therapy while on pressors with Renal impairment  -EKG with RBBB, without ischemic changes  -Please keep K>4 and Mg>2  -Repeat ECHO 4/13 with EF 55%    GI  #Constipation:  -Still no BM since 4/11 per report, even on bowel regimen, wet read 4/15 xray with abd xray showing dilated loops of bowel with no apparent fluid collection, s/p dulcolax suppository 4/14 w/out BM. 4/15 trialed methylnaltrexone to aid w/ BM, with resultant BM- will continue to monitor  -Protonix IVP for GI ppx  -Tube feeds    RENAL//F&E  #Acute Kidney Injury- RESOLVING  -SCr peaked in [place] 3.16 likely 2/2 alas obstruction  -Trending down from mid 2's over last few days => => 1.52 => 1.69 4/15, will monitor; pt w/ fever likely has lots of insensible losses, will obtain and f/u urine lytes  -Alas catheter changed 4/12  -Baseline creatinine unknown, no reported kidney disease  -Electrolytes stable  -Strict I&O monitoring  -Avoid nephrotoxic medications    #Hypernatremia- RESOLVED  -Na 147 => 145 4/15 after 4/14 put on D5 at 70cc/hr for 20 hr starting 2 pm  -Continue to monitor    ID  #?Aspiration pneumonia  -Found 4/14 while trying to extubate w/ delirium, and numerous thick secretions in oral cavity requiring suctioning. 4/15 AM found w/ 102.3F oral temp and WBC increase to 18s, suspect possible aspiration pneumonia, pt is still on zosyn, with previous negative MRSA swab. 4/14 bcx NGTD, 4/15 CXR looks similar, if not mildly improved, but not great insp effort, so can't really assess for opac; lines were already changed recently; Will obtain two more bcx sets 4/15, ucx, continue zosyn    #COVID PNA   -Plan as above  -Remains on steroid rx, remdesivir course completed    #Pneumonia:  -Zosyn started 4/9 x 7-10 day course, last day scheduled as 4/16 but will have to reassess given possible aspiration event 4/14    #Fungal infxn- RESOLVING  -Seen by ID, -ID ok'd Vori (loading doses x 2 given, now on 400 mg q12hr), stopped 4/14 given improvement in pt status before medication was really started   -Daily EKG for QTc monitoring  -fungitell 288 and galactomannon, aspergillus Ab  -BAL sputum samples taken from bronch 4/12, showing yeast   -Peripheral blood cultures ordered x 2; one 4/12 grew coag - staph, likely contaminant  -BAL results showing candidal dublinensis- per ID, colonizer, will not require voriconazole much longer as likely just 2/2 colonizer, may not need    -Central line changed 4/12  -Maintain MAP > 65 mmHg    #Leukocytosis  -See ID above, continue to monitor    ENDOCRINE  #Hyperglycemia  -q6hr correctional scale ordered, will adjust as needed, may need premeal  -Lantus 12 units, regular insulin 2 units as per order  -Blood glucose trend   -Decadron started 4/5 AM, d/c'd 4/13  -A1c 6.2    HEME  #Anemia  -Hgb 11 on admission; unknown baseline. No evidence of bleed.  -Hgb today 9.7  -Trend CBC  -Maintain active T+S  -Transfuse for Hb <7    -DVT ppx with Lovenox BID    DISPO/GOALS OF CARE:  Patient requires continuous monitoring with bedside rhythm monitoring, arterial line, pulse oximetry, ventilator monitoring and intermittent blood gas analysis. Care plan discussed with ICU care team. Patient remains critical at this time.
